# Patient Record
Sex: MALE | Race: WHITE | Employment: OTHER | URBAN - METROPOLITAN AREA
[De-identification: names, ages, dates, MRNs, and addresses within clinical notes are randomized per-mention and may not be internally consistent; named-entity substitution may affect disease eponyms.]

---

## 2017-01-02 PROBLEM — J30.2 SEASONAL ALLERGIC RHINITIS: Status: ACTIVE | Noted: 2017-01-02

## 2017-01-18 DIAGNOSIS — I10 ESSENTIAL HYPERTENSION: Primary | ICD-10-CM

## 2017-01-18 NOTE — TELEPHONE ENCOUNTER
CARVEDILOL 3.125MG    Last Written Prescription Date: 1/28/16  Last Fill Quantity: 180, # refills: 3  Last Office Visit with [facility], [facility] or [facility] prescribing provider: 10/25/16       POTASSIUM   Date Value Ref Range Status   12/29/2015 4.3 3.4 - 5.3 mmol/L Final     CREATININE   Date Value Ref Range Status   12/29/2015 0.80 0.66 - 1.25 mg/dL Final     BP Readings from Last 3 Encounters:   10/25/16 136/60   10/19/16 138/58   07/15/16 130/60

## 2017-01-19 RX ORDER — CARVEDILOL 3.12 MG/1
TABLET ORAL
Qty: 180 TABLET | Refills: 0 | Status: SHIPPED | OUTPATIENT
Start: 2017-01-19 | End: 2017-04-17

## 2017-01-22 DIAGNOSIS — I10 ESSENTIAL HYPERTENSION, BENIGN: Primary | ICD-10-CM

## 2017-01-23 NOTE — TELEPHONE ENCOUNTER
Cozaar 100mg      Last Written Prescription Date: 8/1/2016  Last Fill Quantity: 90, # refills: 1  Last Office Visit with [facility] or [facility] prescribing provider: 10/19/2016       POTASSIUM   Date Value Ref Range Status   12/29/2015 4.3 3.4 - 5.3 mmol/L Final     CREATININE   Date Value Ref Range Status   12/29/2015 0.80 0.66 - 1.25 mg/dL Final     BP Readings from Last 3 Encounters:   10/25/16 136/60   10/19/16 138/58   07/15/16 130/60

## 2017-01-24 RX ORDER — LOSARTAN POTASSIUM 100 MG/1
TABLET ORAL
Qty: 90 TABLET | Refills: 1 | Status: SHIPPED | OUTPATIENT
Start: 2017-01-24 | End: 2017-06-28

## 2017-01-24 NOTE — TELEPHONE ENCOUNTER
K and Cr overdue.  Pended CMP and routing to PCP.  Please add labs as needed.  Triage: call patient to set up lab only appt.

## 2017-02-16 DIAGNOSIS — N52.9 ED (ERECTILE DYSFUNCTION): ICD-10-CM

## 2017-02-16 RX ORDER — SILDENAFIL CITRATE 100 MG
TABLET ORAL
Qty: 12 TABLET | Refills: 2 | Status: SHIPPED | OUTPATIENT
Start: 2017-02-16 | End: 2018-01-04

## 2017-02-16 NOTE — TELEPHONE ENCOUNTER
sildenafil (VIAGRA) 100 MG tablet  Last Written Prescription Date: 01/13/2016  Last Fill Quantity: 12,  # refills: 3   Last Office Visit with [facility], [facility] or [facility] prescribing provider: 10/19/2016    Prescription approved per [facility] Refill Protocol.

## 2017-04-17 DIAGNOSIS — I10 ESSENTIAL HYPERTENSION: ICD-10-CM

## 2017-04-18 RX ORDER — CARVEDILOL 3.12 MG/1
TABLET ORAL
Qty: 180 TABLET | Refills: 0 | Status: SHIPPED | OUTPATIENT
Start: 2017-04-18 | End: 2017-11-07

## 2017-04-18 NOTE — TELEPHONE ENCOUNTER
carvedilol (COREG) 3.125 MG tablet     Last Written Prescription Date: 1/19/17  Last Fill Quantity: 180, # refills: 0    Last Office Visit with [facility], [facility] or [facility] prescribing provider:  10/19/16   Future Office Visit:        BP Readings from Last 3 Encounters:   10/25/16 136/60   10/19/16 138/58   07/15/16 130/60

## 2017-04-18 NOTE — TELEPHONE ENCOUNTER
Prescription approved per [facility], [facility] or [facility] refill protocol.  Tanya Teresa RN  [facility] [employer]

## 2017-06-27 ENCOUNTER — OFFICE VISIT (OUTPATIENT)
Dept: FAMILY MEDICINE | Facility: CLINIC | Age: 77
End: 2017-06-27
Payer: COMMERCIAL

## 2017-06-27 VITALS
SYSTOLIC BLOOD PRESSURE: 138 MMHG | BODY MASS INDEX: 35.36 KG/M2 | DIASTOLIC BLOOD PRESSURE: 60 MMHG | HEART RATE: 84 BPM | HEIGHT: 66 IN | WEIGHT: 220 LBS | RESPIRATION RATE: 16 BRPM

## 2017-06-27 DIAGNOSIS — K62.5 RECTAL BLEEDING: ICD-10-CM

## 2017-06-27 DIAGNOSIS — R19.7 DIARRHEA, UNSPECIFIED TYPE: Primary | ICD-10-CM

## 2017-06-27 LAB
BASOPHILS # BLD AUTO: 0 10E9/L (ref 0–0.2)
BASOPHILS NFR BLD AUTO: 0.1 %
DIFFERENTIAL METHOD BLD: ABNORMAL
EOSINOPHIL # BLD AUTO: 0.1 10E9/L (ref 0–0.7)
EOSINOPHIL NFR BLD AUTO: 1 %
ERYTHROCYTE [DISTWIDTH] IN BLOOD BY AUTOMATED COUNT: 13.3 % (ref 10–15)
HCT VFR BLD AUTO: 39.4 % (ref 40–53)
HGB BLD-MCNC: 13 G/DL (ref 13.3–17.7)
LYMPHOCYTES # BLD AUTO: 1.3 10E9/L (ref 0.8–5.3)
LYMPHOCYTES NFR BLD AUTO: 19.2 %
MCH RBC QN AUTO: 30.8 PG (ref 26.5–33)
MCHC RBC AUTO-ENTMCNC: 33 G/DL (ref 31.5–36.5)
MCV RBC AUTO: 93 FL (ref 78–100)
MONOCYTES # BLD AUTO: 0.7 10E9/L (ref 0–1.3)
MONOCYTES NFR BLD AUTO: 10.2 %
NEUTROPHILS # BLD AUTO: 4.7 10E9/L (ref 1.6–8.3)
NEUTROPHILS NFR BLD AUTO: 69.5 %
PLATELET # BLD AUTO: 194 10E9/L (ref 150–450)
RBC # BLD AUTO: 4.22 10E12/L (ref 4.4–5.9)
WBC # BLD AUTO: 6.8 10E9/L (ref 4–11)

## 2017-06-27 PROCEDURE — 36415 COLL VENOUS BLD VENIPUNCTURE: CPT | Performed by: FAMILY MEDICINE

## 2017-06-27 PROCEDURE — 85025 COMPLETE CBC W/AUTO DIFF WBC: CPT | Performed by: FAMILY MEDICINE

## 2017-06-27 PROCEDURE — 99213 OFFICE O/P EST LOW 20 MIN: CPT | Performed by: FAMILY MEDICINE

## 2017-06-27 NOTE — NURSING NOTE
"Chief Complaint   Patient presents with     Gastrointestinal Problem       Initial /60  Pulse 84  Resp 16  Ht 5' 6\" (1.676 m)  Wt 220 lb (99.8 kg)  BMI 35.51 kg/m2 Estimated body mass index is 35.51 kg/(m^2) as calculated from the following:    Height as of this encounter: 5' 6\" (1.676 m).    Weight as of this encounter: 220 lb (99.8 kg).  Medication Reconciliation: complete     Charlene Michele CMA      "

## 2017-06-27 NOTE — MR AVS SNAPSHOT
After Visit Summary   6/27/2017    Timmy Strange    MRN: [id number]           Patient Information     Date Of Birth          1940        Visit Information        Provider Department      6/27/2017 11:15 AM Samir Duque MD [facility]        Today's Diagnoses     Diarrhea, unspecified type    -  1    Rectal bleeding          Care Instructions    The patient has an appointment next Monday with colorectal surgeons.  I will have him go back on his Citrucel increasing the dose every couple of days until his stools become formed again.  We'll also increase his Imodium by taking 2 initially and then 1 after each loose stool up to 8 daily.  He is going to be getting himself scheduled for a physical later this month.  We did do a stool specimen for ova and parasites, his CBC and a test for Giardia.  Will treat further pending review of those tests.          Follow-ups after your visit        Future tests that were ordered for you today     Open Future Orders        Priority Expected Expires Ordered    Giardia antigen Routine  6/27/2018 6/27/2017    Ova and Parasite Exam Routine Routine  6/27/2018 6/27/2017            Who to contact     If you have questions or need follow up information about today's clinic visit or your schedule please contact [facility] directly at [phone].  Normal or non-critical lab and imaging results will be communicated to you by MyChart, letter or phone within 4 business days after the clinic has received the results. If you do not hear from us within 7 days, please contact the clinic through MyChart or phone. If you have a critical or abnormal lab result, we will notify you by phone as soon as possible.  Submit refill requests through [employer] or call your pharmacy and they will forward the refill request to us. Please allow 3 business days for your refill to be completed.          Additional Information  "About Your Visit        MyChart Information     [employer] gives you secure access to your electronic health record. If you see a primary care provider, you can also send messages to your care team and make appointments. If you have questions, please call your primary care clinic.  If you do not have a primary care provider, please call [phone] and they will assist you.        Care EveryWhere ID     This is your Care EveryWhere ID. This could be used by other organizations to access your [facility] medical records  [id number]        Your Vitals Were     Pulse Respirations Height BMI (Body Mass Index)          84 16 5' 6\" (1.676 m) 35.51 kg/m2         Blood Pressure from Last 3 Encounters:   06/27/17 138/60   10/25/16 136/60   10/19/16 138/58    Weight from Last 3 Encounters:   06/27/17 220 lb (99.8 kg)   10/25/16 222 lb 3.2 oz (100.8 kg)   10/19/16 223 lb (101.2 kg)              We Performed the Following     CBC with platelets differential          Today's Medication Changes          These changes are accurate as of: 6/27/17 12:34 PM.  If you have any questions, ask your nurse or doctor.               These medicines have changed or have updated prescriptions.        Dose/Directions    metFORMIN 500 MG tablet   Commonly known as:  GLUCOPHAGE   This may have changed:    - how much to take  - when to take this   Used for:  Type II diabetes mellitus with peripheral circulatory disorder (H)        Dose:  1000 mg   Take 2 tablets (1,000 mg) by mouth 2 times daily (with meals)   Quantity:  120 tablet   Refills:  2                Primary Care Provider Office Phone # Fax #    Samir Duque [phone]       [facility] [address]  [facility] [address]        Equal Access to Services     ABILIO CRESPO AH: Darron Marquis, wajustin cuenca, qaybta kaalramon campos, jessica jane. So [facility] [phone].    ATENCIÓN: Si jame kraus " a mckeon disposición servicios gratuitos de asistencia lingüística. Von drew [phone].    We comply with applicable federal civil rights laws and [place] laws. We do not discriminate on the basis of race, color, national origin, age, disability sex, sexual orientation or gender identity.            Thank you!     Thank you for choosing [facility]  for your care. Our goal is always to provide you with excellent care. Hearing back from our patients is one way we can continue to improve our services. Please take a few minutes to complete the written survey that you may receive in the mail after your visit with us. Thank you!             Your Updated Medication List - Protect others around you: Learn how to safely use, store and throw away your medicines at www.disposemymeds.org.          This list is accurate as of: 6/27/17 12:34 PM.  Always use your most recent med list.                   Brand Name Dispense Instructions for use Diagnosis    ALLEGRA PO           aspirin 81 MG tablet      Take 1 tablet by mouth daily.        carvedilol 3.125 MG tablet    COREG    180 tablet    TAKE 1 TABLET BY MOUTH TWICE DAILY    Essential hypertension       CENTRUM SILVER per tablet      Take 1 tablet by mouth daily        COLACE PO      Take 100 mg by mouth 2 times daily        finasteride 5 MG tablet    PROSCAR    90 tablet    TAKE 1 TABLET BY MOUTH EVERY DAY    Benign non-nodular prostatic hyperplasia without lower urinary tract symptoms       [facility] MOUTHWASH Susp     237 mL    Swish and swallow 5-10 mLs in mouth every 6 hours as needed    Tongue fissure, Other iron deficiency anemia, Need for prophylactic vaccination and inoculation against influenza       fluticasone 50 MCG/ACT spray    FLONASE    48 mL    INHALE 1 TO 2 SPRAYS IN EACH NOSTRIL EVERY DAY    Seasonal allergic rhinitis       insulin lispro protamine-insulin lispro injection    HumaLOG MIX 75/25 VIAL    1 vial    20-22 units  twice daily    Type II diabetes mellitus with peripheral circulatory disorder (H)       LEVOTHROID 88 MCG tablet   Generic drug:  levothyroxine      Take 88 mcg by mouth daily.        losartan 100 MG tablet    COZAAR    90 tablet    TAKE 1 TABLET BY MOUTH EVERY DAY    Essential hypertension, benign       lovastatin 40 MG tablet    MEVACOR    90 tablet    Take 1 tablet by mouth At Bedtime.    Hyperlipidemia LDL goal <70       metFORMIN 500 MG tablet    GLUCOPHAGE    120 tablet    Take 2 tablets (1,000 mg) by mouth 2 times daily (with meals)    Type II diabetes mellitus with peripheral circulatory disorder (H)       PROBIOTIC DAILY PO      Take 1 capsule by mouth daily        tamsulosin 0.4 MG capsule    FLOMAX    90 capsule    TAKE 1 CAPSULE BY MOUTH EVERY DAY    Benign non-nodular prostatic hyperplasia without lower urinary tract symptoms       VIAGRA 100 MG cap/tab   Generic drug:  sildenafil     12 tablet    TAKE 1 TABLET BY MOUTH 30 MINUTES TO 4 HOURS PRIOR TO ACTIVITY. MAX  MG PER 24 HOURS    ED (erectile dysfunction)

## 2017-06-27 NOTE — PROGRESS NOTES
SUBJECTIVE:                                                    Timmy Strange is a 76 year old male who presents to clinic today for the following health issues:      Gastrointestinal symptoms      Duration: ONGOING    Description:           BLEEDING - bright red blood in the stool    Intensity:  moderate    Accompanying signs and symptoms:  Diarrhea X 5 DAYS    History  Previous {similar problem: no   Previous evaluation:  GI consultation    Aggravating factors: none    Alleviating factors: Imodium     Other Therapies tried: None          Problem list and histories reviewed & adjusted, as indicated.  Additional history: patient did travel to a friend's cabin outside of [place] where they have been having some issues with well water.  He also had been to colorectal surgeons where they banded hemorrhoid and because he was having problems with constipation put him on Citrucel and MiraLAX.  He has stopped both once his diarrhea started.    Patient Active Problem List   Diagnosis     Essential hypertension     Hypothyroidism     Type II diabetes mellitus with peripheral circulatory disorder (H)     BPH (benign prostatic hyperplasia)     ED (erectile dysfunction)     Non morbid obesity due to excess calories: comorbid HTN< DM, hyperlipidemia     Mixed hyperlipidemia     Special screening for malignant neoplasm of prostate     Localized edema     Seasonal allergic rhinitis     Past Surgical History:   Procedure Laterality Date     COLONOSCOPY N/A 11/21/2014    Procedure: COMBINED COLONOSCOPY, SINGLE OR MULTIPLE BIOPSY/POLYPECTOMY BY BIOPSY;  Surgeon: Rolanda Bey MD;  Location:  GI     TONSILLECTOMY, ADENOIDECTOMY, COMBINED         Social History   Substance Use Topics     Smoking status: Former Smoker     Types: Pipe     Quit date: 11/15/2011     Smokeless tobacco: Never Used     Alcohol use No     Family History   Problem Relation Age of Onset     DIABETES Maternal Grandmother      DIABETES Maternal  "Grandfather      Arthritis Maternal Grandfather      Arthritis Father      Thyroid Disease Father      Glaucoma Mother      Alcohol/Drug Mother 49     cirrhosis     Glaucoma Maternal Aunt      DIABETES Daughter 44     type 2     Obesity Daughter      Coronary Artery Disease No family hx of      Hypertension No family hx of      Hyperlipidemia No family hx of      CEREBROVASCULAR DISEASE No family hx of      Breast Cancer No family hx of      Colon Cancer No family hx of      Prostate Cancer No family hx of      Other Cancer No family hx of      Depression No family hx of      Anxiety Disorder No family hx of      MENTAL ILLNESS No family hx of      Substance Abuse No family hx of      Anesthesia Reaction No family hx of      Asthma No family hx of      OSTEOPOROSIS No family hx of      Genetic Disorder No family hx of      Unknown/Adopted No family hx of            Reviewed and updated as needed this visit by clinical staff  Tobacco  Allergies  Meds  Med Hx  Surg Hx  Fam Hx  Soc Hx      Reviewed and updated as needed this visit by Provider         ROS:  CONSTITUTIONAL:NEGATIVE for fever, chills, change in weight  GI: POSITIVE for diarrhea and bright red blood around the stool but none in the stool and NEGATIVE for abdominal pain  and vomiting    OBJECTIVE:                                                    /60  Pulse 84  Resp 16  Ht 5' 6\" (1.676 m)  Wt 220 lb (99.8 kg)  BMI 35.51 kg/m2  Body mass index is 35.51 kg/(m^2).  GENERAL APPEARANCE: healthy, alert and no distress  ABDOMEN: Examination of the rectum shows what might be the remnants of his previous anal fissure.  I did not do a digital rectal exam today due to his previous bleeding issues.         ASSESSMENT/PLAN:                                                        ICD-10-CM    1. Diarrhea, unspecified type R19.7 Giardia antigen     Ova and Parasite Exam Routine     CBC with platelets differential   2. Rectal bleeding K62.5        Patient " Instructions   The patient has an appointment next Monday with colorectal surgeons.  I will have him go back on his Citrucel increasing the dose every couple of days until his stools become formed again.  We'll also increase his Imodium by taking 2 initially and then 1 after each loose stool up to 8 daily.  He is going to be getting himself scheduled for a physical later this month.  We did do a stool specimen for ova and parasites, his CBC and a test for Giardia.  Will treat further pending review of those tests.      Samir Duque MD  [facility]

## 2017-06-27 NOTE — PATIENT INSTRUCTIONS
The patient has an appointment next Monday with colorectal surgeons.  I will have him go back on his Citrucel increasing the dose every couple of days until his stools become formed again.  We'll also increase his Imodium by taking 2 initially and then 1 after each loose stool up to 8 daily.  He is going to be getting himself scheduled for a physical later this month.  We did do a stool specimen for ova and parasites, his CBC and a test for Giardia.  Will treat further pending review of those tests.

## 2017-06-28 DIAGNOSIS — R19.7 DIARRHEA, UNSPECIFIED TYPE: ICD-10-CM

## 2017-06-28 PROCEDURE — 87329 GIARDIA AG IA: CPT | Performed by: FAMILY MEDICINE

## 2017-06-28 PROCEDURE — 87209 SMEAR COMPLEX STAIN: CPT | Performed by: FAMILY MEDICINE

## 2017-06-28 PROCEDURE — 87177 OVA AND PARASITES SMEARS: CPT | Performed by: FAMILY MEDICINE

## 2017-06-29 LAB
G LAMBLIA AG STL QL IA: NORMAL
MICRO REPORT STATUS: NORMAL
MICRO REPORT STATUS: NORMAL
O+P STL MICRO: NORMAL
SPECIMEN SOURCE: NORMAL
SPECIMEN SOURCE: NORMAL

## 2017-09-26 DIAGNOSIS — I10 ESSENTIAL HYPERTENSION, BENIGN: ICD-10-CM

## 2017-09-26 NOTE — TELEPHONE ENCOUNTER
LOSARTAN 100MG TABLETS      Last Written Prescription Date: 6/29/2017  Last Fill Quantity: 30, # refills: 0  Last Office Visit with [facility], [facility] or [facility] prescribing provider: 6/27/2017       Potassium   Date Value Ref Range Status   12/29/2015 4.3 3.4 - 5.3 mmol/L Final     Creatinine   Date Value Ref Range Status   12/29/2015 0.80 0.66 - 1.25 mg/dL Final     BP Readings from Last 3 Encounters:   06/27/17 138/60   10/25/16 136/60   10/19/16 138/58

## 2017-09-27 RX ORDER — LOSARTAN POTASSIUM 100 MG/1
TABLET ORAL
Qty: 30 TABLET | Refills: 0 | Status: SHIPPED | OUTPATIENT
Start: 2017-09-27 | End: 2017-10-23

## 2017-10-04 DIAGNOSIS — N40.0 BENIGN NON-NODULAR PROSTATIC HYPERPLASIA WITHOUT LOWER URINARY TRACT SYMPTOMS: ICD-10-CM

## 2017-10-04 RX ORDER — TAMSULOSIN HYDROCHLORIDE 0.4 MG/1
CAPSULE ORAL
Qty: 90 CAPSULE | Refills: 2 | Status: SHIPPED | OUTPATIENT
Start: 2017-10-04 | End: 2018-08-21

## 2017-10-04 RX ORDER — FINASTERIDE 5 MG/1
TABLET, FILM COATED ORAL
Qty: 90 TABLET | Refills: 2 | Status: SHIPPED | OUTPATIENT
Start: 2017-10-04 | End: 2019-12-16

## 2017-10-04 NOTE — TELEPHONE ENCOUNTER
TAMSULOSIN 0.4MG CAPSULES         Last Written Prescription Date: 10/26/2016  Last Fill Quantity: 90, # refills: 3    Last Office Visit with [facility], [facility] or [facility] prescribing provider:  6/27/2017   Future Office Visit:      BP Readings from Last 3 Encounters:   06/27/17 138/60   10/25/16 136/60   10/19/16 138/58     FINASTERIDE 5MG TABLETS         Last Written Prescription Date: 10/26/2016  Last Fill Quantity: 90, # refills: 3    Last Office Visit with [facility], [facility] or  [facility] prescribing provider:  6/27/2017   Future Office Visit:      BP Readings from Last 3 Encounters:   06/27/17 138/60   10/25/16 136/60   10/19/16 138/58

## 2017-10-04 NOTE — TELEPHONE ENCOUNTER
Tamsulosin     Routing refill request to provider for review/approval because:  Drug interaction warning    Finasteride     Prescription approved per [facility] Refill Protocol.

## 2017-11-07 ENCOUNTER — OFFICE VISIT (OUTPATIENT)
Dept: FAMILY MEDICINE | Facility: CLINIC | Age: 77
End: 2017-11-07
Payer: COMMERCIAL

## 2017-11-07 VITALS
HEIGHT: 66 IN | SYSTOLIC BLOOD PRESSURE: 124 MMHG | RESPIRATION RATE: 16 BRPM | WEIGHT: 220 LBS | HEART RATE: 66 BPM | BODY MASS INDEX: 35.36 KG/M2 | DIASTOLIC BLOOD PRESSURE: 70 MMHG

## 2017-11-07 DIAGNOSIS — J30.2 CHRONIC SEASONAL ALLERGIC RHINITIS, UNSPECIFIED TRIGGER: ICD-10-CM

## 2017-11-07 DIAGNOSIS — E66.09 NON MORBID OBESITY DUE TO EXCESS CALORIES: ICD-10-CM

## 2017-11-07 DIAGNOSIS — E78.5 HYPERLIPIDEMIA LDL GOAL <70: ICD-10-CM

## 2017-11-07 DIAGNOSIS — R60.0 LOCALIZED EDEMA: ICD-10-CM

## 2017-11-07 DIAGNOSIS — I10 ESSENTIAL HYPERTENSION: ICD-10-CM

## 2017-11-07 DIAGNOSIS — N40.0 BENIGN PROSTATIC HYPERPLASIA WITHOUT LOWER URINARY TRACT SYMPTOMS: ICD-10-CM

## 2017-11-07 DIAGNOSIS — E78.2 MIXED HYPERLIPIDEMIA: ICD-10-CM

## 2017-11-07 DIAGNOSIS — Z00.00 ROUTINE MEDICAL EXAM: Primary | ICD-10-CM

## 2017-11-07 DIAGNOSIS — Z12.5 SCREENING FOR PROSTATE CANCER: ICD-10-CM

## 2017-11-07 DIAGNOSIS — E11.51 TYPE II DIABETES MELLITUS WITH PERIPHERAL CIRCULATORY DISORDER (H): ICD-10-CM

## 2017-11-07 DIAGNOSIS — Z13.89 SCREENING FOR DIABETIC PERIPHERAL NEUROPATHY: ICD-10-CM

## 2017-11-07 DIAGNOSIS — E03.9 ACQUIRED HYPOTHYROIDISM: ICD-10-CM

## 2017-11-07 LAB
ALBUMIN SERPL-MCNC: 3.7 G/DL (ref 3.4–5)
ALBUMIN UR-MCNC: NEGATIVE MG/DL
ALP SERPL-CCNC: 50 U/L (ref 40–150)
ALT SERPL W P-5'-P-CCNC: 23 U/L (ref 0–70)
ANION GAP SERPL CALCULATED.3IONS-SCNC: 6 MMOL/L (ref 3–14)
APPEARANCE UR: CLEAR
AST SERPL W P-5'-P-CCNC: 17 U/L (ref 0–45)
BASOPHILS # BLD AUTO: 0 10E9/L (ref 0–0.2)
BASOPHILS NFR BLD AUTO: 0.4 %
BILIRUB SERPL-MCNC: 1.2 MG/DL (ref 0.2–1.3)
BILIRUB UR QL STRIP: NEGATIVE
BUN SERPL-MCNC: 23 MG/DL (ref 7–30)
CALCIUM SERPL-MCNC: 8.9 MG/DL (ref 8.5–10.1)
CHLORIDE SERPL-SCNC: 105 MMOL/L (ref 94–109)
CHOLEST SERPL-MCNC: 162 MG/DL
CO2 SERPL-SCNC: 29 MMOL/L (ref 20–32)
COLOR UR AUTO: YELLOW
CREAT SERPL-MCNC: 0.86 MG/DL (ref 0.66–1.25)
DIFFERENTIAL METHOD BLD: ABNORMAL
EOSINOPHIL # BLD AUTO: 0.1 10E9/L (ref 0–0.7)
EOSINOPHIL NFR BLD AUTO: 2.2 %
ERYTHROCYTE [DISTWIDTH] IN BLOOD BY AUTOMATED COUNT: 14.6 % (ref 10–15)
GFR SERPL CREATININE-BSD FRML MDRD: 87 ML/MIN/1.7M2
GLUCOSE SERPL-MCNC: 118 MG/DL (ref 70–99)
GLUCOSE UR STRIP-MCNC: NEGATIVE MG/DL
HCT VFR BLD AUTO: 39.9 % (ref 40–53)
HDLC SERPL-MCNC: 52 MG/DL
HGB BLD-MCNC: 13.3 G/DL (ref 13.3–17.7)
HGB UR QL STRIP: NEGATIVE
KETONES UR STRIP-MCNC: ABNORMAL MG/DL
LDLC SERPL CALC-MCNC: 87 MG/DL
LEUKOCYTE ESTERASE UR QL STRIP: NEGATIVE
LYMPHOCYTES # BLD AUTO: 1.5 10E9/L (ref 0.8–5.3)
LYMPHOCYTES NFR BLD AUTO: 29.5 %
MCH RBC QN AUTO: 29.9 PG (ref 26.5–33)
MCHC RBC AUTO-ENTMCNC: 33.3 G/DL (ref 31.5–36.5)
MCV RBC AUTO: 90 FL (ref 78–100)
MONOCYTES # BLD AUTO: 0.8 10E9/L (ref 0–1.3)
MONOCYTES NFR BLD AUTO: 14.9 %
NEUTROPHILS # BLD AUTO: 2.7 10E9/L (ref 1.6–8.3)
NEUTROPHILS NFR BLD AUTO: 53 %
NITRATE UR QL: NEGATIVE
NONHDLC SERPL-MCNC: 110 MG/DL
PH UR STRIP: 6.5 PH (ref 5–7)
PLATELET # BLD AUTO: 190 10E9/L (ref 150–450)
POTASSIUM SERPL-SCNC: 4.4 MMOL/L (ref 3.4–5.3)
PROT SERPL-MCNC: 6.7 G/DL (ref 6.8–8.8)
PSA SERPL-ACNC: 0.79 UG/L (ref 0–4)
RBC # BLD AUTO: 4.45 10E12/L (ref 4.4–5.9)
RBC #/AREA URNS AUTO: ABNORMAL /HPF
SODIUM SERPL-SCNC: 140 MMOL/L (ref 133–144)
SOURCE: ABNORMAL
SP GR UR STRIP: 1.02 (ref 1–1.03)
TRIGL SERPL-MCNC: 115 MG/DL
TSH SERPL DL<=0.005 MIU/L-ACNC: 0.98 MU/L (ref 0.4–4)
UROBILINOGEN UR STRIP-ACNC: 0.2 EU/DL (ref 0.2–1)
WBC # BLD AUTO: 5 10E9/L (ref 4–11)
WBC #/AREA URNS AUTO: ABNORMAL /HPF

## 2017-11-07 PROCEDURE — G0439 PPPS, SUBSEQ VISIT: HCPCS | Performed by: FAMILY MEDICINE

## 2017-11-07 PROCEDURE — 80050 GENERAL HEALTH PANEL: CPT | Performed by: FAMILY MEDICINE

## 2017-11-07 PROCEDURE — 81001 URINALYSIS AUTO W/SCOPE: CPT | Performed by: FAMILY MEDICINE

## 2017-11-07 PROCEDURE — G0103 PSA SCREENING: HCPCS | Performed by: FAMILY MEDICINE

## 2017-11-07 PROCEDURE — 36415 COLL VENOUS BLD VENIPUNCTURE: CPT | Performed by: FAMILY MEDICINE

## 2017-11-07 PROCEDURE — 80061 LIPID PANEL: CPT | Performed by: FAMILY MEDICINE

## 2017-11-07 RX ORDER — CARVEDILOL 3.12 MG/1
3.12 TABLET ORAL 2 TIMES DAILY
Qty: 180 TABLET | Refills: 0 | Status: SHIPPED | OUTPATIENT
Start: 2017-11-07 | End: 2018-02-07

## 2017-11-07 RX ORDER — LOVASTATIN 40 MG
40 TABLET ORAL AT BEDTIME
Qty: 90 TABLET | Refills: 3 | Status: SHIPPED | OUTPATIENT
Start: 2017-11-07 | End: 2019-01-10

## 2017-11-07 NOTE — PROGRESS NOTES
SUBJECTIVE:   Timmy Strange is a 77 year old male who presents for Preventive Visit.  click delete buetton to remove this line now  click delete button to remove this line now  Are you in the first 12 months of your Medicare coverage?  No    Physical   Annual:     Getting at least 3 servings of Calcium per day::  Yes    Bi-annual eye exam::  Yes    Dental care twice a year::  Yes    Sleep apnea or symptoms of sleep apnea::  None    Diet::  Regular (no restrictions)    Frequency of exercise::  2-3 days/week    Duration of exercise::  15-30 minutes    Taking medications regularly::  Yes    Medication side effects::  None    Additional concerns today::  No      COGNITIVE SCREEN  1) Repeat 3 items (Banana, Sunrise, Chair)    2) Clock draw: NORMAL  3) 3 item recall: Recalls 3 objects  Results: 3 items recalled: COGNITIVE IMPAIRMENT LESS LIKELY    Mini-CogTM Copyright S Love. Licensed by the author for use in [facility]; reprinted with permission (ash@[facility]). All rights reserved.          Reviewed and updated as needed this visit by clinical staffTobacco  Allergies  Meds  Med Hx  Surg Hx  Fam Hx  Soc Hx        Reviewed and updated as needed this visit by Provider        Social History   Substance Use Topics     Smoking status: Former Smoker     Types: Pipe     Quit date: 11/15/2011     Smokeless tobacco: Never Used     Alcohol use No       The patient does not drink >3 drinks per day nor >7 drinks per week.      Today's PHQ-2 Score:   PHQ-2 ( 1999 [employer]) 2/27/2018   Q1: Little interest or pleasure in doing things 0   Q2: Feeling down, depressed or hopeless 0   PHQ-2 Score 0   Q1: Little interest or pleasure in doing things -   Q2: Feeling down, depressed or hopeless -   PHQ-2 Score -       Do you feel safe in your environment - Yes    Do you have a Health Care Directive?: Yes: Advance Directive has been received and scanned.    Current providers sharing in care for this patient include:    Patient Care Team:  Samir Duque MD as PCP - General (Family Practice)      Hearing impairment: Yes,    Ability to successfully perform activities of daily living: Yes, no assistance needed     Fall risk:  Fallen 2 or more times in the past year?: No  Any fall with injury in the past year?: No      Home safety:  none identified  click delete button to remove this line now    The following health maintenance items are reviewed in Epic and correct as of today:  Health Maintenance   Topic Date Due     AORTIC ANEURYSM SCREENING (SYSTEM ASSIGNED)  10/21/2007     EYE EXAM Q1 YEAR  05/01/2014     ADVANCE DIRECTIVE PLANNING Q5 YRS  12/26/2016     MICROALBUMIN Q1 YEAR  12/29/2016     A1C Q6 MO  01/15/2017     FOOT EXAM Q1 YEAR  07/15/2017     INFLUENZA VACCINE (1) 09/01/2018     CREATININE Q1 YEAR  11/07/2018     FALL RISK ASSESSMENT  11/07/2018     LIPID MONITORING Q1 YEAR  11/07/2018     PHQ-2 Q1 YR  02/27/2019     TSH W/ FREE T4 REFLEX Q2 YEAR  11/07/2019     COLONOSCOPY Q5 YR  11/21/2019     TETANUS IMMUNIZATION (SYSTEM ASSIGNED)  08/24/2020     PNEUMOCOCCAL  Completed     Patient Active Problem List   Diagnosis     Essential hypertension     Hypothyroidism     Type II diabetes mellitus with peripheral circulatory disorder (H)     BPH (benign prostatic hyperplasia)     ED (erectile dysfunction)     Non morbid obesity due to excess calories: comorbid HTN< DM, hyperlipidemia     Mixed hyperlipidemia     Special screening for malignant neoplasm of prostate     Localized edema     Seasonal allergic rhinitis     Screening for prostate cancer     Past Surgical History:   Procedure Laterality Date     COLONOSCOPY N/A 11/21/2014    Procedure: COMBINED COLONOSCOPY, SINGLE OR MULTIPLE BIOPSY/POLYPECTOMY BY BIOPSY;  Surgeon: Rolanda Bey MD;  Location:  GI     TONSILLECTOMY, ADENOIDECTOMY, COMBINED         Social History   Substance Use Topics     Smoking status: Former Smoker     Types: Pipe     Quit date:  "11/15/2011     Smokeless tobacco: Never Used     Alcohol use No     Family History   Problem Relation Age of Onset     Diabetes Maternal Grandmother      Diabetes Maternal Grandfather      Arthritis Maternal Grandfather      Arthritis Father      Thyroid Disease Father      Glaucoma Mother      Alcohol/Drug Mother 49     cirrhosis     Diabetes Daughter 44     type 2     Obesity Daughter      Glaucoma Maternal Aunt      Coronary Artery Disease No family hx of      Hypertension No family hx of      Hyperlipidemia No family hx of      Cerebrovascular Disease No family hx of      Breast Cancer No family hx of      Colon Cancer No family hx of      Prostate Cancer No family hx of      Other Cancer No family hx of      Depression No family hx of      Anxiety Disorder No family hx of      Mental Illness No family hx of      Substance Abuse No family hx of      Anesthesia Reaction No family hx of      Asthma No family hx of      Osteoperosis No family hx of      Genetic Disorder No family hx of      Unknown/Adopted No family hx of                Review of Systems  C: NEGATIVE for fever, chills, change in weight  I: NEGATIVE for worrisome rashes, moles or lesions  E: NEGATIVE for vision changes or irritation  E/M: NEGATIVE for ear, mouth and throat problems  R: NEGATIVE for significant cough or SOB  B: NEGATIVE for masses, tenderness or discharge  CV: NEGATIVE for chest pain, palpitations or peripheral edema  GI: NEGATIVE for nausea, abdominal pain, heartburn, or change in bowel habits  : NEGATIVE for frequency, dysuria, or hematuria  M: NEGATIVE for significant arthralgias or myalgia  N: NEGATIVE for weakness, dizziness or paresthesias  E: NEGATIVE for temperature intolerance, skin/hair changes  H: NEGATIVE for bleeding problems  P: NEGATIVE for changes in mood or affect    OBJECTIVE:   /70  Pulse 66  Resp 16  Ht 5' 6\" (1.676 m)  Wt 220 lb (99.8 kg)  BMI 35.51 kg/m2 Estimated body mass index is 35.51 kg/(m^2) " "as calculated from the following:    Height as of this encounter: 5' 6\" (1.676 m).    Weight as of this encounter: 220 lb (99.8 kg).  Physical Exam  GENERAL: healthy, alert and no distress  EYES: Eyes grossly normal to inspection, PERRL and conjunctivae and sclerae normal  HENT: ear canals and TM's normal, nose and mouth without ulcers or lesions  NECK: no adenopathy, no asymmetry, masses, or scars and thyroid normal to palpation  RESP: lungs clear to auscultation - no rales, rhonchi or wheezes  CV: regular rate and rhythm, normal S1 S2, no S3 or S4, no murmur, click or rub, no peripheral edema and peripheral pulses strong  ABDOMEN: soft, nontender, no hepatosplenomegaly, no masses and bowel sounds normal   (male): normal male genitalia without lesions or urethral discharge, no hernia  RECTAL: normal sphincter tone, no rectal masses, prostate normal size, smooth, nontender without nodules or masses  MS: no gross musculoskeletal defects noted, no edema  SKIN: no suspicious lesions or rashes  NEURO: Normal strength and tone, mentation intact and speech normal  PSYCH: mentation appears normal, affect normal/bright    ASSESSMENT / PLAN:       ICD-10-CM    1. Routine medical exam Z00.00    2. Screening for diabetic peripheral neuropathy Z13.89    3. Chronic seasonal allergic rhinitis, unspecified trigger J30.2    4. Non morbid obesity due to excess calories: comorbid HTN< DM, hyperlipidemia E66.09    5. Mixed hyperlipidemia E78.2    6. Type II diabetes mellitus with peripheral circulatory disorder (H) E11.51    7. Acquired hypothyroidism E03.9 TSH with free T4 reflex   8. Essential hypertension I10 UA with Microscopic     CBC with platelets differential     Comprehensive metabolic panel     Lipid Profile     DISCONTINUED: carvedilol (COREG) 3.125 MG tablet   9. Benign prostatic hyperplasia without lower urinary tract symptoms N40.0    10. Localized edema R60.0    11. Screening for prostate cancer Z12.5 Prostate spec " "antigen screen   12. Hyperlipidemia LDL goal <70 E78.5 lovastatin (MEVACOR) 40 MG tablet       End of Life Planning:  Patient currently has an advanced directive: Yes.  Practitioner is supportive of decision.    COUNSELING:  Reviewed preventive health counseling, as reflected in patient instructions       Vision screening       Prostate cancer screening        Estimated body mass index is 35.51 kg/(m^2) as calculated from the following:    Height as of this encounter: 5' 6\" (1.676 m).    Weight as of this encounter: 220 lb (99.8 kg).  Weight management plan: Discussed healthy diet and exercise guidelines and patient will follow up in 6 months in clinic to re-evaluate.   reports that he quit smoking about 6 years ago. His smoking use included Pipe. He has never used smokeless tobacco.        Appropriate preventive services were discussed with this patient, including applicable screening as appropriate for cardiovascular disease, diabetes, osteopenia/osteoporosis, and glaucoma.  As appropriate for age/gender, discussed screening for colorectal cancer, prostate cancer, breast cancer, and cervical cancer. Checklist reviewing preventive services available has been given to the patient.    Reviewed patients plan of care and provided an AVS. The Basic Care Plan (routine screening as documented in Health Maintenance) for Timmy meets the Care Plan requirement. This Care Plan has been established and reviewed with the Patient.    Counseling Resources:  ATP IV Guidelines  Pooled Cohorts Equation Calculator  Breast Cancer Risk Calculator  FRAX Risk Assessment  ICSI Preventive Guidelines  Dietary Guidelines for Americans, 2010  [employer] MyPlate  ASA Prophylaxis  Lung CA Screening    Samir Duque MD  [facility] XERXESAnswers for HPI/ROS submitted by the patient on 11/4/2017   PHQ-2 Score: 0    "

## 2017-11-07 NOTE — NURSING NOTE
"Chief Complaint   Patient presents with     Physical       Initial /70  Pulse 66  Resp 16  Ht 5' 6\" (1.676 m)  Wt 220 lb (99.8 kg)  BMI 35.51 kg/m2 Estimated body mass index is 35.51 kg/(m^2) as calculated from the following:    Height as of this encounter: 5' 6\" (1.676 m).    Weight as of this encounter: 220 lb (99.8 kg).  Medication Reconciliation: complete     Charlene Michele CMA      "

## 2017-11-07 NOTE — MR AVS SNAPSHOT
After Visit Summary   11/7/2017    Timmy Strange    MRN: [id number]           Patient Information     Date Of Birth          1940        Visit Information        Provider Department      11/7/2017 9:45 AM Samir Duque MD [facility]        Today's Diagnoses     Routine medical exam    -  1    Screening for diabetic peripheral neuropathy        Chronic seasonal allergic rhinitis, unspecified trigger        Non morbid obesity due to excess calories: comorbid HTN< DM, hyperlipidemia        Mixed hyperlipidemia        Type II diabetes mellitus with peripheral circulatory disorder (H)        Acquired hypothyroidism        Essential hypertension        Benign prostatic hyperplasia without lower urinary tract symptoms        Localized edema        Screening for prostate cancer        Hyperlipidemia LDL goal <70          Care Instructions      Preventive Health Recommendations:   Male Ages 65 and over    Yearly exam:             See your health care provider every year in order to  o   Review health changes.   o   Discuss preventive care.    o   Review your medicines if your doctor has prescribed any.    Talk with your health care provider about whether you should have a test to screen for prostate cancer (PSA).    Every 3 years, have a diabetes test (fasting glucose). If you are at risk for diabetes, you should have this test more often.    Every 5 years, have a cholesterol test. Have this test more often if you are at risk for high cholesterol or heart disease.     Every 10 years, have a colonoscopy. Or, have a yearly FIT test (stool test). These exams will check for colon cancer.    Talk to with your health care provider about screening for Abdominal Aortic Aneurysm if you have a family history of AAA or have a history of smoking.    Shots:     Get a flu shot each year.     Get a tetanus shot every 10 years.     Talk to your doctor about your pneumonia  vaccines. There are now two you should receive - Pneumovax (PPSV 23) and Prevnar (PCV 13).     Talk to your doctor about a shingles vaccine.     Talk to your doctor about the hepatitis B vaccine.  Nutrition:     Eat at least 5 servings of fruits and vegetables each day.     Eat whole-grain bread, whole-wheat pasta and brown rice instead of white grains and rice.     Talk to your provider about Calcium and Vitamin D.   Lifestyle    Exercise for at least 150 minutes a week (30 minutes a day, 5 days a week). This will help you control your weight and prevent disease.     Limit alcohol to one drink per day.     No smoking.     Wear sunscreen to prevent skin cancer.     See your dentist every six months for an exam and cleaning.     See your eye doctor every 1 to 2 years to screen for conditions such as glaucoma, macular degeneration, cataracts, etc           Follow-ups after your visit        Who to contact     If you have questions or need follow up information about today's clinic visit or your schedule please contact [facility] directly at [phone].  Normal or non-critical lab and imaging results will be communicated to you by [email or web address], letter or phone within 4 business days after the clinic has received the results. If you do not hear from us within 7 days, please contact the clinic through [employer] or phone. If you have a critical or abnormal lab result, we will notify you by phone as soon as possible.  Submit refill requests through [employer] or call your pharmacy and they will forward the refill request to us. Please allow 3 business days for your refill to be completed.          Additional Information About Your Visit        [employer] Information     [employer] gives you secure access to your electronic health record. If you see a primary care provider, you can also send messages to your care team and make appointments. If you have questions, please call your primary care clinic.  If you  "do not have a primary care provider, please call [phone] and they will assist you.        Care EveryWhere ID     This is your Care EveryWhere ID. This could be used by other organizations to access your [place] medical records  [id number]        Your Vitals Were     Pulse Respirations Height BMI (Body Mass Index)          66 16 5' 6\" (1.676 m) 35.51 kg/m2         Blood Pressure from Last 3 Encounters:   11/07/17 124/70   06/27/17 138/60   10/25/16 136/60    Weight from Last 3 Encounters:   11/07/17 220 lb (99.8 kg)   06/27/17 220 lb (99.8 kg)   10/25/16 222 lb 3.2 oz (100.8 kg)              We Performed the Following     CBC with platelets differential     Comprehensive metabolic panel     Lipid Profile     Prostate spec antigen screen     TSH with free T4 reflex     UA with Microscopic          Today's Medication Changes          These changes are accurate as of: 11/7/17 10:15 AM.  If you have any questions, ask your nurse or doctor.               These medicines have changed or have updated prescriptions.        Dose/Directions    carvedilol 3.125 MG tablet   Commonly known as:  COREG   This may have changed:  See the new instructions.   Used for:  Essential hypertension   Changed by:  Samir Duque MD        Dose:  3.125 mg   Take 1 tablet (3.125 mg) by mouth 2 times daily   Quantity:  180 tablet   Refills:  0       metFORMIN 500 MG tablet   Commonly known as:  GLUCOPHAGE   This may have changed:    - how much to take  - when to take this   Used for:  Type II diabetes mellitus with peripheral circulatory disorder (H)        Dose:  1000 mg   Take 2 tablets (1,000 mg) by mouth 2 times daily (with meals)   Quantity:  120 tablet   Refills:  2            Where to get your medicines      These medications were sent to [id number] - [place], [place] - [address] [facility] [address]  [address], [place] [address]     Phone:  [phone]     carvedilol 3.125 MG tablet    " lovastatin 40 MG tablet                Primary Care Provider Office Phone # Fax #    Samir Duque [phone]       [address] [facility] [address]        Equal Access to Services     ABILIO CRESPO : Hadii aad ku hadlilyo Soomaali, waaxda luqadaha, qaybta kaalmada adeegyada, waxjustin idiin carlosn ademarino dominguez lasanam jane. So [facility] [phone].    ATENCIÓN: Si habla español, tiene a mckeon disposición servicios gratuitos de asistencia lingüística. Llame al [phone].    We comply with applicable federal civil rights laws and [place] laws. We do not discriminate on the basis of race, color, national origin, age, disability, sex, sexual orientation, or gender identity.            Thank you!     Thank you for choosing [facility]  for your care. Our goal is always to provide you with excellent care. Hearing back from our patients is one way we can continue to improve our services. Please take a few minutes to complete the written survey that you may receive in the mail after your visit with us. Thank you!             Your Updated Medication List - Protect others around you: Learn how to safely use, store and throw away your medicines at www.disposemymeds.org.          This list is accurate as of: 11/7/17 10:15 AM.  Always use your most recent med list.                   Brand Name Dispense Instructions for use Diagnosis    ALLEGRA PO           aspirin 81 MG tablet      Take 1 tablet by mouth daily.        carvedilol 3.125 MG tablet    COREG    180 tablet    Take 1 tablet (3.125 mg) by mouth 2 times daily    Essential hypertension       CENTRUM SILVER per tablet      Take 1 tablet by mouth daily        COLACE PO      Take 100 mg by mouth 2 times daily        finasteride 5 MG tablet    PROSCAR    90 tablet    TAKE 1 TABLET BY MOUTH EVERY DAY    Benign non-nodular prostatic hyperplasia without lower urinary tract symptoms       [facility] MOUTHWASH Susp     237 mL     Swish and swallow 5-10 mLs in mouth every 6 hours as needed    Tongue fissure, Other iron deficiency anemia, Need for prophylactic vaccination and inoculation against influenza       fluticasone 50 MCG/ACT spray    FLONASE    48 mL    INHALE 1 TO 2 SPRAYS IN EACH NOSTRIL EVERY DAY    Seasonal allergic rhinitis       insulin lispro protamine-insulin lispro injection    HumaLOG MIX 75/25 VIAL    1 vial    20-22 units twice daily    Type II diabetes mellitus with peripheral circulatory disorder (H)       LEVOTHROID 88 MCG tablet   Generic drug:  levothyroxine      Take 88 mcg by mouth daily.        losartan 100 MG tablet    COZAAR    30 tablet    TAKE 1 TABLET BY MOUTH EVERY DAY    Essential hypertension, benign       lovastatin 40 MG tablet    MEVACOR    90 tablet    Take 1 tablet (40 mg) by mouth At Bedtime    Hyperlipidemia LDL goal <70       metFORMIN 500 MG tablet    GLUCOPHAGE    120 tablet    Take 2 tablets (1,000 mg) by mouth 2 times daily (with meals)    Type II diabetes mellitus with peripheral circulatory disorder (H)       PROBIOTIC DAILY PO      Take 1 capsule by mouth daily        tamsulosin 0.4 MG capsule    FLOMAX    90 capsule    TAKE 1 CAPSULE BY MOUTH EVERY DAY    Benign non-nodular prostatic hyperplasia without lower urinary tract symptoms       VIAGRA 100 MG tablet   Generic drug:  sildenafil     12 tablet    TAKE 1 TABLET BY MOUTH 30 MINUTES TO 4 HOURS PRIOR TO ACTIVITY. MAX  MG PER 24 HOURS    ED (erectile dysfunction)

## 2017-11-08 NOTE — PROGRESS NOTES
Dear Timmy,    Your tests were all normal. A copy of your tests are available in My Chart.    Glad to see you at your appointment.  If you have any questions feel free to call.      Sincerely,      TOO Hardy.

## 2017-11-18 DIAGNOSIS — I10 ESSENTIAL HYPERTENSION, BENIGN: ICD-10-CM

## 2017-11-20 RX ORDER — LOSARTAN POTASSIUM 100 MG/1
TABLET ORAL
Qty: 90 TABLET | Refills: 3 | Status: SHIPPED | OUTPATIENT
Start: 2017-11-20 | End: 2018-11-14

## 2017-11-20 NOTE — TELEPHONE ENCOUNTER
Last OV 11/07/2017.   Prescription approved per [facility] Refill Protocol.    Requested Prescriptions   Pending Prescriptions Disp Refills     losartan (COZAAR) 100 MG tablet [Pharmacy Med Name: LOSARTAN 100MG TABLETS] 30 tablet 0     Sig: TAKE 1 TABLET BY MOUTH EVERY DAY    Angiotensin-II Receptors Passed    11/18/2017  9:52 AM       Passed - Blood pressure under 140/90 in past 12 months.    BP Readings from Last 3 Encounters:   11/07/17 124/70   06/27/17 138/60   10/25/16 136/60                Passed - Recent or future visit with authorizing provider's specialty    Patient had office visit in the last year or has a visit in the next 30 days with authorizing provider.  See chart review.              Passed - Patient is age 18 or older       Passed - Normal serum creatinine on file in past 12 months    Recent Labs   Lab Test  11/07/17   1014   CR  0.86            Passed - Normal serum potassium on file in past 12 months    Recent Labs   Lab Test  11/07/17   1014   POTASSIUM  4.4

## 2018-01-02 ENCOUNTER — MYC MEDICAL ADVICE (OUTPATIENT)
Dept: FAMILY MEDICINE | Facility: CLINIC | Age: 78
End: 2018-01-02

## 2018-01-02 DIAGNOSIS — N52.9 ERECTILE DYSFUNCTION, UNSPECIFIED ERECTILE DYSFUNCTION TYPE: ICD-10-CM

## 2018-01-04 RX ORDER — SILDENAFIL 100 MG/1
TABLET, FILM COATED ORAL
Qty: 12 TABLET | Refills: 2 | Status: SHIPPED | OUTPATIENT
Start: 2018-01-04 | End: 2019-01-15

## 2018-02-07 DIAGNOSIS — I10 ESSENTIAL HYPERTENSION: ICD-10-CM

## 2018-02-08 RX ORDER — CARVEDILOL 3.12 MG/1
TABLET ORAL
Qty: 180 TABLET | Refills: 2 | Status: SHIPPED | OUTPATIENT
Start: 2018-02-08 | End: 2018-12-17

## 2018-02-08 NOTE — TELEPHONE ENCOUNTER
"Requested Prescriptions   Pending Prescriptions Disp Refills     carvedilol (COREG) 3.125 MG tablet [Pharmacy Med Name: CARVEDILOL 3.125MG TABLETS]  Last Written Prescription Date:  11/7/17  Last Fill Quantity: 180,  # refills: 0   Last Office Visit  11/7/2017        with  [facility], P or [facility] prescribing provider:     Future Office Visit:        180 tablet 0     Sig: TAKE ONE TABLET BY MOUTH TWICE DAILY    Beta-Blockers Protocol Passed    2/7/2018  9:57 AM       Passed - Blood pressure under 140/90    BP Readings from Last 3 Encounters:   11/07/17 124/70   06/27/17 138/60   10/25/16 136/60                Passed - Patient is age 6 or older       Passed - Recent or future visit with authorizing provider's specialty    Patient had office visit in the last year or has a visit in the next 30 days with authorizing provider.  See \"Patient Info\" tab in inbasket, or \"Choose Columns\" in Meds & Orders section of the refill encounter.               "

## 2018-02-27 ENCOUNTER — OFFICE VISIT (OUTPATIENT)
Dept: FAMILY MEDICINE | Facility: CLINIC | Age: 78
End: 2018-02-27
Payer: COMMERCIAL

## 2018-02-27 VITALS
OXYGEN SATURATION: 98 % | BODY MASS INDEX: 36 KG/M2 | HEART RATE: 74 BPM | RESPIRATION RATE: 16 BRPM | HEIGHT: 66 IN | DIASTOLIC BLOOD PRESSURE: 60 MMHG | WEIGHT: 224 LBS | TEMPERATURE: 97.8 F | SYSTOLIC BLOOD PRESSURE: 134 MMHG

## 2018-02-27 DIAGNOSIS — J06.9 UPPER RESPIRATORY TRACT INFECTION, UNSPECIFIED TYPE: Primary | ICD-10-CM

## 2018-02-27 PROCEDURE — 99213 OFFICE O/P EST LOW 20 MIN: CPT | Performed by: FAMILY MEDICINE

## 2018-02-27 RX ORDER — CODEINE PHOSPHATE AND GUAIFENESIN 10; 100 MG/5ML; MG/5ML
1 SOLUTION ORAL EVERY 4 HOURS PRN
Qty: 120 ML | Refills: 1 | Status: SHIPPED | OUTPATIENT
Start: 2018-02-27 | End: 2019-12-16

## 2018-02-27 NOTE — NURSING NOTE
"Chief Complaint   Patient presents with     URI       Initial /60 (BP Location: Left arm)  Pulse 74  Temp 97.8  F (36.6  C) (Tympanic)  Resp 16  Ht 5' 6\" (1.676 m)  Wt 224 lb (101.6 kg)  SpO2 98%  BMI 36.15 kg/m2 Estimated body mass index is 36.15 kg/(m^2) as calculated from the following:    Height as of this encounter: 5' 6\" (1.676 m).    Weight as of this encounter: 224 lb (101.6 kg).  Medication Reconciliation: completecomplete    Charlene Michele CMA      "

## 2018-02-27 NOTE — PATIENT INSTRUCTIONS
We will treat symptomatically.  Patient will push fluids.  He can continue with Mucinex during the day.  I gave him Robitussin with codeine to take at night.  He did have some urinary retention issues when he was on larger doses of codeine in the past.  Therefore, he will only use this at bedtime.  Tylenol or Advil can be used as needed.  Follow-up will be if not improving.

## 2018-02-27 NOTE — PROGRESS NOTES
SUBJECTIVE:   Timmy Strange is a 77 year old male who presents to clinic today for the following health issues:      RESPIRATORY SYMPTOMS      Duration: 1 week    Description  Cough- productive, fever, chills, myalgias and chest congestion    Severity: moderate    Accompanying signs and symptoms: None    History (predisposing factors: none    Precipitating or alleviating factors: None    Therapies tried and outcome:  oral decongestant- mucinex dm helps some          Problem list and histories reviewed & adjusted, as indicated.  Additional history: as documented    Patient Active Problem List   Diagnosis     Essential hypertension     Hypothyroidism     Type II diabetes mellitus with peripheral circulatory disorder (H)     BPH (benign prostatic hyperplasia)     ED (erectile dysfunction)     Non morbid obesity due to excess calories: comorbid HTN< DM, hyperlipidemia     Mixed hyperlipidemia     Special screening for malignant neoplasm of prostate     Localized edema     Seasonal allergic rhinitis     Screening for prostate cancer     Past Surgical History:   Procedure Laterality Date     COLONOSCOPY N/A 11/21/2014    Procedure: COMBINED COLONOSCOPY, SINGLE OR MULTIPLE BIOPSY/POLYPECTOMY BY BIOPSY;  Surgeon: Rolanda Bey MD;  Location:  GI     TONSILLECTOMY, ADENOIDECTOMY, COMBINED         Social History   Substance Use Topics     Smoking status: Former Smoker     Types: Pipe     Quit date: 11/15/2011     Smokeless tobacco: Never Used     Alcohol use No     Family History   Problem Relation Age of Onset     DIABETES Maternal Grandmother      DIABETES Maternal Grandfather      Arthritis Maternal Grandfather      Arthritis Father      Thyroid Disease Father      Glaucoma Mother      Alcohol/Drug Mother 49     cirrhosis     DIABETES Daughter 44     type 2     Obesity Daughter      Glaucoma Maternal Aunt      Coronary Artery Disease No family hx of      Hypertension No family hx of      Hyperlipidemia No  "family hx of      CEREBROVASCULAR DISEASE No family hx of      Breast Cancer No family hx of      Colon Cancer No family hx of      Prostate Cancer No family hx of      Other Cancer No family hx of      Depression No family hx of      Anxiety Disorder No family hx of      MENTAL ILLNESS No family hx of      Substance Abuse No family hx of      Anesthesia Reaction No family hx of      Asthma No family hx of      OSTEOPOROSIS No family hx of      Genetic Disorder No family hx of      Unknown/Adopted No family hx of            Reviewed and updated as needed this visit by clinical staff  Tobacco  Allergies  Meds  Med Hx  Surg Hx  Fam Hx  Soc Hx      Reviewed and updated as needed this visit by Provider         ROS:  Constitutional, HEENT, cardiovascular, pulmonary, gi and gu systems are negative, except as otherwise noted.    OBJECTIVE:                                                    /60 (BP Location: Left arm)  Pulse 74  Temp 97.8  F (36.6  C) (Tympanic)  Resp 16  Ht 5' 6\" (1.676 m)  Wt 224 lb (101.6 kg)  SpO2 98%  BMI 36.15 kg/m2  Body mass index is 36.15 kg/(m^2).  GENERAL APPEARANCE: healthy, alert and no distress  EYES: Eyes grossly normal to inspection, PERRL and conjunctivae and sclerae normal  HENT: ear canals and TM's normal and nose and mouth without ulcers or lesions  NECK: no adenopathy and no asymmetry, masses, or scars  RESP: lungs clear to auscultation - no rales, rhonchi or wheezes  CV: regular rates and rhythm, normal S1 S2, no S3 or S4 and no murmur, click or rub  LYMPHATICS: no cervical adenopathy         ASSESSMENT/PLAN:                                                        ICD-10-CM    1. Upper respiratory tract infection, unspecified type J06.9 guaiFENesin-codeine (ROBITUSSIN AC) 100-10 MG/5ML SOLN solution       Patient Instructions   We will treat symptomatically.  Patient will push fluids.  He can continue with Mucinex during the day.  I gave him Robitussin with codeine to " take at night.  He did have some urinary retention issues when he was on larger doses of codeine in the past.  Therefore, he will only use this at bedtime.  Tylenol or Advil can be used as needed.  Follow-up will be if not improving.      Samir Duque MD  [facility]

## 2018-02-27 NOTE — MR AVS SNAPSHOT
After Visit Summary   2/27/2018    Timmy Strange    MRN: [id number]           Patient Information     Date Of Birth          1940        Visit Information        Provider Department      2/27/2018 1:30 PM Samir Duque MD [facility]        Today's Diagnoses     Upper respiratory tract infection, unspecified type    -  1      Care Instructions    We will treat symptomatically.  Patient will push fluids.  He can continue with Mucinex during the day.  I gave him Robitussin with codeine to take at night.  He did have some urinary retention issues when he was on larger doses of codeine in the past.  Therefore, he will only use this at bedtime.  Tylenol or Advil can be used as needed.  Follow-up will be if not improving.          Follow-ups after your visit        Who to contact     If you have questions or need follow up information about today's clinic visit or your schedule please contact [facility] directly at [phone].  Normal or non-critical lab and imaging results will be communicated to you by Gustohart, letter or phone within 4 business days after the clinic has received the results. If you do not hear from us within 7 days, please contact the clinic through eriQoot or phone. If you have a critical or abnormal lab result, we will notify you by phone as soon as possible.  Submit refill requests through AwoX or call your pharmacy and they will forward the refill request to us. Please allow 3 business days for your refill to be completed.          Additional Information About Your Visit        Gustohart Information     AwoX gives you secure access to your electronic health record. If you see a primary care provider, you can also send messages to your care team and make appointments. If you have questions, please call your primary care clinic.  If you do not have a primary care provider, please call [phone] and they  "will assist you.        Care EveryWhere ID     This is your Care EveryWhere ID. This could be used by other organizations to access your [facility] medical records  [id number]        Your Vitals Were     Pulse Temperature Respirations Height Pulse Oximetry BMI (Body Mass Index)    74 97.8  F (36.6  C) (Tympanic) 16 5' 6\" (1.676 m) 98% 36.15 kg/m2       Blood Pressure from Last 3 Encounters:   02/27/18 134/60   11/07/17 124/70   06/27/17 138/60    Weight from Last 3 Encounters:   02/27/18 224 lb (101.6 kg)   11/07/17 220 lb (99.8 kg)   06/27/17 220 lb (99.8 kg)              Today, you had the following     No orders found for display         Today's Medication Changes          These changes are accurate as of 2/27/18  1:51 PM.  If you have any questions, ask your nurse or doctor.               Start taking these medicines.        Dose/Directions    guaiFENesin-codeine 100-10 MG/5ML Soln solution   Commonly known as:  ROBITUSSIN AC   Used for:  Upper respiratory tract infection, unspecified type   Started by:  Samir Duque MD        Dose:  1 tsp.   Take 5 mLs by mouth every 4 hours as needed for cough   Quantity:  120 mL   Refills:  1         These medicines have changed or have updated prescriptions.        Dose/Directions    insulin lispro protamine-insulin lispro injection   Commonly known as:  HumaLOG MIX 75/25 VIAL   This may have changed:  additional instructions   Used for:  Type II diabetes mellitus with peripheral circulatory disorder (H)        20-22 units twice daily   Quantity:  1 vial   Refills:  2       metFORMIN 500 MG tablet   Commonly known as:  GLUCOPHAGE   This may have changed:    - how much to take  - when to take this   Used for:  Type II diabetes mellitus with peripheral circulatory disorder (H)        Dose:  1000 mg   Take 2 tablets (1,000 mg) by mouth 2 times daily (with meals)   Quantity:  120 tablet   Refills:  2            Where to get your medicines      Some of these will need " a paper prescription and others can be bought over the counter.  Ask your nurse if you have questions.     Bring a paper prescription for each of these medications     guaiFENesin-codeine 100-10 MG/5ML Soln solution                Primary Care Provider Office Phone # Fax #    Samir Duque [phone] [address] [facility] [address]        Equal Access to Services     ABILIO CRESPO : Hadii aad ku hadasho Soomaali, waaxda luqadaha, qaybta kaalmada adeegyada, waxay idiin hayaan adeeg kharash la'aan . So [facility] [phone].    ATENCIÓN: Si habla español, tiene a mckeon disposición servicios gratuitos de asistencia lingüística. Llame al [phone].    We comply with applicable federal civil rights laws and [place] laws. We do not discriminate on the basis of race, color, national origin, age, disability, sex, sexual orientation, or gender identity.            Thank you!     Thank you for choosing [facility]  for your care. Our goal is always to provide you with excellent care. Hearing back from our patients is one way we can continue to improve our services. Please take a few minutes to complete the written survey that you may receive in the mail after your visit with us. Thank you!             Your Updated Medication List - Protect others around you: Learn how to safely use, store and throw away your medicines at www.disposemymeds.org.          This list is accurate as of 2/27/18  1:51 PM.  Always use your most recent med list.                   Brand Name Dispense Instructions for use Diagnosis    ALLEGRA PO           aspirin 81 MG tablet      Take 1 tablet by mouth daily.        carvedilol 3.125 MG tablet    COREG    180 tablet    TAKE ONE TABLET BY MOUTH TWICE DAILY    Essential hypertension       CENTRUM SILVER per tablet      Take 1 tablet by mouth daily        COLACE PO      Take 100 mg by mouth 2 times daily        * finasteride 5 MG tablet     PROSCAR    90 tablet    TAKE 1 TABLET BY MOUTH EVERY DAY    Benign non-nodular prostatic hyperplasia without lower urinary tract symptoms       * finasteride 5 MG tablet    PROSCAR    90 tablet    TAKE 1 TABLET BY MOUTH EVERY DAY    Benign non-nodular prostatic hyperplasia without lower urinary tract symptoms       [facility] MOUTHWASH Susp     237 mL    Swish and swallow 5-10 mLs in mouth every 6 hours as needed    Tongue fissure, Other iron deficiency anemia, Need for prophylactic vaccination and inoculation against influenza       fluticasone 50 MCG/ACT spray    FLONASE    48 mL    INHALE 1 TO 2 SPRAYS IN EACH NOSTRIL EVERY DAY    Seasonal allergic rhinitis       guaiFENesin-codeine 100-10 MG/5ML Soln solution    ROBITUSSIN AC    120 mL    Take 5 mLs by mouth every 4 hours as needed for cough    Upper respiratory tract infection, unspecified type       insulin lispro protamine-insulin lispro injection    HumaLOG MIX 75/25 VIAL    1 vial    20-22 units twice daily    Type II diabetes mellitus with peripheral circulatory disorder (H)       LEVOTHROID 88 MCG tablet   Generic drug:  levothyroxine      Take 88 mcg by mouth daily.        losartan 100 MG tablet    COZAAR    90 tablet    TAKE 1 TABLET BY MOUTH EVERY DAY    Essential hypertension, benign       lovastatin 40 MG tablet    MEVACOR    90 tablet    Take 1 tablet (40 mg) by mouth At Bedtime    Hyperlipidemia LDL goal <70       metFORMIN 500 MG tablet    GLUCOPHAGE    120 tablet    Take 2 tablets (1,000 mg) by mouth 2 times daily (with meals)    Type II diabetes mellitus with peripheral circulatory disorder (H)       PROBIOTIC DAILY PO      Take 1 capsule by mouth daily        sildenafil 100 MG tablet    VIAGRA    12 tablet    TAKE 1 TABLET BY MOUTH 30 MINUTES TO 4 HOURS PRIOR TO ACTIVITY. MAX  MG PER 24 HOURS    Erectile dysfunction, unspecified erectile dysfunction type       tamsulosin 0.4 MG capsule    FLOMAX    90 capsule    TAKE 1 CAPSULE BY MOUTH  EVERY DAY    Benign non-nodular prostatic hyperplasia without lower urinary tract symptoms       * Notice:  This list has 2 medication(s) that are the same as other medications prescribed for you. Read the directions carefully, and ask your doctor or other care provider to review them with you.

## 2018-02-28 ENCOUNTER — MYC MEDICAL ADVICE (OUTPATIENT)
Dept: FAMILY MEDICINE | Facility: CLINIC | Age: 78
End: 2018-02-28

## 2018-02-28 DIAGNOSIS — J06.9 UPPER RESPIRATORY TRACT INFECTION, UNSPECIFIED TYPE: Primary | ICD-10-CM

## 2018-02-28 RX ORDER — AZITHROMYCIN 250 MG/1
TABLET, FILM COATED ORAL
Qty: 6 TABLET | Refills: 0 | Status: SHIPPED | OUTPATIENT
Start: 2018-02-28 | End: 2018-03-05

## 2018-03-27 DIAGNOSIS — N40.0 BENIGN NON-NODULAR PROSTATIC HYPERPLASIA WITHOUT LOWER URINARY TRACT SYMPTOMS: ICD-10-CM

## 2018-03-27 NOTE — TELEPHONE ENCOUNTER
"Requested Prescriptions   Pending Prescriptions Disp Refills     finasteride (PROSCAR) 5 MG tablet [Pharmacy Med Name: FINASTERIDE 5MG TABLETS]  Last Written Prescription Date:  12/30/17  Last Fill Quantity: 90,  # refills: 0   Last office visit: 2/27/2018 with prescribing provider:  Dunia   Future Office Visit:     90 tablet 0     Sig: TAKE 1 TABLET BY MOUTH EVERY DAY    Alpha Blockers Failed    3/27/2018 10:17 AM       Failed - Patient does not have Tadalafil, Vardenafil, or Sildenafil on their medication list       Passed - Blood pressure under 140/90 in past 12 months    BP Readings from Last 3 Encounters:   02/27/18 134/60   11/07/17 124/70   06/27/17 138/60                Passed - Recent (12 mo) or future (30 days) visit within the authorizing provider's specialty    Patient had office visit in the last 12 months or has a visit in the next 30 days with authorizing provider or within the authorizing provider's specialty.  See \"Patient Info\" tab in inbasket, or \"Choose Columns\" in Meds & Orders section of the refill encounter.           Passed - Patient is 18 years of age or older          "

## 2018-03-28 RX ORDER — FINASTERIDE 5 MG/1
TABLET, FILM COATED ORAL
Qty: 90 TABLET | Refills: 3 | Status: SHIPPED | OUTPATIENT
Start: 2018-03-28 | End: 2019-08-01

## 2018-03-28 NOTE — TELEPHONE ENCOUNTER
Routing refill request to provider for review/approval because:  Drug interaction warning- Viagra on med list

## 2018-05-15 DIAGNOSIS — J30.2 SEASONAL ALLERGIC RHINITIS: ICD-10-CM

## 2018-05-16 NOTE — TELEPHONE ENCOUNTER
"Requested Prescriptions   Pending Prescriptions Disp Refills     fluticasone (FLONASE) 50 MCG/ACT spray [Pharmacy Med Name: FLUTICASONE 50MCG NASAL SP (120) RX]  Last Written Prescription Date:  1/2/17  Last Fill Quantity: 48 mL,  # refills: 3   Last office visit: 2/27/2018 with prescribing provider:  Dunia   Future Office Visit:     48 mL 0     Sig: SHAKE LIQUID AND USE 1 TO 2 SPRAYS IN EACH NOSTRIL EVERY DAY    Inhaled Steroids Protocol Passed    5/15/2018  7:54 PM       Passed - Patient is age 12 or older       Passed - Recent (12 mo) or future (30 days) visit within the authorizing provider's specialty    Patient had office visit in the last 12 months or has a visit in the next 30 days with authorizing provider or within the authorizing provider's specialty.  See \"Patient Info\" tab in inbasket, or \"Choose Columns\" in Meds & Orders section of the refill encounter.              "

## 2018-05-17 RX ORDER — FLUTICASONE PROPIONATE 50 MCG
SPRAY, SUSPENSION (ML) NASAL
Qty: 48 ML | Refills: 3 | Status: SHIPPED | OUTPATIENT
Start: 2018-05-17 | End: 2019-06-30

## 2018-05-17 NOTE — TELEPHONE ENCOUNTER
Prescription approved per [facility] Refill Protocol.  Angela Chanel RN- Triage [employer]

## 2018-08-21 DIAGNOSIS — N40.0 BENIGN NON-NODULAR PROSTATIC HYPERPLASIA WITHOUT LOWER URINARY TRACT SYMPTOMS: ICD-10-CM

## 2018-08-21 NOTE — TELEPHONE ENCOUNTER
"Requested Prescriptions   Pending Prescriptions Disp Refills     tamsulosin (FLOMAX) 0.4 MG capsule [Pharmacy Med Name: TAMSULOSIN 0.4MG CAPSULES]  Last Written Prescription Date:  10/4/17  Last Fill Quantity: 90,  # refills: 2   Last office visit: 2/27/2018 with prescribing provider:  Dunia   Future Office Visit:     90 capsule 0     Sig: TAKE 1 CAPSULE BY MOUTH EVERY DAY    Alpha Blockers Failed    8/21/2018  9:52 AM       Failed - Patient does not have Tadalafil, Vardenafil, or Sildenafil on their medication list       Passed - Blood pressure under 140/90 in past 12 months    BP Readings from Last 3 Encounters:   02/27/18 134/60   11/07/17 124/70   06/27/17 138/60                Passed - Recent (12 mo) or future (30 days) visit within the authorizing provider's specialty    Patient had office visit in the last 12 months or has a visit in the next 30 days with authorizing provider or within the authorizing provider's specialty.  See \"Patient Info\" tab in inbasket, or \"Choose Columns\" in Meds & Orders section of the refill encounter.           Passed - Patient is 18 years of age or older          "

## 2018-08-22 RX ORDER — TAMSULOSIN HYDROCHLORIDE 0.4 MG/1
CAPSULE ORAL
Qty: 90 CAPSULE | Refills: 0 | Status: SHIPPED | OUTPATIENT
Start: 2018-08-22 | End: 2018-11-14

## 2018-08-22 NOTE — TELEPHONE ENCOUNTER
Routing refill request to provider for review/approval because:  Patient has sildenafil on medication list.    Denise Ayala RN  [employer] Triage

## 2018-10-03 ENCOUNTER — OFFICE VISIT (OUTPATIENT)
Dept: FAMILY MEDICINE | Facility: CLINIC | Age: 78
End: 2018-10-03
Payer: COMMERCIAL

## 2018-10-03 VITALS
TEMPERATURE: 98.6 F | SYSTOLIC BLOOD PRESSURE: 130 MMHG | HEART RATE: 76 BPM | HEIGHT: 66 IN | OXYGEN SATURATION: 96 % | WEIGHT: 217 LBS | BODY MASS INDEX: 34.87 KG/M2 | DIASTOLIC BLOOD PRESSURE: 60 MMHG | RESPIRATION RATE: 16 BRPM

## 2018-10-03 DIAGNOSIS — H40.9 GLAUCOMA OF BOTH EYES, UNSPECIFIED GLAUCOMA TYPE: ICD-10-CM

## 2018-10-03 DIAGNOSIS — E78.2 MIXED HYPERLIPIDEMIA: ICD-10-CM

## 2018-10-03 DIAGNOSIS — J01.90 ACUTE SINUSITIS WITH COEXISTING CONDITION REQUIRING PROPHYLACTIC TREATMENT: Primary | ICD-10-CM

## 2018-10-03 DIAGNOSIS — E11.51 TYPE II DIABETES MELLITUS WITH PERIPHERAL CIRCULATORY DISORDER (H): ICD-10-CM

## 2018-10-03 DIAGNOSIS — I10 ESSENTIAL HYPERTENSION: ICD-10-CM

## 2018-10-03 DIAGNOSIS — H02.30 EXCESS SKIN OF EYELID, UNSPECIFIED LATERALITY: ICD-10-CM

## 2018-10-03 DIAGNOSIS — E66.01 MORBID OBESITY (H): ICD-10-CM

## 2018-10-03 PROBLEM — K64.4 RESIDUAL HEMORRHOIDAL SKIN TAGS: Status: ACTIVE | Noted: 2017-07-03

## 2018-10-03 PROBLEM — R03.0 ELEVATED BLOOD PRESSURE READING WITHOUT DIAGNOSIS OF HYPERTENSION: Status: ACTIVE | Noted: 2017-07-03

## 2018-10-03 PROCEDURE — 99214 OFFICE O/P EST MOD 30 MIN: CPT | Performed by: FAMILY MEDICINE

## 2018-10-03 RX ORDER — LATANOPROST 50 UG/ML
1 SOLUTION/ DROPS OPHTHALMIC DAILY
COMMUNITY

## 2018-10-03 RX ORDER — AMOXICILLIN 500 MG/1
1000 CAPSULE ORAL 3 TIMES DAILY
Qty: 60 CAPSULE | Refills: 0 | Status: SHIPPED | OUTPATIENT
Start: 2018-10-03 | End: 2018-10-13

## 2018-10-03 NOTE — PROGRESS NOTES
SUBJECTIVE:   Timmy Strange is a 77 year old male who presents to clinic today for the following health issues:    ENT Symptoms             Symptoms: cc Present Absent Comment   Fever/Chills  x     Fatigue  x     Muscle Aches  x     Eye Irritation  x     Sneezing   x    Nasal Pablo/Drg  x     Sinus Pressure/Pain  x     Loss of smell   x    Dental pain   x    Sore Throat  x     Swollen Glands   x    Ear Pain/Fullness  x     Cough  x     Wheeze  x     Chest Pain   x    Shortness of breath   x    Rash   x    Other   x      Symptom duration:  09/29/18   Symptom severity:  Moderate and Severe   Treatments tried:  Mucinex and Ibuprofen   Contacts:  Home     Diabetes Follow-up    Patient is checking blood sugars: twice daily.    Blood sugar testing frequency justification: On insulin, frequency appropriate   Results are as follows:         am - 100-130         suppertime - same    Diabetic concerns: None     Symptoms of hypoglycemia (low blood sugar): none     Paresthesias (numbness or burning in feet) or sores: No     Date of last diabetic eye exam: 2018    Diabetes Management Resources    Hyperlipidemia Follow-Up      Rate your low fat/cholesterol diet?: good    Taking statin?  Yes, no muscle aches from statin    Other lipid medications/supplements?:  none    Hypertension Follow-up      Outpatient blood pressures are not being checked.    Low Salt Diet: no added salt    BP Readings from Last 2 Encounters:   10/03/18 130/60   02/27/18 134/60     Hemoglobin A1C (%)   Date Value   07/15/2016 6.6 (H)   12/29/2015 7.1 (H)     LDL Cholesterol Calculated (mg/dL)   Date Value   11/07/2017 87   07/15/2016 58       Problem list and histories reviewed & adjusted, as indicated.  Additional history: as documented    Patient Active Problem List   Diagnosis     Essential hypertension     Hypothyroidism     Type II diabetes mellitus with peripheral circulatory disorder (H)     BPH (benign prostatic hyperplasia)     ED (erectile  "dysfunction)     Non morbid obesity due to excess calories: comorbid HTN< DM, hyperlipidemia     Mixed hyperlipidemia     Special screening for malignant neoplasm of prostate     Localized edema     Seasonal allergic rhinitis     Screening for prostate cancer     Elevated blood pressure reading without diagnosis of hypertension     Residual hemorrhoidal skin tags     Obesity (BMI 35.0-39.9) with comorbidity (H)     Excess skin of eyelid, unspecified laterality     Glaucoma of both eyes, unspecified glaucoma type     Past Surgical History:   Procedure Laterality Date     COLONOSCOPY N/A 11/21/2014    Procedure: COMBINED COLONOSCOPY, SINGLE OR MULTIPLE BIOPSY/POLYPECTOMY BY BIOPSY;  Surgeon: Rolanda Bey MD;  Location:  GI     TONSILLECTOMY, ADENOIDECTOMY, COMBINED         Social History   Substance Use Topics     Smoking status: Former Smoker     Types: Pipe     Quit date: 11/15/2011     Smokeless tobacco: Never Used     Alcohol use No     Family History   Problem Relation Age of Onset     Diabetes Maternal Grandmother      Diabetes Maternal Grandfather      Arthritis Maternal Grandfather      Arthritis Father      Thyroid Disease Father      Glaucoma Mother      Alcohol/Drug Mother 49     cirrhosis     Diabetes Daughter 44     type 2     Obesity Daughter      Glaucoma Maternal Aunt            Reviewed and updated as needed this visit by clinical staff  Tobacco  Allergies  Meds  Med Hx  Surg Hx  Fam Hx  Soc Hx      Reviewed and updated as needed this visit by Provider         ROS:  Constitutional, HEENT, cardiovascular, pulmonary, gi and gu systems are negative, except as otherwise noted.    OBJECTIVE:                                                    /60  Pulse 76  Temp 98.6  F (37  C) (Tympanic)  Resp 16  Ht 5' 6\" (1.676 m)  Wt 217 lb (98.4 kg)  SpO2 96%  BMI 35.02 kg/m2  Body mass index is 35.02 kg/(m^2).  GENERAL APPEARANCE: healthy, alert and no distress  EYES: Eyes grossly normal to " inspection, PERRL, conjunctivae and sclerae normal and droopy eyelids  HENT: ear canals and TM's normal, nose and mouth without ulcers or lesions, frontal sinus tenderness bilateral and maxillary sinus tenderness bilateral  NECK: no adenopathy  RESP: lungs clear to auscultation - no rales, rhonchi or wheezes  LYMPHATICS: no cervical adenopathy         ASSESSMENT/PLAN:                                                        ICD-10-CM    1. Acute sinusitis with coexisting condition requiring prophylactic treatment J01.90 amoxicillin (AMOXIL) 500 MG capsule   2. Mixed hyperlipidemia E78.2 Lipid Profile   3. Type II diabetes mellitus with peripheral circulatory disorder (H) E11.51 FOOT EXAM  NO CHARGE [21769.114]     HEMOGLOBIN A1C     Albumin Random Urine Quantitative with Creat Ratio     TSH   4. Essential hypertension I10 UA with Microscopic     CBC with platelets differential     Comprehensive metabolic panel   5. Obesity (BMI 35.0-39.9) with comorbidity (H) E66.01    6. Excess skin of eyelid, unspecified laterality H02.30     Bilateral   7. Glaucoma of both eyes, unspecified glaucoma type H40.9      Return in about 6 weeks (around 11/14/2018) for Preop.  Patient Instructions   I placed the patient on amoxicillin 1000 mg 3 times daily for the next 10 days.  He will treat symptomatically.  Salt water nasal spray.  He could use a [place] pot.  His wife and his 2 grandchildren who he was around with this past weekend are all 3 are on antibiotics.  Tylenol  or Advil as needed for fever or pain.  Follow-up will be as needed if not improving.    Patient is overdue for his testing for his diabetes, his cholesterol and follow-up on his medications.  I put in future orders for a lipid profile, hemoglobin A1c, microalbumin, TSH, urinalysis, CBC and a CMP.    Patient was recently at the eye doctor and diagnosed with glaucoma and excessive eyelid tissue.  He is going to have surgery done in December for his eyelids.  Within 30  days of that he will make an appointment for his preoperative exam.  We can review his blood tests at that time.  I do not think he will need any blood tests within 30 days of his surgery due to the minor nature of eyelid surgery.      Samir Duque MD  [facility]

## 2018-10-03 NOTE — NURSING NOTE
"Chief Complaint   Patient presents with     URI     /60  Pulse 76  Temp 98.6  F (37  C) (Tympanic)  Resp 16  Ht 5' 6\" (1.676 m)  Wt 217 lb (98.4 kg)  SpO2 96%  BMI 35.02 kg/m2 Estimated body mass index is 35.02 kg/(m^2) as calculated from the following:    Height as of this encounter: 5' 6\" (1.676 m).    Weight as of this encounter: 217 lb (98.4 kg).  BP completed using cuff size: shilpa Heaton CMA    Health Maintenance Due   Topic Date Due     AORTIC ANEURYSM SCREENING (SYSTEM ASSIGNED)  10/21/2007     EYE EXAM Q1 YEAR  05/01/2014     ADVANCE DIRECTIVE PLANNING Q5 YRS  12/26/2016     MICROALBUMIN Q1 YEAR  12/29/2016     A1C Q6 MO  01/15/2017     FOOT EXAM Q1 YEAR  07/15/2017     INFLUENZA VACCINE (1) 09/01/2018     Health Maintenance reviewed at today's visit patient asked to schedule/complete:   Diabetes:  Patient agrees to schedule  Immunizations:  Patient agrees to schedule    "

## 2018-10-03 NOTE — PATIENT INSTRUCTIONS
I placed the patient on amoxicillin 1000 mg 3 times daily for the next 10 days.  He will treat symptomatically.  Salt water nasal spray.  He could use a [place] pot.  His wife and his 2 grandchildren who he was around with this past weekend are all 3 are on antibiotics.  Tylenol  or Advil as needed for fever or pain.  Follow-up will be as needed if not improving.    Patient is overdue for his testing for his diabetes, his cholesterol and follow-up on his medications.  I put in future orders for a lipid profile, hemoglobin A1c, microalbumin, TSH, urinalysis, CBC and a CMP.    Patient was recently at the eye doctor and diagnosed with glaucoma and excessive eyelid tissue.  He is going to have surgery done in December for his eyelids.  Within 30 days of that he will make an appointment for his preoperative exam.  We can review his blood tests at that time.  I do not think he will need any blood tests within 30 days of his surgery due to the minor nature of eyelid surgery.

## 2018-10-03 NOTE — MR AVS SNAPSHOT
After Visit Summary   10/3/2018    Timmy Strange    MRN: [id number]           Patient Information     Date Of Birth          1940        Visit Information        Provider Department      10/3/2018 1:45 PM Samir Duque MD [facility]        Today's Diagnoses     Acute sinusitis with coexisting condition requiring prophylactic treatment    -  1    Mixed hyperlipidemia        Type II diabetes mellitus with peripheral circulatory disorder (H)        Essential hypertension        Obesity (BMI 35.0-39.9) with comorbidity (H)        Excess skin of eyelid, unspecified laterality        Glaucoma of both eyes, unspecified glaucoma type          Care Instructions    I placed the patient on amoxicillin 1000 mg 3 times daily for the next 10 days.  He will treat symptomatically.  Salt water nasal spray.  He could use a [place] pot.  His wife and his 2 grandchildren who he was around with this past weekend are all 3 are on antibiotics.  Tylenol  or Advil as needed for fever or pain.  Follow-up will be as needed if not improving.    Patient is overdue for his testing for his diabetes, his cholesterol and follow-up on his medications.  I put in future orders for a lipid profile, hemoglobin A1c, microalbumin, TSH, urinalysis, CBC and a CMP.    Patient was recently at the eye doctor and diagnosed with glaucoma and excessive eyelid tissue.  He is going to have surgery done in December for his eyelids.  Within 30 days of that he will make an appointment for his preoperative exam.  We can review his blood tests at that time.  I do not think she will need any blood tests within 30 days of his surgery due to the minor nature of eyelid surgery.          Follow-ups after your visit        Your next 10 appointments already scheduled     Nov 14, 2018 11:15 AM CST   Pre-Op physical with Samir Duque MD   [facility] ([facility]  [facility]    [address] [address]   [phone]              Future tests that were ordered for you today     Open Future Orders        Priority Expected Expires Ordered    FOOT EXAM  NO CHARGE [23015.114] Routine  11/10/2018 10/3/2018    HEMOGLOBIN A1C Routine  11/10/2018 10/3/2018    Albumin Random Urine Quantitative with Creat Ratio Routine  11/10/2018 10/3/2018    UA with Microscopic Routine  11/10/2018 10/3/2018    CBC with platelets differential Routine  11/10/2018 10/3/2018    Comprehensive metabolic panel Routine  11/10/2018 10/3/2018    Lipid Profile Routine  11/10/2018 10/3/2018    TSH Routine  11/10/2018 10/3/2018            Who to contact     If you have questions or need follow up information about today's clinic visit or your schedule please contact [facility] directly at [phone].  Normal or non-critical lab and imaging results will be communicated to you by Pradamahart, letter or phone within 4 business days after the clinic has received the results. If you do not hear from us within 7 days, please contact the clinic through Ohanaet or phone. If you have a critical or abnormal lab result, we will notify you by phone as soon as possible.  Submit refill requests through DueProps or call your pharmacy and they will forward the refill request to us. Please allow 3 business days for your refill to be completed.          Additional Information About Your Visit        Pradamahart Information     DueProps gives you secure access to your electronic health record. If you see a primary care provider, you can also send messages to your care team and make appointments. If you have questions, please call your primary care clinic.  If you do not have a primary care provider, please call [phone] and they will assist you.        Care EveryWhere ID     This is your Care EveryWhere ID. This could be used by other organizations to access your  "[place] medical records  [id number]        Your Vitals Were     Pulse Temperature Respirations Height Pulse Oximetry BMI (Body Mass Index)    76 98.6  F (37  C) (Tympanic) 16 5' 6\" (1.676 m) 96% 35.02 kg/m2       Blood Pressure from Last 3 Encounters:   10/03/18 130/60   02/27/18 134/60   11/07/17 124/70    Weight from Last 3 Encounters:   10/03/18 217 lb (98.4 kg)   02/27/18 224 lb (101.6 kg)   11/07/17 220 lb (99.8 kg)                 Today's Medication Changes          These changes are accurate as of 10/3/18  2:44 PM.  If you have any questions, ask your nurse or doctor.               Start taking these medicines.        Dose/Directions    amoxicillin 500 MG capsule   Commonly known as:  AMOXIL   Used for:  Acute sinusitis with coexisting condition requiring prophylactic treatment   Started by:  Samir Duque MD        Dose:  1000 mg   Take 2 capsules (1,000 mg) by mouth 3 times daily for 10 days   Quantity:  60 capsule   Refills:  0         These medicines have changed or have updated prescriptions.        Dose/Directions    insulin lispro protamine-insulin lispro injection   Commonly known as:  HumaLOG MIX 75/25 VIAL   This may have changed:  additional instructions   Used for:  Type II diabetes mellitus with peripheral circulatory disorder (H)        20-22 units twice daily   Quantity:  1 vial   Refills:  2       metFORMIN 500 MG tablet   Commonly known as:  GLUCOPHAGE   This may have changed:    - how much to take  - when to take this   Used for:  Type II diabetes mellitus with peripheral circulatory disorder (H)        Dose:  1000 mg   Take 2 tablets (1,000 mg) by mouth 2 times daily (with meals)   Quantity:  120 tablet   Refills:  2            Where to get your medicines      These medications were sent to [id number] - [place], [place] - [address] [facility] [address]  [address], [place] [address]     Phone:  [phone]     amoxicillin 500 MG capsule          "       Primary Care Provider Office Phone # Fax #    Samir Duque [phone]       [address] [facility] [address]        Equal Access to Services     ABILIO CRESPO : Darron schmid efremo Soomaali, waaxda luqadaha, qaybta kaalmada adeegyada, jessica dominguez laSarkisaretha jane. So [facility] [phone].    ATENCIÓN: Si habla español, tiene a mckeon disposición servicios gratuitos de asistencia lingüística. Llame al [phone].    We comply with applicable federal civil rights laws and [place] laws. We do not discriminate on the basis of race, color, national origin, age, disability, sex, sexual orientation, or gender identity.            Thank you!     Thank you for choosing [facility]  for your care. Our goal is always to provide you with excellent care. Hearing back from our patients is one way we can continue to improve our services. Please take a few minutes to complete the written survey that you may receive in the mail after your visit with us. Thank you!             Your Updated Medication List - Protect others around you: Learn how to safely use, store and throw away your medicines at www.disposemymeds.org.          This list is accurate as of 10/3/18  2:44 PM.  Always use your most recent med list.                   Brand Name Dispense Instructions for use Diagnosis    ALLEGRA PO           amoxicillin 500 MG capsule    AMOXIL    60 capsule    Take 2 capsules (1,000 mg) by mouth 3 times daily for 10 days    Acute sinusitis with coexisting condition requiring prophylactic treatment       aspirin 81 MG tablet      Take 1 tablet by mouth daily.        carvedilol 3.125 MG tablet    COREG    180 tablet    TAKE ONE TABLET BY MOUTH TWICE DAILY    Essential hypertension       CENTRUM SILVER per tablet      Take 1 tablet by mouth daily        COLACE PO      Take 100 mg by mouth 2 times daily        * finasteride 5 MG tablet    PROSCAR    90 tablet     TAKE 1 TABLET BY MOUTH EVERY DAY    Benign non-nodular prostatic hyperplasia without lower urinary tract symptoms       * finasteride 5 MG tablet    PROSCAR    90 tablet    TAKE 1 TABLET BY MOUTH EVERY DAY    Benign non-nodular prostatic hyperplasia without lower urinary tract symptoms       [facility] MOUTHWASH Susp     237 mL    Swish and swallow 5-10 mLs in mouth every 6 hours as needed    Tongue fissure, Other iron deficiency anemia, Need for prophylactic vaccination and inoculation against influenza       fluticasone 50 MCG/ACT spray    FLONASE    48 mL    SHAKE LIQUID AND USE 1 TO 2 SPRAYS IN EACH NOSTRIL EVERY DAY    Seasonal allergic rhinitis       guaiFENesin-codeine 100-10 MG/5ML Soln solution    ROBITUSSIN AC    120 mL    Take 5 mLs by mouth every 4 hours as needed for cough    Upper respiratory tract infection, unspecified type       insulin lispro protamine-insulin lispro injection    HumaLOG MIX 75/25 VIAL    1 vial    20-22 units twice daily    Type II diabetes mellitus with peripheral circulatory disorder (H)       latanoprost 0.005 % ophthalmic solution    XALATAN     Place 1 drop into both eyes daily        LEVOTHROID 88 MCG tablet   Generic drug:  levothyroxine      Take 88 mcg by mouth daily.        losartan 100 MG tablet    COZAAR    90 tablet    TAKE 1 TABLET BY MOUTH EVERY DAY    Essential hypertension, benign       lovastatin 40 MG tablet    MEVACOR    90 tablet    Take 1 tablet (40 mg) by mouth At Bedtime    Hyperlipidemia LDL goal <70       metFORMIN 500 MG tablet    GLUCOPHAGE    120 tablet    Take 2 tablets (1,000 mg) by mouth 2 times daily (with meals)    Type II diabetes mellitus with peripheral circulatory disorder (H)       PROBIOTIC DAILY PO      Take 1 capsule by mouth daily        sildenafil 100 MG tablet    VIAGRA    12 tablet    TAKE 1 TABLET BY MOUTH 30 MINUTES TO 4 HOURS PRIOR TO ACTIVITY. MAX  MG PER 24 HOURS    Erectile dysfunction, unspecified erectile dysfunction  type       tamsulosin 0.4 MG capsule    FLOMAX    90 capsule    TAKE 1 CAPSULE BY MOUTH EVERY DAY    Benign non-nodular prostatic hyperplasia without lower urinary tract symptoms       * Notice:  This list has 2 medication(s) that are the same as other medications prescribed for you. Read the directions carefully, and ask your doctor or other care provider to review them with you.

## 2018-10-04 ENCOUNTER — MYC MEDICAL ADVICE (OUTPATIENT)
Dept: FAMILY MEDICINE | Facility: CLINIC | Age: 78
End: 2018-10-04

## 2018-10-19 ENCOUNTER — MYC MEDICAL ADVICE (OUTPATIENT)
Dept: FAMILY MEDICINE | Facility: CLINIC | Age: 78
End: 2018-10-19

## 2018-10-22 ENCOUNTER — OFFICE VISIT (OUTPATIENT)
Dept: FAMILY MEDICINE | Facility: CLINIC | Age: 78
End: 2018-10-22
Payer: COMMERCIAL

## 2018-10-22 VITALS
OXYGEN SATURATION: 97 % | WEIGHT: 220 LBS | RESPIRATION RATE: 16 BRPM | TEMPERATURE: 98.1 F | DIASTOLIC BLOOD PRESSURE: 70 MMHG | BODY MASS INDEX: 35.51 KG/M2 | HEART RATE: 64 BPM | SYSTOLIC BLOOD PRESSURE: 124 MMHG

## 2018-10-22 DIAGNOSIS — J32.0 CHRONIC MAXILLARY SINUSITIS: Primary | ICD-10-CM

## 2018-10-22 PROCEDURE — 99213 OFFICE O/P EST LOW 20 MIN: CPT | Performed by: FAMILY MEDICINE

## 2018-10-22 RX ORDER — DOXYCYCLINE 100 MG/1
100 TABLET ORAL 2 TIMES DAILY
Qty: 28 TABLET | Refills: 0 | Status: SHIPPED | OUTPATIENT
Start: 2018-10-22 | End: 2018-11-05

## 2018-10-22 NOTE — MR AVS SNAPSHOT
After Visit Summary   10/22/2018    Timmy Strange    MRN: [id number]           Patient Information     Date Of Birth          1940        Visit Information        Provider Department      10/22/2018 3:15 PM Samir Duque MD [facility]        Today's Diagnoses     Chronic maxillary sinusitis    -  1      Care Instructions    We had a discussion about treatment for his sinus condition.  We discussed potentially going on a 2-week course of antibiotics versus getting a CT scan of the sinuses done at the present time.  The patient has almost annually had a spring and fall sinus infection.  We talked about trying a daily antihistamine as [place].  With further discussion the patient opted to go on a 2-week course of doxycycline and a daily dose of fexofenadine.  If not improving over the next 2 weeks we will get a CT scan done of his sinuses.  He will continue with symptomatic treatment otherwise.  He may try a [place] pot.          Follow-ups after your visit        Follow-up notes from your care team     Return in about 2 weeks (around 11/5/2018) for Sinus congestion.      Your next 10 appointments already scheduled     Oct 30, 2018  8:45 AM CDT   LAB with [facility]   [facility] ([address] [address] [phone]           OUTSIDE LABS: Please include name of facility and Physician that is requesting outside labs be drawn.  Please indicate if labs are fasting or non-fasting on appt notes.  Be as specific as you can on which labs are being drawn.            Nov 14, 2018 11:15 AM CST   Pre-Op physical with Samir Duque MD   [facility] ([facility])    [address] [place] [address]    [phone]              Who to contact     If you have questions or need follow up information about today's clinic visit or your schedule please contact [facility] directly at [phone].  Normal or non-critical lab and imaging results will be communicated to you by [email or web address], letter or phone within 4 business days after the clinic has received the results. If you do not hear from us within 7 days, please contact the clinic through [email or web address] or phone. If you have a critical or abnormal lab result, we will notify you by phone as soon as possible.  Submit refill requests through Caliopa or call your pharmacy and they will forward the refill request to us. Please allow 3 business days for your refill to be completed.          Additional Information About Your Visit        [email or web address] Information     Caliopa gives you secure access to your electronic health record. If you see a primary care provider, you can also send messages to your care team and make appointments. If you have questions, please call your primary care clinic.  If you do not have a primary care provider, please call [phone] and they will assist you.        Care EveryWhere ID     This is your Care EveryWhere ID. This could be used by other organizations to access your [place] medical records  [id number]        Your Vitals Were     Pulse Temperature Respirations Pulse Oximetry BMI (Body Mass Index)       64 98.1  F (36.7  C) (Tympanic) 16 97% 35.51 kg/m2        Blood Pressure from Last 3 Encounters:   10/22/18 124/70   10/03/18 130/60   02/27/18 134/60    Weight from Last 3 Encounters:   10/22/18 220 lb (99.8 kg)   10/03/18 217 lb (98.4 kg)   02/27/18 224 lb (101.6 kg)              Today, you had the following     No orders found for display         Today's Medication Changes          These changes are accurate as of 10/22/18 11:59 PM.  If you have any questions, ask your nurse or doctor.               Start taking  these medicines.        Dose/Directions    doxycycline Monohydrate 100 MG Tabs   Used for:  Chronic maxillary sinusitis   Started by:  Samir Duque MD        Dose:  100 mg   Take 100 mg by mouth 2 times daily for 14 days   Quantity:  28 tablet   Refills:  0         These medicines have changed or have updated prescriptions.        Dose/Directions    insulin lispro protamine-insulin lispro injection   Commonly known as:  HumaLOG MIX 75/25 VIAL   This may have changed:  additional instructions   Used for:  Type II diabetes mellitus with peripheral circulatory disorder (H)        20-22 units twice daily   Quantity:  1 vial   Refills:  2       metFORMIN 500 MG tablet   Commonly known as:  GLUCOPHAGE   This may have changed:    - how much to take  - when to take this   Used for:  Type II diabetes mellitus with peripheral circulatory disorder (H)        Dose:  1000 mg   Take 2 tablets (1,000 mg) by mouth 2 times daily (with meals)   Quantity:  120 tablet   Refills:  2            Where to get your medicines      These medications were sent to [facility] [address] [place] [address] [place] [address] AT [facility] [address] & [facility] [address]  [address] [place] [address], [email or web address] [address]     Phone:  [phone]     doxycycline Monohydrate 100 MG Tabs                Primary Care Provider Office Phone # Fax #    Samir Duque [phone] [address] [facility] [address]        Equal Access to Services     ABILIO CRESPO AH: Hadii bob schmid hadasho Soluis danielali, waaxda luqadaha, qaybta kaalmada adeegyada, jessica jane. So [facility] [phone].    ATENCIÓN: Si habla español, tiene a mckeon disposición servicios gratuitos de asistencia lingüística. Von al [phone].    We comply with applicable federal civil rights laws and [place] laws. We do not discriminate on the basis of race, color, national origin, age, disability, sex, sexual orientation, or gender identity.             Thank you!     Thank you for choosing [facility]  for your care. Our goal is always to provide you with excellent care. Hearing back from our patients is one way we can continue to improve our services. Please take a few minutes to complete the written survey that you may receive in the mail after your visit with us. Thank you!             Your Updated Medication List - Protect others around you: Learn how to safely use, store and throw away your medicines at www.disposemymeds.org.          This list is accurate as of 10/22/18 11:59 PM.  Always use your most recent med list.                   Brand Name Dispense Instructions for use Diagnosis    ALLEGRA PO           aspirin 81 MG tablet      Take 1 tablet by mouth daily.        carvedilol 3.125 MG tablet    COREG    180 tablet    TAKE ONE TABLET BY MOUTH TWICE DAILY    Essential hypertension       CENTRUM SILVER per tablet      Take 1 tablet by mouth daily        COLACE PO      Take 100 mg by mouth 2 times daily        doxycycline Monohydrate 100 MG Tabs     28 tablet    Take 100 mg by mouth 2 times daily for 14 days    Chronic maxillary sinusitis       * finasteride 5 MG tablet    PROSCAR    90 tablet    TAKE 1 TABLET BY MOUTH EVERY DAY    Benign non-nodular prostatic hyperplasia without lower urinary tract symptoms       * finasteride 5 MG tablet    PROSCAR    90 tablet    TAKE 1 TABLET BY MOUTH EVERY DAY    Benign non-nodular prostatic hyperplasia without lower urinary tract symptoms       [facility] MOUTHWASH Susp     237 mL    Swish and swallow 5-10 mLs in mouth every 6 hours as needed    Tongue fissure, Other iron deficiency anemia, Need for prophylactic vaccination and inoculation against influenza       fluticasone 50 MCG/ACT spray    FLONASE    48 mL    SHAKE LIQUID AND USE 1 TO 2 SPRAYS IN EACH NOSTRIL EVERY DAY    Seasonal allergic rhinitis       guaiFENesin-codeine 100-10 MG/5ML Soln solution    ROBITUSSIN AC    120 mL     Take 5 mLs by mouth every 4 hours as needed for cough    Upper respiratory tract infection, unspecified type       insulin lispro protamine-insulin lispro injection    HumaLOG MIX 75/25 VIAL    1 vial    20-22 units twice daily    Type II diabetes mellitus with peripheral circulatory disorder (H)       latanoprost 0.005 % ophthalmic solution    XALATAN     Place 1 drop into both eyes daily        LEVOTHROID 88 MCG tablet   Generic drug:  levothyroxine      Take 88 mcg by mouth daily.        losartan 100 MG tablet    COZAAR    90 tablet    TAKE 1 TABLET BY MOUTH EVERY DAY    Essential hypertension, benign       lovastatin 40 MG tablet    MEVACOR    90 tablet    Take 1 tablet (40 mg) by mouth At Bedtime    Hyperlipidemia LDL goal <70       metFORMIN 500 MG tablet    GLUCOPHAGE    120 tablet    Take 2 tablets (1,000 mg) by mouth 2 times daily (with meals)    Type II diabetes mellitus with peripheral circulatory disorder (H)       PROBIOTIC DAILY PO      Take 1 capsule by mouth daily        sildenafil 100 MG tablet    VIAGRA    12 tablet    TAKE 1 TABLET BY MOUTH 30 MINUTES TO 4 HOURS PRIOR TO ACTIVITY. MAX  MG PER 24 HOURS    Erectile dysfunction, unspecified erectile dysfunction type       tamsulosin 0.4 MG capsule    FLOMAX    90 capsule    TAKE 1 CAPSULE BY MOUTH EVERY DAY    Benign non-nodular prostatic hyperplasia without lower urinary tract symptoms       * Notice:  This list has 2 medication(s) that are the same as other medications prescribed for you. Read the directions carefully, and ask your doctor or other care provider to review them with you.

## 2018-10-22 NOTE — PROGRESS NOTES
SUBJECTIVE:   Timmy Strange is a 78 year old male who presents to clinic today for the following health issues:      Acute Illness   Acute illness concerns:sinus  Onset: 1 month    Fever: no    Chills/Sweats: YES-     Headache (location?): YES    Sinus Pressure:YES    Conjunctivitis:  YES-     Ear Pain: YES- wears hearing aids    Rhinorrhea: YES    Congestion: YES    Sore Throat: no     Cough: YES-productive of yellow sputum    Wheeze: no    Decreased Appetite: no    Nausea: no    Vomiting: no    Diarrhea:  no    Dysuria/Freq.: no    Fatigue/Achiness: YES- fatigue    Sick/Strep Exposure: YES     Therapies Tried and outcome: has had 1 round of amoxicillan, sx came back after finishing the rx          Problem list and histories reviewed & adjusted, as indicated.  Additional history: as documented    Patient Active Problem List   Diagnosis     Essential hypertension     Hypothyroidism     Type II diabetes mellitus with peripheral circulatory disorder (H)     BPH (benign prostatic hyperplasia)     ED (erectile dysfunction)     Non morbid obesity due to excess calories: comorbid HTN< DM, hyperlipidemia     Mixed hyperlipidemia     Special screening for malignant neoplasm of prostate     Localized edema     Seasonal allergic rhinitis     Screening for prostate cancer     Elevated blood pressure reading without diagnosis of hypertension     Residual hemorrhoidal skin tags     Obesity (BMI 35.0-39.9) with comorbidity (H)     Excess skin of eyelid, unspecified laterality     Glaucoma of both eyes, unspecified glaucoma type     Past Surgical History:   Procedure Laterality Date     COLONOSCOPY N/A 11/21/2014    Procedure: COMBINED COLONOSCOPY, SINGLE OR MULTIPLE BIOPSY/POLYPECTOMY BY BIOPSY;  Surgeon: Rolanda Bey MD;  Location:  GI     TONSILLECTOMY, ADENOIDECTOMY, COMBINED         Social History   Substance Use Topics     Smoking status: Former Smoker     Types: Pipe     Quit date: 11/15/2011     Smokeless  tobacco: Never Used     Alcohol use No     Family History   Problem Relation Age of Onset     Diabetes Maternal Grandmother      Diabetes Maternal Grandfather      Arthritis Maternal Grandfather      Arthritis Father      Thyroid Disease Father      Glaucoma Mother      Alcohol/Drug Mother 49     cirrhosis     Diabetes Daughter 44     type 2     Obesity Daughter      Glaucoma Maternal Aunt            Reviewed and updated as needed this visit by clinical staff  Tobacco  Allergies  Meds  Med Hx  Surg Hx  Fam Hx  Soc Hx      Reviewed and updated as needed this visit by Provider         ROS:  Constitutional, HEENT, cardiovascular, pulmonary, gi and gu systems are negative, except as otherwise noted.    OBJECTIVE:                                                    /70  Pulse 64  Temp 98.1  F (36.7  C) (Tympanic)  Resp 16  Wt 220 lb (99.8 kg)  SpO2 97%  BMI 35.51 kg/m2  Body mass index is 35.51 kg/(m^2).  GENERAL APPEARANCE: healthy, alert and no distress         ASSESSMENT/PLAN:                                                        ICD-10-CM    1. Chronic maxillary sinusitis J32.0 doxycycline Monohydrate 100 MG TABS     Return in about 2 weeks (around 11/5/2018) for Sinus congestion.  Patient Instructions   We had a discussion about treatment for his sinus condition.  We discussed potentially going on a 2-week course of antibiotics versus getting a CT scan of the sinuses done at the present time.  The patient has almost annually had a spring and fall sinus infection.  We talked about trying a daily antihistamine as [place] allergens.  With further discussion the patient opted to go on a 2-week course of doxycycline and a daily dose of fexofenadine.  If not improving over the next 2 weeks we will get a CT scan done of his sinuses.  He will continue with symptomatic treatment otherwise.  He may try a Elodia pot.      Samir  Baldo Duque MD  [facility]

## 2018-10-24 NOTE — PATIENT INSTRUCTIONS
We had a discussion about treatment for his sinus condition.  We discussed potentially going on a 2-week course of antibiotics versus getting a CT scan of the sinuses done at the present time.  The patient has almost annually had a spring and fall sinus infection.  We talked about trying a daily antihistamine as [place] airborne allerg[place].  With further discussion the patient opted to go on a 2-week course of doxycycline and a daily dose of fexofenadine.  If not improving over the next 2 weeks we will get a CT scan done of his sinuses.  He will continue with symptomatic treatment otherwise.  He may try a [place] pot.

## 2018-10-30 DIAGNOSIS — I10 ESSENTIAL HYPERTENSION: ICD-10-CM

## 2018-10-30 DIAGNOSIS — E78.2 MIXED HYPERLIPIDEMIA: ICD-10-CM

## 2018-10-30 DIAGNOSIS — E11.51 TYPE II DIABETES MELLITUS WITH PERIPHERAL CIRCULATORY DISORDER (H): ICD-10-CM

## 2018-10-30 LAB
ALBUMIN SERPL-MCNC: 3.6 G/DL (ref 3.4–5)
ALBUMIN UR-MCNC: NEGATIVE MG/DL
ALP SERPL-CCNC: 46 U/L (ref 40–150)
ALT SERPL W P-5'-P-CCNC: 23 U/L (ref 0–70)
ANION GAP SERPL CALCULATED.3IONS-SCNC: 7 MMOL/L (ref 3–14)
APPEARANCE UR: CLEAR
AST SERPL W P-5'-P-CCNC: 15 U/L (ref 0–45)
BASOPHILS # BLD AUTO: 0 10E9/L (ref 0–0.2)
BASOPHILS NFR BLD AUTO: 0.2 %
BILIRUB SERPL-MCNC: 0.7 MG/DL (ref 0.2–1.3)
BILIRUB UR QL STRIP: NEGATIVE
BUN SERPL-MCNC: 18 MG/DL (ref 7–30)
CALCIUM SERPL-MCNC: 8.7 MG/DL (ref 8.5–10.1)
CHLORIDE SERPL-SCNC: 108 MMOL/L (ref 94–109)
CHOLEST SERPL-MCNC: 134 MG/DL
CO2 SERPL-SCNC: 26 MMOL/L (ref 20–32)
COLOR UR AUTO: YELLOW
CREAT SERPL-MCNC: 0.85 MG/DL (ref 0.66–1.25)
CREAT UR-MCNC: 107 MG/DL
DIFFERENTIAL METHOD BLD: ABNORMAL
EOSINOPHIL # BLD AUTO: 0.1 10E9/L (ref 0–0.7)
EOSINOPHIL NFR BLD AUTO: 2.4 %
ERYTHROCYTE [DISTWIDTH] IN BLOOD BY AUTOMATED COUNT: 13.9 % (ref 10–15)
GFR SERPL CREATININE-BSD FRML MDRD: 88 ML/MIN/1.7M2
GLUCOSE SERPL-MCNC: 121 MG/DL (ref 70–99)
GLUCOSE UR STRIP-MCNC: NEGATIVE MG/DL
HBA1C MFR BLD: 6.6 % (ref 0–5.6)
HCT VFR BLD AUTO: 38.6 % (ref 40–53)
HDLC SERPL-MCNC: 45 MG/DL
HGB BLD-MCNC: 12.5 G/DL (ref 13.3–17.7)
HGB UR QL STRIP: NEGATIVE
KETONES UR STRIP-MCNC: NEGATIVE MG/DL
LDLC SERPL CALC-MCNC: 65 MG/DL
LEUKOCYTE ESTERASE UR QL STRIP: NEGATIVE
LYMPHOCYTES # BLD AUTO: 1.6 10E9/L (ref 0.8–5.3)
LYMPHOCYTES NFR BLD AUTO: 38.6 %
MCH RBC QN AUTO: 29.7 PG (ref 26.5–33)
MCHC RBC AUTO-ENTMCNC: 32.4 G/DL (ref 31.5–36.5)
MCV RBC AUTO: 92 FL (ref 78–100)
MICROALBUMIN UR-MCNC: 9 MG/L
MICROALBUMIN/CREAT UR: 8.4 MG/G CR (ref 0–17)
MONOCYTES # BLD AUTO: 0.6 10E9/L (ref 0–1.3)
MONOCYTES NFR BLD AUTO: 14.1 %
NEUTROPHILS # BLD AUTO: 1.9 10E9/L (ref 1.6–8.3)
NEUTROPHILS NFR BLD AUTO: 44.7 %
NITRATE UR QL: NEGATIVE
NONHDLC SERPL-MCNC: 89 MG/DL
PH UR STRIP: 6 PH (ref 5–7)
PLATELET # BLD AUTO: 214 10E9/L (ref 150–450)
POTASSIUM SERPL-SCNC: 4.7 MMOL/L (ref 3.4–5.3)
PROT SERPL-MCNC: 6.5 G/DL (ref 6.8–8.8)
RBC # BLD AUTO: 4.21 10E12/L (ref 4.4–5.9)
RBC #/AREA URNS AUTO: NORMAL /HPF
SODIUM SERPL-SCNC: 141 MMOL/L (ref 133–144)
SOURCE: NORMAL
SP GR UR STRIP: 1.02 (ref 1–1.03)
TRIGL SERPL-MCNC: 120 MG/DL
TSH SERPL DL<=0.005 MIU/L-ACNC: 1.15 MU/L (ref 0.4–4)
UROBILINOGEN UR STRIP-ACNC: 0.2 EU/DL (ref 0.2–1)
WBC # BLD AUTO: 4.2 10E9/L (ref 4–11)
WBC #/AREA URNS AUTO: NORMAL /HPF

## 2018-10-30 PROCEDURE — 84443 ASSAY THYROID STIM HORMONE: CPT | Performed by: FAMILY MEDICINE

## 2018-10-30 PROCEDURE — 85025 COMPLETE CBC W/AUTO DIFF WBC: CPT | Performed by: FAMILY MEDICINE

## 2018-10-30 PROCEDURE — 82043 UR ALBUMIN QUANTITATIVE: CPT | Performed by: FAMILY MEDICINE

## 2018-10-30 PROCEDURE — 80061 LIPID PANEL: CPT | Performed by: FAMILY MEDICINE

## 2018-10-30 PROCEDURE — 81001 URINALYSIS AUTO W/SCOPE: CPT | Performed by: FAMILY MEDICINE

## 2018-10-30 PROCEDURE — 80053 COMPREHEN METABOLIC PANEL: CPT | Performed by: FAMILY MEDICINE

## 2018-10-30 PROCEDURE — 83036 HEMOGLOBIN GLYCOSYLATED A1C: CPT | Performed by: FAMILY MEDICINE

## 2018-10-30 PROCEDURE — 36415 COLL VENOUS BLD VENIPUNCTURE: CPT | Performed by: FAMILY MEDICINE

## 2018-11-12 ENCOUNTER — MYC MEDICAL ADVICE (OUTPATIENT)
Dept: FAMILY MEDICINE | Facility: CLINIC | Age: 78
End: 2018-11-12

## 2018-11-14 ENCOUNTER — OFFICE VISIT (OUTPATIENT)
Dept: FAMILY MEDICINE | Facility: CLINIC | Age: 78
End: 2018-11-14
Payer: COMMERCIAL

## 2018-11-14 VITALS
RESPIRATION RATE: 18 BRPM | BODY MASS INDEX: 35.36 KG/M2 | DIASTOLIC BLOOD PRESSURE: 60 MMHG | SYSTOLIC BLOOD PRESSURE: 130 MMHG | HEIGHT: 66 IN | TEMPERATURE: 96.3 F | HEART RATE: 76 BPM | WEIGHT: 220 LBS | OXYGEN SATURATION: 96 %

## 2018-11-14 DIAGNOSIS — E11.51 TYPE II DIABETES MELLITUS WITH PERIPHERAL CIRCULATORY DISORDER (H): ICD-10-CM

## 2018-11-14 DIAGNOSIS — N40.0 BENIGN NON-NODULAR PROSTATIC HYPERPLASIA WITHOUT LOWER URINARY TRACT SYMPTOMS: ICD-10-CM

## 2018-11-14 DIAGNOSIS — R60.0 LOCALIZED EDEMA: ICD-10-CM

## 2018-11-14 DIAGNOSIS — E66.09 NON MORBID OBESITY DUE TO EXCESS CALORIES: ICD-10-CM

## 2018-11-14 DIAGNOSIS — E78.2 MIXED HYPERLIPIDEMIA: ICD-10-CM

## 2018-11-14 DIAGNOSIS — N52.1 ERECTILE DYSFUNCTION DUE TO DISEASES CLASSIFIED ELSEWHERE: ICD-10-CM

## 2018-11-14 DIAGNOSIS — Z00.00 MEDICARE ANNUAL WELLNESS VISIT, SUBSEQUENT: Primary | ICD-10-CM

## 2018-11-14 DIAGNOSIS — E66.01 MORBID OBESITY (H): ICD-10-CM

## 2018-11-14 DIAGNOSIS — I10 ESSENTIAL HYPERTENSION, BENIGN: ICD-10-CM

## 2018-11-14 DIAGNOSIS — H40.9 GLAUCOMA OF BOTH EYES, UNSPECIFIED GLAUCOMA TYPE: ICD-10-CM

## 2018-11-14 DIAGNOSIS — Z23 NEED FOR PROPHYLACTIC VACCINATION AND INOCULATION AGAINST INFLUENZA: ICD-10-CM

## 2018-11-14 DIAGNOSIS — Z12.5 SCREENING FOR PROSTATE CANCER: ICD-10-CM

## 2018-11-14 PROCEDURE — G0439 PPPS, SUBSEQ VISIT: HCPCS | Performed by: FAMILY MEDICINE

## 2018-11-14 PROCEDURE — 99207 ZZC FOOT EXAM  NO CHARGE: CPT | Performed by: FAMILY MEDICINE

## 2018-11-14 PROCEDURE — G0008 ADMIN INFLUENZA VIRUS VAC: HCPCS | Performed by: FAMILY MEDICINE

## 2018-11-14 PROCEDURE — 90662 IIV NO PRSV INCREASED AG IM: CPT | Performed by: FAMILY MEDICINE

## 2018-11-14 RX ORDER — LOSARTAN POTASSIUM 100 MG/1
TABLET ORAL
Qty: 90 TABLET | Refills: 0 | Status: CANCELLED | OUTPATIENT
Start: 2018-11-14

## 2018-11-14 RX ORDER — TAMSULOSIN HYDROCHLORIDE 0.4 MG/1
CAPSULE ORAL
Qty: 90 CAPSULE | Refills: 3 | Status: SHIPPED | OUTPATIENT
Start: 2018-11-14 | End: 2019-12-03

## 2018-11-14 RX ORDER — LOSARTAN POTASSIUM 100 MG/1
100 TABLET ORAL DAILY
Qty: 90 TABLET | Refills: 3 | Status: SHIPPED | OUTPATIENT
Start: 2018-11-14 | End: 2019-11-06

## 2018-11-14 RX ORDER — TAMSULOSIN HYDROCHLORIDE 0.4 MG/1
CAPSULE ORAL
Qty: 90 CAPSULE | Refills: 0 | Status: CANCELLED | OUTPATIENT
Start: 2018-11-14

## 2018-11-14 ASSESSMENT — ACTIVITIES OF DAILY LIVING (ADL): CURRENT_FUNCTION: NO ASSISTANCE NEEDED

## 2018-11-14 NOTE — PROGRESS NOTES
"SUBJECTIVE:   Timmy Strange is a 78 year old male who presents for Preventive Visit.    Are you in the first 12 months of your Medicare coverage?  No    Annual Wellness Visit     In general, how would you rate your overall health?  Good    Frequency of exercise:  2-3 days/week    Do you usually eat at least 4 servings of fruit and vegetables a day, include whole grains    & fiber and avoid regularly eating high fat or \"junk\" foods?  No    Taking medications regularly:  Yes    Medication side effects:  Not applicable    Ability to successfully perform activities of daily living:  No assistance needed    Home Safety:  No safety concerns identified    Hearing Impairment:  No hearing concerns    In the past 6 months, have you been bothered by leaking of urine?  No    In general, how would you rate your overall mental or emotional health?  Excellent    PHQ-2 Total Score: 0    Additional concerns today:  No      Ability to successfully perform activities of daily living: Yes, no assistance needed  Home safety:  none identified   Hearing impairment: Yes, wears aids    Fall risk:       COGNITIVE SCREEN  1) Repeat 3 items (Leader, Season, Table)    2) Clock draw: NORMAL  3) 3 item recall: Recalls 3 objects  Results: 3 items recalled: COGNITIVE IMPAIRMENT LESS LIKELY    Mini-CogTM Copyright MEY Aleman. Licensed by the author for use in [facility]; reprinted with permission ([email or web address]). All rights reserved.        Reviewed and updated as needed this visit by clinical staff  Tobacco  Allergies  Meds  Problems         Reviewed and updated as needed this visit by Provider        Social History   Substance Use Topics     Smoking status: Former Smoker     Types: Pipe     Quit date: 11/15/2011     Smokeless tobacco: Never Used     Alcohol use No       Alcohol Use 11/14/2018   If you drink alcohol do you typically have greater than 3 drinks per day OR greater than 7 drinks per week? Not Applicable   No flowsheet " data found.            Do you feel safe in your environment - Yes    Do you have a Health Care Directive?: Yes: Advance Directive has been received and scanned.    Current providers sharing in care for this patient include:   Patient Care Team:  Samir Duque MD as PCP - General (Family Practice)    The following health maintenance items are reviewed in Epic and correct as of today:  Health Maintenance   Topic Date Due     EYE EXAM Q1 YEAR  05/01/2014     ADVANCE DIRECTIVE PLANNING Q5 YRS  12/26/2016     FOOT EXAM Q1 YEAR  07/15/2017     INFLUENZA VACCINE (1) 09/01/2018     A1C Q6 MO  04/30/2019     FALL RISK ASSESSMENT  10/03/2019     PHQ-2 Q1 YR  10/03/2019     CREATININE Q1 YEAR  10/30/2019     LIPID MONITORING Q1 YEAR  10/30/2019     MICROALBUMIN Q1 YEAR  10/30/2019     COLONOSCOPY Q5 YR  11/21/2019     TETANUS IMMUNIZATION (SYSTEM ASSIGNED)  08/24/2020     TSH W/ FREE T4 REFLEX Q2 YEAR  10/30/2020     PNEUMOCOCCAL  Completed     Patient Active Problem List   Diagnosis     Essential hypertension     Hypothyroidism     Type II diabetes mellitus with peripheral circulatory disorder (H)     BPH (benign prostatic hyperplasia)     ED (erectile dysfunction)     Non morbid obesity due to excess calories: comorbid HTN< DM, hyperlipidemia     Mixed hyperlipidemia     Special screening for malignant neoplasm of prostate     Localized edema     Seasonal allergic rhinitis     Screening for prostate cancer     Elevated blood pressure reading without diagnosis of hypertension     Residual hemorrhoidal skin tags     Obesity (BMI 35.0-39.9) with comorbidity (H)     Excess skin of eyelid, unspecified laterality     Glaucoma of both eyes, unspecified glaucoma type     Past Surgical History:   Procedure Laterality Date     COLONOSCOPY N/A 11/21/2014    Procedure: COMBINED COLONOSCOPY, SINGLE OR MULTIPLE BIOPSY/POLYPECTOMY BY BIOPSY;  Surgeon: Rolanda Bey MD;  Location:  GI     TONSILLECTOMY, ADENOIDECTOMY,  "COMBINED         Social History   Substance Use Topics     Smoking status: Former Smoker     Types: Pipe     Quit date: 11/15/2011     Smokeless tobacco: Never Used     Alcohol use No     Family History   Problem Relation Age of Onset     Diabetes Maternal Grandmother      Diabetes Maternal Grandfather      Arthritis Maternal Grandfather      Arthritis Father      Thyroid Disease Father      Glaucoma Mother      Alcohol/Drug Mother 49     cirrhosis     Diabetes Daughter 44     type 2     Obesity Daughter      Glaucoma Maternal Aunt                Review of Systems  Constitutional, HEENT, cardiovascular, pulmonary, gi and gu systems are negative, except as otherwise noted.    OBJECTIVE:   /60  Pulse 76  Temp 96.3  F (35.7  C) (Tympanic)  Resp 18  Ht 5' 6\" (1.676 m)  Wt 220 lb (99.8 kg)  SpO2 96%  BMI 35.51 kg/m2 Estimated body mass index is 35.51 kg/(m^2) as calculated from the following:    Height as of this encounter: 5' 6\" (1.676 m).    Weight as of this encounter: 220 lb (99.8 kg).  Physical Exam  GENERAL: healthy, alert and no distress  EYES: Eyes grossly normal to inspection, PERRL and conjunctivae and sclerae normal  HENT: ear canals and TM's normal, nose and mouth without ulcers or lesions  NECK: no adenopathy, no asymmetry, masses, or scars and thyroid normal to palpation  RESP: lungs clear to auscultation - no rales, rhonchi or wheezes  CV: regular rate and rhythm, normal S1 S2, no S3 or S4, no murmur, click or rub, no peripheral edema and peripheral pulses strong  ABDOMEN: soft, nontender, no hepatosplenomegaly, no masses and bowel sounds normal  MS: no gross musculoskeletal defects noted, no edema  SKIN: no suspicious lesions or rashes  NEURO: Normal strength and tone, mentation intact and speech normal  PSYCH: mentation appears normal, affect normal/bright  LYMPH: no cervical, supraclavicular, axillary, or inguinal adenopathy  Diabetic foot exam: normal DP and PT pulses, no trophic changes " or ulcerative lesions and normal sensory exam    Results for orders placed or performed in visit on 10/30/18   HEMOGLOBIN A1C   Result Value Ref Range    Hemoglobin A1C 6.6 (H) 0 - 5.6 %   Albumin Random Urine Quantitative with Creat Ratio   Result Value Ref Range    Creatinine Urine 107 mg/dL    Albumin Urine mg/L 9 mg/L    Albumin Urine mg/g Cr 8.40 0 - 17 mg/g Cr   UA with Microscopic   Result Value Ref Range    Color Urine Yellow     Appearance Urine Clear     Glucose Urine Negative NEG^Negative mg/dL    Bilirubin Urine Negative NEG^Negative    Ketones Urine Negative NEG^Negative mg/dL    Specific Gravity Urine 1.020 1.003 - 1.035    pH Urine 6.0 5.0 - 7.0 pH    Protein Albumin Urine Negative NEG^Negative mg/dL    Urobilinogen Urine 0.2 0.2 - 1.0 EU/dL    Nitrite Urine Negative NEG^Negative    Blood Urine Negative NEG^Negative    Leukocyte Esterase Urine Negative NEG^Negative    Source Midstream Urine     WBC Urine 0 - 5 OTO5^0 - 5 /HPF    RBC Urine O - 2 OTO2^O - 2 /HPF   CBC with platelets differential   Result Value Ref Range    WBC 4.2 4.0 - 11.0 10e9/L    RBC Count 4.21 (L) 4.4 - 5.9 10e12/L    Hemoglobin 12.5 (L) 13.3 - 17.7 g/dL    Hematocrit 38.6 (L) 40.0 - 53.0 %    MCV 92 78 - 100 fl    MCH 29.7 26.5 - 33.0 pg    MCHC 32.4 31.5 - 36.5 g/dL    RDW 13.9 10.0 - 15.0 %    Platelet Count 214 150 - 450 10e9/L    % Neutrophils 44.7 %    % Lymphocytes 38.6 %    % Monocytes 14.1 %    % Eosinophils 2.4 %    % Basophils 0.2 %    Absolute Neutrophil 1.9 1.6 - 8.3 10e9/L    Absolute Lymphocytes 1.6 0.8 - 5.3 10e9/L    Absolute Monocytes 0.6 0.0 - 1.3 10e9/L    Absolute Eosinophils 0.1 0.0 - 0.7 10e9/L    Absolute Basophils 0.0 0.0 - 0.2 10e9/L    Diff Method Automated Method    Comprehensive metabolic panel   Result Value Ref Range    Sodium 141 133 - 144 mmol/L    Potassium 4.7 3.4 - 5.3 mmol/L    Chloride 108 94 - 109 mmol/L    Carbon Dioxide 26 20 - 32 mmol/L    Anion Gap 7 3 - 14 mmol/L    Glucose 121 (H) 70 -  "99 mg/dL    Urea Nitrogen 18 7 - 30 mg/dL    Creatinine 0.85 0.66 - 1.25 mg/dL    GFR Estimate 88 >60 mL/min/1.7m2    GFR Estimate If Black >90 >60 mL/min/1.7m2    Calcium 8.7 8.5 - 10.1 mg/dL    Bilirubin Total 0.7 0.2 - 1.3 mg/dL    Albumin 3.6 3.4 - 5.0 g/dL    Protein Total 6.5 (L) 6.8 - 8.8 g/dL    Alkaline Phosphatase 46 40 - 150 U/L    ALT 23 0 - 70 U/L    AST 15 0 - 45 U/L   Lipid Profile   Result Value Ref Range    Cholesterol 134 <200 mg/dL    Triglycerides 120 <150 mg/dL    HDL Cholesterol 45 >39 mg/dL    LDL Cholesterol Calculated 65 <100 mg/dL    Non HDL Cholesterol 89 <130 mg/dL   TSH   Result Value Ref Range    TSH 1.15 0.40 - 4.00 mU/L       ASSESSMENT / PLAN:       ICD-10-CM    1. Medicare annual wellness visit, subsequent Z00.00    2. Essential hypertension, benign I10 losartan (COZAAR) 100 MG tablet   3. Benign non-nodular prostatic hyperplasia without lower urinary tract symptoms N40.0 tamsulosin (FLOMAX) 0.4 MG capsule   4. Need for prophylactic vaccination and inoculation against influenza Z23 FLU VACCINE, INCREASED ANTIGEN, PRESV FREE, AGE 65+ [86311]     Vaccine Administration, Initial [06032]     ADMIN INFLUENZA (For MEDICARE Patients ONLY) []       End of Life Planning:  Patient currently has an advanced directive: yes and will get us a copy    COUNSELING:  Reviewed preventive health counseling, as reflected in patient instructions       Regular exercise       Immunizations    Vaccinated for: Influenza          BP Readings from Last 1 Encounters:   11/14/18 130/60     Estimated body mass index is 35.51 kg/(m^2) as calculated from the following:    Height as of this encounter: 5' 6\" (1.676 m).    Weight as of this encounter: 220 lb (99.8 kg).      Weight management plan: Discussed healthy diet and exercise guidelines and patient will follow up in 6 months in clinic to re-evaluate.     reports that he quit smoking about 7 years ago. His smoking use included Pipe. He has never used " smokeless tobacco.      Appropriate preventive services were discussed with this patient, including applicable screening as appropriate for cardiovascular disease, diabetes, osteopenia/osteoporosis, and glaucoma.  As appropriate for age/gender, discussed screening for colorectal cancer, prostate cancer, breast cancer, and cervical cancer. Checklist reviewing preventive services available has been given to the patient.    Reviewed patients plan of care and provided an AVS. The Basic Care Plan (routine screening as documented in Health Maintenance) for Timmy meets the Care Plan requirement. This Care Plan has been established and reviewed with the Patient.    Counseling Resources:  ATP IV Guidelines  Pooled Cohorts Equation Calculator  Breast Cancer Risk Calculator  FRAX Risk Assessment  ICSI Preventive Guidelines  Dietary Guidelines for Americans, 2010  [employer]'s MyPlate  ASA Prophylaxis  Lung CA Screening    Samir Duque MD  [facility]    Injectable Influenza Immunization Documentation    1.  Is the person to be vaccinated sick today?   No    2. Does the person to be vaccinated have an allergy to a component   of the vaccine?   No  Egg Allergy Algorithm Link    3. Has the person to be vaccinated ever had a serious reaction   to influenza vaccine in the past?   No    4. Has the person to be vaccinated ever had Guillain-Barré syndrome?   No    Form completed by Starr Heaton CMA

## 2018-11-14 NOTE — NURSING NOTE
"Chief Complaint   Patient presents with     Physical     /60  Pulse 76  Temp 96.3  F (35.7  C) (Tympanic)  Resp 18  Ht 5' 6\" (1.676 m)  Wt 220 lb (99.8 kg)  SpO2 96%  BMI 35.51 kg/m2 Estimated body mass index is 35.51 kg/(m^2) as calculated from the following:    Height as of this encounter: 5' 6\" (1.676 m).    Weight as of this encounter: 220 lb (99.8 kg).  BP completed using cuff size: tae Heaton CMA    Health Maintenance Due   Topic Date Due     EYE EXAM Q1 YEAR  05/01/2014     ADVANCE DIRECTIVE PLANNING Q5 YRS  12/26/2016     FOOT EXAM Q1 YEAR  07/15/2017     INFLUENZA VACCINE (1) 09/01/2018     Health Maintenance reviewed at today's visit patient asked to schedule/complete:   Diabetes:  Patient agrees to schedule  Immunizations:  Patient agrees to schedule    "

## 2018-11-14 NOTE — PATIENT INSTRUCTIONS
Services Typically covered by Medicare Recommended Completed   Vaccines    Pneumonoccol    Influenza    Hepatitis B (if medium/high risk)     Once for patients after age 65    Yearly  Medium/high risk factors:    End Stage Kidney Disease    Hemophiliacs who received Factor XIII or IX concentrates    Clients of institutions for developmentally disabled    Persons who live in same house as a Hepatitis B carrier    Homosexual men    Illicit injectable drug users    Health care workers     Mammogram Covered: One-time screen between age 35-39, annually for age 40+     Pap and Pelvic Exam Covered: Annually if  high risk,  or childbearing age with abnormal Pap in last 3 years.  Q24 months for all other women     Prostate Cancer Screening    Digital rectal exam    PSA Covered: Annually for all men > age 50     Corolrectal Cancer Screening Screening colonoscopy every 10 years, more often for high risk patients     Diabetes Self-Management Training Requires referral by treating physician for patient with diabetes     Diabetes Screening    Fasting blood sugar or glucose tolerance test   Once yearly, twice yearly if prediabetic     Cardiovascular Screening Blood Tests    Total Cholesterol    HDL    Triglycerides Every 5 years     Medical Nutrition Therapy for Diabetes or Renal Disease Requires referral by treating physician for patient with diabetes or kidney disease     Glaucoma Screening Annually for patients with one of the following risk factors:    Diabetes Mellitus    Family history of Glaucoma    -American age 50 and over    -American age 65 and over     Bone Mass Measurement Every 24 months if one of the following risk factors:    Estrogen deficiency    Vertebral abnormalities on x-ray indicative of Osteoporosis, Osteopenia, or Vertebral fracture    Receiving/expected to receive the equivalent of at least 5 mg of Prednisone per day for > 3 months    Hyperparathyroidism    Patient being monitored for  response to Osteoporosis Therapy     One-time AAA screen  Must be ordered as part of Medicare IPPE   Any patient with a family history of AAA    Males Age 65-75, with history of smoking at least 100 cigarettes in lifetime     Smoking Cessation Counseling Beneficiaries who use tobacco are eligible to receive 2 cessation attempts per year; each attempt includes maximum of 4 sessions     HIV Screening Annually for beneficiaries at increased risk:       Increased risk for HIV infection is defined in the  National Coverage Determinations (NCD) Manual,  Publication 100-03 Sections 190.14 (diagnostic) and 210.7 (screening). See http://www.cms.gov/manuals/downloads/mvu788k0_Xeww4.pdf and http://www.cms.gov/manuals/downloads/aol110d4_Yulb9.pdf on the Internet.  Three times per pregnancy for beneficiaries who are pregnant.     Future Annual Wellness Visit Annually, for all beneficiaries.       Preventive Health Recommendations:     See your health care provider every year to    Review health changes.     Discuss preventive care.      Review your medicines if your doctor has prescribed any.    Talk with your health care provider about whether you should have a test to screen for prostate cancer (PSA).    Every 3 years, have a diabetes test (fasting glucose). If you are at risk for diabetes, you should have this test more often.    Every 5 years, have a cholesterol test. Have this test more often if you are at risk for high cholesterol or heart disease.     Every 10 years, have a colonoscopy. Or, have a yearly FIT test (stool test). These exams will check for colon cancer.    Talk to with your health care provider about screening for Abdominal Aortic Aneurysm if you have a family history of AAA or have a history of smoking.  Shots:     Get a flu shot each year.     Get a tetanus shot every 10 years.     Talk to your doctor about your pneumonia vaccines. There are now two you should receive - Pneumovax (PPSV 23) and Prevnar (PCV  13).    Talk to your pharmacist about a shingles vaccine.     Talk to your doctor about the hepatitis B vaccine.  Nutrition:     Eat at least 5 servings of fruits and vegetables each day.     Eat whole-grain bread, whole-wheat pasta and brown rice instead of white grains and rice.     Get adequate Calcium and Vitamin D.   Lifestyle    Exercise for at least 150 minutes a week (30 minutes a day, 5 days a week). This will help you control your weight and prevent disease.     Limit alcohol to one drink per day.     No smoking.     Wear sunscreen to prevent skin cancer.     See your dentist every six months for an exam and cleaning.     See your eye doctor every 1 to 2 years to screen for conditions such as glaucoma, macular degeneration and cataracts.    Personalized Prevention Plan  You are due for the preventive services outlined below.  Your care team is available to assist you in scheduling these services.  If you have already completed any of these items, please share that information with your care team to update in your medical record.    Health Maintenance Due   Topic Date Due     Eye Exam - yearly  05/01/2014     Discuss Advance Directive Planning  12/26/2016     Diabetic Foot Exam - yearly  07/15/2017     Flu Vaccine (1) 09/01/2018

## 2018-11-14 NOTE — MR AVS SNAPSHOT
After Visit Summary   11/14/2018    Timmy Strange    MRN: [id number]           Patient Information     Date Of Birth          1940        Visit Information        Provider Department      11/14/2018 11:15 AM Samir Duque MD [facility]        Today's Diagnoses     Medicare annual wellness visit, subsequent    -  1    Essential hypertension, benign        Benign non-nodular prostatic hyperplasia without lower urinary tract symptoms        Need for prophylactic vaccination and inoculation against influenza        Obesity (BMI 35.0-39.9) with comorbidity (H)        Glaucoma of both eyes, unspecified glaucoma type        Screening for prostate cancer        Non morbid obesity due to excess calories: comorbid HTN< DM, hyperlipidemia        Mixed hyperlipidemia        Localized edema        Erectile dysfunction due to diseases classified elsewhere        Type II diabetes mellitus with peripheral circulatory disorder (H)          Care Instructions          Services Typically covered by Medicare Recommended Completed   Vaccines    Pneumonoccol    Influenza    Hepatitis B (if medium/high risk)     Once for patients after age 65    Yearly  Medium/high risk factors:    End Stage Kidney Disease    Hemophiliacs who received Factor XIII or IX concentrates    Clients of institutions for developmentally disabled    Persons who live in same house as a Hepatitis B carrier    Homosexual men    Illicit injectable drug users    Health care workers     Mammogram Covered: One-time screen between age 35-39, annually for age 40+     Pap and Pelvic Exam Covered: Annually if  high risk,  or childbearing age with abnormal Pap in last 3 years.  Q24 months for all other women     Prostate Cancer Screening    Digital rectal exam    PSA Covered: Annually for all men > age 50     Corolrectal Cancer Screening Screening colonoscopy every 10 years, more often for high risk patients      Diabetes Self-Management Training Requires referral by treating physician for patient with diabetes     Diabetes Screening    Fasting blood sugar or glucose tolerance test   Once yearly, twice yearly if prediabetic     Cardiovascular Screening Blood Tests    Total Cholesterol    HDL    Triglycerides Every 5 years     Medical Nutrition Therapy for Diabetes or Renal Disease Requires referral by treating physician for patient with diabetes or kidney disease     Glaucoma Screening Annually for patients with one of the following risk factors:    Diabetes Mellitus    Family history of Glaucoma    -American age 50 and over    -American age 65 and over     Bone Mass Measurement Every 24 months if one of the following risk factors:    Estrogen deficiency    Vertebral abnormalities on x-ray indicative of Osteoporosis, Osteopenia, or Vertebral fracture    Receiving/expected to receive the equivalent of at least 5 mg of Prednisone per day for > 3 months    Hyperparathyroidism    Patient being monitored for response to Osteoporosis Therapy     One-time AAA screen  Must be ordered as part of Medicare IPPE   Any patient with a family history of AAA    Males Age 65-75, with history of smoking at least 100 cigarettes in lifetime     Smoking Cessation Counseling Beneficiaries who use tobacco are eligible to receive 2 cessation attempts per year; each attempt includes maximum of 4 sessions     HIV Screening Annually for beneficiaries at increased risk:       Increased risk for HIV infection is defined in the  National Coverage Determinations (NCD) Manual,  Publication 100-03 Sections 190.14 (diagnostic) and 210.7 (screening). See http://www.cms.gov/manuals/downloads/sya428y4_Ygfp8.pdf and http://www.cms.gov/manuals/downloads/tif757u0_Lhvr6.pdf on the Internet.  Three times per pregnancy for beneficiaries who are pregnant.     Future Annual Wellness Visit Annually, for all beneficiaries.       Preventive Health  Recommendations:     See your health care provider every year to    Review health changes.     Discuss preventive care.      Review your medicines if your doctor has prescribed any.    Talk with your health care provider about whether you should have a test to screen for prostate cancer (PSA).    Every 3 years, have a diabetes test (fasting glucose). If you are at risk for diabetes, you should have this test more often.    Every 5 years, have a cholesterol test. Have this test more often if you are at risk for high cholesterol or heart disease.     Every 10 years, have a colonoscopy. Or, have a yearly FIT test (stool test). These exams will check for colon cancer.    Talk to with your health care provider about screening for Abdominal Aortic Aneurysm if you have a family history of AAA or have a history of smoking.  Shots:     Get a flu shot each year.     Get a tetanus shot every 10 years.     Talk to your doctor about your pneumonia vaccines. There are now two you should receive - Pneumovax (PPSV 23) and Prevnar (PCV 13).    Talk to your pharmacist about a shingles vaccine.     Talk to your doctor about the hepatitis B vaccine.  Nutrition:     Eat at least 5 servings of fruits and vegetables each day.     Eat whole-grain bread, whole-wheat pasta and brown rice instead of white grains and rice.     Get adequate Calcium and Vitamin D.   Lifestyle    Exercise for at least 150 minutes a week (30 minutes a day, 5 days a week). This will help you control your weight and prevent disease.     Limit alcohol to one drink per day.     No smoking.     Wear sunscreen to prevent skin cancer.     See your dentist every six months for an exam and cleaning.     See your eye doctor every 1 to 2 years to screen for conditions such as glaucoma, macular degeneration and cataracts.    Personalized Prevention Plan  You are due for the preventive services outlined below.  Your care team is available to assist you in scheduling these  services.  If you have already completed any of these items, please share that information with your care team to update in your medical record.    Health Maintenance Due   Topic Date Due     Eye Exam - yearly  05/01/2014     Discuss Advance Directive Planning  12/26/2016     Diabetic Foot Exam - yearly  07/15/2017     Flu Vaccine (1) 09/01/2018             Follow-ups after your visit        Follow-up notes from your care team     Return in about 6 months (around 5/14/2019) for hypertension, diabetes.      Who to contact     If you have questions or need follow up information about today's clinic visit or your schedule please contact [facility] directly at [phone].  Normal or non-critical lab and imaging results will be communicated to you by MyChart, letter or phone within 4 business days after the clinic has received the results. If you do not hear from us within 7 days, please contact the clinic through [employer] or phone. If you have a critical or abnormal lab result, we will notify you by phone as soon as possible.  Submit refill requests through [employer] or call your pharmacy and they will forward the refill request to us. Please allow 3 business days for your refill to be completed.          Additional Information About Your Visit        MyChart Information     [employer] gives you secure access to your electronic health record. If you see a primary care provider, you can also send messages to your care team and make appointments. If you have questions, please call your primary care clinic.  If you do not have a primary care provider, please call [phone] and they will assist you.        Care EveryWhere ID     This is your Care EveryWhere ID. This could be used by other organizations to access your [facility] medical records  [id number]        Your Vitals Were     Pulse Temperature Respirations Height Pulse Oximetry BMI (Body Mass Index)    76 96.3  F (35.7  C) (Tympanic) 18  "5' 6\" (1.676 m) 96% 35.51 kg/m2       Blood Pressure from Last 3 Encounters:   11/14/18 130/60   10/22/18 124/70   10/03/18 130/60    Weight from Last 3 Encounters:   11/14/18 220 lb (99.8 kg)   10/22/18 220 lb (99.8 kg)   10/03/18 217 lb (98.4 kg)              We Performed the Following     ADMIN INFLUENZA (For MEDICARE Patients ONLY) []     FLU VACCINE, INCREASED ANTIGEN, PRESV FREE, AGE 65+ [19627]     Vaccine Administration, Initial [19414]          Today's Medication Changes          These changes are accurate as of 11/14/18 12:01 PM.  If you have any questions, ask your nurse or doctor.               These medicines have changed or have updated prescriptions.        Dose/Directions    insulin lispro protamine-insulin lispro injection   Commonly known as:  HumaLOG MIX 75/25 VIAL   This may have changed:  additional instructions   Used for:  Type II diabetes mellitus with peripheral circulatory disorder (H)        20-22 units twice daily   Quantity:  1 vial   Refills:  2       losartan 100 MG tablet   Commonly known as:  COZAAR   This may have changed:  See the new instructions.   Used for:  Essential hypertension, benign   Changed by:  Samir Duque MD        Dose:  100 mg   Take 1 tablet (100 mg) by mouth daily   Quantity:  90 tablet   Refills:  3       metFORMIN 500 MG tablet   Commonly known as:  GLUCOPHAGE   This may have changed:    - how much to take  - when to take this   Used for:  Type II diabetes mellitus with peripheral circulatory disorder (H)        Dose:  1000 mg   Take 2 tablets (1,000 mg) by mouth 2 times daily (with meals)   Quantity:  120 tablet   Refills:  2            Where to get your medicines      These medications were sent to [id number] - [place], [place] - [address] [facility] [address]  [address], [place] [address]     Phone:  [phone]     losartan 100 MG tablet    tamsulosin 0.4 MG capsule                Primary Care Provider Office " Phone # Fax #    Samir Duque [phone]       [address] [facility] [address]        Equal Access to Services     ABILIO CRESPO : Darron schmid efremo Soluis danielali, waaxda luqadaha, qaybta kaalmada adelaurada, jessica dominguez laSarkisaretha jane. So [facility] [phone].    ATENCIÓN: Si habla español, tiene a mckeon disposición servicios gratuitos de asistencia lingüística. Llame al [phone].    We comply with applicable federal civil rights laws and [place] laws. We do not discriminate on the basis of race, color, national origin, age, disability, sex, sexual orientation, or gender identity.            Thank you!     Thank you for choosing [facility]  for your care. Our goal is always to provide you with excellent care. Hearing back from our patients is one way we can continue to improve our services. Please take a few minutes to complete the written survey that you may receive in the mail after your visit with us. Thank you!             Your Updated Medication List - Protect others around you: Learn how to safely use, store and throw away your medicines at www.disposemymeds.org.          This list is accurate as of 11/14/18 12:01 PM.  Always use your most recent med list.                   Brand Name Dispense Instructions for use Diagnosis    ALLEGRA PO           aspirin 81 MG tablet      Take 1 tablet by mouth daily.        carvedilol 3.125 MG tablet    COREG    180 tablet    TAKE ONE TABLET BY MOUTH TWICE DAILY    Essential hypertension       CENTRUM SILVER per tablet      Take 1 tablet by mouth daily        COLACE PO      Take 100 mg by mouth 2 times daily        * finasteride 5 MG tablet    PROSCAR    90 tablet    TAKE 1 TABLET BY MOUTH EVERY DAY    Benign non-nodular prostatic hyperplasia without lower urinary tract symptoms       * finasteride 5 MG tablet    PROSCAR    90 tablet    TAKE 1 TABLET BY MOUTH EVERY DAY    Benign non-nodular  prostatic hyperplasia without lower urinary tract symptoms       [facility] MOUTHWASH Susp     237 mL    Swish and swallow 5-10 mLs in mouth every 6 hours as needed    Tongue fissure, Other iron deficiency anemia, Need for prophylactic vaccination and inoculation against influenza       fluticasone 50 MCG/ACT spray    FLONASE    48 mL    SHAKE LIQUID AND USE 1 TO 2 SPRAYS IN EACH NOSTRIL EVERY DAY    Seasonal allergic rhinitis       guaiFENesin-codeine 100-10 MG/5ML Soln solution    ROBITUSSIN AC    120 mL    Take 5 mLs by mouth every 4 hours as needed for cough    Upper respiratory tract infection, unspecified type       insulin lispro protamine-insulin lispro injection    HumaLOG MIX 75/25 VIAL    1 vial    20-22 units twice daily    Type II diabetes mellitus with peripheral circulatory disorder (H)       latanoprost 0.005 % ophthalmic solution    XALATAN     Place 1 drop into both eyes daily        LEVOTHROID 88 MCG tablet   Generic drug:  levothyroxine      Take 88 mcg by mouth daily.        losartan 100 MG tablet    COZAAR    90 tablet    Take 1 tablet (100 mg) by mouth daily    Essential hypertension, benign       lovastatin 40 MG tablet    MEVACOR    90 tablet    Take 1 tablet (40 mg) by mouth At Bedtime    Hyperlipidemia LDL goal <70       metFORMIN 500 MG tablet    GLUCOPHAGE    120 tablet    Take 2 tablets (1,000 mg) by mouth 2 times daily (with meals)    Type II diabetes mellitus with peripheral circulatory disorder (H)       PROBIOTIC DAILY PO      Take 1 capsule by mouth daily        sildenafil 100 MG tablet    VIAGRA    12 tablet    TAKE 1 TABLET BY MOUTH 30 MINUTES TO 4 HOURS PRIOR TO ACTIVITY. MAX  MG PER 24 HOURS    Erectile dysfunction, unspecified erectile dysfunction type       tamsulosin 0.4 MG capsule    FLOMAX    90 capsule    TAKE 1 CAPSULE BY MOUTH EVERY DAY    Benign non-nodular prostatic hyperplasia without lower urinary tract symptoms       * Notice:  This list has 2 medication(s)  that are the same as other medications prescribed for you. Read the directions carefully, and ask your doctor or other care provider to review them with you.

## 2018-12-17 DIAGNOSIS — I10 ESSENTIAL HYPERTENSION: ICD-10-CM

## 2018-12-18 RX ORDER — CARVEDILOL 3.12 MG/1
TABLET ORAL
Qty: 180 TABLET | Refills: 3 | Status: SHIPPED | OUTPATIENT
Start: 2018-12-18 | End: 2020-01-03

## 2018-12-18 NOTE — TELEPHONE ENCOUNTER
"Requested Prescriptions   Pending Prescriptions Disp Refills     carvedilol (COREG) 3.125 MG tablet [Pharmacy Med Name: CARVEDILOL 3.125MG TABLETS]  Last Written Prescription Date:  2/8/2018  Last Fill Quantity: 180 tablet,  # refills: 2   Last office visit: 11/14/2018 with prescribing provider:  Dunia   Future Office Visit:     180 tablet 0     Sig: TAKE ONE TABLET BY MOUTH TWICE DAILY    Beta-Blockers Protocol Passed - 12/17/2018  9:13 AM       Passed - Blood pressure under 140/90 in past 12 months    BP Readings from Last 3 Encounters:   11/14/18 130/60   10/22/18 124/70   10/03/18 130/60                Passed - Patient is age 6 or older       Passed - Recent (12 mo) or future (30 days) visit within the authorizing provider's specialty    Patient had office visit in the last 12 months or has a visit in the next 30 days with authorizing provider or within the authorizing provider's specialty.  See \"Patient Info\" tab in inbasket, or \"Choose Columns\" in Meds & Orders section of the refill encounter.                 "

## 2019-01-07 DIAGNOSIS — E78.5 HYPERLIPIDEMIA LDL GOAL <70: ICD-10-CM

## 2019-01-07 NOTE — TELEPHONE ENCOUNTER
"Requested Prescriptions   Pending Prescriptions Disp Refills     lovastatin (MEVACOR) 40 MG tablet  Last Written Prescription Date:  11/7/17  Last Fill Quantity: 90 TABLET,  # refills: 3   Last office visit: 11/14/2018 with prescribing provider:  KODY   Future Office Visit:     90 tablet 3     Sig: Take 1 tablet (40 mg) by mouth At Bedtime    Statins Protocol Passed - 1/7/2019  2:04 PM       Passed - LDL on file in past 12 months    Recent Labs   Lab Test 10/30/18  0832   LDL 65            Passed - No abnormal creatine kinase in past 12 months    No lab results found.            Passed - Recent (12 mo) or future (30 days) visit within the authorizing provider's specialty    Patient had office visit in the last 12 months or has a visit in the next 30 days with authorizing provider or within the authorizing provider's specialty.  See \"Patient Info\" tab in inbasket, or \"Choose Columns\" in Meds & Orders section of the refill encounter.             Passed - Medication is active on med list       Passed - Patient is age 18 or older          "

## 2019-01-10 RX ORDER — LOVASTATIN 40 MG
40 TABLET ORAL AT BEDTIME
Qty: 90 TABLET | Refills: 3 | Status: SHIPPED | OUTPATIENT
Start: 2019-01-10 | End: 2019-11-19

## 2019-01-10 NOTE — TELEPHONE ENCOUNTER
"Requested Prescriptions   Pending Prescriptions Disp Refills     lovastatin (MEVACOR) 40 MG tablet 90 tablet 3    Last Written Prescription Date:  11/7/2017  Last Fill Quantity: 90,  # refills: 3   Last office visit: 11/14/2018 with prescribing provider:  Dunia  Future Office Visit:     Sig: Take 1 tablet (40 mg) by mouth At Bedtime    Statins Protocol Passed - 1/7/2019  2:06 PM       Passed - LDL on file in past 12 months    Recent Labs   Lab Test 10/30/18  0832   LDL 65            Passed - No abnormal creatine kinase in past 12 months    No lab results found.            Passed - Recent (12 mo) or future (30 days) visit within the authorizing provider's specialty    Patient had office visit in the last 12 months or has a visit in the next 30 days with authorizing provider or within the authorizing provider's specialty.  See \"Patient Info\" tab in inbasket, or \"Choose Columns\" in Meds & Orders section of the refill encounter.             Passed - Medication is active on med list       Passed - Patient is age 18 or older      Filled per [facility] protocol.     FLORIDA Villa, RN  [employer] [facility]      "

## 2019-01-15 DIAGNOSIS — N52.9 ERECTILE DYSFUNCTION, UNSPECIFIED ERECTILE DYSFUNCTION TYPE: ICD-10-CM

## 2019-01-15 NOTE — TELEPHONE ENCOUNTER
"Requested Prescriptions   Pending Prescriptions Disp Refills     sildenafil (VIAGRA) 100 MG tablet [Pharmacy Med Name: Sildenafil Citrate Oral Tablet 100 MG]  Last Written Prescription Date:  1/4/2018  Last Fill Quantity: 12,  # refills: 2  Last office visit: 11/14/2018 with prescribing provider:  Dr Duque   Future Office Visit:     9 tablet 0     Sig: TAKE 1 TABLET BY MOUTH 30 MINUTES TO 4 HOURS PRIOR TO ACTIVITY, MAX OF 1 TAB PER 24 HOURS    Erectile Dysfuction Protocol Failed - 1/15/2019  9:26 AM       Failed - Absence of Alpha Blockers on Med list       Passed - Absence of nitrates on medication list       Passed - Recent (12 mo) or future (30 days) visit within the authorizing provider's specialty    Patient had office visit in the last 12 months or has a visit in the next 30 days with authorizing provider or within the authorizing provider's specialty.  See \"Patient Info\" tab in inbasket, or \"Choose Columns\" in Meds & Orders section of the refill encounter.             Passed - Medication is active on med list       Passed - Patient is age 18 or older          "

## 2019-01-17 RX ORDER — SILDENAFIL 100 MG/1
TABLET, FILM COATED ORAL
Qty: 9 TABLET | Refills: 0 | Status: SHIPPED | OUTPATIENT
Start: 2019-01-17 | End: 2019-02-18

## 2019-01-17 NOTE — TELEPHONE ENCOUNTER
Routing refill request to provider for review/approval because:  Fails RN prescription refill protocol

## 2019-02-17 ENCOUNTER — TELEPHONE (OUTPATIENT)
Dept: FAMILY MEDICINE | Facility: CLINIC | Age: 79
End: 2019-02-17

## 2019-02-17 DIAGNOSIS — N52.9 ERECTILE DYSFUNCTION, UNSPECIFIED ERECTILE DYSFUNCTION TYPE: ICD-10-CM

## 2019-02-17 NOTE — TELEPHONE ENCOUNTER
Patient calling about exposure to influenza through grandchildren. He was with them yesterday and today they had positive influenza test. Caller reports no symptoms but is wondering if he should have tamiflu on hand in case he does develop symptoms. He reports he did have the flu shot. Pharmacy [facility] in [place]. Patient can be reached at [phone] (home). Message routed to care team for f/u.

## 2019-02-18 ENCOUNTER — MYC MEDICAL ADVICE (OUTPATIENT)
Dept: FAMILY MEDICINE | Facility: CLINIC | Age: 79
End: 2019-02-18

## 2019-02-18 DIAGNOSIS — J10.1 INFLUENZA A: Primary | ICD-10-CM

## 2019-02-18 NOTE — TELEPHONE ENCOUNTER
I would probably wait to see if Mr. Strange starts to develop any symptoms before getting him a prescription for Tamiflu.  However, should he develop symptoms he should contact us very soon so we can get him started.

## 2019-02-19 RX ORDER — OSELTAMIVIR PHOSPHATE 75 MG/1
75 CAPSULE ORAL DAILY
Qty: 10 CAPSULE | Refills: 0 | Status: SHIPPED | OUTPATIENT
Start: 2019-02-19 | End: 2019-05-09

## 2019-02-19 NOTE — TELEPHONE ENCOUNTER
Patient Contact    Attempt # 1    Was call answered?  No.  Left message on voicemail with information to call the clinic and ask to talk with triage.

## 2019-02-20 RX ORDER — SILDENAFIL 100 MG/1
TABLET, FILM COATED ORAL
Qty: 12 TABLET | Refills: 2 | Status: SHIPPED | OUTPATIENT
Start: 2019-02-20 | End: 2019-05-21

## 2019-02-20 NOTE — TELEPHONE ENCOUNTER
Pt is requesting explanation why sildenafil (VIAGRA) 100 MG tablet was only filled for one month. He would like a refill for 1 year. Pt asked if any concerns why Rx was only filled for 1 month. He is expecting a call back today.

## 2019-05-09 ENCOUNTER — OFFICE VISIT (OUTPATIENT)
Dept: FAMILY MEDICINE | Facility: CLINIC | Age: 79
End: 2019-05-09
Payer: COMMERCIAL

## 2019-05-09 VITALS
RESPIRATION RATE: 16 BRPM | OXYGEN SATURATION: 98 % | SYSTOLIC BLOOD PRESSURE: 120 MMHG | HEART RATE: 71 BPM | DIASTOLIC BLOOD PRESSURE: 66 MMHG | TEMPERATURE: 98 F | BODY MASS INDEX: 33.89 KG/M2 | WEIGHT: 210 LBS

## 2019-05-09 DIAGNOSIS — J01.01 ACUTE RECURRENT MAXILLARY SINUSITIS: Primary | ICD-10-CM

## 2019-05-09 PROCEDURE — 99213 OFFICE O/P EST LOW 20 MIN: CPT | Performed by: FAMILY MEDICINE

## 2019-05-09 NOTE — PROGRESS NOTES
SUBJECTIVE:   Timmy Strange is a 78 year old male who presents to clinic today for the following   health issues:      ENT Symptoms             Symptoms: cc Present Absent Comment   Fever/Chills       Fatigue  x     Muscle Aches   x    Eye Irritation   x    Sneezing   x    Nasal Pablo/Drg  x     Sinus Pressure/Pain  x     Loss of smell   x    Dental pain   x    Sore Throat   x    Swollen Glands   x    Ear Pain/Fullness  x x    Cough  x     Wheeze   x    Chest Pain  x     Shortness of breath   x    Rash   x    Other   x      Symptom duration:  10 days   Symptom severity:  moderate   Treatments tried:  mucinex-somewhat helpful   Contacts:  wife and grandchildren     Started after caring for his grandkids.  3 1/2 yr old had cold, then had Strep        Additional history: as documented    Reviewed  and updated as needed this visit by clinical staff         Reviewed and updated as needed this visit by Provider         Labs reviewed in EPIC    ROS:  CONSTITUTIONAL:NEGATIVE for fever, chills, change in weight  ENT/MOUTH: NEGATIVE for ear, mouth and throat problems and POSITIVE for Hx sinus infections, nasal congestion, postnasal drainage, rhinorrhea-purulent and sinus pressure  RESP:POSITIVE for cough-productive    OBJECTIVE:                                                    /66   Pulse 71   Temp 98  F (36.7  C) (Tympanic)   Resp 16   Wt 95.3 kg (210 lb)   SpO2 98%   BMI 33.89 kg/m    Body mass index is 33.89 kg/m .  GENERAL APPEARANCE: healthy, alert and no distress  HENT: ear canals and TM's normal, nose and mouth without ulcers or lesions, oral mucous membranes moist, oropharynx clear, frontal sinus tenderness bilateral and maxillary sinus tenderness bilateral  NECK: no adenopathy, no asymmetry, masses, or scars and thyroid normal to palpation  RESP: lungs clear to auscultation - no rales, rhonchi or wheezes    Diagnostic test results:  none      ASSESSMENT/PLAN:                                                         ICD-10-CM    1. Acute recurrent maxillary sinusitis J01.01 amoxicillin-clavulanate (AUGMENTIN) 875-125 MG tablet       Follow up with Provider - 2 weeks if not resolved   Patient Instructions   Symptomatic treatment.  Will use saline gargles, tylenol and/or advil. Suck on  lozenges as needed. Push fluids. Salt water nasal spray as needed.  Use expectorant such as Mucinex or Robitussin for cough      Darshan Meier MD  [facility]

## 2019-05-21 DIAGNOSIS — N52.9 ERECTILE DYSFUNCTION, UNSPECIFIED ERECTILE DYSFUNCTION TYPE: ICD-10-CM

## 2019-05-23 NOTE — TELEPHONE ENCOUNTER
"Requested Prescriptions   Pending Prescriptions Disp Refills     sildenafil (VIAGRA) 100 MG tablet [Pharmacy Med Name: Sildenafil Citrate Oral Tablet 100 MG]  Last Written Prescription Date:  2/20/19  Last Fill Quantity: 5,  # refills: 11   Last office visit: 5/9/2019 with prescribing provider:  anson   Future Office Visit:     12 tablet 1     Sig: TAKE 1 TABLET BY MOUTH 30 MINUTES TO 4 HOURS PRIOR TO ACTIVITY. MAX  MG PER 24 HOURS       Erectile Dysfuction Protocol Failed - 5/21/2019 12:01 PM        Failed - Absence of Alpha Blockers on Med list        Passed - Absence of nitrates on medication list        Passed - Recent (12 mo) or future (30 days) visit within the authorizing provider's specialty     Patient had office visit in the last 12 months or has a visit in the next 30 days with authorizing provider or within the authorizing provider's specialty.  See \"Patient Info\" tab in inbasket, or \"Choose Columns\" in Meds & Orders section of the refill encounter.              Passed - Medication is active on med list        Passed - Patient is age 18 or older          "

## 2019-05-23 NOTE — TELEPHONE ENCOUNTER
Routing refill request to provider for review/approval because: Patient is currently taking Flomax. Provider approval needed.

## 2019-05-24 RX ORDER — SILDENAFIL 100 MG/1
TABLET, FILM COATED ORAL
Qty: 12 TABLET | Refills: 1 | Status: SHIPPED | OUTPATIENT
Start: 2019-05-24 | End: 2019-09-15

## 2019-06-10 ENCOUNTER — OFFICE VISIT (OUTPATIENT)
Dept: FAMILY MEDICINE | Facility: CLINIC | Age: 79
End: 2019-06-10
Payer: COMMERCIAL

## 2019-06-10 VITALS
DIASTOLIC BLOOD PRESSURE: 56 MMHG | WEIGHT: 213 LBS | SYSTOLIC BLOOD PRESSURE: 128 MMHG | TEMPERATURE: 98 F | BODY MASS INDEX: 34.23 KG/M2 | HEART RATE: 67 BPM | HEIGHT: 66 IN | RESPIRATION RATE: 16 BRPM

## 2019-06-10 DIAGNOSIS — E11.51 TYPE II DIABETES MELLITUS WITH PERIPHERAL CIRCULATORY DISORDER (H): ICD-10-CM

## 2019-06-10 DIAGNOSIS — I10 ESSENTIAL HYPERTENSION: ICD-10-CM

## 2019-06-10 DIAGNOSIS — K64.9 HEMORRHOIDS, UNSPECIFIED HEMORRHOID TYPE: Primary | ICD-10-CM

## 2019-06-10 LAB
ALBUMIN UR-MCNC: NEGATIVE MG/DL
APPEARANCE UR: CLEAR
BASOPHILS # BLD AUTO: 0 10E9/L (ref 0–0.2)
BASOPHILS NFR BLD AUTO: 0.4 %
BILIRUB UR QL STRIP: NEGATIVE
COLOR UR AUTO: YELLOW
DIFFERENTIAL METHOD BLD: ABNORMAL
EOSINOPHIL # BLD AUTO: 0.2 10E9/L (ref 0–0.7)
EOSINOPHIL NFR BLD AUTO: 3 %
ERYTHROCYTE [DISTWIDTH] IN BLOOD BY AUTOMATED COUNT: 13.8 % (ref 10–15)
GLUCOSE UR STRIP-MCNC: NEGATIVE MG/DL
HBA1C MFR BLD: 6.5 % (ref 0–5.6)
HCT VFR BLD AUTO: 36.4 % (ref 40–53)
HGB BLD-MCNC: 12.2 G/DL (ref 13.3–17.7)
HGB UR QL STRIP: NEGATIVE
KETONES UR STRIP-MCNC: NEGATIVE MG/DL
LEUKOCYTE ESTERASE UR QL STRIP: NEGATIVE
LYMPHOCYTES # BLD AUTO: 1.4 10E9/L (ref 0.8–5.3)
LYMPHOCYTES NFR BLD AUTO: 28.6 %
MCH RBC QN AUTO: 29.7 PG (ref 26.5–33)
MCHC RBC AUTO-ENTMCNC: 33.5 G/DL (ref 31.5–36.5)
MCV RBC AUTO: 89 FL (ref 78–100)
MONOCYTES # BLD AUTO: 0.6 10E9/L (ref 0–1.3)
MONOCYTES NFR BLD AUTO: 11.5 %
NEUTROPHILS # BLD AUTO: 2.8 10E9/L (ref 1.6–8.3)
NEUTROPHILS NFR BLD AUTO: 56.5 %
NITRATE UR QL: NEGATIVE
PH UR STRIP: 6 PH (ref 5–7)
PLATELET # BLD AUTO: 212 10E9/L (ref 150–450)
RBC # BLD AUTO: 4.11 10E12/L (ref 4.4–5.9)
RBC #/AREA URNS AUTO: NORMAL /HPF
SOURCE: NORMAL
SP GR UR STRIP: 1.01 (ref 1–1.03)
UROBILINOGEN UR STRIP-ACNC: 0.2 EU/DL (ref 0.2–1)
WBC # BLD AUTO: 5 10E9/L (ref 4–11)
WBC #/AREA URNS AUTO: NORMAL /HPF

## 2019-06-10 PROCEDURE — 80051 ELECTROLYTE PANEL: CPT | Performed by: FAMILY MEDICINE

## 2019-06-10 PROCEDURE — 84520 ASSAY OF UREA NITROGEN: CPT | Performed by: FAMILY MEDICINE

## 2019-06-10 PROCEDURE — 99214 OFFICE O/P EST MOD 30 MIN: CPT | Performed by: FAMILY MEDICINE

## 2019-06-10 PROCEDURE — 82043 UR ALBUMIN QUANTITATIVE: CPT | Performed by: FAMILY MEDICINE

## 2019-06-10 PROCEDURE — 81001 URINALYSIS AUTO W/SCOPE: CPT | Performed by: FAMILY MEDICINE

## 2019-06-10 PROCEDURE — 82565 ASSAY OF CREATININE: CPT | Performed by: FAMILY MEDICINE

## 2019-06-10 PROCEDURE — 83036 HEMOGLOBIN GLYCOSYLATED A1C: CPT | Performed by: FAMILY MEDICINE

## 2019-06-10 PROCEDURE — 36415 COLL VENOUS BLD VENIPUNCTURE: CPT | Performed by: FAMILY MEDICINE

## 2019-06-10 PROCEDURE — 85025 COMPLETE CBC W/AUTO DIFF WBC: CPT | Performed by: FAMILY MEDICINE

## 2019-06-10 ASSESSMENT — MIFFLIN-ST. JEOR: SCORE: 1638.91

## 2019-06-10 NOTE — PATIENT INSTRUCTIONS
I sent the patient for consultation with GI about his internal hemorrhoids.  We will check lab tests as noted on his diabetes and his high blood pressure.  Follow-up at a minimum would be in 3 months to follow-up on his chronic medical issues.  His hemoglobin has been borderline and we checked that again also today.

## 2019-06-10 NOTE — PROGRESS NOTES
Subjective     Timmy Strange is a 78 year old male who presents to clinic today for the following health issues:    HPI   Hemorrhoids       Duration: chronic internal hemorrhoids with intermittent bleeding    Description:   Pain: no   Itching: no     Accompanying signs and symptoms:   Blood in stool: YES- 20 days out of past month  Changes in stool pattern: YES- diarrhea then took Imodium which irritated the internal hemorroids    History (similar episodes/previous evaluation): Needs Hgb checked today    Precipitating or alleviating factors: None    Therapies tried and outcome: preparation H    Diabetes Follow-up      How often are you checking your blood sugar? One time daily    What time of day are you checking your blood sugars (select all that apply)?  Before meals    Have you had any blood sugars above 200?  No    Have you had any blood sugars below 70?  No    What symptoms do you notice when your blood sugar is low?  None    What concerns do you have today about your diabetes? None     Do you have any of these symptoms? (Select all that apply)  No numbness or tingling in feet.  No redness, sores or blisters on feet.  No complaints of excessive thirst.  No reports of blurry vision.  No significant changes to weight.     Have you had a diabetic eye exam in the last 12 months? Yes- Date of last eye exam: 2018    BP Readings from Last 2 Encounters:   06/10/19 128/56   05/09/19 120/66     Hemoglobin A1C (%)   Date Value   06/10/2019 6.5 (H)   10/30/2018 6.6 (H)     LDL Cholesterol Calculated (mg/dL)   Date Value   10/30/2018 65   11/07/2017 87       Diabetes Management Resources  Hypertension Follow-up      Do you check your blood pressure regularly outside of the clinic? No     Are you following a low salt diet? Yes    Are your blood pressures ever more than 140 on the top number (systolic) OR more   than 90 on the bottom number (diastolic), for example 140/90? No    Patient Active Problem List   Diagnosis      "Essential hypertension     Hypothyroidism     Type II diabetes mellitus with peripheral circulatory disorder (H)     BPH (benign prostatic hyperplasia)     ED (erectile dysfunction)     Non morbid obesity due to excess calories: comorbid HTN< DM, hyperlipidemia     Mixed hyperlipidemia     Special screening for malignant neoplasm of prostate     Localized edema     Seasonal allergic rhinitis     Screening for prostate cancer     Elevated blood pressure reading without diagnosis of hypertension     Residual hemorrhoidal skin tags     Obesity (BMI 35.0-39.9) with comorbidity (H)     Excess skin of eyelid, unspecified laterality     Glaucoma of both eyes, unspecified glaucoma type     Past Surgical History:   Procedure Laterality Date     COLONOSCOPY N/A 2014    Procedure: COMBINED COLONOSCOPY, SINGLE OR MULTIPLE BIOPSY/POLYPECTOMY BY BIOPSY;  Surgeon: Rolanda Bey MD;  Location:  GI     TONSILLECTOMY, ADENOIDECTOMY, COMBINED         Social History     Tobacco Use     Smoking status: Former Smoker     Types: Pipe     Last attempt to quit: 11/15/2011     Years since quittin.5     Smokeless tobacco: Never Used   Substance Use Topics     Alcohol use: No     Alcohol/week: 0.0 oz     Family History   Problem Relation Age of Onset     Diabetes Maternal Grandmother      Diabetes Maternal Grandfather      Arthritis Maternal Grandfather      Arthritis Father      Thyroid Disease Father      Glaucoma Mother      Alcohol/Drug Mother 49        cirrhosis     Diabetes Daughter 44        type 2     Obesity Daughter      Glaucoma Maternal Aunt            Reviewed and updated as needed this visit by Provider         Review of Systems   ROS COMP: Constitutional, HEENT, cardiovascular, pulmonary, gi and gu systems are negative, except as otherwise noted.      Objective    /56 (Cuff Size: Adult Large)   Pulse 67   Temp 98  F (36.7  C) (Tympanic)   Resp 16   Ht 1.676 m (5' 6\")   Wt 96.6 kg (213 lb)   BMI " "34.38 kg/m    Body mass index is 34.38 kg/m .  Physical Exam   GENERAL APPEARANCE: healthy, alert and no distress            Assessment & Plan       ICD-10-CM    1. Hemorrhoids, unspecified hemorrhoid type K64.9 GASTROENTEROLOGY ADULT REF CONSULT ONLY   2. Type II diabetes mellitus with peripheral circulatory disorder (H) E11.51 Albumin Random Urine Quantitative with Creat Ratio     Hemoglobin A1c   3. Essential hypertension I10 UA with Microscopic     CBC with platelets differential     Electrolyte panel (Na, K, Cl, CO2, Anion gap)     Creatinine     Urea nitrogen        BMI:   Estimated body mass index is 34.38 kg/m  as calculated from the following:    Height as of this encounter: 1.676 m (5' 6\").    Weight as of this encounter: 96.6 kg (213 lb).   Weight management plan: Discussed healthy diet and exercise guidelines        Patient Instructions   I sent the patient for consultation with GI about his internal hemorrhoids.  We will check lab tests as noted on his diabetes and his high blood pressure.  Follow-up at a minimum would be in 3 months to follow-up on his chronic medical issues.  His hemoglobin has been borderline and we checked that again also today.      Return in about 3 months (around 9/10/2019) for diabetes, dyslipidemia, hypertension.    Samir Duque MD  [facility]    "

## 2019-06-11 LAB
ANION GAP SERPL CALCULATED.3IONS-SCNC: 6 MMOL/L (ref 3–14)
BUN SERPL-MCNC: 19 MG/DL (ref 7–30)
CHLORIDE SERPL-SCNC: 108 MMOL/L (ref 94–109)
CO2 SERPL-SCNC: 27 MMOL/L (ref 20–32)
CREAT SERPL-MCNC: 0.84 MG/DL (ref 0.66–1.25)
CREAT UR-MCNC: 86 MG/DL
GFR SERPL CREATININE-BSD FRML MDRD: 85 ML/MIN/{1.73_M2}
MICROALBUMIN UR-MCNC: 6 MG/L
MICROALBUMIN/CREAT UR: 7.08 MG/G CR (ref 0–17)
POTASSIUM SERPL-SCNC: 4.4 MMOL/L (ref 3.4–5.3)
SODIUM SERPL-SCNC: 141 MMOL/L (ref 133–144)

## 2019-06-30 DIAGNOSIS — J30.2 SEASONAL ALLERGIC RHINITIS: ICD-10-CM

## 2019-07-01 RX ORDER — FLUTICASONE PROPIONATE 50 MCG
SPRAY, SUSPENSION (ML) NASAL
Qty: 48 G | Refills: 2 | Status: ON HOLD | OUTPATIENT
Start: 2019-07-01 | End: 2021-05-08

## 2019-07-01 NOTE — TELEPHONE ENCOUNTER
"Requested Prescriptions   Pending Prescriptions Disp Refills     fluticasone (FLONASE) 50 MCG/ACT nasal spray [Pharmacy Med Name: Fluticasone Propionate Nasal Suspension 50 MCG/ACT] 48 g 0     Sig: SHAKE LIQUID AND USE 1-2 SPRAYS IN EACH NOSTIL EVERY DAY   Last Written Prescription Date:  05/17/2018  Last Fill Quantity: 48,  # refills: 3   Last office visit: 6/10/2019 with prescribing provider:  Dr. Duque   Future Office Visit:      Inhaled Steroids Protocol Passed - 6/30/2019  4:39 PM        Passed - Patient is age 12 or older        Passed - Recent (12 mo) or future (30 days) visit within the authorizing provider's specialty     Patient had office visit in the last 12 months or has a visit in the next 30 days with authorizing provider or within the authorizing provider's specialty.  See \"Patient Info\" tab in inbasket, or \"Choose Columns\" in Meds & Orders section of the refill encounter.              Passed - Medication is active on med list        "

## 2019-07-08 ENCOUNTER — TRANSFERRED RECORDS (OUTPATIENT)
Dept: HEALTH INFORMATION MANAGEMENT | Facility: CLINIC | Age: 79
End: 2019-07-08

## 2019-08-01 DIAGNOSIS — N40.0 BENIGN NON-NODULAR PROSTATIC HYPERPLASIA WITHOUT LOWER URINARY TRACT SYMPTOMS: ICD-10-CM

## 2019-08-01 RX ORDER — FINASTERIDE 5 MG/1
TABLET, FILM COATED ORAL
Qty: 90 TABLET | Refills: 2 | Status: SHIPPED | OUTPATIENT
Start: 2019-08-01 | End: 2020-01-03

## 2019-08-01 NOTE — TELEPHONE ENCOUNTER
"Last OV 06/10/2019.    Requested Prescriptions   Pending Prescriptions Disp Refills     finasteride (PROSCAR) 5 MG tablet [Pharmacy Med Name: Finasteride Oral Tablet 5 MG] 90 tablet 0     Sig: TAKE ONE TABLET BY MOUTH ONE TIME DAILY       Alpha Blockers Failed - 8/1/2019  9:04 AM        Failed - Patient does not have Tadalafil, Vardenafil, or Sildenafil on their medication list        Passed - Blood pressure under 140/90 in past 12 months     BP Readings from Last 3 Encounters:   06/10/19 128/56   05/09/19 120/66   11/14/18 130/60                 Passed - Recent (12 mo) or future (30 days) visit within the authorizing provider's specialty     Patient had office visit in the last 12 months or has a visit in the next 30 days with authorizing provider or within the authorizing provider's specialty.  See \"Patient Info\" tab in inbasket, or \"Choose Columns\" in Meds & Orders section of the refill encounter.              Passed - Medication is active on med list        Passed - Patient is 18 years of age or older          "

## 2019-09-15 DIAGNOSIS — N52.9 ERECTILE DYSFUNCTION, UNSPECIFIED ERECTILE DYSFUNCTION TYPE: ICD-10-CM

## 2019-09-16 NOTE — TELEPHONE ENCOUNTER
"Requested Prescriptions   Pending Prescriptions Disp Refills     sildenafil (VIAGRA) 100 MG tablet [Pharmacy Med Name: Sildenafil Citrate Oral Tablet 100 MG]  Last Written Prescription Date:  5/24/2019  Last Fill Quantity: 12 tablet,  # refills: 1   Last office visit: 6/10/2019 with prescribing provider:  Dunia   Future Office Visit:     12 tablet 0     Sig: TAKE 1 TABLET BY MOUTH 30 MINUTES TO 4 HOURS PRIOR TO ACTIVITY. MAX  MG PER 24 HOURS       Erectile Dysfuction Protocol Failed - 9/15/2019 10:34 AM        Failed - Absence of Alpha Blockers on Med list        Passed - Absence of nitrates on medication list        Passed - Recent (12 mo) or future (30 days) visit within the authorizing provider's specialty     Patient had office visit in the last 12 months or has a visit in the next 30 days with authorizing provider or within the authorizing provider's specialty.  See \"Patient Info\" tab in inbasket, or \"Choose Columns\" in Meds & Orders section of the refill encounter.              Passed - Medication is active on med list        Passed - Patient is age 18 or older           "

## 2019-09-17 ENCOUNTER — TRANSFERRED RECORDS (OUTPATIENT)
Dept: HEALTH INFORMATION MANAGEMENT | Facility: CLINIC | Age: 79
End: 2019-09-17

## 2019-09-17 RX ORDER — SILDENAFIL 100 MG/1
TABLET, FILM COATED ORAL
Qty: 12 TABLET | Refills: 0 | Status: SHIPPED | OUTPATIENT
Start: 2019-09-17 | End: 2019-11-09

## 2019-09-17 NOTE — TELEPHONE ENCOUNTER
Routing refill request to provider for review/approval because:  Failed - Absence of Alpha Blockers on Med list

## 2019-10-02 ENCOUNTER — HEALTH MAINTENANCE LETTER (OUTPATIENT)
Age: 79
End: 2019-10-02

## 2019-10-16 ENCOUNTER — OFFICE VISIT (OUTPATIENT)
Dept: FAMILY MEDICINE | Facility: CLINIC | Age: 79
End: 2019-10-16
Payer: COMMERCIAL

## 2019-10-16 VITALS
HEIGHT: 66 IN | SYSTOLIC BLOOD PRESSURE: 130 MMHG | OXYGEN SATURATION: 96 % | WEIGHT: 207.5 LBS | BODY MASS INDEX: 33.35 KG/M2 | TEMPERATURE: 98 F | RESPIRATION RATE: 14 BRPM | HEART RATE: 59 BPM | DIASTOLIC BLOOD PRESSURE: 62 MMHG

## 2019-10-16 DIAGNOSIS — E11.51 TYPE II DIABETES MELLITUS WITH PERIPHERAL CIRCULATORY DISORDER (H): ICD-10-CM

## 2019-10-16 DIAGNOSIS — R14.1 FLATULENCE, ERUCTATION, AND GAS PAIN: ICD-10-CM

## 2019-10-16 DIAGNOSIS — I10 ESSENTIAL HYPERTENSION: ICD-10-CM

## 2019-10-16 DIAGNOSIS — R19.7 DIARRHEA, UNSPECIFIED TYPE: Primary | ICD-10-CM

## 2019-10-16 DIAGNOSIS — R14.2 FLATULENCE, ERUCTATION, AND GAS PAIN: ICD-10-CM

## 2019-10-16 DIAGNOSIS — R14.3 FLATULENCE, ERUCTATION, AND GAS PAIN: ICD-10-CM

## 2019-10-16 PROCEDURE — 99214 OFFICE O/P EST MOD 30 MIN: CPT | Performed by: FAMILY MEDICINE

## 2019-10-16 ASSESSMENT — MIFFLIN-ST. JEOR: SCORE: 1613.96

## 2019-10-16 NOTE — PROGRESS NOTES
Subjective   Discuss test results from [facility]!  Timmy Strange is a 78 year old male who presents to clinic today for the following health issues:    HPI   Diabetes Follow-up      How often are you checking your blood sugar? Two times daily    What time of day are you checking your blood sugars (select all that apply)?  Before meals    Have you had any blood sugars above 200?  No    Have you had any blood sugars below 70?  Yes     What symptoms do you notice when your blood sugar is low?  None    What concerns do you have today about your diabetes? None     Do you have any of these symptoms? (Select all that apply)  Weight loss     Have you had a diabetic eye exam in the last 12 months? Yes- Date of last eye exam: Had done 09/2019 @ Dr. Escalona @ [facility]    Diabetes Management Resources    Hyperlipidemia Follow-Up      Are you having any of the following symptoms? (Select all that apply)  No complaints of shortness of breath, chest pain or pressure.  No increased sweating or nausea with activity.  No left-sided neck or arm pain.  No complaints of pain in calves when walking 1-2 blocks.    Are you regularly taking any medication or supplement to lower your cholesterol?   Yes- Lovastatin    Are you having muscle aches or other side effects that you think could be caused by your cholesterol lowering medication?  No    Hypertension Follow-up      Do you check your blood pressure regularly outside of the clinic? No     Are you following a low salt diet? No    Are your blood pressures ever more than 140 on the top number (systolic) OR more   than 90 on the bottom number (diastolic), for example 140/90? No    BP Readings from Last 2 Encounters:   10/16/19 130/62   06/10/19 128/56     Hemoglobin A1C (%)   Date Value   06/10/2019 6.5 (H)   10/30/2018 6.6 (H)     LDL Cholesterol Calculated (mg/dL)   Date Value   10/30/2018 65   11/07/2017 87         How many servings of fruits and vegetables do you eat daily?  0-1    On  average, how many sweetened beverages do you drink each day (soda, juice, sweet tea, etc)?   0    How many days per week do you miss taking your medication? 0        Diarrhea      Duration: months    Description:       Consistency of stool: loose       Blood in stool: no        Number of loose stools past 24 hours: 2    Intensity:  moderate    Accompanying signs and symptoms:       Fever: no        Nausea/vomitting: no        Abdominal pain: YES       Weight loss: YES    History (recent antibiotics or travel/ill contacts/med changes/testing done): Antibiotics    Precipitating or alleviating factors: Question antibiotics    Therapies tried and outcome: Imodium AD has helped but patient still has a lot of gas      Patient Active Problem List   Diagnosis     Essential hypertension     Hypothyroidism     Type II diabetes mellitus with peripheral circulatory disorder (H)     BPH (benign prostatic hyperplasia)     ED (erectile dysfunction)     Non morbid obesity due to excess calories: comorbid HTN< DM, hyperlipidemia     Mixed hyperlipidemia     Special screening for malignant neoplasm of prostate     Localized edema     Seasonal allergic rhinitis     Screening for prostate cancer     Elevated blood pressure reading without diagnosis of hypertension     Residual hemorrhoidal skin tags     Obesity (BMI 35.0-39.9) with comorbidity (H)     Excess skin of eyelid, unspecified laterality     Glaucoma of both eyes, unspecified glaucoma type     Flatulence, eructation, and gas pain     Diarrhea, unspecified type     Past Surgical History:   Procedure Laterality Date     COLONOSCOPY N/A 2014    Procedure: COMBINED COLONOSCOPY, SINGLE OR MULTIPLE BIOPSY/POLYPECTOMY BY BIOPSY;  Surgeon: Rolanda Bey MD;  Location:  GI     TONSILLECTOMY, ADENOIDECTOMY, COMBINED         Social History     Tobacco Use     Smoking status: Former Smoker     Packs/day: 0.00     Last attempt to quit: 11/15/2011     Years since quittin.9  "    Smokeless tobacco: Never Used   Substance Use Topics     Alcohol use: No     Alcohol/week: 0.0 standard drinks     Family History   Problem Relation Age of Onset     Diabetes Maternal Grandmother      Diabetes Maternal Grandfather      Arthritis Maternal Grandfather      Arthritis Father      Thyroid Disease Father      Glaucoma Mother      Alcohol/Drug Mother 49        cirrhosis     Diabetes Daughter 44        type 2     Obesity Daughter      Glaucoma Maternal Aunt              Reviewed and updated as needed this visit by Provider         Review of Systems   ROS COMP: Constitutional, HEENT, cardiovascular, pulmonary, gi and gu systems are negative, except as otherwise noted.      Objective    /62 (Patient Position: Sitting, Cuff Size: Adult Regular)   Pulse 59   Temp 98  F (36.7  C) (Tympanic)   Resp 14   Ht 1.676 m (5' 6\")   Wt 94.1 kg (207 lb 8 oz)   SpO2 96%   BMI 33.49 kg/m    Body mass index is 33.49 kg/m .  Physical Exam   GENERAL APPEARANCE: healthy, alert and no distress            Assessment & Plan       ICD-10-CM    1. Diarrhea, unspecified type R19.7 Clostridium difficile Toxin B PCR   2. Type II diabetes mellitus with peripheral circulatory disorder (H) E11.51    3. Essential hypertension I10    4. Flatulence, eructation, and gas pain R14.3     R14.1     R14.2         BMI:   Estimated body mass index is 33.49 kg/m  as calculated from the following:    Height as of this encounter: 1.676 m (5' 6\").    Weight as of this encounter: 94.1 kg (207 lb 8 oz).           Patient Instructions   With respect to the increased gas that the patient has, for the next 2 weeks he will cut out all dairy products.  If that does not have any of his level of gas we will go on a gluten-free diet for 2 weeks.  He will increase his use of Imodium up to 6/day to take care of the loose stools at present.  He was recently seen by gastroenterology and they suggested may be of PPI.  He did try that for 2 weeks and it " made no difference in his symptoms.  We are checking  test today for C. Difficile.  Follow-up will be in approximately 1 month sooner as needed.  He does have a colonoscopy set up for the beginning of December.      Return in about 4 weeks (around 11/13/2019) for Diarrhea, diabetes, hypertension, dyslipidemia.    Samir Duque MD  [facility]

## 2019-10-17 PROBLEM — R14.1 FLATULENCE, ERUCTATION, AND GAS PAIN: Status: ACTIVE | Noted: 2019-10-17

## 2019-10-17 PROBLEM — R14.2 FLATULENCE, ERUCTATION, AND GAS PAIN: Status: ACTIVE | Noted: 2019-10-17

## 2019-10-17 PROBLEM — R14.3 FLATULENCE, ERUCTATION, AND GAS PAIN: Status: ACTIVE | Noted: 2019-10-17

## 2019-10-17 PROBLEM — R19.7 DIARRHEA, UNSPECIFIED TYPE: Status: ACTIVE | Noted: 2019-10-17

## 2019-10-17 NOTE — PATIENT INSTRUCTIONS
With respect to the increased gas that the patient has, for the next 2 weeks he will cut out all dairy products.  If that does not have any of his level of gas we will go on a gluten-free diet for 2 weeks.  He will increase his use of Imodium up to 6/day to take care of the loose stools at present.  He was recently seen by gastroenterology and they suggested may be of PPI.  He did try that for 2 weeks and it made no difference in his symptoms.  We are checking  test today for C. Difficile.  Follow-up will be in approximately 1 month sooner as needed.  He does have a colonoscopy set up for the beginning of December.

## 2019-10-23 DIAGNOSIS — R19.7 DIARRHEA, UNSPECIFIED TYPE: ICD-10-CM

## 2019-10-23 LAB
C DIFF TOX B STL QL: NEGATIVE
SPECIMEN SOURCE: NORMAL

## 2019-10-23 PROCEDURE — 87493 C DIFF AMPLIFIED PROBE: CPT | Performed by: FAMILY MEDICINE

## 2019-11-09 DIAGNOSIS — N52.9 ERECTILE DYSFUNCTION, UNSPECIFIED ERECTILE DYSFUNCTION TYPE: ICD-10-CM

## 2019-11-11 NOTE — TELEPHONE ENCOUNTER
"Requested Prescriptions   Pending Prescriptions Disp Refills     sildenafil (VIAGRA) 100 MG tablet [Pharmacy Med Name: Sildenafil Citrate Oral Tablet 100 MG]  Last Written Prescription Date:  9/17/2019  Last Fill Quantity: 12 tablet,  # refills: 0   Last office visit: 10/16/2019 with prescribing provider:  Dunia   Future Office Visit:     12 tablet 0     Sig: TAKE 1 TABLET BY MOUTH 30 MINUTES TO 4 HOURS PRIOR TO ACTIVITY. MAX  MG PER 24 HOURS       Erectile Dysfuction Protocol Failed - 11/9/2019  1:05 PM        Failed - Absence of Alpha Blockers on Med list        Passed - Absence of nitrates on medication list        Passed - Recent (12 mo) or future (30 days) visit within the authorizing provider's specialty     Patient has had an office visit with the authorizing provider or a provider within the authorizing providers department within the previous 12 mos or has a future within next 30 days. See \"Patient Info\" tab in inbasket, or \"Choose Columns\" in Meds & Orders section of the refill encounter.              Passed - Medication is active on med list        Passed - Patient is age 18 or older           "

## 2019-11-12 RX ORDER — SILDENAFIL 100 MG/1
TABLET, FILM COATED ORAL
Qty: 12 TABLET | Refills: 0 | Status: SHIPPED | OUTPATIENT
Start: 2019-11-12 | End: 2019-12-18

## 2019-11-15 DIAGNOSIS — E78.5 HYPERLIPIDEMIA LDL GOAL <70: ICD-10-CM

## 2019-11-15 NOTE — TELEPHONE ENCOUNTER
"Lovastatin Oral Tablet 40 MG  Last Written Prescription Date:  1/10/2019  Last Fill Quantity: 90,  # refills: 3   Last office visit: 10/16/2019 with prescribing provider:     Future Office Visit:      Requested Prescriptions   Pending Prescriptions Disp Refills     lovastatin (MEVACOR) 40 MG tablet [Pharmacy Med Name: Lovastatin Oral Tablet 40 MG] 90 tablet 2     Sig: TAKE ONE TABLET BY MOUTH AT BEDTIME       Statins Protocol Failed - 11/15/2019  7:02 AM        Failed - LDL on file in past 12 months     Recent Labs   Lab Test 10/30/18  0832   LDL 65             Passed - No abnormal creatine kinase in past 12 months     No lab results found.             Passed - Recent (12 mo) or future (30 days) visit within the authorizing provider's specialty     Patient has had an office visit with the authorizing provider or a provider within the authorizing providers department within the previous 12 mos or has a future within next 30 days. See \"Patient Info\" tab in inbasket, or \"Choose Columns\" in Meds & Orders section of the refill encounter.              Passed - Medication is active on med list        Passed - Patient is age 18 or older          "

## 2019-11-18 RX ORDER — LOVASTATIN 40 MG
TABLET ORAL
Qty: 90 TABLET | Refills: 2 | OUTPATIENT
Start: 2019-11-18

## 2019-11-19 RX ORDER — LOVASTATIN 40 MG
40 TABLET ORAL AT BEDTIME
Qty: 30 TABLET | Refills: 0 | Status: SHIPPED | OUTPATIENT
Start: 2019-11-19 | End: 2019-12-03

## 2019-11-19 NOTE — TELEPHONE ENCOUNTER
Cindy was called back. Pt is due for are refill, I am not sure if the previous nurse mixed it up with another medication.     Medication is being filled for 1 time refill only due to:  Patient needs labs fasting lipids. Future labs ordered yes, patient to schedule a lab only appointment.

## 2019-11-19 NOTE — TELEPHONE ENCOUNTER
Adrianna with [facility] pharmacy called with questions about why it is too soon for the patient to fill the medication. She would like a call back to discuss this.

## 2019-11-26 DIAGNOSIS — E78.5 HYPERLIPIDEMIA LDL GOAL <70: ICD-10-CM

## 2019-11-26 LAB
CHOLEST SERPL-MCNC: 142 MG/DL
HDLC SERPL-MCNC: 49 MG/DL
LDLC SERPL CALC-MCNC: 76 MG/DL
NONHDLC SERPL-MCNC: 93 MG/DL
TRIGL SERPL-MCNC: 85 MG/DL

## 2019-11-26 PROCEDURE — 80061 LIPID PANEL: CPT | Performed by: FAMILY MEDICINE

## 2019-11-26 PROCEDURE — 36415 COLL VENOUS BLD VENIPUNCTURE: CPT | Performed by: FAMILY MEDICINE

## 2019-12-16 ENCOUNTER — HEALTH MAINTENANCE LETTER (OUTPATIENT)
Age: 79
End: 2019-12-16

## 2019-12-16 ENCOUNTER — OFFICE VISIT (OUTPATIENT)
Dept: FAMILY MEDICINE | Facility: CLINIC | Age: 79
End: 2019-12-16
Payer: COMMERCIAL

## 2019-12-16 VITALS
OXYGEN SATURATION: 97 % | BODY MASS INDEX: 33.51 KG/M2 | HEART RATE: 72 BPM | WEIGHT: 208.5 LBS | DIASTOLIC BLOOD PRESSURE: 68 MMHG | SYSTOLIC BLOOD PRESSURE: 160 MMHG | TEMPERATURE: 98 F | RESPIRATION RATE: 15 BRPM | HEIGHT: 66 IN

## 2019-12-16 DIAGNOSIS — Z00.00 ROUTINE GENERAL MEDICAL EXAMINATION AT A HEALTH CARE FACILITY: Primary | ICD-10-CM

## 2019-12-16 DIAGNOSIS — E11.51 TYPE II DIABETES MELLITUS WITH PERIPHERAL CIRCULATORY DISORDER (H): ICD-10-CM

## 2019-12-16 DIAGNOSIS — E78.2 MIXED HYPERLIPIDEMIA: ICD-10-CM

## 2019-12-16 DIAGNOSIS — I10 ESSENTIAL HYPERTENSION: ICD-10-CM

## 2019-12-16 PROCEDURE — G0439 PPPS, SUBSEQ VISIT: HCPCS | Performed by: FAMILY MEDICINE

## 2019-12-16 PROCEDURE — 2894A VOIDCORRECT: CPT | Mod: 25 | Performed by: FAMILY MEDICINE

## 2019-12-16 ASSESSMENT — ACTIVITIES OF DAILY LIVING (ADL): CURRENT_FUNCTION: NO ASSISTANCE NEEDED

## 2019-12-16 ASSESSMENT — MIFFLIN-ST. JEOR: SCORE: 1613.5

## 2019-12-16 NOTE — PATIENT INSTRUCTIONS
Preventive Health Recommendations:     See your health care provider every year to    Review health changes.     Discuss preventive care.      Review your medicines if your doctor has prescribed any.      Talk with your health care provider about whether you should have a test to screen for prostate cancer (PSA).    Every 3 years, have a diabetes test (fasting glucose). If you are at risk for diabetes, you should have this test more often.    Every 5 years, have a cholesterol test. Have this test more often if you are at risk for high cholesterol or heart disease.     Every 10 years, have a colonoscopy. Or, have a yearly FIT test (stool test). These exams will check for colon cancer.    Talk to with your health care provider about screening for Abdominal Aortic Aneurysm if you have a family history of AAA or have a history of smoking.    Shots:     Get a flu shot each year.     Get a tetanus shot every 10 years.     Talk to your doctor about your pneumonia vaccines. There are now two you should receive - Pneumovax (PPSV 23) and Prevnar (PCV 13).     Talk to your pharmacist about a shingles vaccine.     Talk to your doctor about the hepatitis B vaccine.  Nutrition:     Eat at least 5 servings of fruits and vegetables each day.     Eat whole-grain bread, whole-wheat pasta and brown rice instead of white grains and rice.     Get adequate Calcium and Vitamin D.   Lifestyle    Exercise for at least 150 minutes a week (30 minutes a day, 5 days a week). This will help you control your weight and prevent disease.     Limit alcohol to one drink per day.     No smoking.     Wear sunscreen to prevent skin cancer.    See your dentist every six months for an exam and cleaning.    See your eye doctor every 1 to 2 years to screen for conditions such as glaucoma, macular degeneration, cataracts, etc.    Personalized Prevention Plan  You are due for the preventive services outlined below.  Your care team is available to assist you  in scheduling these services.  If you have already completed any of these items, please share that information with your care team to update in your medical record.  Health Maintenance Due   Topic Date Due     Zoster (Shingles) Vaccine (1 of 2) 10/21/1992     Discuss Advance Care Planning  12/26/2016     FALL RISK ASSESSMENT  10/03/2019     Basic Metabolic Panel  10/30/2019     Annual Wellness Visit  11/14/2019     Diabetic Foot Exam  11/14/2019     A1C Lab  12/10/2019     Colonoscopy  11/21/2019

## 2019-12-16 NOTE — PROGRESS NOTES
"SUBJECTIVE:   Timmy Strange is a 79 year old male who presents for Preventive Visit.      Are you in the first 12 months of your Medicare coverage?  No    Healthy Habits:     In general, how would you rate your overall health?  Good    Frequency of exercise:  2-3 days/week    Duration of exercise:  15-30 minutes    Do you usually eat at least 4 servings of fruit and vegetables a day, include whole grains    & fiber and avoid regularly eating high fat or \"junk\" foods?  Yes    Taking medications regularly:  Yes    Medication side effects:  None    Ability to successfully perform activities of daily living:  No assistance needed    Home Safety:  No safety concerns identified    Hearing Impairment:  Need to ask people to speak up or repeat themselves    In the past 6 months, have you been bothered by leaking of urine?  No    In general, how would you rate your overall mental or emotional health?  Good      PHQ-2 Total Score: 0    Additional concerns today:  No    Do you feel safe in your environment? Yes    Have you ever done Advance Care Planning? (For example, a Health Directive, POLST, or a discussion with a medical provider or your loved ones about your wishes): Yes, patient states has an Advance Care Planning document and will bring a copy to the clinic.      Fall risk  Fallen 2 or more times in the past year?: No  Any fall with injury in the past year?: No  click delete button to remove this line now  Cognitive Screening   1) Repeat 3 items (Leader, Season, Table)    2) Clock draw: Normal  3) 3 item recall: Recalls 3 objects  Results: 3 items recalled: COGNITIVE IMPAIRMENT LESS LIKELY    Mini-CogTM Tarsha Aleman. Licensed by the author for use in [facility]; reprinted with permission (ash@.[email or web address]). All rights reserved.      Do you have sleep apnea, excessive snoring or daytime drowsiness?: no    Reviewed and updated as needed this visit by clinical staff  Tobacco  Allergies  Meds  Med Hx  " Surg Hx  Fam Hx  Soc Hx        Reviewed and updated as needed this visit by Provider        Social History     Tobacco Use     Smoking status: Former Smoker     Packs/day: 0.00     Last attempt to quit: 11/15/2011     Years since quittin.0     Smokeless tobacco: Never Used   Substance Use Topics     Alcohol use: No     Alcohol/week: 0.0 standard drinks     If you drink alcohol do you typically have >3 drinks per day or >7 drinks per week? No    Alcohol Use 2019   Prescreen: >3 drinks/day or >7 drinks/week? No   Prescreen: >3 drinks/day or >7 drinks/week? -               Current providers sharing in care for this patient include:   Patient Care Team:  Samir Duque MD as PCP - General (Family Practice)  Samir Duque MD as Assigned PCP    The following health maintenance items are reviewed in Epic and correct as of today:  Health Maintenance   Topic Date Due     ZOSTER IMMUNIZATION (1 of 2) 10/21/1992     FALL RISK ASSESSMENT  10/03/2019     BMP  10/30/2019     MEDICARE ANNUAL WELLNESS VISIT  2019     DIABETIC FOOT EXAM  2019     A1C  12/10/2019     COLONOSCOPY  2019     MICROALBUMIN  06/10/2020     DTAP/TDAP/TD IMMUNIZATION (2 - Td) 2020     EYE EXAM  2020     TSH W/FREE T4 REFLEX  10/30/2020     LIPID  2020     ADVANCE CARE PLANNING  2024     PHQ-2  Completed     INFLUENZA VACCINE  Completed     PNEUMOCOCCAL IMMUNIZATION 65+ LOW/MEDIUM RISK  Completed     IPV IMMUNIZATION  Aged Out     MENINGITIS IMMUNIZATION  Aged Out     Patient Active Problem List   Diagnosis     Essential hypertension     Hypothyroidism     Type II diabetes mellitus with peripheral circulatory disorder (H)     BPH (benign prostatic hyperplasia)     ED (erectile dysfunction)     Non morbid obesity due to excess calories: comorbid HTN< DM, hyperlipidemia     Mixed hyperlipidemia     Special screening for malignant neoplasm of prostate     Localized edema     Seasonal  "allergic rhinitis     Screening for prostate cancer     Elevated blood pressure reading without diagnosis of hypertension     Residual hemorrhoidal skin tags     Obesity (BMI 35.0-39.9) with comorbidity (H)     Excess skin of eyelid, unspecified laterality     Glaucoma of both eyes, unspecified glaucoma type     Flatulence, eructation, and gas pain     Diarrhea, unspecified type     Past Surgical History:   Procedure Laterality Date     COLONOSCOPY N/A 2014    Procedure: COMBINED COLONOSCOPY, SINGLE OR MULTIPLE BIOPSY/POLYPECTOMY BY BIOPSY;  Surgeon: Rolanda Bey MD;  Location:  GI     TONSILLECTOMY, ADENOIDECTOMY, COMBINED         Social History     Tobacco Use     Smoking status: Former Smoker     Packs/day: 0.00     Last attempt to quit: 11/15/2011     Years since quittin.0     Smokeless tobacco: Never Used   Substance Use Topics     Alcohol use: No     Alcohol/week: 0.0 standard drinks     Family History   Problem Relation Age of Onset     Diabetes Maternal Grandmother      Diabetes Maternal Grandfather      Arthritis Maternal Grandfather      Arthritis Father      Thyroid Disease Father      Glaucoma Mother      Alcohol/Drug Mother 49        cirrhosis     Diabetes Daughter 44        type 2     Obesity Daughter      Glaucoma Maternal Aunt              Review of Systems  Constitutional, HEENT, cardiovascular, pulmonary, gi and gu systems are negative, except as otherwise noted.    OBJECTIVE:   BP (!) 160/68   Pulse 72   Temp 98  F (36.7  C) (Tympanic)   Resp 15   Ht 1.676 m (5' 6\")   Wt 94.6 kg (208 lb 8 oz)   SpO2 97%   BMI 33.65 kg/m   Estimated body mass index is 33.65 kg/m  as calculated from the following:    Height as of this encounter: 1.676 m (5' 6\").    Weight as of this encounter: 94.6 kg (208 lb 8 oz).  Physical Exam  GENERAL: healthy, alert and no distress  EYES: Eyes grossly normal to inspection, PERRL and conjunctivae and sclerae normal  HENT: ear canals and TM's normal, " "nose and mouth without ulcers or lesions  NECK: no adenopathy, no asymmetry, masses, or scars and thyroid normal to palpation  RESP: lungs clear to auscultation - no rales, rhonchi or wheezes  CV: regular rate and rhythm, normal S1 S2, no S3 or S4, no murmur, click or rub, no peripheral edema and peripheral pulses strong  ABDOMEN: soft, nontender, no hepatosplenomegaly, no masses and bowel sounds normal  MS: no gross musculoskeletal defects noted, no edema  SKIN: no suspicious lesions or rashes  NEURO: Normal strength and tone, mentation intact and speech normal  PSYCH: mentation appears normal, affect normal/bright  LYMPH: no cervical, supraclavicular, axillary, or inguinal adenopathy        ASSESSMENT / PLAN:       ICD-10-CM    1. Routine general medical examination at a health care facility Z00.00    2. Essential hypertension I10    3. Type II diabetes mellitus with peripheral circulatory disorder (H) E11.51    4. Mixed hyperlipidemia E78.2        COUNSELING:  Reviewed preventive health counseling, as reflected in patient instructions       Regular exercise       Healthy diet/nutrition       Vision screening    Estimated body mass index is 33.65 kg/m  as calculated from the following:    Height as of this encounter: 1.676 m (5' 6\").    Weight as of this encounter: 94.6 kg (208 lb 8 oz).    Weight management plan: Discussed healthy diet and exercise guidelines     reports that he quit smoking about 8 years ago. He smoked 0.00 packs per day. He has never used smokeless tobacco.    I will see the patient back in a month to get his blood pressure checked again.  If it still elevated as it is today we would then anticipate increasing his carvedilol.  He has follow-up appointments coming up with specialists so we will wait till after those are accomplished before we start messing with his blood pressure medications.  Appropriate preventive services were discussed with this patient, including applicable screening as " appropriate for cardiovascular disease, diabetes, osteopenia/osteoporosis, and glaucoma.  As appropriate for age/gender, discussed screening for colorectal cancer, prostate cancer, breast cancer, and cervical cancer. Checklist reviewing preventive services available has been given to the patient.    Reviewed patients plan of care and provided an AVS. The Basic Care Plan (routine screening as documented in Health Maintenance) for Timmy meets the Care Plan requirement. This Care Plan has been established and reviewed with the Patient.    Counseling Resources:  ATP IV Guidelines  Pooled Cohorts Equation Calculator  Breast Cancer Risk Calculator  FRAX Risk Assessment  ICSI Preventive Guidelines  Dietary Guidelines for Americans, 2010  USDA's MyPlate  ASA Prophylaxis  Lung CA Screening    Samir Duque MD  [facility]    Identified Health Risks:

## 2019-12-22 ENCOUNTER — MYC MEDICAL ADVICE (OUTPATIENT)
Dept: FAMILY MEDICINE | Facility: CLINIC | Age: 79
End: 2019-12-22

## 2019-12-23 NOTE — TELEPHONE ENCOUNTER
The patient has been to colon and rectal surgeons before for colonoscopy so I do not think he needs a referral.

## 2020-01-03 ENCOUNTER — MYC MEDICAL ADVICE (OUTPATIENT)
Dept: FAMILY MEDICINE | Facility: CLINIC | Age: 80
End: 2020-01-03

## 2020-01-03 DIAGNOSIS — N40.0 BENIGN NON-NODULAR PROSTATIC HYPERPLASIA WITHOUT LOWER URINARY TRACT SYMPTOMS: ICD-10-CM

## 2020-01-03 DIAGNOSIS — E78.5 HYPERLIPIDEMIA LDL GOAL <70: ICD-10-CM

## 2020-01-03 DIAGNOSIS — I10 ESSENTIAL HYPERTENSION: ICD-10-CM

## 2020-01-03 DIAGNOSIS — I10 ESSENTIAL HYPERTENSION, BENIGN: ICD-10-CM

## 2020-01-03 RX ORDER — LOVASTATIN 40 MG
TABLET ORAL
Qty: 90 TABLET | Refills: 3 | Status: SHIPPED | OUTPATIENT
Start: 2020-01-03 | End: 2020-12-01

## 2020-01-03 RX ORDER — CARVEDILOL 3.12 MG/1
3.12 TABLET ORAL 2 TIMES DAILY
Qty: 180 TABLET | Refills: 3 | Status: SHIPPED | OUTPATIENT
Start: 2020-01-03 | End: 2021-02-02

## 2020-01-03 RX ORDER — LOSARTAN POTASSIUM 100 MG/1
100 TABLET ORAL DAILY
Qty: 90 TABLET | Refills: 3 | Status: SHIPPED | OUTPATIENT
Start: 2020-01-03 | End: 2020-10-08

## 2020-01-03 RX ORDER — LIDOCAINE 40 MG/G
CREAM TOPICAL
Status: CANCELLED | OUTPATIENT
Start: 2020-01-03

## 2020-01-03 RX ORDER — ONDANSETRON 2 MG/ML
4 INJECTION INTRAMUSCULAR; INTRAVENOUS
Status: CANCELLED | OUTPATIENT
Start: 2020-01-03

## 2020-01-03 RX ORDER — TAMSULOSIN HYDROCHLORIDE 0.4 MG/1
CAPSULE ORAL
Qty: 90 CAPSULE | Refills: 3 | Status: SHIPPED | OUTPATIENT
Start: 2020-01-03 | End: 2020-11-09

## 2020-01-03 RX ORDER — FINASTERIDE 5 MG/1
1 TABLET, FILM COATED ORAL DAILY
Qty: 90 TABLET | Refills: 3 | Status: SHIPPED | OUTPATIENT
Start: 2020-01-03 | End: 2020-03-09

## 2020-01-03 RX ORDER — CARVEDILOL 3.12 MG/1
3.12 TABLET ORAL 2 TIMES DAILY
Qty: 180 TABLET | Refills: 3 | Status: CANCELLED | OUTPATIENT
Start: 2020-01-03

## 2020-01-03 NOTE — TELEPHONE ENCOUNTER
Routing refill request to provider for review/approval because:  Pt needs medications filled to [facility]. When I called him he stated he didn't need any immediate tranfers, but needed new scripts    I went over medications with pt that need to be sent to [facility].  He says he will make an appt with you for BP recheck after his wife's surgery in approx 2 weeks.  OK to sign for all new [facility] meds for year? Pended for you to sign.     Otherwise, these are the meds that have not been WDL      tamsulosin (FLOMAX) 0.4 MG capsule 90 capsule 3       Sig: TAKE ONE CAPSULE BY MOUTH ONE TIME DAILY   Last Written Prescription Date:  12/06/19  Last Fill Quantity: 90,  # refills: 3   Last office visit: 12/16/2019 with prescribing provider:  Dunia   Future Office Visit:       Alpha Blockers Failed - 1/3/2020 11:31 AM           losartan (COZAAR) 100 MG tablet 90 tablet 2       Sig: Take 1 tablet (100 mg) by mouth daily   Last Written Prescription Date:  11/08/19  Last Fill Quantity: 90,  # refills: 2   Last office visit: 12/16/2019 with prescribing provider:  Dunia   Future Office Visit:          Angiotensin-II Receptors Failed - 1/3/2020 11:31 AM           finasteride (PROSCAR) 5 MG tablet 90 tablet 2       Sig: Take 1 tablet (5 mg) by mouth daily   Last Written Prescription Date:  08/01/19  Last Fill Quantity: 90,  # refills: 2   Last office visit: 12/16/2019 with prescribing provider:  Dunia   Future Office Visit:          There is no refill protocol information for this order       carvedilol (COREG) 3.125 MG tablet 180 tablet 3       Sig: Take 1 tablet (3.125 mg) by mouth 2 times daily   Last Written Prescription Date:  12/18/19  Last Fill Quantity: 180,  # refills: 3   Last office visit: 12/16/2019 with prescribing provider:  Dunia   Future Office Visit:          Beta-Blockers Protocol Failed - 1/3/2020 11:31 AM

## 2020-01-03 NOTE — TELEPHONE ENCOUNTER
"Requested Prescriptions   Pending Prescriptions Disp Refills     carvedilol (COREG) 3.125 MG tablet 180 tablet 3     Sig: Take 1 tablet (3.125 mg) by mouth 2 times daily     Last Written Prescription Date:  12/18/2019  Last Fill Quantity: 180,  # refills: 3   Last office visit: 12/16/2019 with prescribing provider:  12/16/2019   Future Office Visit:          Beta-Blockers Protocol Failed - 1/3/2020 10:48 AM        Failed - Blood pressure under 140/90 in past 12 months     BP Readings from Last 3 Encounters:   12/16/19 (!) 160/68   10/16/19 130/62   06/10/19 128/56                 Passed - Patient is age 6 or older        Passed - Recent (12 mo) or future (30 days) visit within the authorizing provider's specialty     Patient has had an office visit with the authorizing provider or a provider within the authorizing providers department within the previous 12 mos or has a future within next 30 days. See \"Patient Info\" tab in inbasket, or \"Choose Columns\" in Meds & Orders section of the refill encounter.              Passed - Medication is active on med list          "

## 2020-01-03 NOTE — TELEPHONE ENCOUNTER
FYI- Please see prior message from pt.    Changed his #1 pharm to [facility]- no further action needed here.  Also told pt to reach out to [facility] and [facility] to transfer medications between each other.  Updates about health.     Please let Nurse Triage know if we should do anything for follow up. Thank you!

## 2020-01-03 NOTE — TELEPHONE ENCOUNTER
"Requested Prescriptions   Pending Prescriptions Disp Refills     tamsulosin (FLOMAX) 0.4 MG capsule 90 capsule 3     Sig: TAKE ONE CAPSULE BY MOUTH ONE TIME DAILY   Last Written Prescription Date:  12/06/19  Last Fill Quantity: 90,  # refills: 3   Last office visit: 12/16/2019 with prescribing provider:  Dunia   Future Office Visit:      Alpha Blockers Failed - 1/3/2020 11:31 AM        Failed - Blood pressure under 140/90 in past 12 months     BP Readings from Last 3 Encounters:   12/16/19 (!) 160/68   10/16/19 130/62   06/10/19 128/56                 Failed - Patient does not have Tadalafil, Vardenafil, or Sildenafil on their medication list        Passed - Recent (12 mo) or future (30 days) visit within the authorizing provider's specialty     Patient has had an office visit with the authorizing provider or a provider within the authorizing providers department within the previous 12 mos or has a future within next 30 days. See \"Patient Info\" tab in inbasket, or \"Choose Columns\" in Meds & Orders section of the refill encounter.              Passed - Medication is active on med list        Passed - Patient is 18 years of age or older        lovastatin (MEVACOR) 40 MG tablet 90 tablet 2     Sig: Take 1 tablet by mouth At Bedtime. Must have labs done before refills   Last Written Prescription Date:  12/06/19  Last Fill Quantity: 90,  # refills: 2   Last office visit: 12/16/2019 with prescribing provider:  Dunia   Future Office Visit:        Statins Protocol Passed - 1/3/2020 11:31 AM        Passed - LDL on file in past 12 months     Recent Labs   Lab Test 11/26/19  0853   LDL 76             Passed - No abnormal creatine kinase in past 12 months     No lab results found.             Passed - Recent (12 mo) or future (30 days) visit within the authorizing provider's specialty     Patient has had an office visit with the authorizing provider or a provider within the authorizing providers department within the " "previous 12 mos or has a future within next 30 days. See \"Patient Info\" tab in inbasket, or \"Choose Columns\" in Meds & Orders section of the refill encounter.              Passed - Medication is active on med list        Passed - Patient is age 18 or older        losartan (COZAAR) 100 MG tablet 90 tablet 2     Sig: Take 1 tablet (100 mg) by mouth daily   Last Written Prescription Date:  11/08/19  Last Fill Quantity: 90,  # refills: 2   Last office visit: 12/16/2019 with prescribing provider:  Dunia   Future Office Visit:        Angiotensin-II Receptors Failed - 1/3/2020 11:31 AM        Failed - Last blood pressure under 140/90 in past 12 months     BP Readings from Last 3 Encounters:   12/16/19 (!) 160/68   10/16/19 130/62   06/10/19 128/56                 Passed - Recent (12 mo) or future (30 days) visit within the authorizing provider's specialty     Patient has had an office visit with the authorizing provider or a provider within the authorizing providers department within the previous 12 mos or has a future within next 30 days. See \"Patient Info\" tab in [employer], or \"Choose Columns\" in Meds & Orders section of the refill encounter.              Passed - Medication is active on med list        Passed - Patient is age 18 or older        Passed - Normal serum creatinine on file in past 12 months     Recent Labs   Lab Test 06/10/19  1154   CR 0.84             Passed - Normal serum potassium on file in past 12 months     Recent Labs   Lab Test 06/10/19  1154   POTASSIUM 4.4                    finasteride (PROSCAR) 5 MG tablet 90 tablet 2     Sig: Take 1 tablet (5 mg) by mouth daily   Last Written Prescription Date:  08/01/19  Last Fill Quantity: 90,  # refills: 2   Last office visit: 12/16/2019 with prescribing provider:  Dunia   Future Office Visit:        There is no refill protocol information for this order        carvedilol (COREG) 3.125 MG tablet 180 tablet 3     Sig: Take 1 tablet (3.125 mg) by mouth " "2 times daily   Last Written Prescription Date:  12/18/19  Last Fill Quantity: 180,  # refills: 3   Last office visit: 12/16/2019 with prescribing provider:  Dunia   Future Office Visit:        Beta-Blockers Protocol Failed - 1/3/2020 11:31 AM        Failed - Blood pressure under 140/90 in past 12 months     BP Readings from Last 3 Encounters:   12/16/19 (!) 160/68   10/16/19 130/62   06/10/19 128/56                 Passed - Patient is age 6 or older        Passed - Recent (12 mo) or future (30 days) visit within the authorizing provider's specialty     Patient has had an office visit with the authorizing provider or a provider within the authorizing providers department within the previous 12 mos or has a future within next 30 days. See \"Patient Info\" tab in inbasket, or \"Choose Columns\" in Meds & Orders section of the refill encounter.              Passed - Medication is active on med list          "

## 2020-01-03 NOTE — TELEPHONE ENCOUNTER
Called pt to see which medications need to be transferred.  Went over meds with pt- he states he needs new scripts for the 5 pended medications.

## 2020-01-06 DIAGNOSIS — N40.0 BENIGN NON-NODULAR PROSTATIC HYPERPLASIA WITHOUT LOWER URINARY TRACT SYMPTOMS: ICD-10-CM

## 2020-01-06 RX ORDER — TAMSULOSIN HYDROCHLORIDE 0.4 MG/1
CAPSULE ORAL
Qty: 90 CAPSULE | Refills: 3 | Status: CANCELLED | OUTPATIENT
Start: 2020-01-06

## 2020-01-06 NOTE — TELEPHONE ENCOUNTER
"Requested Prescriptions   Pending Prescriptions Disp Refills     tamsulosin (FLOMAX) 0.4 MG capsule  Last Written Prescription Date:  1/3/2020  Last Fill Quantity: 90 capsule,  # refills: 3   Last office visit: 12/16/2019 with prescribing provider:  Dunia   Future Office Visit:     90 capsule 3     Sig: TAKE ONE CAPSULE BY MOUTH ONE TIME DAILY       Alpha Blockers Failed - 1/6/2020  3:36 PM        Failed - Blood pressure under 140/90 in past 12 months     BP Readings from Last 3 Encounters:   12/16/19 (!) 160/68   10/16/19 130/62   06/10/19 128/56                 Failed - Patient does not have Tadalafil, Vardenafil, or Sildenafil on their medication list        Passed - Recent (12 mo) or future (30 days) visit within the authorizing provider's specialty     Patient has had an office visit with the authorizing provider or a provider within the authorizing providers department within the previous 12 mos or has a future within next 30 days. See \"Patient Info\" tab in inbasket, or \"Choose Columns\" in Meds & Orders section of the refill encounter.              Passed - Medication is active on med list        Passed - Patient is 18 years of age or older           "

## 2020-01-20 ENCOUNTER — HOSPITAL ENCOUNTER (OUTPATIENT)
Facility: CLINIC | Age: 80
Discharge: HOME | End: 2020-01-20
Attending: COLON & RECTAL SURGERY | Admitting: COLON & RECTAL SURGERY
Payer: COMMERCIAL

## 2020-01-20 VITALS
WEIGHT: 207 LBS | OXYGEN SATURATION: 99 % | HEART RATE: 71 BPM | SYSTOLIC BLOOD PRESSURE: 152 MMHG | RESPIRATION RATE: 14 BRPM | BODY MASS INDEX: 33.27 KG/M2 | HEIGHT: 66 IN | DIASTOLIC BLOOD PRESSURE: 62 MMHG

## 2020-01-20 LAB — COLONOSCOPY: NORMAL

## 2020-01-20 PROCEDURE — 25000128 H RX IP 250 OP 636: Performed by: COLON & RECTAL SURGERY

## 2020-01-20 PROCEDURE — 99153 MOD SED SAME PHYS/QHP EA: CPT | Performed by: COLON & RECTAL SURGERY

## 2020-01-20 PROCEDURE — 88305 TISSUE EXAM BY PATHOLOGIST: CPT | Mod: 26,59 | Performed by: COLON & RECTAL SURGERY

## 2020-01-20 PROCEDURE — G0500 MOD SEDAT ENDO SERVICE >5YRS: HCPCS | Performed by: COLON & RECTAL SURGERY

## 2020-01-20 PROCEDURE — 45380 COLONOSCOPY AND BIOPSY: CPT | Mod: PT,XU | Performed by: COLON & RECTAL SURGERY

## 2020-01-20 PROCEDURE — 27210582 ZZH DEVICE CLIP RESOLUTION, EACH: Performed by: COLON & RECTAL SURGERY

## 2020-01-20 PROCEDURE — 45385 COLONOSCOPY W/LESION REMOVAL: CPT | Performed by: COLON & RECTAL SURGERY

## 2020-01-20 PROCEDURE — 88305 TISSUE EXAM BY PATHOLOGIST: CPT | Performed by: COLON & RECTAL SURGERY

## 2020-01-20 RX ORDER — PROCHLORPERAZINE MALEATE 5 MG
5 TABLET ORAL EVERY 6 HOURS PRN
Status: DISCONTINUED | OUTPATIENT
Start: 2020-01-20 | End: 2020-01-20 | Stop reason: HOSPADM

## 2020-01-20 RX ORDER — ONDANSETRON 4 MG/1
4 TABLET, ORALLY DISINTEGRATING ORAL EVERY 6 HOURS PRN
Status: DISCONTINUED | OUTPATIENT
Start: 2020-01-20 | End: 2020-01-20 | Stop reason: HOSPADM

## 2020-01-20 RX ORDER — NALOXONE HYDROCHLORIDE 0.4 MG/ML
.1-.4 INJECTION, SOLUTION INTRAMUSCULAR; INTRAVENOUS; SUBCUTANEOUS
Status: DISCONTINUED | OUTPATIENT
Start: 2020-01-20 | End: 2020-01-20 | Stop reason: HOSPADM

## 2020-01-20 RX ORDER — FLUMAZENIL 0.1 MG/ML
0.2 INJECTION, SOLUTION INTRAVENOUS
Status: DISCONTINUED | OUTPATIENT
Start: 2020-01-20 | End: 2020-01-20 | Stop reason: HOSPADM

## 2020-01-20 RX ORDER — FENTANYL CITRATE 50 UG/ML
INJECTION, SOLUTION INTRAMUSCULAR; INTRAVENOUS PRN
Status: DISCONTINUED | OUTPATIENT
Start: 2020-01-20 | End: 2020-01-20 | Stop reason: HOSPADM

## 2020-01-20 RX ORDER — DIPHENHYDRAMINE HCL 25 MG
25 CAPSULE ORAL EVERY 4 HOURS PRN
Status: DISCONTINUED | OUTPATIENT
Start: 2020-01-20 | End: 2020-01-20 | Stop reason: HOSPADM

## 2020-01-20 RX ORDER — ONDANSETRON 2 MG/ML
4 INJECTION INTRAMUSCULAR; INTRAVENOUS
Status: DISCONTINUED | OUTPATIENT
Start: 2020-01-20 | End: 2020-01-20 | Stop reason: HOSPADM

## 2020-01-20 RX ORDER — LIDOCAINE 40 MG/G
CREAM TOPICAL
Status: DISCONTINUED | OUTPATIENT
Start: 2020-01-20 | End: 2020-01-20 | Stop reason: HOSPADM

## 2020-01-20 RX ORDER — ONDANSETRON 2 MG/ML
4 INJECTION INTRAMUSCULAR; INTRAVENOUS EVERY 6 HOURS PRN
Status: DISCONTINUED | OUTPATIENT
Start: 2020-01-20 | End: 2020-01-20 | Stop reason: HOSPADM

## 2020-01-20 RX ORDER — DIPHENHYDRAMINE HYDROCHLORIDE 50 MG/ML
25 INJECTION INTRAMUSCULAR; INTRAVENOUS EVERY 4 HOURS PRN
Status: DISCONTINUED | OUTPATIENT
Start: 2020-01-20 | End: 2020-01-20 | Stop reason: HOSPADM

## 2020-01-20 ASSESSMENT — MIFFLIN-ST. JEOR: SCORE: 1606.7

## 2020-01-20 NOTE — OP NOTE
See Provation Note In Chart    Mima Vieira MD  [facility]  Office Phone # [phone]

## 2020-01-20 NOTE — H&P
Pre-Endoscopy History and Physical     Timmy Strange MRN# [id number]   YOB: 1940 Age: 79 year old     Date of Procedure: 1/20/2020  Primary care provider: Samir Duque  Type of Endoscopy: Colonoscopy  Reason for Procedure: H/O polyps  Type of Anesthesia Anticipated: Moderate Sedation    HPI:    Timmy is a 79 year old male who will be undergoing the above procedure.      A history and physical has been performed. The patient's medications and allergies have been reviewed. The risks and benefits of the procedure and the sedation options and risks were discussed with the patient.  All questions were answered and informed consent was obtained.      He denies a personal or family history of anesthesia complications or bleeding disorders.     No Known Allergies     No current facility-administered medications on file prior to encounter.   Fexofenadine HCl (ALLEGRA PO),   fluticasone (FLONASE) 50 MCG/ACT nasal spray, SHAKE LIQUID AND USE 1-2 SPRAYS IN EACH NOSTIL EVERY DAY  latanoprost (XALATAN) 0.005 % ophthalmic solution, Place 1 drop into both eyes daily  levothyroxine (LEVOTHROID) 88 MCG tablet, Take 88 mcg by mouth daily.  metFORMIN (GLUCOPHAGE) 500 MG tablet, Take 2 tablets (1,000 mg) by mouth 2 times daily (with meals) (Patient taking differently: Take 1,500 mg by mouth daily (with breakfast) )  Multiple Vitamins-Minerals (CENTRUM SILVER) per tablet, Take 1 tablet by mouth daily  Diphenhyd-HC-Nystatin-Tetracyc (FIRST-AMARIS MOUTHWASH) SUSP, Swish and swallow 5-10 mLs in mouth every 6 hours as needed        Patient Active Problem List   Diagnosis     Essential hypertension     Hypothyroidism     Type II diabetes mellitus with peripheral circulatory disorder (H)     BPH (benign prostatic hyperplasia)     ED (erectile dysfunction)     Non morbid obesity due to excess calories: comorbid HTN< DM, hyperlipidemia     Mixed hyperlipidemia     Special screening for malignant neoplasm of prostate  "    Localized edema     Seasonal allergic rhinitis     Screening for prostate cancer     Elevated blood pressure reading without diagnosis of hypertension     Residual hemorrhoidal skin tags     Obesity (BMI 35.0-39.9) with comorbidity (H)     Excess skin of eyelid, unspecified laterality     Glaucoma of both eyes, unspecified glaucoma type     Flatulence, eructation, and gas pain     Diarrhea, unspecified type        Past Medical History:   Diagnosis Date     Arthritis 1970    Dx'd with RA when in the      BPH (benign prostatic hyperplasia) 2013     Diabetes mellitus      ED (erectile dysfunction) 2015     HTN (hypertension)      Hyperlipidemia LDL goal <100      Hypothyroidism      Obesity         Past Surgical History:   Procedure Laterality Date     COLONOSCOPY N/A 2014    Procedure: COMBINED COLONOSCOPY, SINGLE OR MULTIPLE BIOPSY/POLYPECTOMY BY BIOPSY;  Surgeon: Rolanda Bey MD;  Location:  GI     TONSILLECTOMY, ADENOIDECTOMY, COMBINED         Social History     Tobacco Use     Smoking status: Former Smoker     Packs/day: 0.00     Last attempt to quit: 11/15/2011     Years since quittin.1     Smokeless tobacco: Never Used   Substance Use Topics     Alcohol use: No     Alcohol/week: 0.0 standard drinks       Family History   Problem Relation Age of Onset     Diabetes Maternal Grandmother      Diabetes Maternal Grandfather      Arthritis Maternal Grandfather      Arthritis Father      Thyroid Disease Father      Glaucoma Mother      Alcohol/Drug Mother 49        cirrhosis     Diabetes Daughter 44        type 2     Obesity Daughter      Glaucoma Maternal Aunt        REVIEW OF SYSTEMS:     5 point ROS negative except as noted above in HPI, including Gen., Resp., CV, GI &  system review.      PHYSICAL EXAM:   BP (!) 168/75   Pulse 72   Resp 16   Ht 1.676 m (5' 6\")   Wt 93.9 kg (207 lb)   SpO2 99%   BMI 33.41 kg/m   Estimated body mass index is 33.41 kg/m  as calculated " "from the following:    Height as of this encounter: 1.676 m (5' 6\").    Weight as of this encounter: 93.9 kg (207 lb).   GENERAL APPEARANCE: healthy and alert  MENTAL STATUS: alert  AIRWAY EXAM: Mallampatti Class I (visualization of the soft palate, fauces, uvula, anterior and posterior pillars)  RESP: lungs clear to auscultation - no rales, rhonchi or wheezes  CV: regular rates and rhythm      IMPRESSION   ASA Class 2 - Mild systemic disease        PLAN:     Plan for colonoscopy. We discussed the risks, benefits and alternatives and the patient wished to proceed.    The above has been forwarded to the consulting provider.      Mima Vieira MD  [facility]  Phone: [phone]  Fax: [phone]  January 20, 2020    "

## 2020-01-21 LAB — COPATH REPORT: NORMAL

## 2020-01-28 ENCOUNTER — OFFICE VISIT (OUTPATIENT)
Dept: FAMILY MEDICINE | Facility: CLINIC | Age: 80
End: 2020-01-28
Payer: COMMERCIAL

## 2020-01-28 VITALS
WEIGHT: 208 LBS | BODY MASS INDEX: 33.57 KG/M2 | SYSTOLIC BLOOD PRESSURE: 130 MMHG | RESPIRATION RATE: 16 BRPM | TEMPERATURE: 97.6 F | OXYGEN SATURATION: 97 % | HEART RATE: 62 BPM | DIASTOLIC BLOOD PRESSURE: 64 MMHG

## 2020-01-28 DIAGNOSIS — I10 ESSENTIAL HYPERTENSION: Primary | ICD-10-CM

## 2020-01-28 DIAGNOSIS — E78.2 MIXED HYPERLIPIDEMIA: ICD-10-CM

## 2020-01-28 DIAGNOSIS — E11.51 TYPE II DIABETES MELLITUS WITH PERIPHERAL CIRCULATORY DISORDER (H): ICD-10-CM

## 2020-01-28 PROCEDURE — 99214 OFFICE O/P EST MOD 30 MIN: CPT | Performed by: FAMILY MEDICINE

## 2020-01-28 RX ORDER — LANCETS 30 GAUGE
EACH MISCELLANEOUS
COMMUNITY
Start: 2020-01-08 | End: 2021-08-11

## 2020-01-28 RX ORDER — METHYLPREDNISOLONE SODIUM SUCCINATE 125 MG/2ML
INJECTION, POWDER, FOR SOLUTION INTRAMUSCULAR; INTRAVENOUS
Refills: 1 | COMMUNITY
Start: 2019-12-04 | End: 2021-08-11

## 2020-01-28 RX ORDER — INSULIN LISPRO 100 [IU]/ML
18 INJECTION, SUSPENSION SUBCUTANEOUS EVERY EVENING
COMMUNITY
Start: 2020-01-02 | End: 2021-08-11

## 2020-01-28 RX ORDER — METFORMIN HCL 500 MG
1500 TABLET, EXTENDED RELEASE 24 HR ORAL
COMMUNITY
Start: 2020-01-02 | End: 2021-08-11

## 2020-01-28 RX ORDER — 1.1% SODIUM FLUORIDE PRESCRIPTION DENTAL CREAM 5 MG/G
CREAM DENTAL
Status: ON HOLD | COMMUNITY
Start: 2020-01-06 | End: 2021-05-08

## 2020-01-28 RX ORDER — BLOOD-GLUCOSE METER
EACH MISCELLANEOUS
COMMUNITY
Start: 2020-01-02 | End: 2023-06-07

## 2020-01-28 RX ORDER — SODIUM FLUORIDE 6 MG/ML
PASTE, DENTIFRICE DENTAL
Refills: 3 | Status: ON HOLD | COMMUNITY
Start: 2019-11-27 | End: 2021-05-08

## 2020-01-28 NOTE — PROGRESS NOTES
Subjective     Timmy Strange is a 79 year old male who presents to clinic today for the following health issues:    HPI   Diabetes Follow-up    How often are you checking your blood sugar? One time daily  What time of day are you checking your blood sugars (select all that apply)?  Before meals  Have you had any blood sugars above 200?  No  Have you had any blood sugars below 70?  No    What symptoms do you notice when your blood sugar is low?  None and Not applicable    What concerns do you have today about your diabetes? None     Do you have any of these symptoms? (Select all that apply)  No numbness or tingling in feet.  No redness, sores or blisters on feet.  No complaints of excessive thirst.  No reports of blurry vision.  No significant changes to weight.        Hyperlipidemia Follow-Up      Are you regularly taking any medication or supplement to lower your cholesterol?   Yes- lovastatin    Are you having muscle aches or other side effects that you think could be caused by your cholesterol lowering medication?  No    Hypertension Follow-up      Do you check your blood pressure regularly outside of the clinic? No     Are you following a low salt diet? No    Are your blood pressures ever more than 140 on the top number (systolic) OR more   than 90 on the bottom number (diastolic), for example 140/90? No    BP Readings from Last 2 Encounters:   01/28/20 130/64   01/20/20 (!) 152/62     Hemoglobin A1C (%)   Date Value   06/10/2019 6.5 (H)   10/30/2018 6.6 (H)     LDL Cholesterol Calculated (mg/dL)   Date Value   11/26/2019 76   10/30/2018 65         How many servings of fruits and vegetables do you eat daily?  2-3    On average, how many sweetened beverages do you drink each day (Examples: soda, juice, sweet tea, etc.  Do NOT count diet or artificially sweetened beverages)?   0    How many days per week do you exercise enough to make your heart beat faster? 3 or less    How many minutes a day do you exercise  enough to make your heart beat faster? 20 - 29    How many days per week do you miss taking your medication? 0        Patient Active Problem List   Diagnosis     Essential hypertension     Hypothyroidism     Type II diabetes mellitus with peripheral circulatory disorder (H)     BPH (benign prostatic hyperplasia)     ED (erectile dysfunction)     Non morbid obesity due to excess calories: comorbid HTN< DM, hyperlipidemia     Mixed hyperlipidemia     Special screening for malignant neoplasm of prostate     Localized edema     Seasonal allergic rhinitis     Screening for prostate cancer     Elevated blood pressure reading without diagnosis of hypertension     Residual hemorrhoidal skin tags     Obesity (BMI 35.0-39.9) with comorbidity (H)     Excess skin of eyelid, unspecified laterality     Glaucoma of both eyes, unspecified glaucoma type     Flatulence, eructation, and gas pain     Diarrhea, unspecified type     Past Surgical History:   Procedure Laterality Date     COLONOSCOPY N/A 2014    Procedure: COMBINED COLONOSCOPY, SINGLE OR MULTIPLE BIOPSY/POLYPECTOMY BY BIOPSY;  Surgeon: Rolanda Bey MD;  Location:  GI     COLONOSCOPY N/A 2020    Procedure: COLONOSCOPY, WITH POLYPECTOMY AND BIOPSY;  Surgeon: Christina Vieira MD;  Location:  GI     TONSILLECTOMY, ADENOIDECTOMY, COMBINED         Social History     Tobacco Use     Smoking status: Former Smoker     Packs/day: 0.00     Last attempt to quit: 11/15/2011     Years since quittin.2     Smokeless tobacco: Never Used   Substance Use Topics     Alcohol use: No     Alcohol/week: 0.0 standard drinks     Family History   Problem Relation Age of Onset     Diabetes Maternal Grandmother      Diabetes Maternal Grandfather      Arthritis Maternal Grandfather      Arthritis Father      Thyroid Disease Father      Glaucoma Mother      Alcohol/Drug Mother 49        cirrhosis     Diabetes Daughter 44        type 2     Obesity Daughter      Glaucoma  Maternal Aunt              Reviewed and updated as needed this visit by Provider         Review of Systems   ROS COMP: Constitutional, HEENT, cardiovascular, pulmonary, gi and gu systems are negative, except as otherwise noted.      Objective    /64   Pulse 62   Temp 97.6  F (36.4  C) (Tympanic)   Resp 16   Wt 94.3 kg (208 lb)   SpO2 97%   BMI 33.57 kg/m    Body mass index is 33.57 kg/m .  Physical Exam   GENERAL APPEARANCE: healthy, alert and no distress            Assessment & Plan     No diagnosis found.       There are no Patient Instructions on file for this visit.    No follow-ups on file.    Samir Duque MD  [facility]

## 2020-03-07 DIAGNOSIS — N40.0 BENIGN NON-NODULAR PROSTATIC HYPERPLASIA WITHOUT LOWER URINARY TRACT SYMPTOMS: ICD-10-CM

## 2020-03-07 NOTE — TELEPHONE ENCOUNTER
"Requested Prescriptions   Pending Prescriptions Disp Refills     finasteride (PROSCAR) 5 MG tablet [Pharmacy Med Name: Finasteride Oral Tablet 5 MG]    Last Written Prescription Date:  01/03/2020  Last Fill Quantity: 90 tablet,  # refills: 3   Last office visit: 1/28/2020 with prescribing provider:  Dunia   Future Office Visit:     90 tablet 1     Sig: TAKE ONE TABLET BY MOUTH ONE TIME DAILY       BPH Agents Passed - 3/7/2020  7:02 AM        Passed - Recent (12 mo) or future (30 days) visit within the authorizing provider's department     Patient has had an office visit with the authorizing provider or a provider within the authorizing providers department within the previous 12 mos or has a future within next 30 days. See \"Patient Info\" tab in inbasket, or \"Choose Columns\" in Meds & Orders section of the refill encounter.              Passed - Medication is active on med list        Passed - Patient is 18 years of age or older           "

## 2020-03-09 RX ORDER — FINASTERIDE 5 MG/1
TABLET, FILM COATED ORAL
Qty: 90 TABLET | Refills: 2 | Status: SHIPPED | OUTPATIENT
Start: 2020-03-09 | End: 2021-01-11

## 2020-03-09 NOTE — TELEPHONE ENCOUNTER
Prescription approved per [facility] Refill Protocol.    Request is from new pharmacy.

## 2020-06-09 ENCOUNTER — VIRTUAL VISIT (OUTPATIENT)
Dept: FAMILY MEDICINE | Facility: CLINIC | Age: 80
End: 2020-06-09
Payer: COMMERCIAL

## 2020-06-09 DIAGNOSIS — E78.2 MIXED HYPERLIPIDEMIA: ICD-10-CM

## 2020-06-09 DIAGNOSIS — I10 ESSENTIAL HYPERTENSION: ICD-10-CM

## 2020-06-09 DIAGNOSIS — Z12.5 SPECIAL SCREENING FOR MALIGNANT NEOPLASM OF PROSTATE: ICD-10-CM

## 2020-06-09 DIAGNOSIS — E11.51 TYPE II DIABETES MELLITUS WITH PERIPHERAL CIRCULATORY DISORDER (H): Primary | ICD-10-CM

## 2020-06-09 DIAGNOSIS — E03.9 ACQUIRED HYPOTHYROIDISM: ICD-10-CM

## 2020-06-09 DIAGNOSIS — R49.8 WEAKNESS OF VOICE: ICD-10-CM

## 2020-06-09 PROCEDURE — 99214 OFFICE O/P EST MOD 30 MIN: CPT | Mod: 95 | Performed by: FAMILY MEDICINE

## 2020-06-09 RX ORDER — PANTOPRAZOLE SODIUM 40 MG/1
40 TABLET, DELAYED RELEASE ORAL DAILY
Qty: 90 TABLET | Refills: 3 | Status: SHIPPED | OUTPATIENT
Start: 2020-06-09 | End: 2021-02-22

## 2020-06-09 NOTE — PROGRESS NOTES
"Timmy Strange is a 79 year old male who is being evaluated via a billable video visit.      The patient has been notified of following:     \"This video visit will be conducted via a call between you and your physician/provider. We have found that certain health care needs can be provided without the need for an in-person physical exam.  This service lets us provide the care you need with a video conversation.  If a prescription is necessary we can send it directly to your pharmacy.  If lab work is needed we can place an order for that and you can then stop by our lab to have the test done at a later time.    Video visits are billed at different rates depending on your insurance coverage.  Please reach out to your insurance provider with any questions.    If during the course of the call the physician/provider feels a video visit is not appropriate, you will not be charged for this service.\"    Patient has given verbal consent for Video visit? Yes    How would you like to obtain your AVS? Dagoharmykel    Patient would like the video invitation sent by: Send to e-mail at: [email or web address]    Will anyone else be joining your video visit? No      Subjective     Timmy Strange is a 79 year old male who presents today via video visit for the following health issues:    HPI  Diabetes Follow-up    How often are you checking your blood sugar? One time daily  What time of day are you checking your blood sugars (select all that apply)?  Before meals  Have you had any blood sugars above 200?  No  Have you had any blood sugars below 70?  No    What symptoms do you notice when your blood sugar is low?  Dizzy    What concerns do you have today about your diabetes? None     Do you have any of these symptoms? (Select all that apply)  No numbness or tingling in feet.  No redness, sores or blisters on feet.  No complaints of excessive thirst.  No reports of blurry vision.  No significant changes to weight.              Hyperlipidemia " Follow-Up      Are you regularly taking any medication or supplement to lower your cholesterol?   Yes- Lovastatin    Are you having muscle aches or other side effects that you think could be caused by your cholesterol lowering medication?  No    Hypertension Follow-up      Do you check your blood pressure regularly outside of the clinic? No     Are you following a low salt diet? Yes    Are your blood pressures ever more than 140 on the top number (systolic) OR more   than 90 on the bottom number (diastolic), for example 140/90?unknown    BP Readings from Last 2 Encounters:   01/28/20 130/64   01/20/20 (!) 152/62     Hemoglobin A1C (%)   Date Value   06/10/2019 6.5 (H)   10/30/2018 6.6 (H)     LDL Cholesterol Calculated (mg/dL)   Date Value   11/26/2019 76   10/30/2018 65         How many servings of fruits and vegetables do you eat daily?  4 or more    On average, how many sweetened beverages do you drink each day (Examples: soda, juice, sweet tea, etc.  Do NOT count diet or artificially sweetened beverages)?   0    How many days per week do you exercise enough to make your heart beat faster? 3 or less    How many minutes a day do you exercise enough to make your heart beat faster? 20 - 29    How many days per week do you miss taking your medication? 0        Video Start Time: 1:27    Weak voice      Duration: 2 years    Description (location/character/radiation): Patient's wife has noted a weak voice    Intensity:  mild    Accompanying signs and symptoms: Patient does sleep with his mouth open 3 or 4 nights a week.  He did see GI and they suggested getting on medication possibly for reflux.    History (similar episodes/previous evaluation): None    Precipitating or alleviating factors: See above    Therapies tried and outcome: Patient trialed 2 days of Zantac and then stopped.       Patient Active Problem List   Diagnosis     Essential hypertension     Hypothyroidism     Type II diabetes mellitus with peripheral  circulatory disorder (H)     BPH (benign prostatic hyperplasia)     ED (erectile dysfunction)     Non morbid obesity due to excess calories: comorbid HTN< DM, hyperlipidemia     Mixed hyperlipidemia     Special screening for malignant neoplasm of prostate     Localized edema     Seasonal allergic rhinitis     Screening for prostate cancer     Elevated blood pressure reading without diagnosis of hypertension     Residual hemorrhoidal skin tags     Obesity (BMI 35.0-39.9) with comorbidity (H)     Excess skin of eyelid, unspecified laterality     Glaucoma of both eyes, unspecified glaucoma type     Flatulence, eructation, and gas pain     Diarrhea, unspecified type     Weakness of voice     Past Surgical History:   Procedure Laterality Date     COLONOSCOPY N/A 2014    Procedure: COMBINED COLONOSCOPY, SINGLE OR MULTIPLE BIOPSY/POLYPECTOMY BY BIOPSY;  Surgeon: Rolanda Bey MD;  Location:  GI     COLONOSCOPY N/A 2020    Procedure: COLONOSCOPY, WITH POLYPECTOMY AND BIOPSY;  Surgeon: Christina Vieira MD;  Location:  GI     TONSILLECTOMY, ADENOIDECTOMY, COMBINED         Social History     Tobacco Use     Smoking status: Former Smoker     Packs/day: 0.00     Last attempt to quit: 11/15/2011     Years since quittin.5     Smokeless tobacco: Never Used   Substance Use Topics     Alcohol use: No     Alcohol/week: 0.0 standard drinks     Family History   Problem Relation Age of Onset     Diabetes Maternal Grandmother      Diabetes Maternal Grandfather      Arthritis Maternal Grandfather      Arthritis Father      Thyroid Disease Father      Glaucoma Mother      Alcohol/Drug Mother 49        cirrhosis     Diabetes Daughter 44        type 2     Obesity Daughter      Glaucoma Maternal Aunt            Reviewed and updated as needed this visit by Provider         Review of Systems   Constitutional, HEENT, cardiovascular, pulmonary, gi and gu systems are negative, except as otherwise noted.      Objective   "  There were no vitals taken for this visit.  Estimated body mass index is 33.57 kg/m  as calculated from the following:    Height as of 1/20/20: 1.676 m (5' 6\").    Weight as of 1/28/20: 94.3 kg (208 lb).  Physical Exam     GENERAL: Healthy, alert and no distress  EYES: Eyes grossly normal to inspection.  No discharge or erythema, or obvious scleral/conjunctival abnormalities.  RESP: No audible wheeze, cough, or visible cyanosis.  No visible retractions or increased work of breathing.    SKIN: Visible skin clear. No significant rash, abnormal pigmentation or lesions.  NEURO: Cranial nerves grossly intact.  Mentation and speech appropriate for age.  PSYCH: Mentation appears normal, affect normal/bright, judgement and insight intact, normal speech and appearance well-groomed.              Assessment & Plan       ICD-10-CM    1. Type II diabetes mellitus with peripheral circulatory disorder (H)  E11.51 Albumin Random Urine Quantitative with Creat Ratio     Hemoglobin A1c   2. Mixed hyperlipidemia  E78.2 Lipid Profile   3. Acquired hypothyroidism  E03.9 TSH     T4 FREE   4. Essential hypertension  I10 UA with Microscopic     CBC with platelets differential     Comprehensive metabolic panel   5. Weakness of voice  R49.8 pantoprazole (PROTONIX) 40 MG EC tablet   6. Special screening for malignant neoplasm of prostate  Z12.5 Prostate spec antigen screen        BMI:   Estimated body mass index is 33.57 kg/m  as calculated from the following:    Height as of 1/20/20: 1.676 m (5' 6\").    Weight as of 1/28/20: 94.3 kg (208 lb).           Patient Instructions   I placed the patient on Protonix 40 mg daily to see if that affects his weakness of voice.  He will also try some salt water nasal spray at bedtime.  Follow-up will be if not improving.  Lab tests were ordered.  When he goes into [facility] to get his blood drawn he will also have a nurse only visit to get his blood pressure checked.  He has an upcoming appointment " with his endocrinologist.  We did do his thyroid function tests today.      No follow-ups on file.    Samir Duque MD  [facility]      Video-Visit Details    Type of service:  Video Visit    Video End Time:1:43    Originating Location (pt. Location): Home    Distant Location (provider location):  [facility]     Platform used for Video Visit: Darrin    No follow-ups on file.       Samir Duque MD

## 2020-06-09 NOTE — PATIENT INSTRUCTIONS
I placed the patient on Protonix 40 mg daily to see if that affects his weakness of voice.  He will also try some salt water nasal spray at bedtime.  Follow-up will be if not improving.  Lab tests were ordered.  When he goes into [facility] to get his blood drawn he will also have a nurse only visit to get his blood pressure checked.  He has an upcoming appointment with his endocrinologist.  We did do his thyroid function tests today.

## 2020-06-16 ENCOUNTER — TELEPHONE (OUTPATIENT)
Dept: LAB | Facility: CLINIC | Age: 80
End: 2020-06-16

## 2020-06-16 ENCOUNTER — ALLIED HEALTH/NURSE VISIT (OUTPATIENT)
Dept: NURSING | Facility: CLINIC | Age: 80
End: 2020-06-16
Payer: COMMERCIAL

## 2020-06-16 VITALS — SYSTOLIC BLOOD PRESSURE: 120 MMHG | DIASTOLIC BLOOD PRESSURE: 50 MMHG

## 2020-06-16 DIAGNOSIS — E78.2 MIXED HYPERLIPIDEMIA: ICD-10-CM

## 2020-06-16 DIAGNOSIS — E03.9 ACQUIRED HYPOTHYROIDISM: ICD-10-CM

## 2020-06-16 DIAGNOSIS — R49.8 WEAKNESS OF VOICE: ICD-10-CM

## 2020-06-16 DIAGNOSIS — I10 ESSENTIAL HYPERTENSION: Primary | ICD-10-CM

## 2020-06-16 DIAGNOSIS — E11.51 TYPE II DIABETES MELLITUS WITH PERIPHERAL CIRCULATORY DISORDER (H): ICD-10-CM

## 2020-06-16 DIAGNOSIS — Z12.5 SPECIAL SCREENING FOR MALIGNANT NEOPLASM OF PROSTATE: ICD-10-CM

## 2020-06-16 LAB
BASOPHILS # BLD AUTO: 0 10E9/L (ref 0–0.2)
BASOPHILS NFR BLD AUTO: 0.5 %
CHOLEST SERPL-MCNC: 109 MG/DL
DIFFERENTIAL METHOD BLD: ABNORMAL
EOSINOPHIL # BLD AUTO: 0.1 10E9/L (ref 0–0.7)
EOSINOPHIL NFR BLD AUTO: 2.3 %
ERYTHROCYTE [DISTWIDTH] IN BLOOD BY AUTOMATED COUNT: 14.4 % (ref 10–15)
HBA1C MFR BLD: 5.9 % (ref 0–5.6)
HCT VFR BLD AUTO: 37.3 % (ref 40–53)
HDLC SERPL-MCNC: 39 MG/DL
HGB BLD-MCNC: 12.3 G/DL (ref 13.3–17.7)
LDLC SERPL CALC-MCNC: 55 MG/DL
LYMPHOCYTES # BLD AUTO: 1.4 10E9/L (ref 0.8–5.3)
LYMPHOCYTES NFR BLD AUTO: 34.1 %
MCH RBC QN AUTO: 29.8 PG (ref 26.5–33)
MCHC RBC AUTO-ENTMCNC: 33 G/DL (ref 31.5–36.5)
MCV RBC AUTO: 90 FL (ref 78–100)
MONOCYTES # BLD AUTO: 0.5 10E9/L (ref 0–1.3)
MONOCYTES NFR BLD AUTO: 13.1 %
NEUTROPHILS # BLD AUTO: 2 10E9/L (ref 1.6–8.3)
NEUTROPHILS NFR BLD AUTO: 50 %
NONHDLC SERPL-MCNC: 70 MG/DL
PLATELET # BLD AUTO: 212 10E9/L (ref 150–450)
PSA SERPL-ACNC: 0.5 UG/L (ref 0–4)
RBC # BLD AUTO: 4.13 10E12/L (ref 4.4–5.9)
T4 FREE SERPL-MCNC: 0.97 NG/DL (ref 0.76–1.46)
TRIGL SERPL-MCNC: 77 MG/DL
TSH SERPL DL<=0.005 MIU/L-ACNC: 0.3 MU/L (ref 0.4–4)
WBC # BLD AUTO: 4 10E9/L (ref 4–11)

## 2020-06-16 PROCEDURE — G0103 PSA SCREENING: HCPCS | Performed by: INTERNAL MEDICINE

## 2020-06-16 PROCEDURE — 84443 ASSAY THYROID STIM HORMONE: CPT | Performed by: INTERNAL MEDICINE

## 2020-06-16 PROCEDURE — 36415 COLL VENOUS BLD VENIPUNCTURE: CPT | Performed by: INTERNAL MEDICINE

## 2020-06-16 PROCEDURE — 99207 ZZC NO CHARGE NURSE ONLY: CPT

## 2020-06-16 PROCEDURE — 84439 ASSAY OF FREE THYROXINE: CPT | Performed by: INTERNAL MEDICINE

## 2020-06-16 PROCEDURE — 85025 COMPLETE CBC W/AUTO DIFF WBC: CPT | Performed by: INTERNAL MEDICINE

## 2020-06-16 PROCEDURE — 80061 LIPID PANEL: CPT | Performed by: INTERNAL MEDICINE

## 2020-06-16 PROCEDURE — 83036 HEMOGLOBIN GLYCOSYLATED A1C: CPT | Performed by: INTERNAL MEDICINE

## 2020-06-16 NOTE — TELEPHONE ENCOUNTER
THIS IS DOCUMENTATION ONLY. 6/16/2020     PT CAME FOR LAB WORK AND REFUSED TO LEAVE A URINE SAMPLE. HE SAID HIS WIFE TOLD HIM NOT LEAVE A URINE AND TO JUST DO THE ORDERED BLOOD WORK.    HE INDICATED THAT HE WOULD SPEAK TO THE  ABOUT IT AT HIS NEXT VISIT.    THANK YOU  [facility]

## 2020-06-16 NOTE — PROGRESS NOTES
Timmy Strange is a 79 year old year old patient who comes in today for a Blood Pressure check because of ongoing blood pressure monitoring.  Vital Signs as repeated Cate Ward RN    Patient is taking medication as prescribed  Patient is tolerating medications well.  Patient is not monitoring Blood Pressure at home.    Current complaints: none  Disposition:  patient to continue with the same medication  Cate Ward RN

## 2020-08-18 ENCOUNTER — MYC MEDICAL ADVICE (OUTPATIENT)
Dept: FAMILY MEDICINE | Facility: CLINIC | Age: 80
End: 2020-08-18

## 2020-09-20 ENCOUNTER — MYC MEDICAL ADVICE (OUTPATIENT)
Dept: FAMILY MEDICINE | Facility: CLINIC | Age: 80
End: 2020-09-20

## 2020-09-20 DIAGNOSIS — M21.619 BUNION: ICD-10-CM

## 2020-09-20 DIAGNOSIS — E11.51 TYPE II DIABETES MELLITUS WITH PERIPHERAL CIRCULATORY DISORDER (H): Primary | ICD-10-CM

## 2020-09-24 ENCOUNTER — TRANSFERRED RECORDS (OUTPATIENT)
Dept: HEALTH INFORMATION MANAGEMENT | Facility: CLINIC | Age: 80
End: 2020-09-24

## 2020-10-08 DIAGNOSIS — I10 ESSENTIAL HYPERTENSION, BENIGN: ICD-10-CM

## 2020-10-08 RX ORDER — LOSARTAN POTASSIUM 100 MG/1
TABLET ORAL
Qty: 90 TABLET | Refills: 3 | Status: SHIPPED | OUTPATIENT
Start: 2020-10-08 | End: 2021-05-20

## 2020-10-11 DIAGNOSIS — N52.9 ERECTILE DYSFUNCTION, UNSPECIFIED ERECTILE DYSFUNCTION TYPE: ICD-10-CM

## 2020-10-12 RX ORDER — SILDENAFIL 100 MG/1
TABLET, FILM COATED ORAL
Qty: 12 TABLET | Refills: 0 | Status: SHIPPED | OUTPATIENT
Start: 2020-10-12 | End: 2020-12-14

## 2020-10-12 NOTE — TELEPHONE ENCOUNTER
Routing refill request to provider for review/approval because:  Provider approval needed. Beta Blocker on med list

## 2020-10-26 DIAGNOSIS — Z11.59 ENCOUNTER FOR SCREENING FOR OTHER VIRAL DISEASES: Primary | ICD-10-CM

## 2020-10-28 ENCOUNTER — TRANSFERRED RECORDS (OUTPATIENT)
Dept: HEALTH INFORMATION MANAGEMENT | Facility: CLINIC | Age: 80
End: 2020-10-28

## 2020-11-05 ENCOUNTER — MYC MEDICAL ADVICE (OUTPATIENT)
Dept: FAMILY MEDICINE | Facility: CLINIC | Age: 80
End: 2020-11-05

## 2020-11-09 ENCOUNTER — OFFICE VISIT (OUTPATIENT)
Dept: FAMILY MEDICINE | Facility: CLINIC | Age: 80
End: 2020-11-09
Payer: COMMERCIAL

## 2020-11-09 VITALS
HEART RATE: 66 BPM | BODY MASS INDEX: 31.47 KG/M2 | SYSTOLIC BLOOD PRESSURE: 140 MMHG | RESPIRATION RATE: 14 BRPM | DIASTOLIC BLOOD PRESSURE: 58 MMHG | WEIGHT: 195 LBS | TEMPERATURE: 98.5 F | OXYGEN SATURATION: 98 %

## 2020-11-09 DIAGNOSIS — Z12.5 SCREENING FOR PROSTATE CANCER: ICD-10-CM

## 2020-11-09 DIAGNOSIS — N40.0 BENIGN NON-NODULAR PROSTATIC HYPERPLASIA WITHOUT LOWER URINARY TRACT SYMPTOMS: ICD-10-CM

## 2020-11-09 DIAGNOSIS — E11.51 TYPE II DIABETES MELLITUS WITH PERIPHERAL CIRCULATORY DISORDER (H): ICD-10-CM

## 2020-11-09 DIAGNOSIS — E66.09 NON MORBID OBESITY DUE TO EXCESS CALORIES: ICD-10-CM

## 2020-11-09 DIAGNOSIS — I10 ESSENTIAL HYPERTENSION, BENIGN: ICD-10-CM

## 2020-11-09 DIAGNOSIS — E03.9 ACQUIRED HYPOTHYROIDISM: ICD-10-CM

## 2020-11-09 DIAGNOSIS — R35.0 URINARY FREQUENCY: Primary | ICD-10-CM

## 2020-11-09 DIAGNOSIS — E78.5 HYPERLIPIDEMIA LDL GOAL <70: ICD-10-CM

## 2020-11-09 LAB
ALBUMIN UR-MCNC: NEGATIVE MG/DL
APPEARANCE UR: CLEAR
BASOPHILS # BLD AUTO: 0 10E9/L (ref 0–0.2)
BASOPHILS NFR BLD AUTO: 0.2 %
BILIRUB UR QL STRIP: NEGATIVE
COLOR UR AUTO: YELLOW
DIFFERENTIAL METHOD BLD: ABNORMAL
EOSINOPHIL # BLD AUTO: 0.1 10E9/L (ref 0–0.7)
EOSINOPHIL NFR BLD AUTO: 1.3 %
ERYTHROCYTE [DISTWIDTH] IN BLOOD BY AUTOMATED COUNT: 13.1 % (ref 10–15)
GLUCOSE UR STRIP-MCNC: NEGATIVE MG/DL
HBA1C MFR BLD: 5.5 % (ref 0–5.6)
HCT VFR BLD AUTO: 34.6 % (ref 40–53)
HGB BLD-MCNC: 11 G/DL (ref 13.3–17.7)
HGB UR QL STRIP: NEGATIVE
KETONES UR STRIP-MCNC: NEGATIVE MG/DL
LEUKOCYTE ESTERASE UR QL STRIP: NEGATIVE
LYMPHOCYTES # BLD AUTO: 1.6 10E9/L (ref 0.8–5.3)
LYMPHOCYTES NFR BLD AUTO: 32.6 %
MCH RBC QN AUTO: 29.3 PG (ref 26.5–33)
MCHC RBC AUTO-ENTMCNC: 31.8 G/DL (ref 31.5–36.5)
MCV RBC AUTO: 92 FL (ref 78–100)
MONOCYTES # BLD AUTO: 0.6 10E9/L (ref 0–1.3)
MONOCYTES NFR BLD AUTO: 11.8 %
NEUTROPHILS # BLD AUTO: 2.6 10E9/L (ref 1.6–8.3)
NEUTROPHILS NFR BLD AUTO: 54.1 %
NITRATE UR QL: NEGATIVE
PH UR STRIP: 7 PH (ref 5–7)
PLATELET # BLD AUTO: 251 10E9/L (ref 150–450)
RBC # BLD AUTO: 3.75 10E12/L (ref 4.4–5.9)
RBC #/AREA URNS AUTO: NORMAL /HPF
SOURCE: NORMAL
SP GR UR STRIP: 1.02 (ref 1–1.03)
UROBILINOGEN UR STRIP-ACNC: 0.2 EU/DL (ref 0.2–1)
WBC # BLD AUTO: 4.8 10E9/L (ref 4–11)
WBC #/AREA URNS AUTO: NORMAL /HPF

## 2020-11-09 PROCEDURE — G0103 PSA SCREENING: HCPCS | Performed by: FAMILY MEDICINE

## 2020-11-09 PROCEDURE — 83036 HEMOGLOBIN GLYCOSYLATED A1C: CPT | Performed by: FAMILY MEDICINE

## 2020-11-09 PROCEDURE — 84439 ASSAY OF FREE THYROXINE: CPT | Performed by: FAMILY MEDICINE

## 2020-11-09 PROCEDURE — 82043 UR ALBUMIN QUANTITATIVE: CPT | Performed by: FAMILY MEDICINE

## 2020-11-09 PROCEDURE — 84443 ASSAY THYROID STIM HORMONE: CPT | Performed by: FAMILY MEDICINE

## 2020-11-09 PROCEDURE — 80061 LIPID PANEL: CPT | Performed by: FAMILY MEDICINE

## 2020-11-09 PROCEDURE — 99214 OFFICE O/P EST MOD 30 MIN: CPT | Performed by: FAMILY MEDICINE

## 2020-11-09 PROCEDURE — 36415 COLL VENOUS BLD VENIPUNCTURE: CPT | Performed by: FAMILY MEDICINE

## 2020-11-09 PROCEDURE — 80053 COMPREHEN METABOLIC PANEL: CPT | Performed by: FAMILY MEDICINE

## 2020-11-09 PROCEDURE — 81001 URINALYSIS AUTO W/SCOPE: CPT | Performed by: FAMILY MEDICINE

## 2020-11-09 PROCEDURE — 85025 COMPLETE CBC W/AUTO DIFF WBC: CPT | Performed by: FAMILY MEDICINE

## 2020-11-09 RX ORDER — TAMSULOSIN HYDROCHLORIDE 0.4 MG/1
0.8 CAPSULE ORAL DAILY
Qty: 180 CAPSULE | Refills: 3 | Status: SHIPPED | OUTPATIENT
Start: 2020-11-09 | End: 2020-12-22

## 2020-11-09 NOTE — PATIENT INSTRUCTIONS
Krunal has an upcoming appointment with urology in January.  I suggested that in the meantime we increase his tamsulosin to 0.8 mg daily.  Laboratory testing was done.  He has an upcoming appointment with endocrinology.

## 2020-11-09 NOTE — PROGRESS NOTES
Subjective     Timmy Strange is a 80 year old male who presents to clinic today for the following health issues:    HPI         Genitourinary - Male  Onset/Duration: Ongoing  Description:   Dysuria (painful urination): no}  Hematuria (blood in urine): no  Frequency: YES  Waking at night to urinate: YES  Hesitancy (delay in urine): YES  Retention (unable to empty): YES  Decrease in urinary flow: no  Incontinence: YES  Progression of Symptoms:  same  Accompanying Signs & Symptoms:  Fever: no  Back/Flank pain: no  Urethral discharge: no  Testicle lumps/masses/pain: no  Nausea and/or vomiting: no  Abdominal pain: no  History:   History of frequent UTI s: no  History of kidney stones: no  History of hernias: no  Personal or Family history of Prostate problems: YES  Sexually active: YES  Precipitating or alleviating factors: None  Therapies tried and outcome: saw palmetto and Medications    Diabetes Follow-up    How often are you checking your blood sugar? One time daily  What time of day are you checking your blood sugars (select all that apply)?  Before and after meals and At bedtime  Have you had any blood sugars above 200?  No  Have you had any blood sugars below 70?  No    What symptoms do you notice when your blood sugar is low?  Shaky, Dizzy and Weak    What concerns do you have today about your diabetes? None     Do you have any of these symptoms? (Select all that apply)  No numbness or tingling in feet.  No redness, sores or blisters on feet.  No complaints of excessive thirst.  No reports of blurry vision.  No significant changes to weight.    Have you had a diabetic eye exam in the last 12 months? Didn't ask        BP Readings from Last 2 Encounters:   11/09/20 (!) 140/58   06/16/20 120/50     Hemoglobin A1C (%)   Date Value   06/16/2020 5.9 (H)   06/10/2019 6.5 (H)     LDL Cholesterol Calculated (mg/dL)   Date Value   06/16/2020 55   11/26/2019 76         How many servings of fruits and vegetables do you eat  "daily?  2-3    On average, how many sweetened beverages do you drink each day (Examples: soda, juice, sweet tea, etc.  Do NOT count diet or artificially sweetened beverages)?   0    How many days per week do you exercise enough to make your heart beat faster? 3 or less    How many minutes a day do you exercise enough to make your heart beat faster? 9 or less    How many days per week do you miss taking your medication? 0      Review of Systems   Constitutional, HEENT, cardiovascular, pulmonary, gi and gu systems are negative, except as otherwise noted.      Objective    BP (!) 140/58   Pulse 66   Temp 98.5  F (36.9  C) (Tympanic)   Resp 14   Wt 88.5 kg (195 lb)   SpO2 98%   BMI 31.47 kg/m    Body mass index is 31.47 kg/m .  Physical Exam   GENERAL APPEARANCE: healthy, alert, no distress and over weight  PSYCH: mentation appears normal and affect normal/bright            Assessment & Plan     Urinary frequency      Essential hypertension, benign    - UA with Microscopic  - CBC with platelets differential  - Comprehensive metabolic panel    Type II diabetes mellitus with peripheral circulatory disorder (H)    - Albumin Random Urine Quantitative with Creat Ratio  - Hemoglobin A1c    Hyperlipidemia LDL goal <70    - Lipid Profile    Acquired hypothyroidism    - TSH  - T4, free    Screening for prostate cancer    - Prostate spec antigen screen    Benign non-nodular prostatic hyperplasia without lower urinary tract symptoms    - tamsulosin (FLOMAX) 0.4 MG capsule  Dispense: 180 capsule; Refill: 3       BMI:   Estimated body mass index is 31.47 kg/m  as calculated from the following:    Height as of 1/20/20: 1.676 m (5' 6\").    Weight as of this encounter: 88.5 kg (195 lb).   Weight management plan: Discussed healthy diet and exercise guidelines         Patient Instructions   Krunal has an upcoming appointment with urology in January.  I suggested that in the meantime we increase his tamsulosin to 0.8 mg daily.  " Laboratory testing was done.  He has an upcoming appointment with endocrinology.      Return in about 3 months (around 2/9/2021) for diabetes.    Samir Duque MD  [facility]

## 2020-11-10 LAB
ALBUMIN SERPL-MCNC: 3.4 G/DL (ref 3.4–5)
ALP SERPL-CCNC: 52 U/L (ref 40–150)
ALT SERPL W P-5'-P-CCNC: 26 U/L (ref 0–70)
ANION GAP SERPL CALCULATED.3IONS-SCNC: 4 MMOL/L (ref 3–14)
AST SERPL W P-5'-P-CCNC: 16 U/L (ref 0–45)
BILIRUB SERPL-MCNC: 0.8 MG/DL (ref 0.2–1.3)
BUN SERPL-MCNC: 22 MG/DL (ref 7–30)
CALCIUM SERPL-MCNC: 8.9 MG/DL (ref 8.5–10.1)
CHLORIDE SERPL-SCNC: 108 MMOL/L (ref 94–109)
CHOLEST SERPL-MCNC: 126 MG/DL
CO2 SERPL-SCNC: 29 MMOL/L (ref 20–32)
CREAT SERPL-MCNC: 0.82 MG/DL (ref 0.66–1.25)
CREAT UR-MCNC: 62 MG/DL
GFR SERPL CREATININE-BSD FRML MDRD: 85 ML/MIN/{1.73_M2}
GLUCOSE SERPL-MCNC: 115 MG/DL (ref 70–99)
HDLC SERPL-MCNC: 38 MG/DL
LDLC SERPL CALC-MCNC: 67 MG/DL
MICROALBUMIN UR-MCNC: <5 MG/L
MICROALBUMIN/CREAT UR: NORMAL MG/G CR (ref 0–17)
NONHDLC SERPL-MCNC: 88 MG/DL
POTASSIUM SERPL-SCNC: 4.5 MMOL/L (ref 3.4–5.3)
PROT SERPL-MCNC: 6.4 G/DL (ref 6.8–8.8)
PSA SERPL-ACNC: 0.78 UG/L (ref 0–4)
SODIUM SERPL-SCNC: 141 MMOL/L (ref 133–144)
T4 FREE SERPL-MCNC: 0.97 NG/DL (ref 0.76–1.46)
TRIGL SERPL-MCNC: 106 MG/DL
TSH SERPL DL<=0.005 MIU/L-ACNC: 0.54 MU/L (ref 0.4–4)

## 2020-11-30 ENCOUNTER — TRANSFERRED RECORDS (OUTPATIENT)
Dept: HEALTH INFORMATION MANAGEMENT | Facility: CLINIC | Age: 80
End: 2020-11-30

## 2020-12-01 DIAGNOSIS — E78.5 HYPERLIPIDEMIA LDL GOAL <70: ICD-10-CM

## 2020-12-01 RX ORDER — LOVASTATIN 40 MG
TABLET ORAL
Qty: 90 TABLET | Refills: 2 | Status: SHIPPED | OUTPATIENT
Start: 2020-12-01 | End: 2021-09-03

## 2020-12-09 ENCOUNTER — TRANSFERRED RECORDS (OUTPATIENT)
Dept: HEALTH INFORMATION MANAGEMENT | Facility: CLINIC | Age: 80
End: 2020-12-09

## 2020-12-11 ENCOUNTER — TRANSFERRED RECORDS (OUTPATIENT)
Dept: HEALTH INFORMATION MANAGEMENT | Facility: CLINIC | Age: 80
End: 2020-12-11

## 2020-12-11 DIAGNOSIS — N52.9 ERECTILE DYSFUNCTION, UNSPECIFIED ERECTILE DYSFUNCTION TYPE: ICD-10-CM

## 2020-12-14 RX ORDER — SILDENAFIL 100 MG/1
TABLET, FILM COATED ORAL
Qty: 12 TABLET | Refills: 0 | Status: SHIPPED | OUTPATIENT
Start: 2020-12-14 | End: 2021-03-18

## 2020-12-14 NOTE — TELEPHONE ENCOUNTER
Routing refill request to provider for review/approval because:  Erectile Dysfuction Protocol [id number]/11/2020 03:26 PM   Absence of Alpha Blockers on Med list

## 2020-12-19 ASSESSMENT — ACTIVITIES OF DAILY LIVING (ADL): CURRENT_FUNCTION: NO ASSISTANCE NEEDED

## 2020-12-22 ENCOUNTER — OFFICE VISIT (OUTPATIENT)
Dept: INTERNAL MEDICINE | Facility: CLINIC | Age: 80
End: 2020-12-22
Payer: COMMERCIAL

## 2020-12-22 VITALS
OXYGEN SATURATION: 98 % | HEART RATE: 60 BPM | SYSTOLIC BLOOD PRESSURE: 136 MMHG | RESPIRATION RATE: 16 BRPM | BODY MASS INDEX: 31.5 KG/M2 | DIASTOLIC BLOOD PRESSURE: 56 MMHG | WEIGHT: 196 LBS | HEIGHT: 66 IN

## 2020-12-22 DIAGNOSIS — D64.9 ANEMIA, UNSPECIFIED TYPE: ICD-10-CM

## 2020-12-22 DIAGNOSIS — N40.0 BENIGN NON-NODULAR PROSTATIC HYPERPLASIA WITHOUT LOWER URINARY TRACT SYMPTOMS: ICD-10-CM

## 2020-12-22 DIAGNOSIS — Z00.00 ROUTINE MEDICAL EXAM: Primary | ICD-10-CM

## 2020-12-22 DIAGNOSIS — I10 ESSENTIAL HYPERTENSION: ICD-10-CM

## 2020-12-22 DIAGNOSIS — E11.51 TYPE II DIABETES MELLITUS WITH PERIPHERAL CIRCULATORY DISORDER (H): ICD-10-CM

## 2020-12-22 DIAGNOSIS — E03.9 ACQUIRED HYPOTHYROIDISM: ICD-10-CM

## 2020-12-22 PROCEDURE — 99207 PR FOOT EXAM NO CHARGE: CPT | Mod: 25 | Performed by: FAMILY MEDICINE

## 2020-12-22 PROCEDURE — G0439 PPPS, SUBSEQ VISIT: HCPCS | Performed by: FAMILY MEDICINE

## 2020-12-22 RX ORDER — TAMSULOSIN HYDROCHLORIDE 0.4 MG/1
CAPSULE ORAL
Qty: 90 CAPSULE | Refills: 3 | Status: SHIPPED | OUTPATIENT
Start: 2020-12-22 | End: 2021-08-11

## 2020-12-22 ASSESSMENT — MIFFLIN-ST. JEOR: SCORE: 1551.8

## 2020-12-22 ASSESSMENT — ACTIVITIES OF DAILY LIVING (ADL): CURRENT_FUNCTION: NO ASSISTANCE NEEDED

## 2020-12-22 NOTE — PROGRESS NOTES
"SUBJECTIVE:   Timmy Strange is a 80 year old male who presents for Preventive Visit.    {Patient has been advised of split billing requirements and indicates understanding: Yes     Are you in the first 12 months of your Medicare coverage?  No    Healthy Habits:     In general, how would you rate your overall health?  Good    Frequency of exercise:  1 day/week    Duration of exercise:  15-30 minutes    Do you usually eat at least 4 servings of fruit and vegetables a day, include whole grains    & fiber and avoid regularly eating high fat or \"junk\" foods?  Yes    Taking medications regularly:  Yes    Medication side effects:  Not applicable    Ability to successfully perform activities of daily living:  No assistance needed    Home Safety:  No safety concerns identified    Hearing Impairment:  Difficulty following a conversation in a noisy restaurant or crowded room and need to ask people to speak up or repeat themselves    In the past 6 months, have you been bothered by leaking of urine?  No    In general, how would you rate your overall mental or emotional health?  Excellent      PHQ-2 Total Score: 0    Additional concerns today:  No       Do you feel safe in your environment? Yes    Have you ever done Advance Care Planning? (For example, a Health Directive, POLST, or a discussion with a medical provider or your loved ones about your wishes): Yes, patient states has an Advance Care Planning document and will bring a copy to the clinic.      Fall risk  Fallen 2 or more times in the past year?: No  Any fall with injury in the past year?: No    Cognitive Screening     1) Repeat 3 items (Leader, Season, Table)    2) Clock draw: NORMAL  3) 3 item recall: Recalls 3 objects  Results: 3 items recalled: COGNITIVE IMPAIRMENT LESS LIKELY    Mini-CogTM Copyright MEY Aleman. Licensed by the author for use in [facility]; reprinted with permission (ash@.[facility]). All rights reserved.      Do you have sleep apnea, " excessive snoring or daytime drowsiness?: no    Reviewed and updated as needed this visit by clinical staff                 Reviewed and updated as needed this visit by Provider                Social History     Tobacco Use     Smoking status: Former Smoker     Packs/day: 0.00     Quit date: 11/15/2011     Years since quittin.1     Smokeless tobacco: Never Used   Substance Use Topics     Alcohol use: No     Alcohol/week: 0.0 standard drinks     If you drink alcohol do you typically have >3 drinks per day or >7 drinks per week? No    Alcohol Use 2020   Prescreen: >3 drinks/day or >7 drinks/week? Not Applicable   Prescreen: >3 drinks/day or >7 drinks/week? -           Diabetes Follow-up    How often are you checking your blood sugar? One time daily  What time of day are you checking your blood sugars (select all that apply)?  Before and after meals  Have you had any blood sugars above 200?  No  Have you had any blood sugars below 70?  No    What symptoms do you notice when your blood sugar is low?  Shaky    What concerns do you have today about your diabetes? None     Do you have any of these symptoms? (Select all that apply)  No numbness or tingling in feet.  No redness, sores or blisters on feet.  No complaints of excessive thirst.  No reports of blurry vision.  No significant changes to weight.    Have you had a diabetic eye exam in the last 12 months? Yes- Date of last eye exam:           Hyperlipidemia Follow-Up      Are you regularly taking any medication or supplement to lower your cholesterol?   Yes- statin    Are you having muscle aches or other side effects that you think could be caused by your cholesterol lowering medication?  No    Hypertension Follow-up      Do you check your blood pressure regularly outside of the clinic? Yes     Are you following a low salt diet? Yes    Are your blood pressures ever more than 140 on the top number (systolic) OR more   than 90 on the bottom number  (diastolic), for example 140/90? No    BP Readings from Last 2 Encounters:   12/22/20 136/56   11/09/20 (!) 140/58     Hemoglobin A1C (%)   Date Value   11/09/2020 5.5   06/16/2020 5.9 (H)     LDL Cholesterol Calculated (mg/dL)   Date Value   11/09/2020 67   06/16/2020 55         Current providers sharing in care for this patient include:   Patient Care Team:  Samir Duque MD as PCP - General (Family Practice)  aSmir Duque MD as Assigned PCP    The following health maintenance items are reviewed in Epic and correct as of today:  Health Maintenance   Topic Date Due     HEPATITIS C SCREENING  10/21/1958     ZOSTER IMMUNIZATION (1 of 2) 10/21/1992     EYE EXAM  09/17/2020     FALL RISK ASSESSMENT  12/16/2020     MEDICARE ANNUAL WELLNESS VISIT  12/16/2020     A1C  05/09/2021     BMP  11/09/2021     LIPID  11/09/2021     MICROALBUMIN  11/09/2021     DIABETIC FOOT EXAM  11/30/2021     ADVANCE CARE PLANNING  12/17/2024     COLORECTAL CANCER SCREENING  01/20/2025     DTAP/TDAP/TD IMMUNIZATION (3 - Td) 10/24/2030     PHQ-2  Completed     INFLUENZA VACCINE  Completed     Pneumococcal Vaccine: 65+ Years  Completed     Pneumococcal Vaccine: Pediatrics (0 to 5 Years) and At-Risk Patients (6 to 64 Years)  Aged Out     IPV IMMUNIZATION  Aged Out     MENINGITIS IMMUNIZATION  Aged Out     Patient Active Problem List   Diagnosis     Essential hypertension     Hypothyroidism     Type II diabetes mellitus with peripheral circulatory disorder (H)     BPH (benign prostatic hyperplasia)     ED (erectile dysfunction)     Non morbid obesity due to excess calories: comorbid HTN< DM, hyperlipidemia     Mixed hyperlipidemia     Special screening for malignant neoplasm of prostate     Localized edema     Seasonal allergic rhinitis     Screening for prostate cancer     Elevated blood pressure reading without diagnosis of hypertension     Residual hemorrhoidal skin tags     Obesity (BMI 35.0-39.9) with comorbidity (H)      "Excess skin of eyelid, unspecified laterality     Glaucoma of both eyes, unspecified glaucoma type     Flatulence, eructation, and gas pain     Diarrhea, unspecified type     Weakness of voice     Past Surgical History:   Procedure Laterality Date     COLONOSCOPY N/A 2014    Procedure: COMBINED COLONOSCOPY, SINGLE OR MULTIPLE BIOPSY/POLYPECTOMY BY BIOPSY;  Surgeon: Rolanda Bey MD;  Location:  GI     COLONOSCOPY N/A 2020    Procedure: COLONOSCOPY, WITH POLYPECTOMY AND BIOPSY;  Surgeon: Christina Vieira MD;  Location:  GI     TONSILLECTOMY, ADENOIDECTOMY, COMBINED         Social History     Tobacco Use     Smoking status: Former Smoker     Packs/day: 0.00     Quit date: 11/15/2011     Years since quittin.1     Smokeless tobacco: Never Used   Substance Use Topics     Alcohol use: No     Alcohol/week: 0.0 standard drinks     Family History   Problem Relation Age of Onset     Diabetes Maternal Grandmother      Diabetes Maternal Grandfather      Arthritis Maternal Grandfather      Arthritis Father      Thyroid Disease Father      Glaucoma Mother      Alcohol/Drug Mother 49        cirrhosis     Diabetes Daughter 44        type 2     Obesity Daughter      Glaucoma Maternal Aunt              Review of Systems  Constitutional, HEENT, cardiovascular, pulmonary, gi and gu systems are negative, except as otherwise noted.    OBJECTIVE:   There were no vitals taken for this visit. Estimated body mass index is 31.47 kg/m  as calculated from the following:    Height as of 20: 1.676 m (5' 6\").    Weight as of 20: 88.5 kg (195 lb).  Physical Exam  GENERAL: healthy, alert and no distress  EYES: Eyes grossly normal to inspection, PERRL and conjunctivae and sclerae normal  HENT: ear canals and TM's normal, nose and mouth without ulcers or lesions  NECK: no adenopathy, no asymmetry, masses, or scars and thyroid normal to palpation  RESP: lungs clear to auscultation - no rales, rhonchi or " "wheezes  CV: regular rate and rhythm, normal S1 S2, no S3 or S4, no murmur, click or rub, no peripheral edema and peripheral pulses strong  ABDOMEN: soft, nontender, no hepatosplenomegaly, no masses and bowel sounds normal   (male): normal male genitalia without lesions or urethral discharge, no hernia  RECTAL: normal sphincter tone, no rectal masses, prostate normal size, smooth, nontender without nodules or masses  MS: no gross musculoskeletal defects noted, no edema  SKIN: no suspicious lesions or rashes  NEURO: Normal strength and tone, mentation intact and speech normal  PSYCH: mentation appears normal, affect normal/bright  LYMPH: no cervical, supraclavicular, axillary, or inguinal adenopathy  Diabetic foot exam: normal DP and PT pulses, no trophic changes or ulcerative lesions and normal sensory exam        ASSESSMENT / PLAN:   No diagnosis found.    Patient has been advised of split billing requirements and indicates understanding: Yes  COUNSELING:  Reviewed preventive health counseling, as reflected in patient instructions       Regular exercise       Healthy diet/nutrition    Estimated body mass index is 31.47 kg/m  as calculated from the following:    Height as of 1/20/20: 1.676 m (5' 6\").    Weight as of 11/9/20: 88.5 kg (195 lb).    Weight management plan: Discussed healthy diet and exercise guidelines    He reports that he quit smoking about 9 years ago. He smoked 0.00 packs per day. He has never used smokeless tobacco.      Appropriate preventive services were discussed with this patient, including applicable screening as appropriate for cardiovascular disease, diabetes, osteopenia/osteoporosis, and glaucoma.  As appropriate for age/gender, discussed screening for colorectal cancer, prostate cancer, breast cancer, and cervical cancer. Checklist reviewing preventive services available has been given to the patient.    Reviewed patients plan of care and provided an AVS. The Basic Care Plan (routine " screening as documented in Health Maintenance) for Timmy meets the Care Plan requirement. This Care Plan has been established and reviewed with the Patient.    Counseling Resources:  ATP IV Guidelines  Pooled Cohorts Equation Calculator  Breast Cancer Risk Calculator  Breast Cancer: Medication to Reduce Risk  FRAX Risk Assessment  ICSI Preventive Guidelines  Dietary Guidelines for Americans, 2010  [email or web address]'s MyPlate  ASA Prophylaxis  Lung CA Screening    Samir Duque MD  [facility]    Identified Health Risks:

## 2021-01-04 ENCOUNTER — TRANSFERRED RECORDS (OUTPATIENT)
Dept: HEALTH INFORMATION MANAGEMENT | Facility: CLINIC | Age: 81
End: 2021-01-04

## 2021-01-09 DIAGNOSIS — N40.0 BENIGN NON-NODULAR PROSTATIC HYPERPLASIA WITHOUT LOWER URINARY TRACT SYMPTOMS: ICD-10-CM

## 2021-01-10 DIAGNOSIS — Z11.59 ENCOUNTER FOR SCREENING FOR OTHER VIRAL DISEASES: Primary | ICD-10-CM

## 2021-01-11 ENCOUNTER — VIRTUAL VISIT (OUTPATIENT)
Dept: UROLOGY | Facility: CLINIC | Age: 81
End: 2021-01-11
Payer: COMMERCIAL

## 2021-01-11 ENCOUNTER — TELEPHONE (OUTPATIENT)
Dept: UROLOGY | Facility: CLINIC | Age: 81
End: 2021-01-11

## 2021-01-11 VITALS — BODY MASS INDEX: 30.76 KG/M2 | HEIGHT: 67 IN | WEIGHT: 196 LBS

## 2021-01-11 DIAGNOSIS — R35.0 URINARY FREQUENCY: Primary | ICD-10-CM

## 2021-01-11 PROCEDURE — 99203 OFFICE O/P NEW LOW 30 MIN: CPT | Mod: 95 | Performed by: UROLOGY

## 2021-01-11 RX ORDER — FINASTERIDE 5 MG/1
TABLET, FILM COATED ORAL
Qty: 90 TABLET | Refills: 3 | Status: SHIPPED | OUTPATIENT
Start: 2021-01-11 | End: 2021-07-07

## 2021-01-11 RX ORDER — TOLTERODINE 4 MG/1
4 CAPSULE, EXTENDED RELEASE ORAL DAILY
Qty: 30 CAPSULE | Refills: 4 | Status: SHIPPED | OUTPATIENT
Start: 2021-01-11 | End: 2021-01-12

## 2021-01-11 ASSESSMENT — PAIN SCALES - GENERAL: PAINLEVEL: NO PAIN (0)

## 2021-01-11 ASSESSMENT — MIFFLIN-ST. JEOR: SCORE: 1559.74

## 2021-01-11 NOTE — PROGRESS NOTES
Pt goes 7-8 times a day.  Pt does not drink bladder irritants.  Pt also wants to talk about not being achieve and orgasm.  Pt is diabetic.  Pt is taking 2 flomax tabs a day.  Pt is amemic  Pt lost 20lbs in the last year  Pt is on iron.  ==============================    Timmy is a 80 year old who is being evaluated via a billable video visit.      How would you like to obtain your AVS? MyChart  If the video visit is dropped, the invitation should be resent by: Send to e-mail at: [email or web address]  Will anyone else be joining your video visit? No      Video-Visit Details    Type of service:  Video Visit    History: It is a pleasure to see this very pleasant 80-year-old gentleman in initial consultation in video format today.  His major concern at present is mild frequency of micturition although without significant nocturia.  He has been going to the bathroom 12 or 13 times a day although since reducing caffeinated beverages it is now down to about 9-10 times a day.  He is not drinking significant sodas at this point and is only really drinking fluids and feeling thirsty.  Current medications include both tamsulosin and finasteride.  He is also taking medication for type 2 diabetes as well as insulin and medication for blood pressure problems.    Past Medical History:   Diagnosis Date     Arthritis 1970    Dx'd with RA when in the      BPH (benign prostatic hyperplasia) 12/16/2013     Diabetes mellitus      ED (erectile dysfunction) 1/6/2015     HTN (hypertension)      Hyperlipidemia LDL goal <100      Hypothyroidism      Mumps      Obesity        Past Surgical History:   Procedure Laterality Date     COLONOSCOPY N/A 11/21/2014    Procedure: COMBINED COLONOSCOPY, SINGLE OR MULTIPLE BIOPSY/POLYPECTOMY BY BIOPSY;  Surgeon: Rolanda Bey MD;  Location:  GI     COLONOSCOPY N/A 1/20/2020    Procedure: COLONOSCOPY, WITH POLYPECTOMY AND BIOPSY;  Surgeon: Christina Vieira MD;  Location:  GI      TESTICLE SURGERY       TONSILLECTOMY, ADENOIDECTOMY, COMBINED       VASECTOMY         Family History   Problem Relation Age of Onset     Diabetes Maternal Grandmother      Diabetes Maternal Grandfather      Arthritis Maternal Grandfather      Arthritis Father      Thyroid Disease Father      Glaucoma Mother      Alcohol/Drug Mother 49        cirrhosis     Diabetes Daughter 44        type 2     Obesity Daughter      Glaucoma Maternal Aunt        Social History     Socioeconomic History     Marital status:      Spouse name: Not on file     Number of children: Not on file     Years of education: Not on file     Highest education level: Not on file   Occupational History     Not on file   Social Needs     Financial resource strain: Not on file     Food insecurity     Worry: Not on file     Inability: Not on file     Transportation needs     Medical: Not on file     Non-medical: Not on file   Tobacco Use     Smoking status: Former Smoker     Packs/day: 0.00     Quit date: 11/15/2011     Years since quittin.1     Smokeless tobacco: Never Used   Substance and Sexual Activity     Alcohol use: No     Alcohol/week: 0.0 standard drinks     Drug use: No     Sexual activity: Yes     Partners: Female   Lifestyle     Physical activity     Days per week: Not on file     Minutes per session: Not on file     Stress: Not on file   Relationships     Social connections     Talks on phone: Not on file     Gets together: Not on file     Attends Zoroastrianism service: Not on file     Active member of club or organization: Not on file     Attends meetings of clubs or organizations: Not on file     Relationship status: Not on file     Intimate partner violence     Fear of current or ex partner: Not on file     Emotionally abused: Not on file     Physically abused: Not on file     Forced sexual activity: Not on file   Other Topics Concern     Parent/sibling w/ CABG, MI or angioplasty before 65F 55M? No      Service Not Asked      Blood Transfusions Not Asked     Caffeine Concern Not Asked     Occupational Exposure Not Asked     Hobby Hazards Not Asked     Sleep Concern Not Asked     Stress Concern Not Asked     Weight Concern Not Asked     Special Diet No     Back Care Not Asked     Exercise Yes     Comment: walking 3-4 days a week     Bike Helmet Not Asked     Seat Belt Not Asked     Self-Exams Not Asked   Social History Narrative     Not on file       Current Outpatient Medications   Medication Sig Dispense Refill     blood glucose monitoring (ONE TOUCH ULTRA 2) meter device kit        carvedilol (COREG) 3.125 MG tablet Take 1 tablet (3.125 mg) by mouth 2 times daily 180 tablet 3     Fexofenadine HCl (ALLEGRA PO)        finasteride (PROSCAR) 5 MG tablet TAKE ONE TABLET BY MOUTH ONE TIME DAILY 90 tablet 2     fluticasone (FLONASE) 50 MCG/ACT nasal spray SHAKE LIQUID AND USE 1-2 SPRAYS IN EACH NOSTIL EVERY DAY 48 g 2     HUMALOG MIX 75/25 KWIKPEN (75-25) 100 UNIT/ML susp        Lancets (ONETOUCH DELICA PLUS [id number]) MISC        latanoprost (XALATAN) 0.005 % ophthalmic solution Place 1 drop into both eyes daily       levothyroxine (LEVOTHROID) 88 MCG tablet Take 88 mcg by mouth daily.       losartan (COZAAR) 100 MG tablet TAKE 1 TABLET BY MOUTH  DAILY 90 tablet 3     lovastatin (MEVACOR) 40 MG tablet TAKE 1 TABLET BY MOUTH  DAILY AT BEDTIME 90 tablet 2     metFORMIN (GLUCOPHAGE) 500 MG tablet Take 2 tablets (1,000 mg) by mouth 2 times daily (with meals) 120 tablet 2     metFORMIN (GLUCOPHAGE-XR) 500 MG 24 hr tablet        Multiple Vitamins-Minerals (CENTRUM SILVER) per tablet Take 1 tablet by mouth daily       ONETOUCH ULTRA test strip        pantoprazole (PROTONIX) 40 MG EC tablet Take 1 tablet (40 mg) by mouth daily 90 tablet 3     PREVIDENT 5000 BOOSTER PLUS 1.1 % PSTE USE IN PLACE OF REGULAR TOOTHPASTE BEFORE BED, BRUSH NORMALLY, SWISH WITH REMAINING FOAM FOR AT LEAST 30 SECONDS, SPIT OUT EXCESS, DO NOT RI  3     SF 5000 PLUS 1.1 %  "CREA        sildenafil (VIAGRA) 100 MG tablet TAKE 1 TABLET BY MOUTH 30 MINUTES TO 4 HOURS PRIOR TO ACTIVITY. MAX  MG PER 24 HOURS 12 tablet 0     tamsulosin (FLOMAX) 0.4 MG capsule TAKE 1 CAPSULE BY MOUTH  ONCE A DAY 90 capsule 3     tolterodine ER (DETROL LA) 4 MG 24 hr capsule Take 1 capsule (4 mg) by mouth daily 30 capsule 4     ULTICARE SHORT 31G X 8 MM insulin pen needle USE TO INJECT TWICE DAILY  1       Review Of Systems:  Skin: negative  Eyes: negative  Ears/Nose/Throat: hearing loss  Respiratory: No shortness of breath, dyspnea on exertion, cough, or hemoptysis  Cardiovascular: Hypertension  Gastrointestinal: Looseness of stool  Genitourinary: frequency  Musculoskeletal: negative  Neurologic: negative  Psychiatric: negative  Hematologic/Lymphatic/Immunologic: negative  Endocrine: Diabetes mellitus.   -Diabetic diet  -Continue home diabetes regimen  -Start sliding-scale insulin    Exam: Based on video observation  Ht 1.689 m (5' 6.5\")   Wt 88.9 kg (196 lb)   BMI 31.16 kg/m      General Impression: Very pleasant gentleman in no acute distress, well oriented in time place and person    Mental status.  Normal    HEENT: There is no clinical evidence of jaundice on examination of conjunctiva.  Extraocular eye movements normal.  Mucous membranes appear unremarkable    Skin: Skin otherwise normal to examination    Lymph Nodes: Not examined    Respiratory System: Respiratory cycle normal    Cardiovascular: Not examined    Abdominal: Obese of the abdomen    Extremities: Not examined    Back and Flank: Not examined    Genital: Not examined    Rectal: Not examined    Neurologic: There are no visible focal clinical abnormal neurological signs on examination of both the central and peripheral nervous systems    Impression: The patient's main problem is frequency of micturition.  He has had a mild reduction in symptoms with elimination of caffeine but he still significantly troubled by the problem.  Interestingly " however he is has minimal problems with nocturia.  He is already on both tamsulosin and finasteride.  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  We had a careful discussion about this.  The most recent urinalysis is negative.  Serum creatinine is 0.82.  CBC is unremarkable.  We had a discussion today about a proceed.  It discussed the possibility of doing a cystoscopy to evaluate for evidence of infra vesicle obstruction which could be a contributing factor.  It is also possible that there may be some dysfunction of the detrusor muscle as well.  We talked about both of these methods i.e. cystoscopy and urodynamics.  However the patient would prefer to at least try a medication to inhibit the bladder to see if this is of some benefit first before we remove it the other investigations.  I suggested he try tall tolerating 4 mg daily.  I have informed her of potential side effects of postural hypotension, constipation, and dryness of the mouth that could occur with this.  He will keep me informed of his progress and if he continues to have major problems with frequency which are unaffected by this drug the next step would be an outpatient cystoscopy to evaluate for infra vesicle obstruction and then if necessary urodynamic studies if we feel there is evidence of potential dysfunction of the detrusor muscle.  I went over the entire situation with the patient and his wife in detail today.  I answered and addressed all his questions    Plan: We will start him on tolterodine 4 mg daily.  I have asked him to contact our office if this is not of benefit to him so he could proceed with cystoscopy    Video time totaled up to 30 minutes including time for review of records as this was our first visit, labs other pertinent studies discussion of all findings, potential options for investigation options of therapy as noted above    Originating Location (pt. Location):  Home    Distant Location (provider location):  [facility]     Platform used for Video Visit: Mao

## 2021-01-11 NOTE — LETTER
1/11/2021       RE: Timmy Strange  [address] [facility] [address]     Dear Colleague,    Thank you for referring your patient, Timmy Strange, to the [facility]OLOGY [facility] [place] [facility]. Please see a copy of my visit note below.    Pt goes 7-8 times a day.  Pt does not drink bladder irritants.  Pt also wants to talk about not being achieve and orgasm.  Pt is diabetic.  Pt is taking 2 flomax tabs a day.  Pt is amemic  Pt lost 20lbs in the last year  Pt is on iron.  ==============================    Timmy is a 80 year old who is being evaluated via a billable video visit.      How would you like to obtain your AVS? MyChart  If the video visit is dropped, the invitation should be resent by: Send to e-mail at: [email or web address]  Will anyone else be joining your video visit? No      Video-Visit Details    Type of service:  Video Visit    History: It is a pleasure to see this very pleasant 80-year-old gentleman in initial consultation in video format today.  His major concern at present is mild frequency of micturition although without significant nocturia.  He has been going to the bathroom 12 or 13 times a day although since reducing caffeinated beverages it is now down to about 9-10 times a day.  He is not drinking significant sodas at this point and is only really drinking fluids and feeling thirsty.  Current medications include both tamsulosin and finasteride.  He is also taking medication for type 2 diabetes as well as insulin and medication for blood pressure problems.    Past Medical History:   Diagnosis Date     Arthritis 1970    Dx'd with RA when in the      BPH (benign prostatic hyperplasia) 12/16/2013     Diabetes mellitus      ED (erectile dysfunction) 1/6/2015     HTN (hypertension)      Hyperlipidemia LDL goal <100      Hypothyroidism      Mumps      Obesity        Past Surgical History:   Procedure Laterality Date     COLONOSCOPY N/A  2014    Procedure: COMBINED COLONOSCOPY, SINGLE OR MULTIPLE BIOPSY/POLYPECTOMY BY BIOPSY;  Surgeon: Rolanda eBy MD;  Location:  GI     COLONOSCOPY N/A 2020    Procedure: COLONOSCOPY, WITH POLYPECTOMY AND BIOPSY;  Surgeon: Christina Vieira MD;  Location:  GI     TESTICLE SURGERY       TONSILLECTOMY, ADENOIDECTOMY, COMBINED       VASECTOMY         Family History   Problem Relation Age of Onset     Diabetes Maternal Grandmother      Diabetes Maternal Grandfather      Arthritis Maternal Grandfather      Arthritis Father      Thyroid Disease Father      Glaucoma Mother      Alcohol/Drug Mother 49        cirrhosis     Diabetes Daughter 44        type 2     Obesity Daughter      Glaucoma Maternal Aunt        Social History     Socioeconomic History     Marital status:      Spouse name: Not on file     Number of children: Not on file     Years of education: Not on file     Highest education level: Not on file   Occupational History     Not on file   Social Needs     Financial resource strain: Not on file     Food insecurity     Worry: Not on file     Inability: Not on file     Transportation needs     Medical: Not on file     Non-medical: Not on file   Tobacco Use     Smoking status: Former Smoker     Packs/day: 0.00     Quit date: 11/15/2011     Years since quittin.1     Smokeless tobacco: Never Used   Substance and Sexual Activity     Alcohol use: No     Alcohol/week: 0.0 standard drinks     Drug use: No     Sexual activity: Yes     Partners: Female   Lifestyle     Physical activity     Days per week: Not on file     Minutes per session: Not on file     Stress: Not on file   Relationships     Social connections     Talks on phone: Not on file     Gets together: Not on file     Attends Judaism service: Not on file     Active member of club or organization: Not on file     Attends meetings of clubs or organizations: Not on file     Relationship status: Not on file     Intimate  partner violence     Fear of current or ex partner: Not on file     Emotionally abused: Not on file     Physically abused: Not on file     Forced sexual activity: Not on file   Other Topics Concern     Parent/sibling w/ CABG, MI or angioplasty before 65F 55M? No      Service Not Asked     Blood Transfusions Not Asked     Caffeine Concern Not Asked     Occupational Exposure Not Asked     Hobby Hazards Not Asked     Sleep Concern Not Asked     Stress Concern Not Asked     Weight Concern Not Asked     Special Diet No     Back Care Not Asked     Exercise Yes     Comment: walking 3-4 days a week     Bike Helmet Not Asked     Seat Belt Not Asked     Self-Exams Not Asked   Social History Narrative     Not on file       Current Outpatient Medications   Medication Sig Dispense Refill     blood glucose monitoring (ONE TOUCH ULTRA 2) meter device kit        carvedilol (COREG) 3.125 MG tablet Take 1 tablet (3.125 mg) by mouth 2 times daily 180 tablet 3     Fexofenadine HCl (ALLEGRA PO)        finasteride (PROSCAR) 5 MG tablet TAKE ONE TABLET BY MOUTH ONE TIME DAILY 90 tablet 2     fluticasone (FLONASE) 50 MCG/ACT nasal spray SHAKE LIQUID AND USE 1-2 SPRAYS IN EACH NOSTIL EVERY DAY 48 g 2     HUMALOG MIX 75/25 KWIKPEN (75-25) 100 UNIT/ML susp        Lancets (ONETOUCH DELICA PLUS [id number]) MISC        latanoprost (XALATAN) 0.005 % ophthalmic solution Place 1 drop into both eyes daily       levothyroxine (LEVOTHROID) 88 MCG tablet Take 88 mcg by mouth daily.       losartan (COZAAR) 100 MG tablet TAKE 1 TABLET BY MOUTH  DAILY 90 tablet 3     lovastatin (MEVACOR) 40 MG tablet TAKE 1 TABLET BY MOUTH  DAILY AT BEDTIME 90 tablet 2     metFORMIN (GLUCOPHAGE) 500 MG tablet Take 2 tablets (1,000 mg) by mouth 2 times daily (with meals) 120 tablet 2     metFORMIN (GLUCOPHAGE-XR) 500 MG 24 hr tablet        Multiple Vitamins-Minerals (CENTRUM SILVER) per tablet Take 1 tablet by mouth daily       ONETOUCH ULTRA test strip         "pantoprazole (PROTONIX) 40 MG EC tablet Take 1 tablet (40 mg) by mouth daily 90 tablet 3     PREVIDENT 5000 BOOSTER PLUS 1.1 % PSTE USE IN PLACE OF REGULAR TOOTHPASTE BEFORE BED, BRUSH NORMALLY, SWISH WITH REMAINING FOAM FOR AT LEAST 30 SECONDS, SPIT OUT EXCESS, DO NOT RI  3     SF 5000 PLUS 1.1 % CREA        sildenafil (VIAGRA) 100 MG tablet TAKE 1 TABLET BY MOUTH 30 MINUTES TO 4 HOURS PRIOR TO ACTIVITY. MAX  MG PER 24 HOURS 12 tablet 0     tamsulosin (FLOMAX) 0.4 MG capsule TAKE 1 CAPSULE BY MOUTH  ONCE A DAY 90 capsule 3     tolterodine ER (DETROL LA) 4 MG 24 hr capsule Take 1 capsule (4 mg) by mouth daily 30 capsule 4     ULTICARE SHORT 31G X 8 MM insulin pen needle USE TO INJECT TWICE DAILY  1       Review Of Systems:  Skin: negative  Eyes: negative  Ears/Nose/Throat: hearing loss  Respiratory: No shortness of breath, dyspnea on exertion, cough, or hemoptysis  Cardiovascular: Hypertension  Gastrointestinal: Looseness of stool  Genitourinary: frequency  Musculoskeletal: negative  Neurologic: negative  Psychiatric: negative  Hematologic/Lymphatic/Immunologic: negative  Endocrine: Diabetes mellitus.   -Diabetic diet  -Continue home diabetes regimen  -Start sliding-scale insulin    Exam: Based on video observation  Ht 1.689 m (5' 6.5\")   Wt 88.9 kg (196 lb)   BMI 31.16 kg/m      General Impression: Very pleasant gentleman in no acute distress, well oriented in time place and person    Mental status.  Normal    HEENT: There is no clinical evidence of jaundice on examination of conjunctiva.  Extraocular eye movements normal.  Mucous membranes appear unremarkable    Skin: Skin otherwise normal to examination    Lymph Nodes: Not examined    Respiratory System: Respiratory cycle normal    Cardiovascular: Not examined    Abdominal: Obese of the abdomen    Extremities: Not examined    Back and Flank: Not examined    Genital: Not examined    Rectal: Not examined    Neurologic: There are no visible focal clinical " abnormal neurological signs on examination of both the central and peripheral nervous systems    Impression: The patient's main problem is frequency of micturition.  He has had a mild reduction in symptoms with elimination of caffeine but he still significantly troubled by the problem.  Interestingly however he is has minimal problems with nocturia.  He is already on both tamsulosin and finasteride.  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  We had a careful discussion about this.  The most recent urinalysis is negative.  Serum creatinine is 0.82.  CBC is unremarkable.  We had a discussion today about a proceed.  It discussed the possibility of doing a cystoscopy to evaluate for evidence of infra vesicle obstruction which could be a contributing factor.  It is also possible that there may be some dysfunction of the detrusor muscle as well.  We talked about both of these methods i.e. cystoscopy and urodynamics.  However the patient would prefer to at least try a medication to inhibit the bladder to see if this is of some benefit first before we remove it the other investigations.  I suggested he try tall tolerating 4 mg daily.  I have informed her of potential side effects of postural hypotension, constipation, and dryness of the mouth that could occur with this.  He will keep me informed of his progress and if he continues to have major problems with frequency which are unaffected by this drug the next step would be an outpatient cystoscopy to evaluate for infra vesicle obstruction and then if necessary urodynamic studies if we feel there is evidence of potential dysfunction of the detrusor muscle.  I went over the entire situation with the patient and his wife in detail today.  I answered and addressed all his questions    Plan: We will start him on tolterodine 4 mg daily.  I have asked him to contact our office if this is not of benefit to him so he could proceed  with cystoscopy    Video time totaled up to 30 minutes including time for review of records as this was our first visit, labs other pertinent studies discussion of all findings, potential options for investigation options of therapy as noted above    Originating Location (pt. Location): Home    Distant Location (provider location):  [facility]OLOG[facility] [place]     Platform used for Video Visit: Mao Vazquez MD

## 2021-01-11 NOTE — TELEPHONE ENCOUNTER
[facility] Call Center    Phone Message    May a detailed message be left on voicemail: yes     Reason for Call: Other: Timmy calling to report that he is waiting for Dr. Vazquez. He is wondering when he should expect Dr. Vazquez to join the video appointment. Please give him a call back to discuss.     Action Taken: Message routed to:  Other: [facility]    Travel Screening: Not Applicable

## 2021-01-11 NOTE — TELEPHONE ENCOUNTER
Returned phone call and LM that it will be about 15 more minutes as MD is running behind.     Dina Mejia LPN

## 2021-01-12 DIAGNOSIS — R35.0 URINARY FREQUENCY: ICD-10-CM

## 2021-01-12 RX ORDER — TOLTERODINE 4 MG/1
4 CAPSULE, EXTENDED RELEASE ORAL DAILY
Qty: 90 CAPSULE | Refills: 1 | Status: SHIPPED | OUTPATIENT
Start: 2021-01-12 | End: 2021-01-28

## 2021-01-19 DIAGNOSIS — D64.9 ANEMIA, UNSPECIFIED TYPE: Primary | ICD-10-CM

## 2021-01-20 DIAGNOSIS — D64.9 ANEMIA, UNSPECIFIED TYPE: ICD-10-CM

## 2021-01-20 LAB
BASOPHILS # BLD AUTO: 0 10E9/L (ref 0–0.2)
BASOPHILS NFR BLD AUTO: 0.2 %
DIFFERENTIAL METHOD BLD: ABNORMAL
EOSINOPHIL # BLD AUTO: 0.1 10E9/L (ref 0–0.7)
EOSINOPHIL NFR BLD AUTO: 1.2 %
ERYTHROCYTE [DISTWIDTH] IN BLOOD BY AUTOMATED COUNT: 15 % (ref 10–15)
HCT VFR BLD AUTO: 37.1 % (ref 40–53)
HGB BLD-MCNC: 12.2 G/DL (ref 13.3–17.7)
LYMPHOCYTES # BLD AUTO: 1.4 10E9/L (ref 0.8–5.3)
LYMPHOCYTES NFR BLD AUTO: 27.9 %
MCH RBC QN AUTO: 28.9 PG (ref 26.5–33)
MCHC RBC AUTO-ENTMCNC: 32.9 G/DL (ref 31.5–36.5)
MCV RBC AUTO: 88 FL (ref 78–100)
MONOCYTES # BLD AUTO: 0.7 10E9/L (ref 0–1.3)
MONOCYTES NFR BLD AUTO: 14.3 %
NEUTROPHILS # BLD AUTO: 2.8 10E9/L (ref 1.6–8.3)
NEUTROPHILS NFR BLD AUTO: 56.4 %
PLATELET # BLD AUTO: 213 10E9/L (ref 150–450)
RBC # BLD AUTO: 4.22 10E12/L (ref 4.4–5.9)
WBC # BLD AUTO: 5 10E9/L (ref 4–11)

## 2021-01-20 PROCEDURE — 36415 COLL VENOUS BLD VENIPUNCTURE: CPT | Performed by: FAMILY MEDICINE

## 2021-01-20 PROCEDURE — 85025 COMPLETE CBC W/AUTO DIFF WBC: CPT | Performed by: FAMILY MEDICINE

## 2021-01-28 DIAGNOSIS — Z11.59 ENCOUNTER FOR SCREENING FOR OTHER VIRAL DISEASES: ICD-10-CM

## 2021-01-28 DIAGNOSIS — D64.9 ANEMIA, UNSPECIFIED TYPE: Primary | ICD-10-CM

## 2021-01-28 LAB
LABORATORY COMMENT REPORT: NORMAL
SARS-COV-2 RNA RESP QL NAA+PROBE: NEGATIVE
SARS-COV-2 RNA RESP QL NAA+PROBE: NORMAL
SPECIMEN SOURCE: NORMAL
SPECIMEN SOURCE: NORMAL

## 2021-01-28 PROCEDURE — U0005 INFEC AGEN DETEC AMPLI PROBE: HCPCS | Performed by: COLON & RECTAL SURGERY

## 2021-01-28 PROCEDURE — U0003 INFECTIOUS AGENT DETECTION BY NUCLEIC ACID (DNA OR RNA); SEVERE ACUTE RESPIRATORY SYNDROME CORONAVIRUS 2 (SARS-COV-2) (CORONAVIRUS DISEASE [COVID-19]), AMPLIFIED PROBE TECHNIQUE, MAKING USE OF HIGH THROUGHPUT TECHNOLOGIES AS DESCRIBED BY CMS-2020-01-R: HCPCS | Performed by: COLON & RECTAL SURGERY

## 2021-02-01 ENCOUNTER — HOSPITAL ENCOUNTER (OUTPATIENT)
Facility: CLINIC | Age: 81
Discharge: HOME | End: 2021-02-01
Attending: COLON & RECTAL SURGERY | Admitting: COLON & RECTAL SURGERY
Payer: COMMERCIAL

## 2021-02-01 VITALS
SYSTOLIC BLOOD PRESSURE: 129 MMHG | TEMPERATURE: 97.3 F | HEART RATE: 58 BPM | OXYGEN SATURATION: 100 % | DIASTOLIC BLOOD PRESSURE: 52 MMHG | RESPIRATION RATE: 28 BRPM

## 2021-02-01 LAB — COLONOSCOPY: NORMAL

## 2021-02-01 PROCEDURE — 99153 MOD SED SAME PHYS/QHP EA: CPT | Performed by: COLON & RECTAL SURGERY

## 2021-02-01 PROCEDURE — 88305 TISSUE EXAM BY PATHOLOGIST: CPT | Mod: TC | Performed by: COLON & RECTAL SURGERY

## 2021-02-01 PROCEDURE — G0500 MOD SEDAT ENDO SERVICE >5YRS: HCPCS | Performed by: COLON & RECTAL SURGERY

## 2021-02-01 PROCEDURE — 88305 TISSUE EXAM BY PATHOLOGIST: CPT | Mod: 26 | Performed by: PATHOLOGY

## 2021-02-01 PROCEDURE — 45380 COLONOSCOPY AND BIOPSY: CPT | Mod: PT,XU | Performed by: COLON & RECTAL SURGERY

## 2021-02-01 PROCEDURE — 45385 COLONOSCOPY W/LESION REMOVAL: CPT | Mod: PT | Performed by: COLON & RECTAL SURGERY

## 2021-02-01 PROCEDURE — 250N000011 HC RX IP 250 OP 636: Performed by: COLON & RECTAL SURGERY

## 2021-02-01 RX ORDER — DIPHENHYDRAMINE HYDROCHLORIDE 50 MG/ML
25 INJECTION INTRAMUSCULAR; INTRAVENOUS EVERY 4 HOURS PRN
Status: DISCONTINUED | OUTPATIENT
Start: 2021-02-01 | End: 2021-02-01 | Stop reason: HOSPADM

## 2021-02-01 RX ORDER — ONDANSETRON 2 MG/ML
4 INJECTION INTRAMUSCULAR; INTRAVENOUS EVERY 6 HOURS PRN
Status: DISCONTINUED | OUTPATIENT
Start: 2021-02-01 | End: 2021-02-01 | Stop reason: HOSPADM

## 2021-02-01 RX ORDER — ONDANSETRON 2 MG/ML
4 INJECTION INTRAMUSCULAR; INTRAVENOUS
Status: DISCONTINUED | OUTPATIENT
Start: 2021-02-01 | End: 2021-02-01 | Stop reason: HOSPADM

## 2021-02-01 RX ORDER — ONDANSETRON 4 MG/1
4 TABLET, ORALLY DISINTEGRATING ORAL EVERY 6 HOURS PRN
Status: DISCONTINUED | OUTPATIENT
Start: 2021-02-01 | End: 2021-02-01 | Stop reason: HOSPADM

## 2021-02-01 RX ORDER — DIPHENHYDRAMINE HCL 25 MG
25 CAPSULE ORAL EVERY 4 HOURS PRN
Status: DISCONTINUED | OUTPATIENT
Start: 2021-02-01 | End: 2021-02-01 | Stop reason: HOSPADM

## 2021-02-01 RX ORDER — LIDOCAINE 40 MG/G
CREAM TOPICAL
Status: DISCONTINUED | OUTPATIENT
Start: 2021-02-01 | End: 2021-02-01 | Stop reason: HOSPADM

## 2021-02-01 RX ORDER — NALOXONE HYDROCHLORIDE 0.4 MG/ML
0.2 INJECTION, SOLUTION INTRAMUSCULAR; INTRAVENOUS; SUBCUTANEOUS
Status: DISCONTINUED | OUTPATIENT
Start: 2021-02-01 | End: 2021-02-01 | Stop reason: HOSPADM

## 2021-02-01 RX ORDER — NALOXONE HYDROCHLORIDE 0.4 MG/ML
0.4 INJECTION, SOLUTION INTRAMUSCULAR; INTRAVENOUS; SUBCUTANEOUS
Status: DISCONTINUED | OUTPATIENT
Start: 2021-02-01 | End: 2021-02-01 | Stop reason: HOSPADM

## 2021-02-01 RX ORDER — PROCHLORPERAZINE MALEATE 5 MG
5 TABLET ORAL EVERY 6 HOURS PRN
Status: DISCONTINUED | OUTPATIENT
Start: 2021-02-01 | End: 2021-02-01 | Stop reason: HOSPADM

## 2021-02-01 RX ORDER — FLUMAZENIL 0.1 MG/ML
0.2 INJECTION, SOLUTION INTRAVENOUS
Status: DISCONTINUED | OUTPATIENT
Start: 2021-02-01 | End: 2021-02-01 | Stop reason: HOSPADM

## 2021-02-01 RX ORDER — FENTANYL CITRATE 50 UG/ML
INJECTION, SOLUTION INTRAMUSCULAR; INTRAVENOUS PRN
Status: DISCONTINUED | OUTPATIENT
Start: 2021-02-01 | End: 2021-02-01 | Stop reason: HOSPADM

## 2021-02-01 RX ORDER — FERROUS SULFATE 324(65)MG
324 TABLET, DELAYED RELEASE (ENTERIC COATED) ORAL 2 TIMES DAILY
COMMUNITY
End: 2021-06-08

## 2021-02-01 NOTE — H&P
.  Pre-Endoscopy History and Physical     Timmy Strange MRN# [id number]   YOB: 1940 Age: 80 year old     Date of Procedure: 2/1/2021  Primary care provider: Samir Duque  Type of Endoscopy: Colonoscopy  Reason for Procedure: H/O polyps  Type of Anesthesia Anticipated: Moderate Sedation    HPI:    Timmy is a 80 year old male who will be undergoing the above procedure.      A history and physical has been performed. The patient's medications and allergies have been reviewed. The risks and benefits of the procedure and the sedation options and risks were discussed with the patient.  All questions were answered and informed consent was obtained.      He denies a personal or family history of anesthesia complications or bleeding disorders.     No Known Allergies     No current facility-administered medications on file prior to encounter.        blood glucose monitoring (ONE TOUCH ULTRA 2) meter device kit,        carvedilol (COREG) 3.125 MG tablet, Take 1 tablet (3.125 mg) by mouth 2 times daily       Ferrous Sulfate 324 (65 Fe) MG TBEC,        Fexofenadine HCl (ALLEGRA PO),        latanoprost (XALATAN) 0.005 % ophthalmic solution, Place 1 drop into both eyes daily       levothyroxine (LEVOTHROID) 88 MCG tablet, Take 88 mcg by mouth daily.       losartan (COZAAR) 100 MG tablet, TAKE 1 TABLET BY MOUTH  DAILY       metFORMIN (GLUCOPHAGE-XR) 500 MG 24 hr tablet,        pantoprazole (PROTONIX) 40 MG EC tablet, Take 1 tablet (40 mg) by mouth daily       PREVIDENT 5000 BOOSTER PLUS 1.1 % PSTE, USE IN PLACE OF REGULAR TOOTHPASTE BEFORE BED, BRUSH NORMALLY, SWISH WITH REMAINING FOAM FOR AT LEAST 30 SECONDS, SPIT OUT EXCESS, DO NOT RI       SF 5000 PLUS 1.1 % CREA,        ULTICARE SHORT 31G X 8 MM insulin pen needle, USE TO INJECT TWICE DAILY       fluticasone (FLONASE) 50 MCG/ACT nasal spray, SHAKE LIQUID AND USE 1-2 SPRAYS IN EACH NOSTIL EVERY DAY       HUMALOG MIX 75/25 KWIKPEN (75-25) 100 UNIT/ML  susp,        Lancets (ONETOUCH DELICA PLUS [id number]) MISC,        Multiple Vitamins-Minerals (CENTRUM SILVER) per tablet, Take 1 tablet by mouth daily       ONETOUCH ULTRA test strip,         Patient Active Problem List   Diagnosis     Essential hypertension     Hypothyroidism     Type II diabetes mellitus with peripheral circulatory disorder (H)     BPH (benign prostatic hyperplasia)     ED (erectile dysfunction)     Mixed hyperlipidemia     Special screening for malignant neoplasm of prostate     Localized edema     Seasonal allergic rhinitis     Screening for prostate cancer     Elevated blood pressure reading without diagnosis of hypertension     Residual hemorrhoidal skin tags     Obesity (BMI 35.0-39.9) with comorbidity (H)     Excess skin of eyelid, unspecified laterality     Glaucoma of both eyes, unspecified glaucoma type     Flatulence, eructation, and gas pain     Diarrhea, unspecified type     Weakness of voice     Routine medical exam     Anemia, unspecified type        Past Medical History:   Diagnosis Date     Arthritis 1970    Dx'd with RA when in the      BPH (benign prostatic hyperplasia) 2013     Diabetes mellitus      ED (erectile dysfunction) 2015     HTN (hypertension)      Hyperlipidemia LDL goal <100      Hypothyroidism      Mumps      Obesity         Past Surgical History:   Procedure Laterality Date     COLONOSCOPY N/A 2014    Procedure: COMBINED COLONOSCOPY, SINGLE OR MULTIPLE BIOPSY/POLYPECTOMY BY BIOPSY;  Surgeon: Rolanda Bey MD;  Location:  GI     COLONOSCOPY N/A 2020    Procedure: COLONOSCOPY, WITH POLYPECTOMY AND BIOPSY;  Surgeon: Christina Vieira MD;  Location:  GI     TESTICLE SURGERY       TONSILLECTOMY, ADENOIDECTOMY, COMBINED       VASECTOMY         Social History     Tobacco Use     Smoking status: Former Smoker     Packs/day: 0.00     Types: Pipe     Quit date: 11/15/2011     Years since quittin.2     Smokeless tobacco: Never  "Used   Substance Use Topics     Alcohol use: No     Alcohol/week: 0.0 standard drinks       Family History   Problem Relation Age of Onset     Diabetes Maternal Grandmother      Diabetes Maternal Grandfather      Arthritis Maternal Grandfather      Arthritis Father      Thyroid Disease Father      Glaucoma Mother      Alcohol/Drug Mother 49        cirrhosis     Diabetes Daughter 44        type 2     Obesity Daughter      Glaucoma Maternal Aunt        REVIEW OF SYSTEMS:     5 point ROS negative except as noted above in HPI, including Gen., Resp., CV, GI &  system review.      PHYSICAL EXAM:   There were no vitals taken for this visit. Estimated body mass index is 31.16 kg/m  as calculated from the following:    Height as of 1/11/21: 1.689 m (5' 6.5\").    Weight as of 1/11/21: 88.9 kg (196 lb).   GENERAL APPEARANCE: healthy and alert  MENTAL STATUS: alert  AIRWAY EXAM: Mallampatti Class I (visualization of the soft palate, fauces, uvula, anterior and posterior pillars)  RESP: lungs clear to auscultation - no rales, rhonchi or wheezes  CV: regular rates and rhythm      IMPRESSION   ASA Class 2 - Mild systemic disease        PLAN:     Plan for colonoscopy. We discussed the risks, benefits and alternatives and the patient wished to proceed.    The above has been forwarded to the consulting provider.      Mima Vieira MD  [facility]  Phone: [phone]  Fax: [phone]  February 1, 2021    "

## 2021-02-01 NOTE — OP NOTE
See Provation Note In Chart    Mima Vieira MD  [facility]  Office Phone # [phone]

## 2021-02-02 DIAGNOSIS — I10 ESSENTIAL HYPERTENSION: ICD-10-CM

## 2021-02-02 DIAGNOSIS — D64.9 ANEMIA, UNSPECIFIED TYPE: Primary | ICD-10-CM

## 2021-02-02 LAB — COPATH REPORT: NORMAL

## 2021-02-02 RX ORDER — CARVEDILOL 3.12 MG/1
TABLET ORAL
Qty: 180 TABLET | Refills: 3 | Status: SHIPPED | OUTPATIENT
Start: 2021-02-02 | End: 2021-05-20

## 2021-02-05 ENCOUNTER — MYC MEDICAL ADVICE (OUTPATIENT)
Dept: INTERNAL MEDICINE | Facility: CLINIC | Age: 81
End: 2021-02-05

## 2021-02-15 ENCOUNTER — TRANSFERRED RECORDS (OUTPATIENT)
Dept: HEALTH INFORMATION MANAGEMENT | Facility: CLINIC | Age: 81
End: 2021-02-15

## 2021-02-22 DIAGNOSIS — D64.9 ANEMIA, UNSPECIFIED TYPE: ICD-10-CM

## 2021-02-22 DIAGNOSIS — R49.8 WEAKNESS OF VOICE: ICD-10-CM

## 2021-02-22 LAB
ERYTHROCYTE [DISTWIDTH] IN BLOOD BY AUTOMATED COUNT: 15.7 % (ref 10–15)
HCT VFR BLD AUTO: 32.4 % (ref 40–53)
HGB BLD-MCNC: 10.4 G/DL (ref 13.3–17.7)
MCH RBC QN AUTO: 28.7 PG (ref 26.5–33)
MCHC RBC AUTO-ENTMCNC: 32.1 G/DL (ref 31.5–36.5)
MCV RBC AUTO: 90 FL (ref 78–100)
PLATELET # BLD AUTO: 226 10E9/L (ref 150–450)
RBC # BLD AUTO: 3.62 10E12/L (ref 4.4–5.9)
WBC # BLD AUTO: 4.3 10E9/L (ref 4–11)

## 2021-02-22 PROCEDURE — 85027 COMPLETE CBC AUTOMATED: CPT | Performed by: FAMILY MEDICINE

## 2021-02-22 PROCEDURE — 36415 COLL VENOUS BLD VENIPUNCTURE: CPT | Performed by: FAMILY MEDICINE

## 2021-02-22 RX ORDER — PANTOPRAZOLE SODIUM 40 MG/1
40 TABLET, DELAYED RELEASE ORAL DAILY
Qty: 90 TABLET | Refills: 2 | Status: SHIPPED | OUTPATIENT
Start: 2021-02-22 | End: 2021-08-11

## 2021-03-02 ENCOUNTER — VIRTUAL VISIT (OUTPATIENT)
Dept: INTERNAL MEDICINE | Facility: CLINIC | Age: 81
End: 2021-03-02
Payer: COMMERCIAL

## 2021-03-02 DIAGNOSIS — D64.9 ANEMIA, UNSPECIFIED TYPE: ICD-10-CM

## 2021-03-02 DIAGNOSIS — K21.9 GASTROESOPHAGEAL REFLUX DISEASE WITHOUT ESOPHAGITIS: Primary | ICD-10-CM

## 2021-03-02 PROCEDURE — 99213 OFFICE O/P EST LOW 20 MIN: CPT | Mod: 95 | Performed by: FAMILY MEDICINE

## 2021-03-02 NOTE — PATIENT INSTRUCTIONS
I referred the patient for an upper endoscopy.  He will continue with pantoprazole 40 mg daily.  We can always refer him back to colorectal surgery if his upper endoscopy is negative to get his hemorrhoids dealt with.

## 2021-03-02 NOTE — PROGRESS NOTES
Timmy is a 80 year old who is being evaluated via a billable video visit.      How would you like to obtain your AVS? Dagoharmykel  If the video visit is dropped, the invitation should be resent by: Other e-mail: María Elena  Will anyone else be joining your video visit? No      Video Start Time: 10:04    Assessment & Plan     Gastroesophageal reflux disease without esophagitis    - GASTROENTEROLOGY ADULT REF PROCEDURE ONLY; Future    Anemia, unspecified type          20 minutes spent on the date of the encounter doing chart review, history and exam, documentation and further activities as noted above       Patient Instructions   I referred the patient for an upper endoscopy.  He will continue with pantoprazole 40 mg daily.  We can always refer him back to colorectal surgery if his upper endoscopy is negative to get his hemorrhoids dealt with.      Return in about 2 months (around 5/2/2021) for diabetes.    Samir Duque MD  [facility]   Timmy is a 80 year old who presents for the following health issues     HPI       Pt to discuss update in regards to blood in his stool and discuss a treatment plan. Has had no bleeding for a week.     Discuss a endoscopy, pt is taking pantoprazole which seems to be effective.       Concern -anemia  Onset: 2-3 months  Description: Low hemoglobin  Intensity: mild  Progression of Symptoms:  constant  Accompanying Signs & Symptoms: Does have reflux  Previous history of similar problem: No, but does have a history of bleeding hemorrhoids.  Precipitating factors:        Worsened by: Nothing  Alleviating factors:        Improved by: Pantoprazole  Therapies tried and outcome:       Review of Systems   Constitutional, HEENT, cardiovascular, pulmonary, gi and gu systems are negative, except as otherwise noted.      Objective    Vitals - Patient Reported  Weight (Patient Reported): 90.7 kg (200 lb)      Vitals:  No vitals were obtained today due  to virtual visit.    Physical Exam   GENERAL: Healthy, alert and no distress  EYES: Eyes grossly normal to inspection.  No discharge or erythema, or obvious scleral/conjunctival abnormalities.  RESP: No audible wheeze, cough, or visible cyanosis.  No visible retractions or increased work of breathing.    SKIN: Visible skin clear. No significant rash, abnormal pigmentation or lesions.  NEURO: Cranial nerves grossly intact.  Mentation and speech appropriate for age.  PSYCH: Mentation appears normal, affect normal/bright, judgement and insight intact, normal speech and appearance well-groomed.                Video-Visit Details    Type of service:  Video Visit    Video End Time:10:17    Originating Location (pt. Location): Home    Distant Location (provider location):  [facility]     Platform used for Video Visit: [employer]

## 2021-03-04 DIAGNOSIS — Z11.59 ENCOUNTER FOR SCREENING FOR OTHER VIRAL DISEASES: ICD-10-CM

## 2021-03-12 DIAGNOSIS — Z11.59 ENCOUNTER FOR SCREENING FOR OTHER VIRAL DISEASES: ICD-10-CM

## 2021-03-12 PROCEDURE — U0003 INFECTIOUS AGENT DETECTION BY NUCLEIC ACID (DNA OR RNA); SEVERE ACUTE RESPIRATORY SYNDROME CORONAVIRUS 2 (SARS-COV-2) (CORONAVIRUS DISEASE [COVID-19]), AMPLIFIED PROBE TECHNIQUE, MAKING USE OF HIGH THROUGHPUT TECHNOLOGIES AS DESCRIBED BY CMS-2020-01-R: HCPCS | Performed by: SPECIALIST

## 2021-03-12 PROCEDURE — U0005 INFEC AGEN DETEC AMPLI PROBE: HCPCS | Performed by: SPECIALIST

## 2021-03-16 ENCOUNTER — HOSPITAL ENCOUNTER (OUTPATIENT)
Facility: CLINIC | Age: 81
Discharge: HOME | End: 2021-03-16
Attending: SPECIALIST | Admitting: SPECIALIST
Payer: COMMERCIAL

## 2021-03-16 VITALS
HEIGHT: 67 IN | SYSTOLIC BLOOD PRESSURE: 117 MMHG | WEIGHT: 197 LBS | BODY MASS INDEX: 30.92 KG/M2 | DIASTOLIC BLOOD PRESSURE: 57 MMHG | RESPIRATION RATE: 12 BRPM | OXYGEN SATURATION: 97 % | TEMPERATURE: 97.3 F | HEART RATE: 58 BPM

## 2021-03-16 LAB
GLUCOSE BLDC GLUCOMTR-MCNC: 103 MG/DL (ref 70–99)
UPPER GI ENDOSCOPY: NORMAL

## 2021-03-16 PROCEDURE — 88305 TISSUE EXAM BY PATHOLOGIST: CPT | Mod: TC | Performed by: SPECIALIST

## 2021-03-16 PROCEDURE — 82962 GLUCOSE BLOOD TEST: CPT

## 2021-03-16 PROCEDURE — 43239 EGD BIOPSY SINGLE/MULTIPLE: CPT | Performed by: SPECIALIST

## 2021-03-16 PROCEDURE — 88305 TISSUE EXAM BY PATHOLOGIST: CPT | Mod: 26 | Performed by: PATHOLOGY

## 2021-03-16 PROCEDURE — 250N000011 HC RX IP 250 OP 636: Performed by: SPECIALIST

## 2021-03-16 PROCEDURE — 250N000009 HC RX 250: Performed by: SPECIALIST

## 2021-03-16 PROCEDURE — G0500 MOD SEDAT ENDO SERVICE >5YRS: HCPCS | Performed by: SPECIALIST

## 2021-03-16 RX ORDER — DEXTROSE, SODIUM CHLORIDE, SODIUM LACTATE, POTASSIUM CHLORIDE, AND CALCIUM CHLORIDE 5; .6; .31; .03; .02 G/100ML; G/100ML; G/100ML; G/100ML; G/100ML
INJECTION, SOLUTION INTRAVENOUS CONTINUOUS PRN
Status: DISCONTINUED | OUTPATIENT
Start: 2021-03-16 | End: 2021-03-16 | Stop reason: HOSPADM

## 2021-03-16 RX ORDER — LIDOCAINE 40 MG/G
CREAM TOPICAL
Status: DISCONTINUED | OUTPATIENT
Start: 2021-03-16 | End: 2021-03-16 | Stop reason: HOSPADM

## 2021-03-16 RX ORDER — FENTANYL CITRATE 50 UG/ML
INJECTION, SOLUTION INTRAMUSCULAR; INTRAVENOUS PRN
Status: DISCONTINUED | OUTPATIENT
Start: 2021-03-16 | End: 2021-03-16 | Stop reason: HOSPADM

## 2021-03-16 RX ORDER — ONDANSETRON 2 MG/ML
4 INJECTION INTRAMUSCULAR; INTRAVENOUS
Status: DISCONTINUED | OUTPATIENT
Start: 2021-03-16 | End: 2021-03-16 | Stop reason: HOSPADM

## 2021-03-16 ASSESSMENT — MIFFLIN-ST. JEOR: SCORE: 1572.22

## 2021-03-16 NOTE — H&P
Pre-Endoscopy History and Physical     Timmy Strange MRN# [id number]   YOB: 1940 Age: 80 year old     Date of Procedure: 3/16/2021  Primary care provider: Samir Duque  Type of Endoscopy: Esophagogastroduodenoscopy with possible biopsy, possible dilation, possible foreign body removal  Reason for Procedure: anemia  Type of Anesthesia Anticipated: Conscious Sedation    HPI:    Timmy is a 80 year old male who will be undergoing the above procedure.      A history and physical has been performed. The patient's medications and allergies have been reviewed. The risks and benefits of the procedure and the sedation options and risks were discussed with the patient.  All questions were answered and informed consent was obtained.      He denies a personal or family history of anesthesia complications or bleeding disorders.     Patient Active Problem List   Diagnosis     Essential hypertension     Hypothyroidism     Type II diabetes mellitus with peripheral circulatory disorder (H)     BPH (benign prostatic hyperplasia)     ED (erectile dysfunction)     Mixed hyperlipidemia     Special screening for malignant neoplasm of prostate     Localized edema     Seasonal allergic rhinitis     Screening for prostate cancer     Elevated blood pressure reading without diagnosis of hypertension     Residual hemorrhoidal skin tags     Obesity (BMI 35.0-39.9) with comorbidity (H)     Excess skin of eyelid, unspecified laterality     Glaucoma of both eyes, unspecified glaucoma type     Flatulence, eructation, and gas pain     Diarrhea, unspecified type     Weakness of voice     Routine medical exam     Anemia, unspecified type     Gastroesophageal reflux disease without esophagitis        Past Medical History:   Diagnosis Date     Arthritis 1970    Dx'd with RA when in the      BPH (benign prostatic hyperplasia) 12/16/2013     Cancer (H)     basal cell cancer behind ear     Diabetes mellitus      ED  (erectile dysfunction) 2015     HTN (hypertension)      Hyperlipidemia LDL goal <100      Hypothyroidism      Mumps      Obesity         Past Surgical History:   Procedure Laterality Date     COLONOSCOPY N/A 2014    Procedure: COMBINED COLONOSCOPY, SINGLE OR MULTIPLE BIOPSY/POLYPECTOMY BY BIOPSY;  Surgeon: Rolanda Bey MD;  Location:  GI     COLONOSCOPY N/A 2020    Procedure: COLONOSCOPY, WITH POLYPECTOMY AND BIOPSY;  Surgeon: Christina Vieira MD;  Location:  GI     COLONOSCOPY N/A 2021    Procedure: Colonoscopy, With Polypectomy And Biopsy;  Surgeon: Christina Vieira MD;  Location:  GI     TESTICLE SURGERY       TONSILLECTOMY, ADENOIDECTOMY, COMBINED       VASECTOMY         Social History     Tobacco Use     Smoking status: Former Smoker     Packs/day: 0.00     Types: Pipe     Quit date: 11/15/2011     Years since quittin.3     Smokeless tobacco: Never Used   Substance Use Topics     Alcohol use: No     Alcohol/week: 0.0 standard drinks       Family History   Problem Relation Age of Onset     Diabetes Maternal Grandmother      Diabetes Maternal Grandfather      Arthritis Maternal Grandfather      Arthritis Father      Thyroid Disease Father      Glaucoma Mother      Alcohol/Drug Mother 49        cirrhosis     Diabetes Daughter 44        type 2     Obesity Daughter      Glaucoma Maternal Aunt        Prior to Admission medications    Medication Sig Start Date End Date Taking? Authorizing Provider   carvedilol (COREG) 3.125 MG tablet TAKE 1 TABLET BY MOUTH TWO  TIMES DAILY 21  Yes Samri Duque MD   Fexofenadine HCl (ALLEGRA PO)    Yes Reported, Patient   finasteride (PROSCAR) 5 MG tablet TAKE 1 TABLET BY MOUTH  DAILY 21  Yes Samir Duque MD   HUMALOG MIX 75/25 KWIKPEN (75-25) 100 UNIT/ML susp Took 16 units in the evening 20  Yes Reported, Patient   levothyroxine (LEVOTHROID) 88 MCG tablet Take 88 mcg by mouth daily.   Yes Reported,  Patient   losartan (COZAAR) 100 MG tablet TAKE 1 TABLET BY MOUTH  DAILY 10/8/20  Yes Samir Duque MD   lovastatin (MEVACOR) 40 MG tablet TAKE 1 TABLET BY MOUTH  DAILY AT BEDTIME 12/1/20  Yes Samir Duque MD   metFORMIN (GLUCOPHAGE-XR) 500 MG 24 hr tablet  1/2/20  Yes Reported, Patient   Multiple Vitamins-Minerals (CENTRUM SILVER) per tablet Take 1 tablet by mouth daily   Yes Reported, Patient   pantoprazole (PROTONIX) 40 MG EC tablet Take 1 tablet (40 mg) by mouth daily 2/22/21  Yes Samir Duque MD   sildenafil (VIAGRA) 100 MG tablet TAKE 1 TABLET BY MOUTH 30 MINUTES TO 4 HOURS PRIOR TO ACTIVITY. MAX  MG PER 24 HOURS 12/14/20  Yes Samir Duque MD   tamsulosin (FLOMAX) 0.4 MG capsule TAKE 1 CAPSULE BY MOUTH  ONCE A DAY 12/22/20  Yes Samir Duque MD   blood glucose monitoring (ONE TOUCH ULTRA 2) meter device kit  1/2/20   Reported, Patient   Ferrous Sulfate 324 (65 Fe) MG TBEC     Reported, Patient   fluticasone (FLONASE) 50 MCG/ACT nasal spray SHAKE LIQUID AND USE 1-2 SPRAYS IN EACH NOSTIL EVERY DAY 7/1/19   Samir Duque MD   Lancets (ONETOUCH DELICA PLUS [id number]) MISC  1/8/20   Reported, Patient   latanoprost (XALATAN) 0.005 % ophthalmic solution Place 1 drop into both eyes daily    Reported, Patient   ONETOUCH ULTRA test strip  1/8/20   Reported, Patient   PREVIDENT 5000 BOOSTER PLUS 1.1 % PSTE USE IN PLACE OF REGULAR TOOTHPASTE BEFORE BED, BRUSH NORMALLY, SWISH WITH REMAINING FOAM FOR AT LEAST 30 SECONDS, SPIT OUT EXCESS, DO NOT RI 11/27/19   Reported, Patient   SF 5000 PLUS 1.1 % CREA  1/6/20   Reported, Patient   ULTICARE SHORT 31G X 8 MM insulin pen needle USE TO INJECT TWICE DAILY 12/4/19   Reported, Patient       No Known Allergies     REVIEW OF SYSTEMS:   5 point ROS negative except as noted above in HPI, including Gen., Resp., CV, GI &  system review.    PHYSICAL EXAM:   BP (!) 155/56   Temp 97.3  F (36.3  C)   Resp 12   Ht 1.702  "m (5' 7\")   Wt 89.4 kg (197 lb)   SpO2 99%   BMI 30.85 kg/m   Estimated body mass index is 30.85 kg/m  as calculated from the following:    Height as of this encounter: 1.702 m (5' 7\").    Weight as of this encounter: 89.4 kg (197 lb).   GENERAL APPEARANCE: alert, and oriented  MENTAL STATUS: alert  AIRWAY EXAM: Mallampatti Class II (visualization of the soft palate, fauces, and uvula)  RESP: lungs clear to auscultation - no rales, rhonchi or wheezes  CV: regular rates and rhythm  DIAGNOSTICS:    Not indicated    IMPRESSION   ASA Class 2 - Mild systemic disease    PLAN:   Plan for Esophagogastroduodenoscopy with possible biopsy, possible dilation, possible foreign body removal. We discussed the risks, benefits and alternatives and the patient wished to proceed.    The above has been forwarded to the consulting provider.      Signed Electronically by: Jose Schrader MD  March 16, 2021          "

## 2021-03-17 LAB — COPATH REPORT: NORMAL

## 2021-03-18 DIAGNOSIS — N52.9 ERECTILE DYSFUNCTION, UNSPECIFIED ERECTILE DYSFUNCTION TYPE: ICD-10-CM

## 2021-03-18 DIAGNOSIS — D64.9 ANEMIA, UNSPECIFIED TYPE: Primary | ICD-10-CM

## 2021-03-18 RX ORDER — SILDENAFIL 100 MG/1
TABLET, FILM COATED ORAL
Qty: 12 TABLET | Refills: 0 | Status: SHIPPED | OUTPATIENT
Start: 2021-03-18 | End: 2021-08-11

## 2021-03-18 NOTE — RESULT ENCOUNTER NOTE
Assessment: Neither the endoscopy, nor the pathology, showed a clinically significant abnormality of the gastric mucosa. There is no explanation for anemia on endoscopy or pathology.     Recommendations: Follow up with primary caregiver.

## 2021-03-24 DIAGNOSIS — D64.9 ANEMIA, UNSPECIFIED TYPE: ICD-10-CM

## 2021-03-24 LAB
BASOPHILS # BLD AUTO: 0 10E9/L (ref 0–0.2)
BASOPHILS NFR BLD AUTO: 0.2 %
DIFFERENTIAL METHOD BLD: ABNORMAL
EOSINOPHIL # BLD AUTO: 0.1 10E9/L (ref 0–0.7)
EOSINOPHIL NFR BLD AUTO: 2 %
ERYTHROCYTE [DISTWIDTH] IN BLOOD BY AUTOMATED COUNT: 14.9 % (ref 10–15)
HCT VFR BLD AUTO: 33.1 % (ref 40–53)
HGB BLD-MCNC: 10.5 G/DL (ref 13.3–17.7)
LYMPHOCYTES # BLD AUTO: 1.3 10E9/L (ref 0.8–5.3)
LYMPHOCYTES NFR BLD AUTO: 29.2 %
MCH RBC QN AUTO: 28.7 PG (ref 26.5–33)
MCHC RBC AUTO-ENTMCNC: 31.7 G/DL (ref 31.5–36.5)
MCV RBC AUTO: 90 FL (ref 78–100)
MONOCYTES # BLD AUTO: 0.7 10E9/L (ref 0–1.3)
MONOCYTES NFR BLD AUTO: 16.3 %
NEUTROPHILS # BLD AUTO: 2.3 10E9/L (ref 1.6–8.3)
NEUTROPHILS NFR BLD AUTO: 52.3 %
PLATELET # BLD AUTO: 207 10E9/L (ref 150–450)
RBC # BLD AUTO: 3.66 10E12/L (ref 4.4–5.9)
WBC # BLD AUTO: 4.5 10E9/L (ref 4–11)

## 2021-03-24 PROCEDURE — 36415 COLL VENOUS BLD VENIPUNCTURE: CPT | Performed by: FAMILY MEDICINE

## 2021-03-24 PROCEDURE — 85025 COMPLETE CBC W/AUTO DIFF WBC: CPT | Performed by: FAMILY MEDICINE

## 2021-03-29 DIAGNOSIS — D64.9 ANEMIA, UNSPECIFIED TYPE: Primary | ICD-10-CM

## 2021-03-31 NOTE — TELEPHONE ENCOUNTER
RECORDS STATUS - ALL OTHER DIAGNOSIS      RECORDS RECEIVED FROM: [facility]/ [facility]   DATE RECEIVED: 5/13/2021   NOTES STATUS DETAILS   OFFICE NOTE from referring provider Samir Grewal MD   OFFICE NOTE from medical oncologist N/A [facility] Notes are in EPIC   DISCHARGE SUMMARY from hospital Complete 3/16/2021 Gastro - Esophagogastrodudenoscopy     2/1/2021 Gastro     1/20/2020 Gastro     11/21/2014 Gastro    DISCHARGE REPORT from the ER     OPERATIVE REPORT N/A 3/16/2021 Upper GI Endoscopy     2/1/2021 Colonoscopy     1/20/2021 Colonoscopy     More in EPIC   MEDICATION LIST Complete [facility]   CLINICAL TRIAL TREATMENTS TO DATE     LABS     PATHOLOGY REPORTS N/A    ANYTHING RELATED TO DIAGNOSIS Complete Labs last updated on 3/26/2021   GENONOMIC TESTING     TYPE:     IMAGING (NEED IMAGES & REPORT)     CT SCANS     MRI     MAMMO     ULTRASOUND     PET

## 2021-04-05 ENCOUNTER — TELEPHONE (OUTPATIENT)
Dept: INTERNAL MEDICINE | Facility: CLINIC | Age: 81
End: 2021-04-05

## 2021-04-05 DIAGNOSIS — N40.0 BENIGN NON-NODULAR PROSTATIC HYPERPLASIA WITHOUT LOWER URINARY TRACT SYMPTOMS: ICD-10-CM

## 2021-04-05 NOTE — TELEPHONE ENCOUNTER
[facility] - prescription not received on Tamsulosin from 12/2020    ICD-10-CM    1. Benign non-nodular prostatic hyperplasia without lower urinary tract symptoms  N40.0

## 2021-04-08 ENCOUNTER — MYC MEDICAL ADVICE (OUTPATIENT)
Dept: INTERNAL MEDICINE | Facility: CLINIC | Age: 81
End: 2021-04-08

## 2021-04-08 ENCOUNTER — TRANSFERRED RECORDS (OUTPATIENT)
Dept: HEALTH INFORMATION MANAGEMENT | Facility: CLINIC | Age: 81
End: 2021-04-08

## 2021-04-08 LAB — RETINOPATHY: NEGATIVE

## 2021-05-01 ENCOUNTER — TRANSFERRED RECORDS (OUTPATIENT)
Dept: HEALTH INFORMATION MANAGEMENT | Facility: CLINIC | Age: 81
End: 2021-05-01

## 2021-05-01 LAB — RETINOPATHY: NORMAL

## 2021-05-08 ENCOUNTER — TRANSFERRED RECORDS (OUTPATIENT)
Dept: HEALTH INFORMATION MANAGEMENT | Facility: CLINIC | Age: 81
End: 2021-05-08

## 2021-05-08 ENCOUNTER — HOSPITAL ENCOUNTER (INPATIENT)
Facility: CLINIC | Age: 81
LOS: 10 days | Discharge: HOME HEALTH | DRG: 603 | End: 2021-05-18
Attending: HOSPITALIST | Admitting: INTERNAL MEDICINE
Payer: COMMERCIAL

## 2021-05-08 DIAGNOSIS — L03.115 CELLULITIS OF RIGHT LOWER EXTREMITY: Primary | ICD-10-CM

## 2021-05-08 PROBLEM — L03.90 CELLULITIS: Status: ACTIVE | Noted: 2021-05-08

## 2021-05-08 LAB
ANION GAP SERPL CALCULATED.3IONS-SCNC: 2 MMOL/L (ref 3–14)
BUN SERPL-MCNC: 36 MG/DL (ref 7–30)
CALCIUM SERPL-MCNC: 8 MG/DL (ref 8.5–10.1)
CHLORIDE SERPL-SCNC: 106 MMOL/L (ref 94–109)
CO2 SERPL-SCNC: 29 MMOL/L (ref 20–32)
CREAT SERPL-MCNC: 1.21 MG/DL (ref 0.66–1.25)
CREAT SERPL-MCNC: 1.23 MG/DL (ref 0.67–1.17)
CRP SERPL-MCNC: 237 MG/L (ref 0–8)
ERYTHROCYTE [DISTWIDTH] IN BLOOD BY AUTOMATED COUNT: 16.3 % (ref 10–15)
GFR SERPL CREATININE-BSD FRML MDRD: 56 ML/MIN/1.73ME2 (ref 60–150)
GFR SERPL CREATININE-BSD FRML MDRD: 57 ML/MIN/{1.73_M2}
GLUCOSE BLDC GLUCOMTR-MCNC: 120 MG/DL (ref 70–99)
GLUCOSE BLDC GLUCOMTR-MCNC: 70 MG/DL (ref 70–99)
GLUCOSE SERPL-MCNC: 110 MG/DL (ref 70–99)
GLUCOSE SERPL-MCNC: 148 MG/DL (ref 74–100)
HBA1C MFR BLD: 5.6 % (ref 0–5.6)
HCT VFR BLD AUTO: 32.5 % (ref 40–53)
HGB BLD-MCNC: 10.2 G/DL (ref 13.3–17.7)
MCH RBC QN AUTO: 27.6 PG (ref 26.5–33)
MCHC RBC AUTO-ENTMCNC: 31.4 G/DL (ref 31.5–36.5)
MCV RBC AUTO: 88 FL (ref 78–100)
PLATELET # BLD AUTO: 174 10E9/L (ref 150–450)
POTASSIUM SERPL-SCNC: 4.2 MMOL/L (ref 3.4–5.3)
POTASSIUM SERPL-SCNC: 4.3 MMOL/L (ref 3.5–5.1)
PROCALCITONIN SERPL-MCNC: 3.76 NG/ML
RBC # BLD AUTO: 3.69 10E12/L (ref 4.4–5.9)
SODIUM SERPL-SCNC: 137 MMOL/L (ref 133–144)
WBC # BLD AUTO: 9.6 10E9/L (ref 4–11)

## 2021-05-08 PROCEDURE — 120N000001 HC R&B MED SURG/OB

## 2021-05-08 PROCEDURE — 250N000011 HC RX IP 250 OP 636: Performed by: HOSPITALIST

## 2021-05-08 PROCEDURE — 36415 COLL VENOUS BLD VENIPUNCTURE: CPT | Performed by: PHYSICIAN ASSISTANT

## 2021-05-08 PROCEDURE — 999N001017 HC STATISTIC GLUCOSE BY METER IP

## 2021-05-08 PROCEDURE — 99223 1ST HOSP IP/OBS HIGH 75: CPT | Mod: AI | Performed by: PHYSICIAN ASSISTANT

## 2021-05-08 PROCEDURE — 85027 COMPLETE CBC AUTOMATED: CPT | Performed by: PHYSICIAN ASSISTANT

## 2021-05-08 PROCEDURE — 84145 PROCALCITONIN (PCT): CPT | Performed by: PHYSICIAN ASSISTANT

## 2021-05-08 PROCEDURE — 250N000011 HC RX IP 250 OP 636: Performed by: PHYSICIAN ASSISTANT

## 2021-05-08 PROCEDURE — 258N000003 HC RX IP 258 OP 636: Performed by: PHYSICIAN ASSISTANT

## 2021-05-08 PROCEDURE — 250N000013 HC RX MED GY IP 250 OP 250 PS 637: Performed by: PHYSICIAN ASSISTANT

## 2021-05-08 PROCEDURE — 83036 HEMOGLOBIN GLYCOSYLATED A1C: CPT | Performed by: PHYSICIAN ASSISTANT

## 2021-05-08 PROCEDURE — 258N000003 HC RX IP 258 OP 636: Performed by: HOSPITALIST

## 2021-05-08 PROCEDURE — 80048 BASIC METABOLIC PNL TOTAL CA: CPT | Performed by: PHYSICIAN ASSISTANT

## 2021-05-08 PROCEDURE — 86140 C-REACTIVE PROTEIN: CPT | Performed by: PHYSICIAN ASSISTANT

## 2021-05-08 RX ORDER — ACETAMINOPHEN 325 MG/1
650 TABLET ORAL EVERY 4 HOURS PRN
Status: DISCONTINUED | OUTPATIENT
Start: 2021-05-08 | End: 2021-05-18 | Stop reason: HOSPADM

## 2021-05-08 RX ORDER — NALOXONE HYDROCHLORIDE 0.4 MG/ML
0.4 INJECTION, SOLUTION INTRAMUSCULAR; INTRAVENOUS; SUBCUTANEOUS
Status: DISCONTINUED | OUTPATIENT
Start: 2021-05-08 | End: 2021-05-18 | Stop reason: HOSPADM

## 2021-05-08 RX ORDER — LATANOPROST 50 UG/ML
1 SOLUTION/ DROPS OPHTHALMIC AT BEDTIME
Status: DISCONTINUED | OUTPATIENT
Start: 2021-05-08 | End: 2021-05-18 | Stop reason: HOSPADM

## 2021-05-08 RX ORDER — AMOXICILLIN 250 MG
1 CAPSULE ORAL 2 TIMES DAILY PRN
Status: DISCONTINUED | OUTPATIENT
Start: 2021-05-08 | End: 2021-05-18 | Stop reason: HOSPADM

## 2021-05-08 RX ORDER — PIPERACILLIN SODIUM, TAZOBACTAM SODIUM 3; .375 G/15ML; G/15ML
3.38 INJECTION, POWDER, LYOPHILIZED, FOR SOLUTION INTRAVENOUS EVERY 6 HOURS
Status: DISCONTINUED | OUTPATIENT
Start: 2021-05-08 | End: 2021-05-12

## 2021-05-08 RX ORDER — DEXTROSE MONOHYDRATE 25 G/50ML
25-50 INJECTION, SOLUTION INTRAVENOUS
Status: DISCONTINUED | OUTPATIENT
Start: 2021-05-08 | End: 2021-05-18 | Stop reason: HOSPADM

## 2021-05-08 RX ORDER — PROCHLORPERAZINE 25 MG
12.5 SUPPOSITORY, RECTAL RECTAL EVERY 12 HOURS PRN
Status: DISCONTINUED | OUTPATIENT
Start: 2021-05-08 | End: 2021-05-18 | Stop reason: HOSPADM

## 2021-05-08 RX ORDER — POLYETHYLENE GLYCOL 3350 17 G/17G
17 POWDER, FOR SOLUTION ORAL DAILY PRN
Status: DISCONTINUED | OUTPATIENT
Start: 2021-05-08 | End: 2021-05-18 | Stop reason: HOSPADM

## 2021-05-08 RX ORDER — NICOTINE POLACRILEX 4 MG
15-30 LOZENGE BUCCAL
Status: DISCONTINUED | OUTPATIENT
Start: 2021-05-08 | End: 2021-05-18 | Stop reason: HOSPADM

## 2021-05-08 RX ORDER — CEFAZOLIN SODIUM 1 G/50ML
1750 SOLUTION INTRAVENOUS EVERY 12 HOURS
Status: DISCONTINUED | OUTPATIENT
Start: 2021-05-09 | End: 2021-05-08

## 2021-05-08 RX ORDER — HYDRALAZINE HYDROCHLORIDE 20 MG/ML
10 INJECTION INTRAMUSCULAR; INTRAVENOUS EVERY 4 HOURS PRN
Status: DISCONTINUED | OUTPATIENT
Start: 2021-05-08 | End: 2021-05-18 | Stop reason: HOSPADM

## 2021-05-08 RX ORDER — NALOXONE HYDROCHLORIDE 0.4 MG/ML
0.2 INJECTION, SOLUTION INTRAMUSCULAR; INTRAVENOUS; SUBCUTANEOUS
Status: DISCONTINUED | OUTPATIENT
Start: 2021-05-08 | End: 2021-05-18 | Stop reason: HOSPADM

## 2021-05-08 RX ORDER — FINASTERIDE 5 MG/1
5 TABLET, FILM COATED ORAL DAILY
Status: DISCONTINUED | OUTPATIENT
Start: 2021-05-09 | End: 2021-05-18 | Stop reason: HOSPADM

## 2021-05-08 RX ORDER — LOSARTAN POTASSIUM 100 MG/1
100 TABLET ORAL DAILY
Status: DISCONTINUED | OUTPATIENT
Start: 2021-05-09 | End: 2021-05-18 | Stop reason: HOSPADM

## 2021-05-08 RX ORDER — LEVOTHYROXINE SODIUM 88 UG/1
88 TABLET ORAL DAILY
Status: DISCONTINUED | OUTPATIENT
Start: 2021-05-09 | End: 2021-05-18 | Stop reason: HOSPADM

## 2021-05-08 RX ORDER — LIDOCAINE 40 MG/G
CREAM TOPICAL
Status: DISCONTINUED | OUTPATIENT
Start: 2021-05-08 | End: 2021-05-18 | Stop reason: HOSPADM

## 2021-05-08 RX ORDER — AMOXICILLIN 250 MG
2 CAPSULE ORAL 2 TIMES DAILY PRN
Status: DISCONTINUED | OUTPATIENT
Start: 2021-05-08 | End: 2021-05-18 | Stop reason: HOSPADM

## 2021-05-08 RX ORDER — CALCIUM CARBONATE 500 MG/1
1000 TABLET, CHEWABLE ORAL 4 TIMES DAILY PRN
Status: DISCONTINUED | OUTPATIENT
Start: 2021-05-08 | End: 2021-05-18 | Stop reason: HOSPADM

## 2021-05-08 RX ORDER — FEXOFENADINE HCL 180 MG/1
180 TABLET ORAL DAILY
Status: DISCONTINUED | OUTPATIENT
Start: 2021-05-09 | End: 2021-05-18 | Stop reason: HOSPADM

## 2021-05-08 RX ORDER — TAMSULOSIN HYDROCHLORIDE 0.4 MG/1
0.8 CAPSULE ORAL DAILY
Status: DISCONTINUED | OUTPATIENT
Start: 2021-05-09 | End: 2021-05-18 | Stop reason: HOSPADM

## 2021-05-08 RX ORDER — ATORVASTATIN CALCIUM 10 MG/1
10 TABLET, FILM COATED ORAL DAILY
Refills: 2 | Status: DISCONTINUED | OUTPATIENT
Start: 2021-05-09 | End: 2021-05-18 | Stop reason: HOSPADM

## 2021-05-08 RX ORDER — ACETAMINOPHEN 650 MG/1
650 SUPPOSITORY RECTAL EVERY 4 HOURS PRN
Status: DISCONTINUED | OUTPATIENT
Start: 2021-05-08 | End: 2021-05-18 | Stop reason: HOSPADM

## 2021-05-08 RX ORDER — ONDANSETRON 4 MG/1
4 TABLET, ORALLY DISINTEGRATING ORAL EVERY 6 HOURS PRN
Status: DISCONTINUED | OUTPATIENT
Start: 2021-05-08 | End: 2021-05-18 | Stop reason: HOSPADM

## 2021-05-08 RX ORDER — PROCHLORPERAZINE MALEATE 5 MG
5 TABLET ORAL EVERY 6 HOURS PRN
Status: DISCONTINUED | OUTPATIENT
Start: 2021-05-08 | End: 2021-05-18 | Stop reason: HOSPADM

## 2021-05-08 RX ORDER — CARVEDILOL 3.12 MG/1
3.12 TABLET ORAL 2 TIMES DAILY WITH MEALS
Status: DISCONTINUED | OUTPATIENT
Start: 2021-05-08 | End: 2021-05-18 | Stop reason: HOSPADM

## 2021-05-08 RX ORDER — ONDANSETRON 2 MG/ML
4 INJECTION INTRAMUSCULAR; INTRAVENOUS EVERY 6 HOURS PRN
Status: DISCONTINUED | OUTPATIENT
Start: 2021-05-08 | End: 2021-05-18 | Stop reason: HOSPADM

## 2021-05-08 RX ORDER — OXYCODONE HYDROCHLORIDE 5 MG/1
5-10 TABLET ORAL EVERY 4 HOURS PRN
Status: DISCONTINUED | OUTPATIENT
Start: 2021-05-08 | End: 2021-05-18 | Stop reason: HOSPADM

## 2021-05-08 RX ORDER — PANTOPRAZOLE SODIUM 40 MG/1
40 TABLET, DELAYED RELEASE ORAL DAILY
Status: DISCONTINUED | OUTPATIENT
Start: 2021-05-09 | End: 2021-05-18 | Stop reason: HOSPADM

## 2021-05-08 RX ORDER — INSULIN LISPRO 100 [IU]/ML
14 INJECTION, SUSPENSION SUBCUTANEOUS
COMMUNITY
End: 2021-08-11

## 2021-05-08 RX ORDER — FERROUS SULFATE 325(65) MG
325 TABLET ORAL 2 TIMES DAILY
Status: DISCONTINUED | OUTPATIENT
Start: 2021-05-08 | End: 2021-05-18 | Stop reason: HOSPADM

## 2021-05-08 RX ORDER — SODIUM CHLORIDE 9 MG/ML
INJECTION, SOLUTION INTRAVENOUS CONTINUOUS
Status: DISCONTINUED | OUTPATIENT
Start: 2021-05-08 | End: 2021-05-12

## 2021-05-08 RX ORDER — HYDROMORPHONE HYDROCHLORIDE 1 MG/ML
.3-.5 INJECTION, SOLUTION INTRAMUSCULAR; INTRAVENOUS; SUBCUTANEOUS
Status: DISCONTINUED | OUTPATIENT
Start: 2021-05-08 | End: 2021-05-18 | Stop reason: HOSPADM

## 2021-05-08 RX ADMIN — FERROUS SULFATE TAB 325 MG (65 MG ELEMENTAL FE) 325 MG: 325 (65 FE) TAB at 22:11

## 2021-05-08 RX ADMIN — VANCOMYCIN HYDROCHLORIDE 750 MG: 10 INJECTION, POWDER, LYOPHILIZED, FOR SOLUTION INTRAVENOUS at 20:15

## 2021-05-08 RX ADMIN — CARVEDILOL 3.12 MG: 3.12 TABLET, FILM COATED ORAL at 19:06

## 2021-05-08 RX ADMIN — SODIUM CHLORIDE: 9 INJECTION, SOLUTION INTRAVENOUS at 17:01

## 2021-05-08 RX ADMIN — PIPERACILLIN SODIUM AND TAZOBACTAM SODIUM 3.38 G: 3; .375 INJECTION, POWDER, LYOPHILIZED, FOR SOLUTION INTRAVENOUS at 22:13

## 2021-05-08 RX ADMIN — ACETAMINOPHEN 650 MG: 325 TABLET, FILM COATED ORAL at 23:05

## 2021-05-08 ASSESSMENT — ACTIVITIES OF DAILY LIVING (ADL)
ADLS_ACUITY_SCORE: 16
INTERPRETER_SERVICES_OFFERED_TO_THE_PATIENT: NO
ADLS_ACUITY_SCORE: 18

## 2021-05-08 ASSESSMENT — MIFFLIN-ST. JEOR: SCORE: 1551.8

## 2021-05-08 NOTE — H&P
[facility]    History and Physical  Hospitalist       Date of Admission:  5/8/2021  Date of Service (when I saw the patient): 05/08/21    Assessment & Plan   Timmy Strange is a 80 year old male with a past medical history of type 2 diabetes, hypertension, hyperlipidemia, BPH, and recent falls who presented to [facility] emergency department with fever, chills, and generalized weakness with mild confusion.  He is found to have acute right lower extremity cellulitis, and is admitted to [facility] for further management.    Acute right lower extremity cellulitis  Patient developed fever and chills rapidly on 5/7.  Measured temperature at home to be 99.8.  Also had a productive cough, which he attributed to postnasal drip.  Was seen and outside hospital emergency department, and found to have temperature of 100, with WBC of 10.6.  On examination, he was found to have right lower extremity erythema from ankle up to his knee.  D-dimer elevated, but ultrasound negative for DVT.  He had no other signs for sepsis.  Findings are consistent for cellulitis.  He was started on IV vancomycin and Zosyn in the ER, and given 1 L fluid bolus.  He was then transferred to [facility] for further management  --IV vancomycin, IV Zosyn  --Brayan erythematous area   --CBC and BMP in a.m.  --Monitor for signs of sepsis or worsening infection  --- Addendum: Procalcitonin 3.76, .  Repeat WBC 9.6.     Generalized weakness   Recent mechanical falls x2  Possible infectious encephalopathy  Patient fell twice while in [place] on recent travel.  One time sounds like he lost balance due to his backpack slipping off, another time sounds like he was having trouble seeing in the dark which caused him to fall.  He reports some generalized weakness, but otherwise is independent and ambulatory at baseline.  --We will asked PT/OT to evaluate  --Monitor for worsening  confusion, currently oriented  --No focal deficits.  Patient might of hit his head during one of the falls, so could consider head CT but given his lack of focal or worsening symptoms will hold off for now.    Type 2 diabetes mellitus  PTA regimen is Humalog mix 75/2518 units every evening, 14 units every morning, with Metformin 1500 mg daily.  Hemoglobin A1c on recheck here is 5.6.  --Moderate dose sliding scale NovoLog  --Hold PTA Metformin  --Due to low p.o. intake, reduce long-acting insulin dose.  Will have Lantus 12 units in p.m., 10 units in a.m. and adjust as necessary.  --Given his low hemoglobin A1c, may need to consider keeping his insulin dosing lower at discharge.  --Carbohydrate controlled diet   --Monitor 4 times daily Accu-Cheks and as needed.    Hyperlipidemia  Hypertension  --Resume PTA statin  --Resume PTA carvedilol 3.125 mg twice daily, losartan 100 mg daily    Hypothyroidism  --Continue PTA Synthroid.    BPH  --Continue PTA Flomax and Proscar.    Anemia  Per patient and spouse, uncertain etiology.  He has a hematology appointment scheduled next week.  He has had negative EGD and colonoscopy recently.  --Patient wife requesting anemia work-up be performed here if he remains hospitalized.  They are fine with him following up as outpatient if he discharges quickly.  --We will monitor hemoglobin  Recent Labs   Lab 05/08/21  1739   HGB 10.2*       Diet: Consistent Carbohydrate Diet [id number] Calories: Moderate Consistent CHO (4-6 CHO units/meal)     DVT Prophylaxis: Pneumatic Compression Devices   Tim Catheter: not present  Code Status: Full Code     Disposition Plan    Expected discharge: Inpatient status, anticipate 2+ days of hospitalization for adequate treatment of acute cellulitis with IV antibiotics, close monitoring, and given his comorbidities including diabetes.  Will have PT/OT to evaluate.  CC/SW consulted.    Entered: SHELLEY Rodriguez 05/08/2021, 4:03 PM        The patient's  care was discussed with the Attending Physician, Dr. Newsome, Bedside Nurse, Patient and The patient's wife via phone.    The patient has been discussed with Dr. Newsome, who agrees with the assessment and plan at this time.       Patrick Ramsey    Primary Care Physician   Dr. Samir Duque    Chief Complaint   Fever, chills    History is obtained from the patient, as well as review of medical records sent from outside hospital emergency department.    History of Present Illness   Timmy Strange is a 80 year old male with a past medical history of diabetes, hypertension, hyperlipidemia, BPH, and hypothyroidism who presented to [facility] ER due to complaints of fever and chills.  Patient reports that his fever and chills developed rapidly on 5/7, along with a cough.  He denies any cough today, but continues to report fever.  Denies chest pain or shortness of breath.  Denies any urinary symptoms including dysuria, hematuria, and frequency.  Denies nausea, vomiting, diarrhea.  Per his wife he has been mentally slower, and takes a long time to answer questions which is not normal for him.  Patient has a low appetite and has not been eating like usual.  He has had 2 doses of Pfizer COVID-19 vaccination.  Last 1 was on 1/22/2021.  Travel to [place] and back on 4/12-4/20.  He had 2 falls while traveling in [place].  His wife states that with the first, the backpack he was carrying slipped and knocked him over, and he fell to his knees.  6 days later, he was using a flashlight on his phone and did not see the step down to the curb.  He did break his glasses without follow-up.  He has no new leg swelling or pain, no history of DVT or PE.  He does have a history of anemia of unknown etiology.  He has had a negative colonoscopy, endoscopy, and has a hematology appointment 5/13/2021.      In the ER patient was found to have right lower extremity erythema and warmth concerning for cellulitis.  Lab work  shows mild creatinine elevation of 1.23, along with elevated D-dimer, with negative lower extremity ultrasound for DVT.  Due to patient's continued confusion per wife, as well as history of DM, patient was placed on broad-spectrum antibiotics with vancomycin and IV Zosyn for cellulitis.  Dr. Benson accepted the patient for transfer.    CBC shows WBC 10.6, hemoglobin 10.6, platelet count 175.  Differential with left shift, 90% neutrophils.  BMP with sodium 137, testing 4.3, creatinine 1.23.  Urinalysis with largely unremarkable, no evidence for infection.  D-dimer was elevated 0.95  Right lower extremity ultrasound negative for DVT   Chest x-ray shows no evidence of pneumonia per radiology read      Patient received IV Zosyn, 3.75 mg along with IV vancomycin, and 1 L of normal saline IV fluid bolus.  Patient was transferred to [facility] for admission for further treatment of acute RLE cellulitis.      Past Medical History    I have reviewed this patient's medical history and updated it with pertinent information if needed.   Past Medical History:   Diagnosis Date     Arthritis 1970    Dx'd with RA when in the      BPH (benign prostatic hyperplasia) 12/16/2013     Cancer (H)     basal cell cancer behind ear     Diabetes mellitus      ED (erectile dysfunction) 1/6/2015     HTN (hypertension)      Hyperlipidemia LDL goal <100      Hypothyroidism      Mumps      Obesity        Past Surgical History   I have reviewed this patient's surgical history and updated it with pertinent information if needed.  Past Surgical History:   Procedure Laterality Date     COLONOSCOPY N/A 11/21/2014    Procedure: COMBINED COLONOSCOPY, SINGLE OR MULTIPLE BIOPSY/POLYPECTOMY BY BIOPSY;  Surgeon: Rolanda Bey MD;  Location:  GI     COLONOSCOPY N/A 1/20/2020    Procedure: COLONOSCOPY, WITH POLYPECTOMY AND BIOPSY;  Surgeon: Christina Vieira MD;  Location:  GI     COLONOSCOPY N/A 2/1/2021    Procedure:  Colonoscopy, With Polypectomy And Biopsy;  Surgeon: Christina Vieira MD;  Location:  GI     ESOPHAGOSCOPY, GASTROSCOPY, DUODENOSCOPY (EGD), COMBINED N/A 3/16/2021    Procedure: ESOPHAGOGASTRODUODENOSCOPY, WITH BIOPSY;  Surgeon: Jose Schrader MD;  Location:  GI     TESTICLE SURGERY       TONSILLECTOMY, ADENOIDECTOMY, COMBINED       VASECTOMY         Prior to Admission Medications   Prior to Admission Medications   Prescriptions Last Dose Informant Patient Reported? Taking?   Ferrous Sulfate 324 (65 Fe) MG TBEC   Yes No   Fexofenadine HCl (ALLEGRA PO)   Yes No   HUMALOG MIX 75/25 KWIKPEN (75-25) 100 UNIT/ML susp   Yes No   Sig: Took 16 units in the evening   Lancets (ONETOUCH DELICA PLUS [id number]) MISC   Yes No   Multiple Vitamins-Minerals (CENTRUM SILVER) per tablet   Yes No   Sig: Take 1 tablet by mouth daily   ONETOUCH ULTRA test strip   Yes No   PREVIDENT 5000 BOOSTER PLUS 1.1 % PSTE   Yes No   Sig: USE IN PLACE OF REGULAR TOOTHPASTE BEFORE BED, BRUSH NORMALLY, SWISH WITH REMAINING FOAM FOR AT LEAST 30 SECONDS, SPIT OUT EXCESS, DO NOT RI   SF 5000 PLUS 1.1 % CREA   Yes No   ULTICARE SHORT 31G X 8 MM insulin pen needle   Yes No   Sig: USE TO INJECT TWICE DAILY   blood glucose monitoring (ONE TOUCH ULTRA 2) meter device kit   Yes No   carvedilol (COREG) 3.125 MG tablet   No No   Sig: TAKE 1 TABLET BY MOUTH TWO  TIMES DAILY   finasteride (PROSCAR) 5 MG tablet   No No   Sig: TAKE 1 TABLET BY MOUTH  DAILY   fluticasone (FLONASE) 50 MCG/ACT nasal spray   No No   Sig: SHAKE LIQUID AND USE 1-2 SPRAYS IN EACH NOSTIL EVERY DAY   latanoprost (XALATAN) 0.005 % ophthalmic solution   Yes No   Sig: Place 1 drop into both eyes daily   levothyroxine (LEVOTHROID) 88 MCG tablet   Yes No   Sig: Take 88 mcg by mouth daily.   losartan (COZAAR) 100 MG tablet   No No   Sig: TAKE 1 TABLET BY MOUTH  DAILY   lovastatin (MEVACOR) 40 MG tablet   No No   Sig: TAKE 1 TABLET BY MOUTH  DAILY AT BEDTIME   metFORMIN (GLUCOPHAGE-XR) 500  MG 24 hr tablet   Yes No   pantoprazole (PROTONIX) 40 MG EC tablet   No No   Sig: Take 1 tablet (40 mg) by mouth daily   sildenafil (VIAGRA) 100 MG tablet   No No   Sig: TAKE 1 TABLET BY MOUTH 30 MINUTES TO 4 HOURS PRIOR TO ACTIVITY. MAX  MG PER 24 HOURS   tamsulosin (FLOMAX) 0.4 MG capsule   No No   Sig: TAKE 1 CAPSULE BY MOUTH  ONCE A DAY      Facility-Administered Medications: None     Allergies   No Known Allergies    Social History   I have reviewed this patient's social history and updated it with pertinent information if needed.  Patient is a former tobacco user, and quit 9 years ago.  He denies any current alcohol use.  Denies any illicit drug use.  He is  and lives independently with his wife.  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  He is currently retired.    Family History   I have reviewed this patient's family history and updated it with pertinent information if needed.   Family History   Problem Relation Age of Onset     Diabetes Maternal Grandmother      Diabetes Maternal Grandfather      Arthritis Maternal Grandfather      Arthritis Father      Thyroid Disease Father      Glaucoma Mother      Alcohol/Drug Mother 49        cirrhosis     Diabetes Daughter 44        type 2     Obesity Daughter      Glaucoma Maternal Aunt        Review of Systems   The 10 point Review of Systems is negative other than noted in the HPI or here.  Positive for fever, chills, generalized weakness, falls, right lower extremity redness, swelling, warmth, and productive cough.  Negative for chest pain, shortness of breath, abdominal pain, nausea, vomiting, diarrhea, dysuria, focal weakness/numbness.    Physical Exam   Temp: 98.9  F (37.2  C) Temp src: Oral BP: 128/50 Pulse: 64   Resp: 16 SpO2: 97 % O2 Device: None (Room air)    Vital Signs with Ranges  Temp:  [98.9  F (37.2  C)] 98.9  F (37.2  C)  Pulse:  [64] 64  Resp:  [16] 16  BP: (128)/(50) 128/50  SpO2:  [97 %] 97 %  192  lbs 8 oz    Constitutional: Well-appearing gentleman, lying in bed, awake, alert, cooperative, in no apparent distress.    ENT: Normocephalic, without obvious abnormality, atraumatic, oral pharynx with moist mucus membranes.  Eyes pupils are equal, round; extra occular movements intact.  Normal sclera.    Neck: Supple, symmetrical, trachea midline.  Pulmonary: No increased work of breathing, good air exchange, clear to auscultation bilaterally, no crackles or wheezing.  Cardiovascular: Regular rate and rhythm, normal S1 and S2, grade 1-2/6 systolic murmur heard at LSB.  GI: Normal bowel sounds, soft, non-distended, non-tender.    Skin/Integumen: Right lower extremity with erythema and warmth extending from the ankle to just below the knee, marked by nursing.  No open wounds, breaks in the skin or blisters.  Remainder extremities without rash.  Neuro: CN II-XII grossly intact.  Moves all 4 extremities equally.  Coordination grossly normal.  Speech normal.  No facial droop.  Psych:  Alert and oriented person, place, situation. Normal affect.  Extremities: Right lower extremity edematous and erythematous as above.  No tenderness to palpation.  Well perfused.  Remainder of limbs atraumatic and normal.    Data   Data reviewed today: The lab work and imaging reports sent over from the outside hospital were reviewed by myself.  I personally reviewed no images or EKG's today.  No lab results found in last 7 days.    Recent Labs   Lab 05/08/21  1739   WBC 9.6   HGB 10.2*   MCV 88         POTASSIUM 4.2   CHLORIDE 106   CO2 29   BUN 36*   CR 1.21   ANIONGAP 2*   ANIBAL 8.0*   *     No results found for this or any previous visit (from the past 24 hour(s)).

## 2021-05-08 NOTE — PROGRESS NOTES
Physician Attestation   I, Aj Newsome, saw and evaluated Timmy Strange as part of a shared visit.  I have reviewed and discussed with the advanced practice provider their history, physical and plan.    I personally reviewed the vital signs, medications, labs and imaging.    My key history or physical exam findings: 79 yo M w/hx of DM2 on insulin who presents with right leg erythema with associated chills. Reports he has had some recent falls bumping his right leg, but no open sores or wounds. Denies history of MRSA. Temperature of 100F at [place]. On exam right lower extremity erythema and warmth from foot up into thigh.     Key management decisions made by me:    Right leg cellulitis   - Continue piperacillin-tazobactam IV, non-purulent cellulitis and no known hx of MRSA so will stop vancomycin      Diabetes mellitus, type 2   - HgbA1c   - Transition to glargine 14 units daily + aspart 1 unit per 20 grams of carbohydrates + sliding scale insulin while hospitalized     Aj Newsome  Date of Service (when I saw the patient): 05/08/21

## 2021-05-08 NOTE — PHARMACY-VANCOMYCIN DOSING SERVICE
Pharmacy Vancomycin Initial Note  Date of Service May 8, 2021  Patient's  1940  80 year old, male    Indication: Skin and Soft Tissue Infection    Current estimated CrCl = Estimated Creatinine Clearance: 53.1 mL/min (based on SCr of 1.21 mg/dL).    Creatinine for last 3 days  2021:  5:39 PM Creatinine 1.21 mg/dL    Recent Vancomycin Level(s) for last 3 days  No results found for requested labs within last 72 hours.      Vancomycin IV Administrations (past 72 hours)      No vancomycin orders with administrations in past 72 hours.                Nephrotoxins and other renal medications (From now, onward)    Start     Dose/Rate Route Frequency Ordered Stop    21 0200  vancomycin (VANCOCIN) 1,750 mg in sodium chloride 0.9 % 500 mL intermittent infusion      1,750 mg  over 2 Hours Intravenous EVERY 12 HOURS 21 1823      21  piperacillin-tazobactam (ZOSYN) 3.375 g vial to attach to  mL bag      3.375 g  over 30 Minutes Intravenous EVERY 6 HOURS 21 1559      21 1830  vancomycin (VANCOCIN) 750 mg in sodium chloride 0.9 % 250 mL intermittent infusion      750 mg  over 90 Minutes Intravenous ONCE 21 1823            Contrast Orders - past 72 hours (72h ago, onward)    None                   Plan:  1. Give additional 750mg now then Start vancomycin  1750 mg IV q12h. Pt received approximately ~1gm in [address] [place] [address].   2. Vancomycin monitoring method: Trough (Method 1 = dosing nomogram)  3. Vancomycin therapeutic monitoring goal: 15-20 mg/L  4. Pharmacy will check vancomycin levels as appropriate in 1-3 Days.    5. Serum creatinine levels will be ordered daily for the first week of therapy and at least twice weekly for subsequent weeks.      Emi Price, [facility]

## 2021-05-08 NOTE — PLAN OF CARE
Pt is A & O x 4. Lungs sound clear, bowel sounds active, cms intact. Up with 1 and walker. Dressing is CDI. Up independently. IV fluid infusing. Redness in right leg. BG was covered per order. Will continue to monitor.

## 2021-05-08 NOTE — PHARMACY-ADMISSION MEDICATION HISTORY
Pharmacy Medication History  Admission medication history interview status for the 5/8/2021  admission is complete. See EPIC admission navigator for prior to admission medications     Location of Interview: Patient room  Medication history sources: Patient    Significant changes made to the medication list:  None    In the past week, patient estimated taking medication this percent of the time: greater than 90%    Additional medication history information:   None    Medication reconciliation completed by provider prior to medication history? No    Time spent in this activity: 20 min    Prior to Admission medications    Medication Sig Last Dose Taking? Auth Provider   carvedilol (COREG) 3.125 MG tablet TAKE 1 TABLET BY MOUTH TWO  TIMES DAILY 5/8/2021 at x1 Yes Samir Duque MD   Ferrous Sulfate 324 (65 Fe) MG TBEC Take 324 mg by mouth 2 times daily  5/8/2021 at x1 Yes Reported, Patient   Fexofenadine HCl (ALLEGRA PO) Take 180 mg by mouth daily  5/8/2021 at Unknown time Yes Reported, Patient   finasteride (PROSCAR) 5 MG tablet TAKE 1 TABLET BY MOUTH  DAILY 5/8/2021 at Unknown time Yes Samir Duque MD   HUMALOG MIX 75/25 KWIKPEN (75-25) 100 UNIT/ML susp Inject 18 Units Subcutaneous every evening Took 16 units in the evening 5/7/2021 at Unknown time Yes Reported, Patient   insulin lispro protamine-insulin lispro (HUMALOG MIX 75/25 KWIKPEN) (75-25) 100 UNIT/ML pen Inject 14 Units Subcutaneous every morning (before breakfast) 5/8/2021 at took 18units Yes Unknown, Entered By History   latanoprost (XALATAN) 0.005 % ophthalmic solution Place 1 drop into both eyes daily 5/7/2021 at Unknown time Yes Reported, Patient   levothyroxine (LEVOTHROID) 88 MCG tablet Take 88 mcg by mouth daily. 5/8/2021 at Unknown time Yes Reported, Patient   losartan (COZAAR) 100 MG tablet TAKE 1 TABLET BY MOUTH  DAILY 5/8/2021 at Unknown time Yes Samir Duque MD   lovastatin (MEVACOR) 40 MG tablet TAKE 1 TABLET BY  MOUTH  DAILY AT BEDTIME  Patient taking differently: Take 40 mg by mouth every morning  5/8/2021 at Unknown time Yes Samir Duque MD   metFORMIN (GLUCOPHAGE-XR) 500 MG 24 hr tablet Take 1,500 mg by mouth daily (with dinner)  5/6/2021 at pm Yes Reported, Patient   Multiple Vitamins-Minerals (CENTRUM SILVER) per tablet Take 1 tablet by mouth daily 5/8/2021 at Unknown time Yes Reported, Patient   pantoprazole (PROTONIX) 40 MG EC tablet Take 1 tablet (40 mg) by mouth daily 5/8/2021 at Unknown time Yes Samir Duque MD   tamsulosin (FLOMAX) 0.4 MG capsule TAKE 1 CAPSULE BY MOUTH  ONCE A DAY  Patient taking differently: 0.8 mg  5/8/2021 at Unknown time Yes Samir Duque MD   blood glucose monitoring (ONE TOUCH ULTRA 2) meter device kit    Reported, Patient   Lancets (ONETOUCH DELICA PLUS [id number]) MISC    Reported, Patient   ONETOUCH ULTRA test strip    Reported, Patient   sildenafil (VIAGRA) 100 MG tablet TAKE 1 TABLET BY MOUTH 30 MINUTES TO 4 HOURS PRIOR TO ACTIVITY. MAX  MG PER 24 HOURS   Samir Duque MD   ULTICARE SHORT 31G X 8 MM insulin pen needle USE TO INJECT TWICE DAILY   Reported, Patient       The information provided in this note is only as accurate as the sources available at the time of update(s)

## 2021-05-09 ENCOUNTER — NURSE TRIAGE (OUTPATIENT)
Dept: NURSING | Facility: CLINIC | Age: 81
End: 2021-05-09

## 2021-05-09 ENCOUNTER — MYC MEDICAL ADVICE (OUTPATIENT)
Dept: INTERNAL MEDICINE | Facility: CLINIC | Age: 81
End: 2021-05-09

## 2021-05-09 ENCOUNTER — APPOINTMENT (OUTPATIENT)
Dept: PHYSICAL THERAPY | Facility: CLINIC | Age: 81
DRG: 603 | End: 2021-05-09
Attending: PHYSICIAN ASSISTANT
Payer: COMMERCIAL

## 2021-05-09 LAB
ALBUMIN UR-MCNC: 20 MG/DL
ANION GAP SERPL CALCULATED.3IONS-SCNC: 3 MMOL/L (ref 3–14)
APPEARANCE UR: CLEAR
BILIRUB UR QL STRIP: NEGATIVE
BUN SERPL-MCNC: 38 MG/DL (ref 7–30)
CALCIUM SERPL-MCNC: 8.1 MG/DL (ref 8.5–10.1)
CHLORIDE SERPL-SCNC: 110 MMOL/L (ref 94–109)
CO2 SERPL-SCNC: 26 MMOL/L (ref 20–32)
COLOR UR AUTO: ABNORMAL
CREAT SERPL-MCNC: 1.45 MG/DL (ref 0.66–1.25)
CRP SERPL-MCNC: 203 MG/L (ref 0–8)
ERYTHROCYTE [DISTWIDTH] IN BLOOD BY AUTOMATED COUNT: 16.4 % (ref 10–15)
GFR SERPL CREATININE-BSD FRML MDRD: 46 ML/MIN/{1.73_M2}
GLUCOSE BLDC GLUCOMTR-MCNC: 118 MG/DL (ref 70–99)
GLUCOSE BLDC GLUCOMTR-MCNC: 158 MG/DL (ref 70–99)
GLUCOSE BLDC GLUCOMTR-MCNC: 160 MG/DL (ref 70–99)
GLUCOSE BLDC GLUCOMTR-MCNC: 182 MG/DL (ref 70–99)
GLUCOSE SERPL-MCNC: 124 MG/DL (ref 70–99)
GLUCOSE UR STRIP-MCNC: NEGATIVE MG/DL
HCT VFR BLD AUTO: 30.9 % (ref 40–53)
HGB BLD-MCNC: 9.6 G/DL (ref 13.3–17.7)
HGB UR QL STRIP: ABNORMAL
KETONES UR STRIP-MCNC: NEGATIVE MG/DL
LEUKOCYTE ESTERASE UR QL STRIP: NEGATIVE
MCH RBC QN AUTO: 27.7 PG (ref 26.5–33)
MCHC RBC AUTO-ENTMCNC: 31.1 G/DL (ref 31.5–36.5)
MCV RBC AUTO: 89 FL (ref 78–100)
MUCOUS THREADS #/AREA URNS LPF: PRESENT /LPF
NITRATE UR QL: NEGATIVE
PH UR STRIP: 5.5 PH (ref 5–7)
PLATELET # BLD AUTO: 158 10E9/L (ref 150–450)
POTASSIUM SERPL-SCNC: 4.6 MMOL/L (ref 3.4–5.3)
RBC # BLD AUTO: 3.47 10E12/L (ref 4.4–5.9)
RBC #/AREA URNS AUTO: 1 /HPF (ref 0–2)
SODIUM SERPL-SCNC: 139 MMOL/L (ref 133–144)
SOURCE: ABNORMAL
SP GR UR STRIP: 1.03 (ref 1–1.03)
SQUAMOUS #/AREA URNS AUTO: <1 /HPF (ref 0–1)
UROBILINOGEN UR STRIP-MCNC: 0 MG/DL (ref 0–2)
WBC # BLD AUTO: 8 10E9/L (ref 4–11)
WBC #/AREA URNS AUTO: 1 /HPF (ref 0–5)

## 2021-05-09 PROCEDURE — 250N000013 HC RX MED GY IP 250 OP 250 PS 637: Performed by: PHYSICIAN ASSISTANT

## 2021-05-09 PROCEDURE — 250N000012 HC RX MED GY IP 250 OP 636 PS 637: Performed by: PHYSICIAN ASSISTANT

## 2021-05-09 PROCEDURE — 250N000011 HC RX IP 250 OP 636: Performed by: PHYSICIAN ASSISTANT

## 2021-05-09 PROCEDURE — 85027 COMPLETE CBC AUTOMATED: CPT | Performed by: PHYSICIAN ASSISTANT

## 2021-05-09 PROCEDURE — 97161 PT EVAL LOW COMPLEX 20 MIN: CPT | Mod: GP | Performed by: PHYSICAL THERAPIST

## 2021-05-09 PROCEDURE — 250N000012 HC RX MED GY IP 250 OP 636 PS 637: Performed by: INTERNAL MEDICINE

## 2021-05-09 PROCEDURE — 97116 GAIT TRAINING THERAPY: CPT | Mod: GP | Performed by: PHYSICAL THERAPIST

## 2021-05-09 PROCEDURE — 999N001017 HC STATISTIC GLUCOSE BY METER IP

## 2021-05-09 PROCEDURE — 80048 BASIC METABOLIC PNL TOTAL CA: CPT | Performed by: PHYSICIAN ASSISTANT

## 2021-05-09 PROCEDURE — 120N000001 HC R&B MED SURG/OB

## 2021-05-09 PROCEDURE — 86140 C-REACTIVE PROTEIN: CPT | Performed by: PHYSICIAN ASSISTANT

## 2021-05-09 PROCEDURE — 99233 SBSQ HOSP IP/OBS HIGH 50: CPT | Performed by: INTERNAL MEDICINE

## 2021-05-09 PROCEDURE — 36415 COLL VENOUS BLD VENIPUNCTURE: CPT | Performed by: PHYSICIAN ASSISTANT

## 2021-05-09 PROCEDURE — 81001 URINALYSIS AUTO W/SCOPE: CPT | Performed by: INTERNAL MEDICINE

## 2021-05-09 PROCEDURE — 258N000003 HC RX IP 258 OP 636: Performed by: PHYSICIAN ASSISTANT

## 2021-05-09 RX ADMIN — PIPERACILLIN SODIUM AND TAZOBACTAM SODIUM 3.38 G: 3; .375 INJECTION, POWDER, LYOPHILIZED, FOR SOLUTION INTRAVENOUS at 14:03

## 2021-05-09 RX ADMIN — LOSARTAN POTASSIUM 100 MG: 100 TABLET, FILM COATED ORAL at 08:31

## 2021-05-09 RX ADMIN — PIPERACILLIN SODIUM AND TAZOBACTAM SODIUM 3.38 G: 3; .375 INJECTION, POWDER, LYOPHILIZED, FOR SOLUTION INTRAVENOUS at 08:40

## 2021-05-09 RX ADMIN — ATORVASTATIN CALCIUM 10 MG: 10 TABLET, FILM COATED ORAL at 08:31

## 2021-05-09 RX ADMIN — PIPERACILLIN SODIUM AND TAZOBACTAM SODIUM 3.38 G: 3; .375 INJECTION, POWDER, LYOPHILIZED, FOR SOLUTION INTRAVENOUS at 20:18

## 2021-05-09 RX ADMIN — INSULIN ASPART 1 UNITS: 100 INJECTION, SOLUTION INTRAVENOUS; SUBCUTANEOUS at 18:21

## 2021-05-09 RX ADMIN — INSULIN ASPART 1 UNITS: 100 INJECTION, SOLUTION INTRAVENOUS; SUBCUTANEOUS at 14:04

## 2021-05-09 RX ADMIN — PIPERACILLIN SODIUM AND TAZOBACTAM SODIUM 3.38 G: 3; .375 INJECTION, POWDER, LYOPHILIZED, FOR SOLUTION INTRAVENOUS at 02:20

## 2021-05-09 RX ADMIN — FEXOFENADINE HYDROCHLORIDE 180 MG: 180 TABLET, FILM COATED ORAL at 08:31

## 2021-05-09 RX ADMIN — CARVEDILOL 3.12 MG: 3.12 TABLET, FILM COATED ORAL at 08:32

## 2021-05-09 RX ADMIN — PANTOPRAZOLE SODIUM 40 MG: 40 TABLET, DELAYED RELEASE ORAL at 05:55

## 2021-05-09 RX ADMIN — LEVOTHYROXINE SODIUM 88 MCG: 88 TABLET ORAL at 05:54

## 2021-05-09 RX ADMIN — SODIUM CHLORIDE: 9 INJECTION, SOLUTION INTRAVENOUS at 14:03

## 2021-05-09 RX ADMIN — FERROUS SULFATE TAB 325 MG (65 MG ELEMENTAL FE) 325 MG: 325 (65 FE) TAB at 20:18

## 2021-05-09 RX ADMIN — INSULIN GLARGINE 14 UNITS: 100 INJECTION, SOLUTION SUBCUTANEOUS at 08:34

## 2021-05-09 RX ADMIN — FINASTERIDE 5 MG: 5 TABLET, FILM COATED ORAL at 08:32

## 2021-05-09 RX ADMIN — FERROUS SULFATE TAB 325 MG (65 MG ELEMENTAL FE) 325 MG: 325 (65 FE) TAB at 08:32

## 2021-05-09 RX ADMIN — TAMSULOSIN HYDROCHLORIDE 0.8 MG: 0.4 CAPSULE ORAL at 08:31

## 2021-05-09 ASSESSMENT — ACTIVITIES OF DAILY LIVING (ADL)
ADLS_ACUITY_SCORE: 18
ADLS_ACUITY_SCORE: 18
ADLS_ACUITY_SCORE: 17
ADLS_ACUITY_SCORE: 19

## 2021-05-09 NOTE — PROGRESS NOTES
05/09/21 1015   Quick Adds   Type of Visit Initial PT Evaluation   Living Environment   People in home spouse   Current Living Arrangements house   Home Accessibility stairs within home  (basement but does not need to use)   Number of Stairs, Within Home, Primary 8   Stair Railings, Within Home, Primary railing on right side (ascending)   Transportation Anticipated car, drives self   Living Environment Comments Pt lives in a 2 story home iwth his wife. Has a bed on main floor he sleeps on.    Self-Care   Usual Activity Tolerance moderate   Current Activity Tolerance moderate   Regular Exercise No   Equipment Currently Used at Home none   Activity/Exercise/Self-Care Comment Pt reports being relaitively sedentary but mobilie if needed.    Disability/Function   Hearing Difficulty or Deaf yes   Describe hearing loss bilateral hearing loss   Use of hearing assistive devices bilateral hearing aids   Fall history within last six months yes   Number of times patient has fallen within last six months 2  (1 fall due to backpack slipping off and other from darkness)   Change in Functional Status Since Onset of Current Illness/Injury no   General Information   Onset of Illness/Injury or Date of Surgery 05/08/21   Referring Physician Patrick Norwood PA   Pertinent History of Current Problem (include personal factors and/or comorbidities that impact the POC) Timmy Strange is a 80 year old male with a past medical history of type 2 diabetes, hypertension, hyperlipidemia, BPH, and recent falls who presented to [facility] emergency department with fever, chills, and generalized weakness with mild confusion.  He is found to have acute right lower extremity cellulitis, and is admitted to [facility] for further management.   General Observations Pt with R LE edema and redness. Alert and oriented.    Cognition   Orientation Status (Cognition) oriented x 4   Affect/Mental Status (Cognition)  WFL   Follows Commands (Cognition) WFL   Pain Assessment   Patient Currently in Pain Yes, see Vital Sign flowsheet   Integumentary/Edema   Integumentary/Edema Comments R LE edema and redness.    Posture    Posture Forward head position   Range of Motion (ROM)   ROM Comment WFL   Strength   Strength Comments WFL; indepenent functional stand   Bed Mobility   Comment (Bed Mobility) IND   Transfers   Transfer Safety Comments IND   Gait/Stairs (Locomotion)   Comment (Gait/Stairs) IND; slight antalgic gait but no overt LOB with 200ft ambulation without AD   Balance   Balance Comments Good.   Sensory Examination   Sensory Perception patient reports no sensory changes   Coordination   Coordination no deficits were identified   Muscle Tone   Muscle Tone no deficits were identified   Clinical Impression   Criteria for Skilled Therapeutic Intervention yes, treatment indicated   PT Diagnosis (PT) painful LE   Influenced by the following impairments pain   Functional limitations due to impairments pain   Clinical Presentation Stable/Uncomplicated   Clinical Presentation Rationale manageable pain levels   Clinical Decision Making (Complexity) moderate complexity   Therapy Frequency (PT) One time eval and treatment only   Predicted Duration of Therapy Intervention (days/wks) 1 day   Planned Therapy Interventions (PT) gait training   Risk & Benefits of therapy have been explained evaluation/treatment results reviewed;care plan/treatment goals reviewed;risks/benefits reviewed;current/potential barriers reviewed;participants voiced agreement with care plan;patient;spouse/significant other   PT Discharge Planning    PT Discharge Recommendation (DC Rec) home   PT Rationale for DC Rec Pt mobilizing IND; slight antalgic gait but demos safety with stairs and gait.    PT Brief overview of current status  IND; educated for walking program in hosptial IND   Total Evaluation Time   Total Evaluation Time (Minutes) 9

## 2021-05-09 NOTE — PROGRESS NOTES
MD Notification    Notified Person: MD    Notified Person Name: Perla    Notification Date/Time: 1415, 5/9/21    Notification Interaction: web page    Purpose of Notification: Pt requesting discontinue of cont. Fluids.      Orders Received: Decrease rate of cont. Nacl to 50 ml/hr    Comments:

## 2021-05-09 NOTE — PROGRESS NOTES
[facility]  Hospitalist Progress Note  Name: Timmy Strange    MRN: [id number]  Physician:  Lauri Duncan DO, FHM (Text Page)    Summary of Stay:  79 yo M w/hx of DM2 on insulin who presents with right leg erythema with associated chills. Reports he has had some recent falls bumping his right leg, but no open sores or wounds. Denies history of MRSA. Temperature of 100F at [place]. On exam right lower extremity erythema and warmth from foot up into thigh.  Since admission erythema has begun improving however his creatinine has worsened.    Assessment & Plan    RLE Cellulitis:  -  At outside facility D-dimer elevated, but ultrasound reportedly negative for DVT  -  Vancomycin started initially but stopped as no definitive MRSA history.  He remains on zosyn IV which I will continue.  RLE on exam today shows modest improvement of erythema from prior drawn lines but leg remains warm with significant erythema.  Encouraged patient to keep RLE elevated when possible.    CLAUDIA:  -  Patient with creatinine of 0.8 11/9/20.  On admission creat had increased to 1.2 and this AM is now 1.45.  This is possibly related to the infection or to the vancomycin.  He has been on IVF and doubt major volume depletion at this point.  IVF rate will be decreased this afternoon.  I will request a UA for CLAUDIA.  Will also request a bladder scan.  Consider further workup and nephrology assessment tomorrow if not improving.  -  Hold losartan with worsening renal function    Generalized weakness   Recent mechanical falls x2  Possible transient infectious encephalopathy:  Patient fell twice while in [place] on recent travel.  One time sounds like he lost balance due to his backpack slipping off, another time sounds like he was having trouble seeing in the dark which caused him to fall.  He reports some generalized weakness, but otherwise is independent and ambulatory at baseline.  --PT/OT evaluation today - no major needs  noted  --Monitor for worsening confusion, currently oriented  --No acute neuro complaints, recent focal deficits.    Type 2 diabetes mellitus on chronic insulin:  PTA regimen is Humalog mix 75/2518 units every evening, 14 units every morning, with Metformin 1500 mg daily.  Hemoglobin A1c on recheck here is 5.6.  --Transitioned initially to glargine 14 units daily + aspart 1 unit per 20 grams of carbohydrates + sliding scale insulin.  Monitor trend today and further adjust as needed.  --Holding PTA Metformin with acute infection and worsening renal function  --Given his low hemoglobin A1c, may need to consider keeping his insulin dosing lower at discharge.  --Carbohydrate controlled diet     Hyperlipidemia  Hypertension:  --Continue PTA statin  --Continue PTA carvedilol 3.125 mg twice daily  -  I have now placed losartan on HOLD due to CLAUDIA     Hypothyroidism:  --Continue PTA Synthroid.     BPH  --Continue PTA Flomax and Proscar.     Anemia:  Per patient and spouse, uncertain etiology.  He has a hematology appointment scheduled next week.  He has had negative EGD and colonoscopy recently.  --Monitor hgb for now, slightly lower but after initial significant IVF.  If worsens substantially further consider more acute workup.    Incidentally noted slight cardiac murmur:  -  No acute cardiac complaints.  Consider outpatient echo.         COVID Status:  COVID-19 PCR Results    COVID-19 PCR Results 1/28/21 1/28/21 3/12/21 3/12/21    1124 1124 1122 1122   COVID-19 Virus PCR to [facility] - Result Test received-See reflex to IDDL test SARS CoV2 (COVID-19) Virus RT-PCR  Test received-See reflex to IDDL test SARS CoV2 (COVID-19) Virus RT-PCR    COVID-19 Virus PCR to [facility] - Source Nasopharyngeal  Nasopharyngeal    SARS-CoV-2 Virus Specimen Source  Nasopharyngeal  Nasopharyngeal   SARS-CoV-2 PCR Result  NEGATIVE  NEGATIVE      Comments are available for some flowsheets but are not being displayed.         COVID-19 Antibody  Results, Testing for Immunity    COVID-19 Antibody Results, Testing for Immunity   No data to display.            Diet: Consistent Carbohydrate Diet 1042-7436 Calories: Moderate Consistent CHO (4-6 CHO units/meal)    DVT Prophylaxis:  Ambulation, heparin/lovenox not ordered with recent anemia issues.  If mobility declines could reconsider addition.  Tim Catheter: not present  Code Status: Full Code      Disposition Plan   Expected discharge Tuesday with further improvement though CLAUDIA makes timing difficult to predict.  Await pending cultures and need to make sure renal function recovers.     Entered: Lauri Duncan 05/09/2021, 3:04 PM       Interval History   Assumed care, history reviewed.  Mr. Strange overall feels better.  RLE erythema he feels is improving.  Denies chest pain, nausea, other new complaints.    -Data reviewed today: I reviewed all new labs and imaging reports over the last 24 hours. I personally reviewed no images or EKG's today.    Physical Exam   Temp: 98.2  F (36.8  C) Temp src: Oral BP: 114/52 Pulse: 54   Resp: 16 SpO2: 96 % O2 Device: None (Room air)    Vitals:    05/08/21 1526   Weight: 87.3 kg (192 lb 8 oz)     Vital Signs with Ranges  Temp:  [97.7  F (36.5  C)-99.7  F (37.6  C)] 98.2  F (36.8  C)  Pulse:  [50-70] 54  Resp:  [16] 16  BP: (101-128)/(43-52) 114/52  SpO2:  [93 %-97 %] 96 %  No intake/output data recorded.    GEN:  Alert, oriented x 3, appears comfortable, no overt distress.  He was up in the chair when I saw him earlier.  HEENT:  Normocephalic/atraumatic, no scleral icterus, no nasal discharge, mouth moist.  CV:  Regular rate and rhythm, very soft I/VI systolic murmur. No rub.  LUNGS:  Clear to auscultation bilaterally without rales/rhonchi/wheezing/retractions.  Symmetric chest rise on inhalation noted.  ABD:  Active bowel sounds, soft, non-tender/non-distended.  No rebound/guarding/rigidity.  EXT:  Trace LE edema.  No cyanosis.  No acute joint synovitis noted.  SKIN:  Dry  to touch, RLE erythema has receded about 10% from the lines.  Central erythema still quite notable RLE with warmth to touch.  No exanthems otherwise noted in the visualized areas.    Medications     sodium chloride 50 mL/hr at 05/09/21 1456       atorvastatin  10 mg Oral Daily     carvedilol  3.125 mg Oral BID w/meals     ferrous sulfate  325 mg Oral BID     fexofenadine  180 mg Oral Daily     finasteride  5 mg Oral Daily     insulin aspart  1-7 Units Subcutaneous TID AC     insulin aspart  1-5 Units Subcutaneous At Bedtime     insulin aspart   Subcutaneous TID AC     insulin glargine  14 Units Subcutaneous QAM AC     latanoprost  1 drop Both Eyes At Bedtime     levothyroxine  88 mcg Oral Daily     [Held by provider] losartan  100 mg Oral Daily     pantoprazole  40 mg Oral Daily     piperacillin-tazobactam  3.375 g Intravenous Q6H     sodium chloride (PF)  3 mL Intracatheter Q8H     tamsulosin  0.8 mg Oral Daily     Data     Recent Labs   Lab 05/09/21  0546 05/08/21  1739   WBC 8.0 9.6   HGB 9.6* 10.2*   HCT 30.9* 32.5*   MCV 89 88    174       Recent Labs   Lab 05/09/21  0546 05/08/21  1739    137   POTASSIUM 4.6 4.2   CHLORIDE 110* 106   CO2 26 29   ANIONGAP 3 2*   * 110*   BUN 38* 36*   CR 1.45* 1.21   GFRESTIMATED 46* 57*   GFRESTBLACK 53* 66   ANIBAL 8.1* 8.0*       No results found for this or any previous visit (from the past 24 hour(s)).

## 2021-05-09 NOTE — PLAN OF CARE
Physical Therapy Discharge Summary    Reason for therapy discharge:    All goals and outcomes met, no further needs identified.    Progress towards therapy goal(s). See goals on Care Plan in [facility] electronic health record for goal details.  Goals met    Therapy recommendation(s):    Continue home exercise program.Continue walking program during LOS.

## 2021-05-09 NOTE — UTILIZATION REVIEW
"[facility]   Admission Status; Secondary Review Determination     Admission Date: 5/8/2021  3:20 PM      Under the authority of the Utilization Management Committee, the utilization review process indicated a secondary review on the above patient.  The review outcome is based on review of the medical records, discussions with staff, and applying clinical experience noted on the date of the review.        (X)      Inpatient Status Appropriate - This patient's medical care is consistent with medical management for inpatient care and reasonable inpatient medical practice.      () Observation Status Appropriate - This patient does not meet hospital inpatient criteria and is placed in observation status. If this patient's primary payer is Medicare and was admitted as an inpatient, Condition Code 44 should be used and patient status changed to \"observation\".   () Admission Status NOT Appropriate - This patient's medical care is not consistent with medical management for Inpatient or Observation Status.          RATIONALE FOR DETERMINATION   79 yo male with HTN, DM II and recent falls who presented to an outside ER with fever, chills, generalized weakness and confusion and was found to have RLE cellulitis extending from his ankle to his knee.  He was transferred to this hospital for management of cellulitis and acute encephalopathy. He is receiving IV antibiotics and is at higher risk of complication due to DM II. Today, he has been afebrile and without leukocytosis; however, his Cr is going up from 1.21 to 1.45 today without history of CKD. Concern is for ATN from vancomycin received prior to transfer. His CRP is also quite high.  I spoke with Dr. Duncan, the physician caring for this patient today, and given worsening renal function along with ongoing cellulitis/infection and need for continued hospital level care including close lab monitoring he is appropriate for inpatient cares.     The severity of " illness, intensity of service provided, expected LOS and risk for adverse outcome make the care complex, high risk and appropriate for hospital admission.    The information on this document is developed by the utilization review team in order for the business office to ensure compliance.  This only denotes the appropriateness of proper admission status and does not reflect the quality of care rendered.         The definitions of Inpatient Status and Observation Status used in making the determination above are those provided in the CMS Coverage Manual, Chapter 1 and Chapter 6, section 70.4.      Sincerely,     Sherry Ramirez MD MPH  Utilization Review  [facility].

## 2021-05-09 NOTE — PLAN OF CARE
Patient vital signs are at baseline: Yes  Patient able to ambulate as they were prior to admission or with assist devices provided by therapies during their stay:  Yes  Patient MUST void prior to discharge:  No,  Reason:  DTV this shift  Patient able to tolerate oral intake:  Yes  Pain has adequate pain control using Oral analgesics:  Yes, denies pain    AOx4, VSS on RA, CMS intact, RLE erythema, warmth, 3+ edema RLE, PIV infusing, IND in rm, . Voids adequately in BR, plan is to continue IV abx, takes insulin, discharge pending resolution of cellulitis, PT,OT,SW following

## 2021-05-09 NOTE — PLAN OF CARE
1900-2330: Aox4, VSS on RA, IND, CMS intact. RLE cellulitis outlined is improving, +2 edema, redness/warm.No complaints of pain. Voiding w/o difficulty, + flatus, + BS, + BM. BG prior to bed was 120. PIV infusing. PT/OT/SW consulted. Plan pending ABX and pt progress.

## 2021-05-09 NOTE — PLAN OF CARE
Summary: Cellulitis RLE.  Orientation: A & O x 4  VS/ O2/ IV: VSS on ra. Afebrile. DIONTE NACL @ 100 ml/hr, ABX given x 2  Mobility: Independent within room  Pain Management: Minimal pain in RLE. Declines pain med, walking helps discomfort  Diet: Mod CHO  Bowel/ Bladder: Continent, adequate output.  Labs/ BG: , 160  Skin: RLE redness. Outlined, redness receeding. 2+ edema.   CMS: Intact  Consults/ Providers: Hospitalist  Discharge/ Plan: Continue to monitor, abx regimen and watching kidney function.

## 2021-05-09 NOTE — PLAN OF CARE
OT: OT orders received and reviewed. Per PT consult, no IP OT needs at this time. Pt ind with mobility and ADL's. Will complete orders at this time.

## 2021-05-10 LAB
ANION GAP SERPL CALCULATED.3IONS-SCNC: 3 MMOL/L (ref 3–14)
BUN SERPL-MCNC: 31 MG/DL (ref 7–30)
CALCIUM SERPL-MCNC: 7.9 MG/DL (ref 8.5–10.1)
CHLORIDE SERPL-SCNC: 112 MMOL/L (ref 94–109)
CO2 SERPL-SCNC: 25 MMOL/L (ref 20–32)
CREAT SERPL-MCNC: 1.07 MG/DL (ref 0.66–1.25)
CRP SERPL-MCNC: 134 MG/L (ref 0–8)
ERYTHROCYTE [DISTWIDTH] IN BLOOD BY AUTOMATED COUNT: 16.2 % (ref 10–15)
GFR SERPL CREATININE-BSD FRML MDRD: 66 ML/MIN/{1.73_M2}
GLUCOSE BLDC GLUCOMTR-MCNC: 142 MG/DL (ref 70–99)
GLUCOSE BLDC GLUCOMTR-MCNC: 165 MG/DL (ref 70–99)
GLUCOSE BLDC GLUCOMTR-MCNC: 171 MG/DL (ref 70–99)
GLUCOSE BLDC GLUCOMTR-MCNC: 231 MG/DL (ref 70–99)
GLUCOSE SERPL-MCNC: 131 MG/DL (ref 70–99)
HCT VFR BLD AUTO: 30.2 % (ref 40–53)
HGB BLD-MCNC: 9.3 G/DL (ref 13.3–17.7)
MCH RBC QN AUTO: 27.5 PG (ref 26.5–33)
MCHC RBC AUTO-ENTMCNC: 30.8 G/DL (ref 31.5–36.5)
MCV RBC AUTO: 89 FL (ref 78–100)
PLATELET # BLD AUTO: 161 10E9/L (ref 150–450)
POTASSIUM SERPL-SCNC: 3.8 MMOL/L (ref 3.4–5.3)
RBC # BLD AUTO: 3.38 10E12/L (ref 4.4–5.9)
SODIUM SERPL-SCNC: 140 MMOL/L (ref 133–144)
WBC # BLD AUTO: 5.5 10E9/L (ref 4–11)

## 2021-05-10 PROCEDURE — 36415 COLL VENOUS BLD VENIPUNCTURE: CPT | Performed by: INTERNAL MEDICINE

## 2021-05-10 PROCEDURE — 250N000011 HC RX IP 250 OP 636: Performed by: PHYSICIAN ASSISTANT

## 2021-05-10 PROCEDURE — 80048 BASIC METABOLIC PNL TOTAL CA: CPT | Performed by: INTERNAL MEDICINE

## 2021-05-10 PROCEDURE — 99233 SBSQ HOSP IP/OBS HIGH 50: CPT | Performed by: STUDENT IN AN ORGANIZED HEALTH CARE EDUCATION/TRAINING PROGRAM

## 2021-05-10 PROCEDURE — 250N000013 HC RX MED GY IP 250 OP 250 PS 637: Performed by: PHYSICIAN ASSISTANT

## 2021-05-10 PROCEDURE — 250N000012 HC RX MED GY IP 250 OP 636 PS 637: Performed by: INTERNAL MEDICINE

## 2021-05-10 PROCEDURE — 999N001017 HC STATISTIC GLUCOSE BY METER IP

## 2021-05-10 PROCEDURE — 85027 COMPLETE CBC AUTOMATED: CPT | Performed by: INTERNAL MEDICINE

## 2021-05-10 PROCEDURE — 120N000001 HC R&B MED SURG/OB

## 2021-05-10 PROCEDURE — 86140 C-REACTIVE PROTEIN: CPT | Performed by: INTERNAL MEDICINE

## 2021-05-10 PROCEDURE — 99222 1ST HOSP IP/OBS MODERATE 55: CPT | Performed by: INTERNAL MEDICINE

## 2021-05-10 RX ADMIN — PIPERACILLIN SODIUM AND TAZOBACTAM SODIUM 3.38 G: 3; .375 INJECTION, POWDER, LYOPHILIZED, FOR SOLUTION INTRAVENOUS at 13:32

## 2021-05-10 RX ADMIN — FERROUS SULFATE TAB 325 MG (65 MG ELEMENTAL FE) 325 MG: 325 (65 FE) TAB at 21:21

## 2021-05-10 RX ADMIN — INSULIN ASPART 1 UNITS: 100 INJECTION, SOLUTION INTRAVENOUS; SUBCUTANEOUS at 18:25

## 2021-05-10 RX ADMIN — ACETAMINOPHEN 650 MG: 325 TABLET, FILM COATED ORAL at 12:42

## 2021-05-10 RX ADMIN — FERROUS SULFATE TAB 325 MG (65 MG ELEMENTAL FE) 325 MG: 325 (65 FE) TAB at 08:47

## 2021-05-10 RX ADMIN — LEVOTHYROXINE SODIUM 88 MCG: 88 TABLET ORAL at 06:02

## 2021-05-10 RX ADMIN — PIPERACILLIN SODIUM AND TAZOBACTAM SODIUM 3.38 G: 3; .375 INJECTION, POWDER, LYOPHILIZED, FOR SOLUTION INTRAVENOUS at 01:20

## 2021-05-10 RX ADMIN — PIPERACILLIN SODIUM AND TAZOBACTAM SODIUM 3.38 G: 3; .375 INJECTION, POWDER, LYOPHILIZED, FOR SOLUTION INTRAVENOUS at 20:14

## 2021-05-10 RX ADMIN — LATANOPROST 1 DROP: 50 SOLUTION/ DROPS OPHTHALMIC at 21:21

## 2021-05-10 RX ADMIN — INSULIN ASPART 1 UNITS: 100 INJECTION, SOLUTION INTRAVENOUS; SUBCUTANEOUS at 12:44

## 2021-05-10 RX ADMIN — ACETAMINOPHEN 650 MG: 325 TABLET, FILM COATED ORAL at 17:10

## 2021-05-10 RX ADMIN — LATANOPROST 1 DROP: 50 SOLUTION/ DROPS OPHTHALMIC at 01:20

## 2021-05-10 RX ADMIN — TAMSULOSIN HYDROCHLORIDE 0.8 MG: 0.4 CAPSULE ORAL at 08:47

## 2021-05-10 RX ADMIN — PIPERACILLIN SODIUM AND TAZOBACTAM SODIUM 3.38 G: 3; .375 INJECTION, POWDER, LYOPHILIZED, FOR SOLUTION INTRAVENOUS at 08:46

## 2021-05-10 RX ADMIN — FEXOFENADINE HYDROCHLORIDE 180 MG: 180 TABLET, FILM COATED ORAL at 08:47

## 2021-05-10 RX ADMIN — CARVEDILOL 3.12 MG: 3.12 TABLET, FILM COATED ORAL at 08:47

## 2021-05-10 RX ADMIN — FINASTERIDE 5 MG: 5 TABLET, FILM COATED ORAL at 08:47

## 2021-05-10 RX ADMIN — ACETAMINOPHEN 325 MG: 325 TABLET, FILM COATED ORAL at 08:39

## 2021-05-10 RX ADMIN — PANTOPRAZOLE SODIUM 40 MG: 40 TABLET, DELAYED RELEASE ORAL at 06:01

## 2021-05-10 RX ADMIN — ACETAMINOPHEN 650 MG: 325 TABLET, FILM COATED ORAL at 21:21

## 2021-05-10 RX ADMIN — INSULIN GLARGINE 14 UNITS: 100 INJECTION, SOLUTION SUBCUTANEOUS at 08:49

## 2021-05-10 RX ADMIN — ATORVASTATIN CALCIUM 10 MG: 10 TABLET, FILM COATED ORAL at 08:46

## 2021-05-10 ASSESSMENT — ACTIVITIES OF DAILY LIVING (ADL)
ADLS_ACUITY_SCORE: 17
ADLS_ACUITY_SCORE: 19
ADLS_ACUITY_SCORE: 17
ADLS_ACUITY_SCORE: 17
ADLS_ACUITY_SCORE: 19
ADLS_ACUITY_SCORE: 19

## 2021-05-10 NOTE — PLAN OF CARE
Summary: Cellulitis RLE.  Orientation: A & O x 4  VS/ O2/ IV: VSS on ra. Afebrile. DIONTE NACL @ 50 ml/hr, ABX given x 2  Mobility: Independent within room  Pain Management: Tylenol prn for RLE pain.  Diet: Mod CHO  Bowel/ Bladder: Continent, adequate output.  Labs/ BG: , 171  Skin: RLE redness. Outlined, redness receeding. 2+ edema RLE. Small open sore R large toe, covered.  CMS: Intact  Consults/ Providers: Hospitalist, hematology  Discharge/ Plan: Continue to monitor, abx regimen and watching kidney function. Hematology following for trending low hgb.

## 2021-05-10 NOTE — TELEPHONE ENCOUNTER
Recommend inpatient consult by hematology . hospitalist to be seeing pt at noon . .Adri Packer RN

## 2021-05-10 NOTE — PROGRESS NOTES
[facility]  Hospitalist Progress Note    Name: Timmy Strange    MRN: [id number]  Physician:  MALLY MCDONOUGH MD   (Text Page)    Summary of Stay:  81 yo M w/hx of DM2 on insulin who presents with right leg erythema with associated chills. Reports he has had some recent falls bumping his right leg, but no open sores or wounds. Denies history of MRSA. Temperature of 100F at [place]. On exam right lower extremity erythema and warmth from foot up into thigh.  Since admission erythema has begun improving however his creatinine has worsened.    Assessment & Plan      RLE Cellulitis:  -  At outside facility D-dimer elevated, but ultrasound reportedly negative for DVT  -  Vancomycin started initially but stopped as no definitive MRSA history.  He remains on zosyn IV which I will continue.  RLE on exam today shows modest improvement of erythema from prior drawn lines but leg remains warm with significant erythema.  Encouraged patient to keep RLE elevated when possible.    CLAUDIA:  -  Patient with creatinine of 0.8 11/9/20.  On admission creat had increased to 1.2 and this AM is now 1.45.  This is possibly related to the infection or to the vancomycin.  He has been on IVF and doubt major volume depletion at this point.  IVF rate will be decreased this afternoon.  I will request a UA for CLAUDIA.  Will also request a bladder scan. Consider further workup and nephrology assessment tomorrow if not improving.  -  Hold losartan with worsening renal function    Generalized weakness   Recent mechanical falls x2  Possible transient infectious encephalopathy:  Patient fell twice while in [place] on recent travel.  One time sounds like he lost balance due to his backpack slipping off, another time sounds like he was having trouble seeing in the dark which caused him to fall.  He reports some generalized weakness, but otherwise is independent and ambulatory at baseline.  Plan:  --PT/OT evaluation - no major needs  noted  --Monitor for worsening confusion, currently oriented  --No acute neuro complaints, recent focal deficits.    Type 2 diabetes mellitus on chronic insulin:  PTA regimen is Humalog mix 75/2518 units every evening, 14 units every morning, with Metformin 1500 mg daily.  Hemoglobin A1c on recheck here is 5.6.  --Transitioned initially to glargine 14 units daily + aspart 1 unit per 20 grams of carbohydrates + sliding scale insulin.  Monitor trend today and further adjust as needed.  Plan:  --Holding PTA Metformin with acute infection and worsening renal function  --Given his low hemoglobin A1c, may need to consider keeping his insulin dosing lower at discharge.  --Carbohydrate controlled diet     Hyperlipidemia  Hypertension:  --Continue PTA statin  --Continue PTA carvedilol 3.125 mg twice daily  Plan:  - placed losartan on HOLD due to CLAUDIA, can likely resume at discharge     Hypothyroidism:  --Continue PTA Synthroid.     BPH  --Continue PTA Flomax and Proscar.     Anemia:  Per patient and spouse, uncertain etiology.  He has a hematology appointment scheduled next week.  He has had negative EGD and colonoscopy recently.  --Monitor hgb for now, slightly lower but after initial significant IVF.  Plan:  - hematology consulted per family request    Incidentally noted slight cardiac murmur:  -  No acute cardiac complaints.  Consider outpatient echo.         COVID Status:  COVID-19 PCR Results    COVID-19 PCR Results 1/28/21 1/28/21 3/12/21 3/12/21    1124 1124 1122 1122   COVID-19 Virus PCR to [facility] - Result Test received-See reflex to IDDL test SARS CoV2 (COVID-19) Virus RT-PCR  Test received-See reflex to IDDL test SARS CoV2 (COVID-19) Virus RT-PCR    COVID-19 Virus PCR to [facility] - Source Nasopharyngeal  Nasopharyngeal    SARS-CoV-2 Virus Specimen Source  Nasopharyngeal  Nasopharyngeal   SARS-CoV-2 PCR Result  NEGATIVE  NEGATIVE      Comments are available for some flowsheets but are not being displayed.          COVID-19 Antibody Results, Testing for Immunity    COVID-19 Antibody Results, Testing for Immunity   No data to display.            Diet: Consistent Carbohydrate Diet 4856-4187 Calories: Moderate Consistent CHO (4-6 CHO units/meal)    DVT Prophylaxis:  Ambulation, heparin/lovenox not ordered with recent anemia issues.  If mobility declines could reconsider addition.  Tim Catheter: not present  Code Status: Full Code      Disposition Plan      Expected discharge Tuesday/Wednesday with further improvement though CLAUDIA makes timing difficult to predict.  Await pending cultures and need to make sure renal function recovers.     Entered: Bipin Be 05/10/2021, 3:51 PM          Patient, family, interdisciplinary team involved in care and agrees with plan.  Total time - Greater than 35 min. More than 50% of time spent in direct patient care, care coordination, patient/caregiver counseling, and formalizing plan of care.       Interval History      Assumed care, history reviewed.  Mr. Strange overall feels better.  RLE erythema he feels is stable, not significantly better today  Denies chest pain, nausea, other new complaints. No fevers or chills. No other complaints    -Data reviewed today: I reviewed all new labs and imaging reports over the last 24 hours. I personally reviewed no images or EKG's today.    Physical Exam   Temp: 98.6  F (37  C) Temp src: Oral BP: 117/47 Pulse: 53   Resp: 16 SpO2: 97 % O2 Device: None (Room air)    Vitals:    05/08/21 1526   Weight: 87.3 kg (192 lb 8 oz)     Vital Signs with Ranges  Temp:  [97.5  F (36.4  C)-98.7  F (37.1  C)] 98.6  F (37  C)  Pulse:  [52-68] 53  Resp:  [16] 16  BP: (113-152)/(44-74) 117/47  SpO2:  [95 %-98 %] 97 %  I/O last 3 completed shifts:  In: 360 [P.O.:360]  Out: 250 [Urine:250]    GEN: no apparent distress  CV:  Regular rate and rhythm, very soft I/VI systolic murmur. No rub.  LUNGS:  Clear to auscultation bilaterally without rales/rhonchi/wheezing/retractions.   Symmetric chest rise on inhalation noted.  ABD:  Active bowel sounds, soft, non-tender/non-distended.  No rebound/guarding/rigidity.  EXT:  Trace LE edema.  No cyanosis.  No acute joint synovitis noted.  SKIN:  Dry to touch, RLE erythema has receded about 10% from the lines.  Central erythema still quite notable RLE with warmth to touch.  No exanthems otherwise noted in the visualized areas.    Medications     sodium chloride 50 mL/hr at 05/09/21 1456       atorvastatin  10 mg Oral Daily     carvedilol  3.125 mg Oral BID w/meals     ferrous sulfate  325 mg Oral BID     fexofenadine  180 mg Oral Daily     finasteride  5 mg Oral Daily     insulin aspart  1-7 Units Subcutaneous TID AC     insulin aspart  1-5 Units Subcutaneous At Bedtime     insulin aspart   Subcutaneous TID AC     insulin glargine  14 Units Subcutaneous QAM AC     latanoprost  1 drop Both Eyes At Bedtime     levothyroxine  88 mcg Oral Daily     [Held by provider] losartan  100 mg Oral Daily     pantoprazole  40 mg Oral Daily     piperacillin-tazobactam  3.375 g Intravenous Q6H     sodium chloride (PF)  3 mL Intracatheter Q8H     tamsulosin  0.8 mg Oral Daily     Data     Recent Labs   Lab 05/10/21  0617 05/09/21  0546 05/08/21  1739   WBC 5.5 8.0 9.6   HGB 9.3* 9.6* 10.2*   HCT 30.2* 30.9* 32.5*   MCV 89 89 88    158 174       Recent Labs   Lab 05/10/21  0617 05/09/21  0546 05/08/21  1739    139 137   POTASSIUM 3.8 4.6 4.2   CHLORIDE 112* 110* 106   CO2 25 26 29   ANIONGAP 3 3 2*   * 124* 110*   BUN 31* 38* 36*   CR 1.07 1.45* 1.21   GFRESTIMATED 66 46* 57*   GFRESTBLACK 76 53* 66   ANIBAL 7.9* 8.1* 8.0*       No results found for this or any previous visit (from the past 24 hour(s)).

## 2021-05-10 NOTE — PLAN OF CARE
A&OX4, VSS on RA except for bradycardiac, coreg held. Up independently. RLE elevated on pillow and iced. Cellulits appears to be improving but a small bump in the skin might be getting bigger per pt report. Pt has been advised to ice and elevate the leg. Mod carb diet. BG checks.UA collected and negative. Bladderscan with low PVR. IVF at 50mL/hr. Continue to monitor.

## 2021-05-10 NOTE — TELEPHONE ENCOUNTER
Pt is notified to keep video visit on Thursday . Unless he can get hospitals to gt a consult within the hospital MD Ese Packer RN

## 2021-05-10 NOTE — TELEPHONE ENCOUNTER
Caller is currently an inpatient at  [facility] ; Inquiring I he can get a podiatry consult to cut his nails while hospitalized and   has video  visit  Schedule for  4 days with his hematologist and wonder s what to do about that   Caller is advised to direct these questions to his primary nurse and attending provider while an inpatient   Caller understands lamin Dale RN  FNA       Reason for Disposition    General information question, no triage required and triager able to answer question    Protocols used: INFORMATION ONLY CALL - NO TRIAGE-A-

## 2021-05-10 NOTE — PLAN OF CARE
Patient vital signs are at baseline: Yes  Patient able to ambulate as they were prior to admission or with assist devices provided by therapies during their stay:  Yes  Patient MUST void prior to discharge:  Yes  Patient able to tolerate oral intake:  Yes  Pain has adequate pain control using Oral analgesics:  Yes    AOx4, VSS on RA  except spot checking noted slight bradycardia 56-58 bpm x 2, walks IND to BR, bladder scanned for 82 mL, voided 100 mL in urinal x1, voided in BR (unmeasured) several times this shift.

## 2021-05-11 LAB
ALBUMIN SERPL-MCNC: 2.5 G/DL (ref 3.4–5)
ALP SERPL-CCNC: 55 U/L (ref 40–150)
ALT SERPL W P-5'-P-CCNC: 73 U/L (ref 0–70)
ANION GAP SERPL CALCULATED.3IONS-SCNC: 6 MMOL/L (ref 3–14)
AST SERPL W P-5'-P-CCNC: 68 U/L (ref 0–45)
BASOPHILS # BLD AUTO: 0 10E9/L (ref 0–0.2)
BASOPHILS NFR BLD AUTO: 0.2 %
BILIRUB DIRECT SERPL-MCNC: 0.1 MG/DL (ref 0–0.2)
BILIRUB SERPL-MCNC: 0.6 MG/DL (ref 0.2–1.3)
BUN SERPL-MCNC: 27 MG/DL (ref 7–30)
CALCIUM SERPL-MCNC: 8 MG/DL (ref 8.5–10.1)
CHLORIDE SERPL-SCNC: 116 MMOL/L (ref 94–109)
CO2 SERPL-SCNC: 22 MMOL/L (ref 20–32)
CREAT SERPL-MCNC: 0.97 MG/DL (ref 0.66–1.25)
DIFFERENTIAL METHOD BLD: ABNORMAL
EOSINOPHIL # BLD AUTO: 0.2 10E9/L (ref 0–0.7)
EOSINOPHIL NFR BLD AUTO: 2.5 %
ERYTHROCYTE [DISTWIDTH] IN BLOOD BY AUTOMATED COUNT: 16.4 % (ref 10–15)
FERRITIN SERPL-MCNC: 69 NG/ML (ref 26–388)
FOLATE SERPL-MCNC: 28.1 NG/ML
GFR SERPL CREATININE-BSD FRML MDRD: 75 ML/MIN/{1.73_M2}
GLUCOSE BLDC GLUCOMTR-MCNC: 128 MG/DL (ref 70–99)
GLUCOSE BLDC GLUCOMTR-MCNC: 135 MG/DL (ref 70–99)
GLUCOSE BLDC GLUCOMTR-MCNC: 139 MG/DL (ref 70–99)
GLUCOSE BLDC GLUCOMTR-MCNC: 196 MG/DL (ref 70–99)
GLUCOSE BLDC GLUCOMTR-MCNC: 211 MG/DL (ref 70–99)
GLUCOSE SERPL-MCNC: 155 MG/DL (ref 70–99)
HCT VFR BLD AUTO: 30 % (ref 40–53)
HGB BLD-MCNC: 9.2 G/DL (ref 13.3–17.7)
IMM GRANULOCYTES # BLD: 0 10E9/L (ref 0–0.4)
IMM GRANULOCYTES NFR BLD: 0.2 %
IRON SATN MFR SERPL: 6 % (ref 15–46)
IRON SERPL-MCNC: 13 UG/DL (ref 35–180)
LYMPHOCYTES # BLD AUTO: 0.9 10E9/L (ref 0.8–5.3)
LYMPHOCYTES NFR BLD AUTO: 15.1 %
MCH RBC QN AUTO: 27.2 PG (ref 26.5–33)
MCHC RBC AUTO-ENTMCNC: 30.7 G/DL (ref 31.5–36.5)
MCV RBC AUTO: 89 FL (ref 78–100)
MONOCYTES # BLD AUTO: 0.7 10E9/L (ref 0–1.3)
MONOCYTES NFR BLD AUTO: 11.6 %
NEUTROPHILS # BLD AUTO: 4.3 10E9/L (ref 1.6–8.3)
NEUTROPHILS NFR BLD AUTO: 70.4 %
NRBC # BLD AUTO: 0 10*3/UL
NRBC BLD AUTO-RTO: 0 /100
PLATELET # BLD AUTO: 173 10E9/L (ref 150–450)
POTASSIUM SERPL-SCNC: 4.1 MMOL/L (ref 3.4–5.3)
PROT SERPL-MCNC: 5.7 G/DL (ref 6.8–8.8)
RBC # BLD AUTO: 3.38 10E12/L (ref 4.4–5.9)
RETICS # AUTO: 29.4 10E9/L (ref 25–95)
RETICS/RBC NFR AUTO: 0.9 % (ref 0.5–2)
SODIUM SERPL-SCNC: 144 MMOL/L (ref 133–144)
TIBC SERPL-MCNC: 226 UG/DL (ref 240–430)
TSH SERPL DL<=0.005 MIU/L-ACNC: 1.1 MU/L (ref 0.4–4)
VIT B12 SERPL-MCNC: 376 PG/ML (ref 193–986)
WBC # BLD AUTO: 6 10E9/L (ref 4–11)

## 2021-05-11 PROCEDURE — 250N000013 HC RX MED GY IP 250 OP 250 PS 637: Performed by: PHYSICIAN ASSISTANT

## 2021-05-11 PROCEDURE — 82746 ASSAY OF FOLIC ACID SERUM: CPT | Performed by: INTERNAL MEDICINE

## 2021-05-11 PROCEDURE — 82728 ASSAY OF FERRITIN: CPT | Performed by: INTERNAL MEDICINE

## 2021-05-11 PROCEDURE — 258N000003 HC RX IP 258 OP 636: Performed by: INTERNAL MEDICINE

## 2021-05-11 PROCEDURE — 85060 BLOOD SMEAR INTERPRETATION: CPT | Performed by: PATHOLOGY

## 2021-05-11 PROCEDURE — 83550 IRON BINDING TEST: CPT | Performed by: INTERNAL MEDICINE

## 2021-05-11 PROCEDURE — 84145 PROCALCITONIN (PCT): CPT | Performed by: INTERNAL MEDICINE

## 2021-05-11 PROCEDURE — 99232 SBSQ HOSP IP/OBS MODERATE 35: CPT | Performed by: INTERNAL MEDICINE

## 2021-05-11 PROCEDURE — 120N000001 HC R&B MED SURG/OB

## 2021-05-11 PROCEDURE — 80048 BASIC METABOLIC PNL TOTAL CA: CPT | Performed by: INTERNAL MEDICINE

## 2021-05-11 PROCEDURE — 84443 ASSAY THYROID STIM HORMONE: CPT | Performed by: INTERNAL MEDICINE

## 2021-05-11 PROCEDURE — 36415 COLL VENOUS BLD VENIPUNCTURE: CPT | Performed by: INTERNAL MEDICINE

## 2021-05-11 PROCEDURE — 80076 HEPATIC FUNCTION PANEL: CPT | Performed by: INTERNAL MEDICINE

## 2021-05-11 PROCEDURE — 99233 SBSQ HOSP IP/OBS HIGH 50: CPT | Performed by: STUDENT IN AN ORGANIZED HEALTH CARE EDUCATION/TRAINING PROGRAM

## 2021-05-11 PROCEDURE — 250N000011 HC RX IP 250 OP 636: Performed by: INTERNAL MEDICINE

## 2021-05-11 PROCEDURE — 84165 PROTEIN E-PHORESIS SERUM: CPT | Mod: 26 | Performed by: PATHOLOGY

## 2021-05-11 PROCEDURE — 999N001017 HC STATISTIC GLUCOSE BY METER IP

## 2021-05-11 PROCEDURE — 84165 PROTEIN E-PHORESIS SERUM: CPT | Mod: TC | Performed by: INTERNAL MEDICINE

## 2021-05-11 PROCEDURE — 999N001036 HC STATISTIC TOTAL PROTEIN: Performed by: INTERNAL MEDICINE

## 2021-05-11 PROCEDURE — 85025 COMPLETE CBC W/AUTO DIFF WBC: CPT | Performed by: INTERNAL MEDICINE

## 2021-05-11 PROCEDURE — 250N000011 HC RX IP 250 OP 636: Performed by: PHYSICIAN ASSISTANT

## 2021-05-11 PROCEDURE — 999N001109 HC STATISTIC MORPHOLOGY W/INTERP HISTOLOGY TC 85060: Performed by: INTERNAL MEDICINE

## 2021-05-11 PROCEDURE — 82607 VITAMIN B-12: CPT | Performed by: INTERNAL MEDICINE

## 2021-05-11 PROCEDURE — 250N000012 HC RX MED GY IP 250 OP 636 PS 637: Performed by: INTERNAL MEDICINE

## 2021-05-11 PROCEDURE — 85045 AUTOMATED RETICULOCYTE COUNT: CPT | Performed by: INTERNAL MEDICINE

## 2021-05-11 PROCEDURE — 83540 ASSAY OF IRON: CPT | Performed by: INTERNAL MEDICINE

## 2021-05-11 RX ADMIN — TAMSULOSIN HYDROCHLORIDE 0.8 MG: 0.4 CAPSULE ORAL at 08:26

## 2021-05-11 RX ADMIN — ATORVASTATIN CALCIUM 10 MG: 10 TABLET, FILM COATED ORAL at 08:26

## 2021-05-11 RX ADMIN — PIPERACILLIN SODIUM AND TAZOBACTAM SODIUM 3.38 G: 3; .375 INJECTION, POWDER, LYOPHILIZED, FOR SOLUTION INTRAVENOUS at 14:55

## 2021-05-11 RX ADMIN — CARVEDILOL 3.12 MG: 3.12 TABLET, FILM COATED ORAL at 08:26

## 2021-05-11 RX ADMIN — ACETAMINOPHEN 650 MG: 325 TABLET, FILM COATED ORAL at 17:29

## 2021-05-11 RX ADMIN — CARVEDILOL 3.12 MG: 3.12 TABLET, FILM COATED ORAL at 17:29

## 2021-05-11 RX ADMIN — FEXOFENADINE HYDROCHLORIDE 180 MG: 180 TABLET, FILM COATED ORAL at 08:25

## 2021-05-11 RX ADMIN — ACETAMINOPHEN 650 MG: 325 TABLET, FILM COATED ORAL at 06:15

## 2021-05-11 RX ADMIN — PIPERACILLIN SODIUM AND TAZOBACTAM SODIUM 3.38 G: 3; .375 INJECTION, POWDER, LYOPHILIZED, FOR SOLUTION INTRAVENOUS at 02:05

## 2021-05-11 RX ADMIN — ACETAMINOPHEN 650 MG: 325 TABLET, FILM COATED ORAL at 02:05

## 2021-05-11 RX ADMIN — FINASTERIDE 5 MG: 5 TABLET, FILM COATED ORAL at 08:26

## 2021-05-11 RX ADMIN — INSULIN ASPART 2 UNITS: 100 INJECTION, SOLUTION INTRAVENOUS; SUBCUTANEOUS at 17:33

## 2021-05-11 RX ADMIN — SODIUM CHLORIDE: 9 INJECTION, SOLUTION INTRAVENOUS at 11:33

## 2021-05-11 RX ADMIN — FERROUS SULFATE TAB 325 MG (65 MG ELEMENTAL FE) 325 MG: 325 (65 FE) TAB at 20:32

## 2021-05-11 RX ADMIN — PIPERACILLIN SODIUM AND TAZOBACTAM SODIUM 3.38 G: 3; .375 INJECTION, POWDER, LYOPHILIZED, FOR SOLUTION INTRAVENOUS at 20:32

## 2021-05-11 RX ADMIN — INSULIN ASPART 2 UNITS: 100 INJECTION, SOLUTION INTRAVENOUS; SUBCUTANEOUS at 08:28

## 2021-05-11 RX ADMIN — INSULIN GLARGINE 14 UNITS: 100 INJECTION, SOLUTION SUBCUTANEOUS at 08:31

## 2021-05-11 RX ADMIN — PANTOPRAZOLE SODIUM 40 MG: 40 TABLET, DELAYED RELEASE ORAL at 06:16

## 2021-05-11 RX ADMIN — LEVOTHYROXINE SODIUM 88 MCG: 88 TABLET ORAL at 06:15

## 2021-05-11 RX ADMIN — FERROUS SULFATE TAB 325 MG (65 MG ELEMENTAL FE) 325 MG: 325 (65 FE) TAB at 08:26

## 2021-05-11 RX ADMIN — PIPERACILLIN SODIUM AND TAZOBACTAM SODIUM 3.38 G: 3; .375 INJECTION, POWDER, LYOPHILIZED, FOR SOLUTION INTRAVENOUS at 08:25

## 2021-05-11 RX ADMIN — IRON SUCROSE 300 MG: 20 INJECTION, SOLUTION INTRAVENOUS at 12:54

## 2021-05-11 RX ADMIN — LATANOPROST 1 DROP: 50 SOLUTION/ DROPS OPHTHALMIC at 22:37

## 2021-05-11 ASSESSMENT — ACTIVITIES OF DAILY LIVING (ADL)
ADLS_ACUITY_SCORE: 18

## 2021-05-11 NOTE — PLAN OF CARE
Date: 5/11 7-3:30pm  Summary: Cellulitis RLE.  Orientation: A & O x 4  VS/ O2/ IV: VSS on ra. Afebrile. IV NS@ 50 ml/hr,   Mobility: Independent within room  Pain Management: Tylenol prn for RLE pain.  Diet: Mod CHO, carb counting  Bowel/ Bladder: Continent, adequate output.  Labs/ BG:   Skin: RLE redness. Outlined, redness receeding. 2+ edema RLE. Small open sore R large toe, covered.  CMS: Intact  Consults/ Providers: Hospitalist, hematology  Discharge/ Plan: Continue to monitor, abx regimen and watching kidney function. Hematology following for trending low hgb

## 2021-05-11 NOTE — CONSULTS
Consult Date: 05/10/2021    This consult has been requested by Dr. Be for anemia.    Mr. Strange is a 80-year-old gentleman with multiple medical problems including hypertension, hyperlipidemia, hypothyroidism and diabetes mellitus.  The patient is admitted with right lower extremity cellulitis.  The patient is on IV antibiotics.    The patient has worsening anemia for the last 1 year.  Because of that, Hematology/Oncology consult has been requested.  I reviewed his previous investigations, and they are summarized below:  1.  On 11/07/2017, hemoglobin of 13.3.  -On 10/30/2018, hemoglobin of 12.5.  -On 06/16/2020, hemoglobin of 12.3.  -On 02/22/2021, WBC of 4.3, hemoglobin of 10.4, platelet of 226 and MCV of 90.  2.  On 11/09/2020:    -CMP normal except mildly low protein.  -Hemoglobin A1c normal at 5.5.  -TSH normal.   3.  On 02/01/2021, colonoscopy revealed colon polyps and diverticulosis.  It was a tubular adenoma.  4.  On 03/16/2021, EGD was essentially normal.  Biopsy did not reveal any H. pylori.    The patient has now been admitted with cellulitis.  Labs done on 05/08/2021 reveal persistent anemia with hemoglobin of 10.2. Normal WBC platelet and MCV.    The patient is on IV antibiotics.  He is improving.  Pain and swelling in the leg is improving.    The patient denies bleeding from any site.  No bleeding from ear, nose or throat.  No blood in the urine or stool.  No black stool.    He has some fatigue.  No headache.  No dizziness.  No chest pain.  No shortness of breath.  No nausea or vomiting.    All other review of systems negative.    ALLERGIES:  REVIEWED.    MEDICATIONS:  Reviewed.    PAST MEDICAL HISTORY:    1.  Diabetes mellitus.  2.  Hypertension.  3.  Hypothyroidism.  4.  Hyperlipidemia.  5.  Osteoarthritis.  6.  ?Rheumatoid arthritis.   7.  BPH.  8.  Basal cell skin cancer.    9.  Vasectomy.  10.  Tonsillectomy and adenoidectomy.    SOCIAL HISTORY:    -He quit smoking 10 years ago.  -Occasional  alcohol use.    PHYSICAL EXAMINATION:    He is alert and oriented x3.  Not in any distress.  Rest of systems not examined.    LABS:  Reviewed.    IMPRESSION:    1.  A 80-year-old gentleman with normocytic anemia.  2.  Right lower extremity cellulitis.  3.  Multiple other medical problems including diabetes mellitus, hypertension, hypothyroidism.    RECOMMENDATION:    1.  I had a long discussion with the patient and his wife (wife was on the phone).  Labs were all reviewed.  I explained to the patient that he is slowly worsening anemia.  He has normocytic anemia.  This is typically seen with blood loss, anemia of chronic disease, anemia of renal disease or early myelodysplastic syndrome.    The patient does have multiple chronic medical problems, including diabetes mellitus that will cause anemia of chronic disease.  The patient is elderly.  He may be developing myelodysplastic syndrome.  We also discussed other causes including iron deficiency, vitamin B12 deficiency, folate deficiency, myeloma and other causes.    2.  Discussed regarding further workup.  We will get some labs, including blood smear review, vitamin B12, folate, SPEP and other labs.    I discussed regarding bone marrow biopsy.  That will help to look for myelodysplastic syndrome.  The patient has been admitted with cellulitis.  I told him that we will plan on getting a bone marrow biopsy as outpatient after cellulitis has resolved.  He is agreeable for it.    3.  The patient and wife had multiple questions, which were all answered.  We will plan on seeing him in clinic in 3-4 weeks after discharge. At that time, we will schedule the bone marrow biopsy.    Thanks for the consult.    TOTAL TIME SPENT:  60 minutes.    Chandrakant Panchal MD        D: 05/10/2021   T: 05/10/2021   MT: [facility]    Name:     YESI DENISE  MRN:      [id number]        Account:      [id number]   :      1940           Consult Date: 05/10/2021     Document:  [id number]

## 2021-05-11 NOTE — PLAN OF CARE
Summary: Cellulitis RLE.  Orientation: A & O x 4  VS/ O2/ IV: VSS on ra. Afebrile. DIONTE NACL @ 50 ml/hr,   Mobility: Independent within room  Pain Management: Tylenol prn for RLE pain.  Diet: Mod CHO  Bowel/ Bladder: Continent, adequate output.  Labs/ BG:   Skin: RLE redness. Outlined, redness receeding. 2+ edema RLE. Small open sore R large toe, covered.  CMS: Intact  Consults/ Providers: Hospitalist, hematology  Discharge/ Plan: Continue to monitor, abx regimen and watching kidney function. Hematology following for trending low hgb.

## 2021-05-11 NOTE — PROGRESS NOTES
[facility]  Hospitalist Progress Note    Name: Timmy Strange    MRN: [id number]  Physician:  MALLY MCDONOUGH MD   (Text Page)    Summary of Stay:  79 yo M w/hx of DM2 on insulin who presents with right leg erythema with associated chills. Reports he has had some recent falls bumping his right leg, but no open sores or wounds. Denies history of MRSA. Temperature of 100F at [place]. On exam right lower extremity erythema and warmth from foot up into thigh.  Since admission erythema has begun improving however his creatinine has worsened.    Assessment & Plan      RLE Cellulitis:  -  At outside facility D-dimer elevated, but ultrasound reportedly negative for DVT  -  Vancomycin started initially but stopped as no definitive MRSA history.    - still with significant erythema and swelling / tenderness / not improving as expected  Plan:  - Continue IV Zosyn  - ID consulted  - Follow vitals/temp    CLAUDIA:  -  Patient with creatinine of 0.8 11/9/20.  On admission creat had increased to 1.2 and this AM is now 1.45.  This is possibly related to the infection or to the vancomycin.  He has been on IVF and doubt major volume depletion at this point.  IVF rate will be decreased this afternoon.  I will request a UA for CLAUDIA.  Will also request a bladder scan. Consider further workup and nephrology assessment tomorrow if not improving.  -  Hold losartan for now    Generalized weakness   Recent mechanical falls x2  Possible transient infectious encephalopathy:  Patient fell twice while in [place] on recent travel.  One time sounds like he lost balance due to his backpack slipping off, another time sounds like he was having trouble seeing in the dark which caused him to fall.  He reports some generalized weakness, but otherwise is independent and ambulatory at baseline.  Plan:  --PT/OT evaluation - no major needs noted  --No acute neuro complaints, recent focal deficits.    Type 2 diabetes mellitus on chronic  insulin:  PTA regimen is Humalog mix 75/2518 units every evening, 14 units every morning, with Metformin 1500 mg daily.  Hemoglobin A1c on recheck here is 5.6.  --Transitioned initially to glargine 14 units daily + aspart 1 unit per 20 grams of carbohydrates + sliding scale insulin.  Monitor trend today and further adjust as needed.  Plan:  --Holding PTA Metformin with acute infection and worsening renal function  --Given his low hemoglobin A1c, may need to consider keeping his insulin dosing lower at discharge.  --Carbohydrate controlled diet     Hyperlipidemia  Hypertension:  --Continue PTA statin  --Continue PTA carvedilol 3.125 mg twice daily  Plan:  - placed losartan on HOLD due to CLAUDIA, can likely resume at discharge     Hypothyroidism:  --Continue PTA Synthroid.     BPH  --Continue PTA Flomax and Proscar.     Anemia:  Per patient and spouse, uncertain etiology.  He has a hematology appointment scheduled next week.  He has had negative EGD and colonoscopy recently.  --Monitor hgb for now, slightly lower but after initial significant IVF.  Plan:  - hematology consulted -> bone marrow biopsy as outpatient.  That will help to look for myelodysplastic syndrome.      Incidentally noted slight cardiac murmur:  -  No acute cardiac complaints.  Consider outpatient echo.         COVID Status:  COVID-19 PCR Results    COVID-19 PCR Results 1/28/21 1/28/21 3/12/21 3/12/21    1124 1124 1122 1122   COVID-19 Virus PCR to [facility] - Result Test received-See reflex to IDDL test SARS CoV2 (COVID-19) Virus RT-PCR  Test received-See reflex to IDDL test SARS CoV2 (COVID-19) Virus RT-PCR    COVID-19 Virus PCR to [facility] - Source Nasopharyngeal  Nasopharyngeal    SARS-CoV-2 Virus Specimen Source  Nasopharyngeal  Nasopharyngeal   SARS-CoV-2 PCR Result  NEGATIVE  NEGATIVE      Comments are available for some flowsheets but are not being displayed.         COVID-19 Antibody Results, Testing for Immunity    COVID-19 Antibody Results,  Testing for Immunity   No data to display.            Diet: Consistent Carbohydrate Diet 9730-1802 Calories: Moderate Consistent CHO (4-6 CHO units/meal)    DVT Prophylaxis:  Ambulation, heparin/lovenox not ordered with recent anemia issues.  If mobility declines could reconsider addition.  Tim Catheter: not present  Code Status: Full Code      Disposition Plan      Expected discharge Tuesday/Wednesday with further improvement though CLAUDIA makes timing difficult to predict.  Await pending cultures and need to make sure renal function recovers.     Entered: Bipin Be 05/11/2021, 6:27 PM          Patient, family, interdisciplinary team involved in care and agrees with plan.  Total time - Greater than 35 min. More than 50% of time spent in direct patient care, care coordination, patient/caregiver counseling, and formalizing plan of care.       Interval History      No acute events overnight, history reviewed.  RLE erythema not really better today and still with tenderness with ambulation.   Denies chest pain, nausea, other new complaints. No fevers or chills. No other complaints    -Data reviewed today: I reviewed all new labs and imaging reports over the last 24 hours. I personally reviewed no images or EKG's today.    Physical Exam   Temp: 98.2  F (36.8  C) Temp src: Oral BP: 134/48 Pulse: 58   Resp: 16 SpO2: 97 % O2 Device: None (Room air)    Vitals:    05/08/21 1526   Weight: 87.3 kg (192 lb 8 oz)     Vital Signs with Ranges  Temp:  [97.9  F (36.6  C)-98.3  F (36.8  C)] 98.2  F (36.8  C)  Pulse:  [53-60] 58  Resp:  [16-18] 16  BP: (124-148)/(48-60) 134/48  SpO2:  [97 %-100 %] 97 %  I/O last 3 completed shifts:  In: 550 [P.O.:200; I.V.:350]  Out: -     GEN: no apparent distress  CV:  Regular rate and rhythm, very soft I/VI systolic murmur. No rub.  LUNGS:  Clear to auscultation bilaterally without rales/rhonchi/wheezing/retractions.  Symmetric chest rise on inhalation noted.  ABD:  Active bowel sounds, soft,  non-tender/non-distended.  No rebound/guarding/rigidity.  EXT:  Pitting LE edema.  No cyanosis.  No acute joint synovitis noted.  SKIN:  Dry to touch, RLE erythema unchanged today. Central erythema still quite notable RLE with warmth to touch.  No exanthems otherwise noted in the visualized areas.    Medications     sodium chloride 50 mL/hr at 05/11/21 1133       atorvastatin  10 mg Oral Daily     carvedilol  3.125 mg Oral BID w/meals     ferrous sulfate  325 mg Oral BID     fexofenadine  180 mg Oral Daily     finasteride  5 mg Oral Daily     insulin aspart  1-7 Units Subcutaneous TID AC     insulin aspart  1-5 Units Subcutaneous At Bedtime     insulin aspart   Subcutaneous TID AC     insulin glargine  14 Units Subcutaneous QAM AC     latanoprost  1 drop Both Eyes At Bedtime     levothyroxine  88 mcg Oral Daily     [Held by provider] losartan  100 mg Oral Daily     pantoprazole  40 mg Oral Daily     piperacillin-tazobactam  3.375 g Intravenous Q6H     sodium chloride (PF)  3 mL Intracatheter Q8H     tamsulosin  0.8 mg Oral Daily     Data     Recent Labs   Lab 05/11/21  0630 05/10/21  0617 05/09/21  0546   WBC 6.0 5.5 8.0   HGB 9.2* 9.3* 9.6*   HCT 30.0* 30.2* 30.9*   MCV 89 89 89    161 158       Recent Labs   Lab 05/11/21  0630 05/10/21  0617 05/09/21  0546    140 139   POTASSIUM 4.1 3.8 4.6   CHLORIDE 116* 112* 110*   CO2 22 25 26   ANIONGAP 6 3 3   * 131* 124*   BUN 27 31* 38*   CR 0.97 1.07 1.45*   GFRESTIMATED 75 66 46*   GFRESTBLACK 86 76 53*   ANIBAL 8.0* 7.9* 8.1*   PROTTOTAL 5.7*  --   --    ALBUMIN 2.5*  --   --    BILITOTAL 0.6  --   --    ALKPHOS 55  --   --    AST 68*  --   --    ALT 73*  --   --        No results found for this or any previous visit (from the past 24 hour(s)).

## 2021-05-11 NOTE — PLAN OF CARE
A/Ox4. VSS on RA. Pain control with tylenol. RLE cellulitis marked. RLE edema- elevated on pillows. Up IND. Tolerating diet. Discharge pending.

## 2021-05-11 NOTE — PLAN OF CARE
Summary: Cellulitis RLE.  Orientation: A & O x 4  VS/ O2/ IV: VSS on ra. Afebrile. IV NS@ 50 ml/hr,   Mobility: Independent within room  Pain Management: Tylenol prn for RLE pain.  Diet: Mod CHO, carb counting  Bowel/ Bladder: Continent, adequate output.  Labs/ BG:   Skin: RLE redness. Outlined, redness receeding. 2+ edema RLE. Small open sore R large toe, covered.  CMS: Intact  Consults/ Providers: Hospitalist, hematology,infectious diseases consulted  Discharge/ Plan: Continue to monitor, abx regimen and watching kidney function. Hematology following for trending low hgb.

## 2021-05-12 ENCOUNTER — HOME INFUSION (PRE-WILLOW HOME INFUSION) (OUTPATIENT)
Dept: PHARMACY | Facility: CLINIC | Age: 81
End: 2021-05-12

## 2021-05-12 LAB
ALBUMIN SERPL ELPH-MCNC: 2.7 G/DL (ref 3.7–5.1)
ALPHA1 GLOB SERPL ELPH-MCNC: 0.4 G/DL (ref 0.2–0.4)
ALPHA2 GLOB SERPL ELPH-MCNC: 0.9 G/DL (ref 0.5–0.9)
B-GLOBULIN SERPL ELPH-MCNC: 0.6 G/DL (ref 0.6–1)
COPATH REPORT: NORMAL
GAMMA GLOB SERPL ELPH-MCNC: 0.5 G/DL (ref 0.7–1.6)
GLUCOSE BLDC GLUCOMTR-MCNC: 101 MG/DL (ref 70–99)
GLUCOSE BLDC GLUCOMTR-MCNC: 108 MG/DL (ref 70–99)
GLUCOSE BLDC GLUCOMTR-MCNC: 152 MG/DL (ref 70–99)
GLUCOSE BLDC GLUCOMTR-MCNC: 193 MG/DL (ref 70–99)
GLUCOSE BLDC GLUCOMTR-MCNC: 197 MG/DL (ref 70–99)
M PROTEIN SERPL ELPH-MCNC: 0 G/DL
PROCALCITONIN SERPL-MCNC: 0.75 NG/ML
PROT PATTERN SERPL ELPH-IMP: ABNORMAL

## 2021-05-12 PROCEDURE — 250N000013 HC RX MED GY IP 250 OP 250 PS 637: Performed by: INTERNAL MEDICINE

## 2021-05-12 PROCEDURE — 250N000011 HC RX IP 250 OP 636: Performed by: PHYSICIAN ASSISTANT

## 2021-05-12 PROCEDURE — 999N001017 HC STATISTIC GLUCOSE BY METER IP

## 2021-05-12 PROCEDURE — 250N000012 HC RX MED GY IP 250 OP 636 PS 637: Performed by: INTERNAL MEDICINE

## 2021-05-12 PROCEDURE — 250N000013 HC RX MED GY IP 250 OP 250 PS 637: Performed by: PHYSICIAN ASSISTANT

## 2021-05-12 PROCEDURE — 99233 SBSQ HOSP IP/OBS HIGH 50: CPT | Performed by: INTERNAL MEDICINE

## 2021-05-12 PROCEDURE — 84145 PROCALCITONIN (PCT): CPT | Performed by: INTERNAL MEDICINE

## 2021-05-12 PROCEDURE — 250N000009 HC RX 250: Performed by: INTERNAL MEDICINE

## 2021-05-12 PROCEDURE — 120N000001 HC R&B MED SURG/OB

## 2021-05-12 PROCEDURE — 250N000011 HC RX IP 250 OP 636: Performed by: INTERNAL MEDICINE

## 2021-05-12 RX ORDER — LACTOBACILLUS RHAMNOSUS GG 10B CELL
1 CAPSULE ORAL 2 TIMES DAILY
Status: DISCONTINUED | OUTPATIENT
Start: 2021-05-12 | End: 2021-05-18 | Stop reason: HOSPADM

## 2021-05-12 RX ORDER — LACTOBACILLUS ACIDOPH-L.BULGARICUS 1 MILLION CELL CHEWABLE TABLET 1MM CELL
1 TABLET,CHEWABLE ORAL 2 TIMES DAILY
Status: DISCONTINUED | OUTPATIENT
Start: 2021-05-12 | End: 2021-05-12 | Stop reason: CLARIF

## 2021-05-12 RX ORDER — CEFTRIAXONE 2 G/1
2 INJECTION, POWDER, FOR SOLUTION INTRAMUSCULAR; INTRAVENOUS EVERY 24 HOURS
Status: DISCONTINUED | OUTPATIENT
Start: 2021-05-12 | End: 2021-05-17

## 2021-05-12 RX ORDER — LACTOBACILLUS ACIDOPH-L.BULGARICUS 1 MILLION CELL CHEWABLE TABLET 1MM CELL
1 TABLET,CHEWABLE ORAL 2 TIMES DAILY
Status: DISCONTINUED | OUTPATIENT
Start: 2021-05-12 | End: 2021-05-12

## 2021-05-12 RX ORDER — CLINDAMYCIN PHOSPHATE 600 MG/50ML
600 INJECTION, SOLUTION INTRAVENOUS EVERY 8 HOURS
Status: DISCONTINUED | OUTPATIENT
Start: 2021-05-12 | End: 2021-05-17

## 2021-05-12 RX ORDER — ACETAMINOPHEN 500 MG
1000 TABLET ORAL EVERY 8 HOURS
Status: DISCONTINUED | OUTPATIENT
Start: 2021-05-12 | End: 2021-05-18 | Stop reason: HOSPADM

## 2021-05-12 RX ORDER — MICONAZOLE NITRATE 20 MG/G
CREAM TOPICAL 2 TIMES DAILY
Status: DISCONTINUED | OUTPATIENT
Start: 2021-05-12 | End: 2021-05-18 | Stop reason: HOSPADM

## 2021-05-12 RX ORDER — CEFTRIAXONE 2 G/1
2 INJECTION, POWDER, FOR SOLUTION INTRAMUSCULAR; INTRAVENOUS EVERY 24 HOURS
Status: DISCONTINUED | OUTPATIENT
Start: 2021-05-12 | End: 2021-05-12

## 2021-05-12 RX ADMIN — TAMSULOSIN HYDROCHLORIDE 0.8 MG: 0.4 CAPSULE ORAL at 08:48

## 2021-05-12 RX ADMIN — FERROUS SULFATE TAB 325 MG (65 MG ELEMENTAL FE) 325 MG: 325 (65 FE) TAB at 08:48

## 2021-05-12 RX ADMIN — ACETAMINOPHEN 1000 MG: 500 TABLET, FILM COATED ORAL at 21:59

## 2021-05-12 RX ADMIN — INSULIN GLARGINE 14 UNITS: 100 INJECTION, SOLUTION SUBCUTANEOUS at 08:48

## 2021-05-12 RX ADMIN — CARVEDILOL 3.12 MG: 3.12 TABLET, FILM COATED ORAL at 18:23

## 2021-05-12 RX ADMIN — ACETAMINOPHEN 650 MG: 325 TABLET, FILM COATED ORAL at 16:48

## 2021-05-12 RX ADMIN — ACETAMINOPHEN 650 MG: 325 TABLET, FILM COATED ORAL at 09:04

## 2021-05-12 RX ADMIN — LEVOTHYROXINE SODIUM 88 MCG: 88 TABLET ORAL at 06:24

## 2021-05-12 RX ADMIN — INSULIN ASPART 1 UNITS: 100 INJECTION, SOLUTION INTRAVENOUS; SUBCUTANEOUS at 18:24

## 2021-05-12 RX ADMIN — Medication 1 CAPSULE: at 21:51

## 2021-05-12 RX ADMIN — LATANOPROST 1 DROP: 50 SOLUTION/ DROPS OPHTHALMIC at 21:51

## 2021-05-12 RX ADMIN — FINASTERIDE 5 MG: 5 TABLET, FILM COATED ORAL at 08:48

## 2021-05-12 RX ADMIN — ATORVASTATIN CALCIUM 10 MG: 10 TABLET, FILM COATED ORAL at 08:48

## 2021-05-12 RX ADMIN — CLINDAMYCIN IN 5 PERCENT DEXTROSE 600 MG: 12 INJECTION, SOLUTION INTRAVENOUS at 18:50

## 2021-05-12 RX ADMIN — FERROUS SULFATE TAB 325 MG (65 MG ELEMENTAL FE) 325 MG: 325 (65 FE) TAB at 21:51

## 2021-05-12 RX ADMIN — PIPERACILLIN SODIUM AND TAZOBACTAM SODIUM 3.38 G: 3; .375 INJECTION, POWDER, LYOPHILIZED, FOR SOLUTION INTRAVENOUS at 02:33

## 2021-05-12 RX ADMIN — FEXOFENADINE HYDROCHLORIDE 180 MG: 180 TABLET, FILM COATED ORAL at 08:48

## 2021-05-12 RX ADMIN — CEFTRIAXONE SODIUM 2 G: 2 INJECTION, POWDER, FOR SOLUTION INTRAMUSCULAR; INTRAVENOUS at 12:57

## 2021-05-12 RX ADMIN — MICONAZOLE NITRATE: 20 CREAM TOPICAL at 21:59

## 2021-05-12 RX ADMIN — PANTOPRAZOLE SODIUM 40 MG: 40 TABLET, DELAYED RELEASE ORAL at 06:24

## 2021-05-12 RX ADMIN — PIPERACILLIN SODIUM AND TAZOBACTAM SODIUM 3.38 G: 3; .375 INJECTION, POWDER, LYOPHILIZED, FOR SOLUTION INTRAVENOUS at 08:48

## 2021-05-12 RX ADMIN — CARVEDILOL 3.12 MG: 3.12 TABLET, FILM COATED ORAL at 08:48

## 2021-05-12 ASSESSMENT — ACTIVITIES OF DAILY LIVING (ADL)
ADLS_ACUITY_SCORE: 18
ADLS_ACUITY_SCORE: 17
ADLS_ACUITY_SCORE: 18
ADLS_ACUITY_SCORE: 17

## 2021-05-12 NOTE — CONSULTS
[facility]    Infectious Disease Consultation     Date of Admission:  5/8/2021  Date of Consult (When I saw the patient): 05/12/21    Assessment & Plan   Timmy Strange is a 80 year old male who was admitted on 5/8/2021.     Impression:  1. 80 y.o male with diabetes, insulin dependent.   2. Arthritis   3. HTN, HLP  4. Admitted with fevers, chills, and right leg swelling and redness.   5. Started on vanco and zosyn, vanco stopped due to worsening creat.   6. Today switched to ceftriaxone.     Recommendations:   Agree with ceftriaxone.   Add clindamycin   Keep the LE raised at all times sitting and lying down.   Streptococcal appearing cellulitis will take several days to improve. Anticipate next 2 -3 days in hospital on IV antibiotics.  Start probiotics.   Trend procal.       Manfred Swift MD    Reason for Consult   Reason for consult: I was asked to evaluate this patient for cellulitis .    Primary Care Physician   Samir Duque    Chief Complaint   Cellulitis     History is obtained from the patient and medical records    History of Present Illness   Timmy Strange is a 80 year old male with  DM2 on insulin who presents with right leg erythema with associated chills. Reports he has had some recent falls bumping his right leg, but no open sores or wounds. Denies history of MRSA. Temperature of 100F at [place]. On exam right lower extremity erythema and warmth from foot up into thigh.    Past Medical History   I have reviewed this patient's medical history and updated it with pertinent information if needed.   Past Medical History:   Diagnosis Date     Arthritis 1970    Dx'd with RA when in the      BPH (benign prostatic hyperplasia) 12/16/2013     Cancer (H)     basal cell cancer behind ear     Diabetes mellitus      ED (erectile dysfunction) 1/6/2015     HTN (hypertension)      Hyperlipidemia LDL goal <100      Hypothyroidism      Mumps      Obesity        Past Surgical  History   I have reviewed this patient's surgical history and updated it with pertinent information if needed.  Past Surgical History:   Procedure Laterality Date     COLONOSCOPY N/A 11/21/2014    Procedure: COMBINED COLONOSCOPY, SINGLE OR MULTIPLE BIOPSY/POLYPECTOMY BY BIOPSY;  Surgeon: Rolanda Bey MD;  Location:  GI     COLONOSCOPY N/A 1/20/2020    Procedure: COLONOSCOPY, WITH POLYPECTOMY AND BIOPSY;  Surgeon: Christina Vieira MD;  Location:  GI     COLONOSCOPY N/A 2/1/2021    Procedure: Colonoscopy, With Polypectomy And Biopsy;  Surgeon: Christina Vieira MD;  Location: [facility]     ESOPHAGOSCOPY, GASTROSCOPY, DUODENOSCOPY (EGD), COMBINED N/A 3/16/2021    Procedure: ESOPHAGOGASTRODUODENOSCOPY, WITH BIOPSY;  Surgeon: Jose Schrader MD;  Location:  GI     TESTICLE SURGERY       TONSILLECTOMY, ADENOIDECTOMY, COMBINED       VASECTOMY         Prior to Admission Medications   Prior to Admission Medications   Prescriptions Last Dose Informant Patient Reported? Taking?   Ferrous Sulfate 324 (65 Fe) MG TBEC 5/8/2021 at x1 Self Yes Yes   Sig: Take 324 mg by mouth 2 times daily    Fexofenadine HCl (ALLEGRA PO) 5/8/2021 at Unknown time Self Yes Yes   Sig: Take 180 mg by mouth daily    HUMALOG MIX 75/25 KWIKPEN (75-25) 100 UNIT/ML susp 5/7/2021 at Unknown time Self Yes Yes   Sig: Inject 18 Units Subcutaneous every evening Took 16 units in the evening   Lancets (ONETOUCH DELICA PLUS [id number]) MISC  Self Yes No   Multiple Vitamins-Minerals (CENTRUM SILVER) per tablet 5/8/2021 at Unknown time Self Yes Yes   Sig: Take 1 tablet by mouth daily   ONETOUCH ULTRA test strip  Self Yes No   ULTICARE SHORT 31G X 8 MM insulin pen needle  Self Yes No   Sig: USE TO INJECT TWICE DAILY   blood glucose monitoring (ONE TOUCH ULTRA 2) meter device kit  Self Yes No   carvedilol (COREG) 3.125 MG tablet 5/8/2021 at x1 Self No Yes   Sig: TAKE 1 TABLET BY MOUTH TWO  TIMES DAILY   finasteride (PROSCAR) 5 MG tablet 5/8/2021 at  Unknown time Self No Yes   Sig: TAKE 1 TABLET BY MOUTH  DAILY   insulin lispro protamine-insulin lispro (HUMALOG MIX 75/25 KWIKPEN) (75-25) 100 UNIT/ML pen 5/8/2021 at took 18units Self Yes Yes   Sig: Inject 14 Units Subcutaneous every morning (before breakfast)   latanoprost (XALATAN) 0.005 % ophthalmic solution 5/7/2021 at Unknown time Self Yes Yes   Sig: Place 1 drop into both eyes daily   levothyroxine (LEVOTHROID) 88 MCG tablet 5/8/2021 at Unknown time Self Yes Yes   Sig: Take 88 mcg by mouth daily.   losartan (COZAAR) 100 MG tablet 5/8/2021 at Unknown time Self No Yes   Sig: TAKE 1 TABLET BY MOUTH  DAILY   lovastatin (MEVACOR) 40 MG tablet 5/8/2021 at Unknown time Self No Yes   Sig: TAKE 1 TABLET BY MOUTH  DAILY AT BEDTIME   Patient taking differently: Take 40 mg by mouth every morning    metFORMIN (GLUCOPHAGE-XR) 500 MG 24 hr tablet 5/6/2021 at pm Self Yes Yes   Sig: Take 1,500 mg by mouth daily (with dinner)    pantoprazole (PROTONIX) 40 MG EC tablet 5/8/2021 at Unknown time Self No Yes   Sig: Take 1 tablet (40 mg) by mouth daily   sildenafil (VIAGRA) 100 MG tablet  Self No No   Sig: TAKE 1 TABLET BY MOUTH 30 MINUTES TO 4 HOURS PRIOR TO ACTIVITY. MAX  MG PER 24 HOURS   tamsulosin (FLOMAX) 0.4 MG capsule 5/8/2021 at Unknown time Self No Yes   Sig: TAKE 1 CAPSULE BY MOUTH  ONCE A DAY   Patient taking differently: 0.8 mg       Facility-Administered Medications: None     Allergies   No Known Allergies    Immunization History   Immunization History   Administered Date(s) Administered     Flu 65+ Years 01/03/2018     Flu, Unspecified 11/04/2019     Influenza (H1N1) 01/15/2010     Influenza (High Dose) 3 valent vaccine 10/20/2015, 10/19/2016, 11/14/2018     Influenza (IIV3) PF 10/06/2011, 01/03/2013, 11/15/2013     Influenza, Quad, High Dose, Pf, 65yr + 10/24/2020     Pneumo Conj 13-V (2010&after) 12/23/2014     Pneumococcal 23 valent 10/26/2011     TD (ADULT, 7+) 10/24/2020     Tdap (Adacel,Boostrix)  08/24/2010       Social History   I have reviewed this patient's social history and updated it with pertinent information if needed. Timmy Strange  reports that he quit smoking about 9 years ago. His smoking use included pipe. He smoked 0.00 packs per day. He has never used smokeless tobacco. He reports that he does not drink alcohol or use drugs.    Family History   I have reviewed this patient's family history and updated it with pertinent information if needed.   Family History   Problem Relation Age of Onset     Diabetes Maternal Grandmother      Diabetes Maternal Grandfather      Arthritis Maternal Grandfather      Arthritis Father      Thyroid Disease Father      Glaucoma Mother      Alcohol/Drug Mother 49        cirrhosis     Diabetes Daughter 44        type 2     Obesity Daughter      Glaucoma Maternal Aunt        Review of Systems   The 10 point Review of Systems is negative other than noted in the HPI or here.     Physical Exam   Temp: 99.7  F (37.6  C) Temp src: Oral BP: (!) 141/55 Pulse: 65   Resp: 16 SpO2: 97 % O2 Device: None (Room air)    Vital Signs with Ranges  Temp:  [98.2  F (36.8  C)-99.7  F (37.6  C)] 99.7  F (37.6  C)  Pulse:  [58-65] 65  Resp:  [16-18] 16  BP: (122-141)/(45-55) 141/55  SpO2:  [95 %-97 %] 97 %  192 lbs 8 oz  Body mass index is 30.15 kg/m .    GENERAL APPEARANCE:  awake  EYES: Eyes grossly normal to inspection, PERRL and conjunctivae and sclerae normal  HENT: ear canals and TM's normal and nose and mouth without ulcers or lesions  NECK: no adenopathy, no asymmetry, masses, or scars and thyroid normal to palpation  RESP: lungs clear to auscultation - no rales, rhonchi or wheezes  CV: regular rates and rhythm, normal S1 S2, no S3 or S4 and no murmur, click or rub  LYMPHATICS: normal ant/post cervical and supraclavicular nodes  ABDOMEN: soft, nontender, without hepatosplenomegaly or masses and bowel sounds normal  MS: right LE very swollen jonas appearing redness which is patchy    SKIN: no suspicious lesions or rashes      Data   Lab Results   Component Value Date    WBC 6.0 05/11/2021    HGB 9.2 (L) 05/11/2021    HCT 30.0 (L) 05/11/2021     05/11/2021     05/11/2021    POTASSIUM 4.1 05/11/2021    CHLORIDE 116 (H) 05/11/2021    CO2 22 05/11/2021    BUN 27 05/11/2021    CR 0.97 05/11/2021     (H) 05/11/2021    AST 68 (H) 05/11/2021    ALT 73 (H) 05/11/2021    ALKPHOS 55 05/11/2021    BILITOTAL 0.6 05/11/2021     No results for input(s): CULT in the last 168 hours.  Recent Labs   Lab Test 07/15/16  1537   CULT No Beta Streptococcus isolated

## 2021-05-12 NOTE — PROGRESS NOTES
[facility]  Hospitalist Progress Note    Name: Timmy Strange    MRN: [id number]      Summary of Stay:  79 yo M w/hx of DM2 on insulin who presents with right leg erythema with associated chills. Reports he has had some recent falls bumping his right leg, but no open sores or wounds. Denies history of MRSA. Temperature of 100F at [place]. On exam right lower extremity erythema and warmth from foot up into thigh.  Since admission erythema has begun improving however his creatinine has worsened.    Assessment & Plan      RLE Cellulitis:  TEnia pedis  -  At outside facility D-dimer elevated, but ultrasound reportedly negative for DVT  -  Vancomycin started initially but stopped as no definitive MRSA history.    - still with significant erythema and swelling / tenderness / not improving as expected  Plan:  -on IV zosyn cellulitis looks like possibly strep infection so switch to IV ceftriaxone 2grams daily   Scheduled ty;enol to help with RLE pain   - ID consulted  Added micozole cream to the skin between toes to help with tenia pedis   Elevated RLE as tolerated to help with swelling     CLAUDIA:  -  Patient with creatinine of 0.8 11/9/20.  On admission creat had increased to 1.2 and this AM is now 1.45.  This is possibly related to the infection or to the vancomycin.   Cr improved to  0.97 today   D/isabelle IVF     Generalized weakness   Recent mechanical falls x2  Possible transient infectious encephalopathy:  Patient fell twice while in [place] on recent travel.  One time sounds like he lost balance due to his backpack slipping off, another time sounds like he was having trouble seeing in the dark which caused him to fall.  He reports some generalized weakness, but otherwise is independent and ambulatory at baseline.  Plan:  --PT/OT evaluation - no major needs noted ok to go home per PT on discharge   --No acute neuro complaints, recent focal deficits.    Type 2 diabetes mellitus on chronic  insulin:  PTA regimen is Humalog mix 75/2518 units every evening, 14 units every morning, with Metformin 1500 mg daily.  Hemoglobin A1c on recheck here is 5.6.  --Transitioned initially to glargine 14 units daily + aspart 1 unit per 20 grams of carbohydrates + sliding scale insulin.  Monitor trend today and further adjust as needed.  Plan:  --Holding PTA Metformin with acute infection and worsening renal function  --Given his low hemoglobin A1c, may need to consider keeping his insulin dosing lower at discharge.  --Carbohydrate controlled diet   Blood sugars are well controlled currently     Hyperlipidemia  Hypertension:  --Continue PTA statin  --Continue PTA carvedilol 3.125 mg twice daily  Plan:  - placed losartan on HOLD due to CLAUDIA, can likely resume at discharge     Hypothyroidism:  --Continue PTA Synthroid.     BPH  --Continue PTA Flomax and Proscar.     Anemia:  Per patient and spouse, uncertain etiology.  He has a hematology appointment scheduled next week.  He has had negative EGD and colonoscopy recently.  --Monitor hgb for now, slightly lower but after initial significant IVF.  Plan:  - hematology consulted -> bone marrow biopsy as outpatient.  That will help to look for myelodysplastic syndrome.      Incidentally noted slight cardiac murmur:  -  No acute cardiac complaints.  Consider outpatient echo.         COVID Status:  COVID-19 PCR Results    COVID-19 PCR Results 1/28/21 1/28/21 3/12/21 3/12/21    1124 1124 1122 1122   COVID-19 Virus PCR to [facility] - Result Test received-See reflex to IDDL test SARS CoV2 (COVID-19) Virus RT-PCR  Test received-See reflex to IDDL test SARS CoV2 (COVID-19) Virus RT-PCR    COVID-19 Virus PCR to [facility] - Source Nasopharyngeal  Nasopharyngeal    SARS-CoV-2 Virus Specimen Source  Nasopharyngeal  Nasopharyngeal   SARS-CoV-2 PCR Result  NEGATIVE  NEGATIVE      Comments are available for some flowsheets but are not being displayed.         COVID-19 Antibody Results, Testing  for Immunity    COVID-19 Antibody Results, Testing for Immunity   No data to display.            Diet: Consistent Carbohydrate Diet 0665-7371 Calories: Moderate Consistent CHO (4-6 CHO units/meal)    DVT Prophylaxis:  Ambulation, heparin/lovenox not ordered with recent anemia issues.  If mobility declines could reconsider addition.  Tim Catheter: not present  Code Status: Full Code      Disposition Plan      Expected discharge in 1-2days when OK with ID     Erica Sullivan MD   Page [phone](7AM-6PM)         Patient, family, interdisciplinary team involved in care and agrees with plan.  Total time - Greater than 35 min. More than 50% of time spent in direct patient care, care coordination, patient/caregiver counseling, and formalizing plan of care.       Interval History      No acute events overnight, history reviewed.  RLE erythema not really better today and still with tenderness with ambulation. C/o RLE pain limiting walking     Denies chest pain, nausea, other new complaints. No fevers or chills. No other complaints    -Data reviewed today: I reviewed all new labs and imaging reports over the last 24 hours. I personally reviewed no images or EKG's today.    Physical Exam   Temp: 99.7  F (37.6  C) Temp src: Oral BP: (!) 141/55 Pulse: 65   Resp: 16 SpO2: 97 % O2 Device: None (Room air)    Vitals:    05/08/21 1526   Weight: 87.3 kg (192 lb 8 oz)     Vital Signs with Ranges  Temp:  [98.1  F (36.7  C)-99.7  F (37.6  C)] 99.7  F (37.6  C)  Pulse:  [53-65] 65  Resp:  [16-18] 16  BP: (122-142)/(45-55) 141/55  SpO2:  [95 %-99 %] 97 %  I/O last 3 completed shifts:  In: 350 [I.V.:350]  Out: -     GEN: no apparent distress  CV:  Regular rate and rhythm, very soft I/VI systolic murmur. No rub.  LUNGS:  Clear to auscultation bilaterally without rales/rhonchi/wheezing/retractions.  Symmetric chest rise on inhalation noted.  ABD:  Active bowel sounds, soft, non-tender/non-distended.  No rebound/guarding/rigidity.  EXT:   Pitting LE edema.  No cyanosis.  No acute joint synovitis noted.  SKIN:  Dry to touch, RLE erythema unchanged today. Central erythema still quite notable RLE with warmth to touch.  Skin between toes macerated with tenia pedis infection     Medications       acetaminophen  1,000 mg Oral Q8H     atorvastatin  10 mg Oral Daily     carvedilol  3.125 mg Oral BID w/meals     cefTRIAXone  2 g Intravenous Q24H     ferrous sulfate  325 mg Oral BID     fexofenadine  180 mg Oral Daily     finasteride  5 mg Oral Daily     insulin aspart  1-7 Units Subcutaneous TID AC     insulin aspart  1-5 Units Subcutaneous At Bedtime     insulin aspart   Subcutaneous TID AC     insulin glargine  14 Units Subcutaneous QAM AC     latanoprost  1 drop Both Eyes At Bedtime     levothyroxine  88 mcg Oral Daily     [Held by provider] losartan  100 mg Oral Daily     miconazole   Topical BID     pantoprazole  40 mg Oral Daily     sodium chloride (PF)  3 mL Intracatheter Q8H     tamsulosin  0.8 mg Oral Daily     Data     Recent Labs   Lab 05/11/21  0630 05/10/21  0617 05/09/21  0546   WBC 6.0 5.5 8.0   HGB 9.2* 9.3* 9.6*   HCT 30.0* 30.2* 30.9*   MCV 89 89 89    161 158       Recent Labs   Lab 05/11/21  0630 05/10/21  0617 05/09/21  0546    140 139   POTASSIUM 4.1 3.8 4.6   CHLORIDE 116* 112* 110*   CO2 22 25 26   ANIONGAP 6 3 3   * 131* 124*   BUN 27 31* 38*   CR 0.97 1.07 1.45*   GFRESTIMATED 75 66 46*   GFRESTBLACK 86 76 53*   ANIBAL 8.0* 7.9* 8.1*   PROTTOTAL 5.7*  --   --    ALBUMIN 2.5*  --   --    BILITOTAL 0.6  --   --    ALKPHOS 55  --   --    AST 68*  --   --    ALT 73*  --   --        No results found for this or any previous visit (from the past 24 hour(s)).

## 2021-05-12 NOTE — PROGRESS NOTES
Service Date: 05/11/2021    SUBJECTIVE:  Mr. Strange is a 80-year-old gentleman with multiple medical problems including hypertension and diabetes mellitus.  The patient is admitted with right lower extremity cellulitis.  Hematology consulted for anemia.  The patient has a normocytic anemia.  He had multiple workups done.  Recent EGD and colonoscopy did not reveal any bleeding.  I had ordered some labs.  He does have iron deficiency.  Vitamin B12, folate and TSH are normal.    The patient's anemia is due to iron deficiency and anemia of chronic disease. Another possibility is myelodysplastic syndrome.    I discussed regarding iron deficiency.  The patient denies bleeding.  EGD and colonoscopy have not revealed any bleeding. The patient has been on oral iron.  In spite of that, his iron and saturation index are very low.    The patient is being treated for cellulitis.  He is slowly improving.  His leg pain, swelling and redness or better.    PHYSICAL EXAMINATION:    GENERAL:  He is alert and oriented x 3.  VITAL SIGNS:  Rest of systems not examined.    LABS:  Reviewed.    ASSESSMENT:    1.  A 80-year-old gentleman with normocytic anemia secondary to iron deficiency, anemia of chronic kidney disease and ? myelodysplastic syndrome.  2.  Right lower extremity cellulitis.    PLAN:    1.  Labs were reviewed with the patient.  I explained to him he has iron deficiency. The patient has been on oral iron.  That is not helping.  We will give him IV Venofer 300 mg.  2.  We will see him in clinic in about 4 weeks' time with CBC and iron studies.  Further IV iron will be given if he is still iron deficient in the clinic.  We will discuss regarding bone marrow biopsy to rule out myelodysplastic syndrome.  He is agreeable for this plan.  3.  The patient had few questions, which were all answered. Called his wife and updated her. Oncology will sign off.    Chandrakant Panchal MD        D: 05/11/2021   T: 05/11/2021   MT: [facility]    Name:      YESI DENISE  MRN:      [id number]        Account:      [id number]   :      1940           Service Date: 2021       Document: [id number]

## 2021-05-12 NOTE — PROGRESS NOTES
Therapy: IV ABX  Insurance: [facility] MEDICARE PLAN     Patient Timmy Strange does not have iv abx coverage with his [facility] Medicare plan, he is self-pay. Cost for Ceftriaxone 2gm Q24 is $33.70 per day for drug and supplies, nursing is only covered if he is HB if not I charges $90.00 per visit.    He should have coverage in a TCU or infusion center    In reference to admission date 05/08/2021 [facility] to check iv abx coverage       Please contact Intake with any questions, [phone] or In Basket pool,  Home Infusion ([phone]).

## 2021-05-12 NOTE — PLAN OF CARE
Temp max 99.7. Right leg reddened and warm.Denies numbness or tingling. Pedal pulses 1+. Foam wedge obtained and elevation encouraged . Up independently. Denies pain. No change in condition of right lower leg. Await ID consult.

## 2021-05-12 NOTE — PROGRESS NOTES
Alert and oriented x4. VSS. CMS intact. Outlined reddened area to RLE remains same, still with +2 edema. Verbalizes RLE pain especially upon getting out of bed and standing, but pain goes away after a while. No PRN meds given. NS 0.9% infusing at 50 ml/hour.

## 2021-05-12 NOTE — CONSULTS
Care Management Initial Consult    General Information  Assessment completed with: Spouse or significant otherLadan [phone]  Type of CM/SW Visit: Initial Assessment  Primary Care Provider verified and updated as needed:     Readmission within the last 30 days: no previous admission in last 30 days    Advance Care Planning: Advance Care Planning Reviewed: present on chart      Communication Assessment  Patient's communication style: spoken language (English or Bilingual)    Hearing Difficulty or Deaf: yes   Wear Glasses or Blind: yes  Cognitive  Cognitive/Neuro/Behavioral: WDL                    Living Environment:   People in home: spouse     Current living Arrangements: house      Able to return to prior arrangements:       Family/Social Support:  Care provided by:  Self,spouse  Provides care for:     Description of Support System:       Current Resources:   Patient receiving home care services:       Community Resources:    Equipment currently used at home: none  Supplies currently used at home:      Employment/Financial:  Employment Status:        Financial Concerns:      Lifestyle & Psychosocial Needs:        Socioeconomic History     Marital status:      Spouse name: Not on file     Number of children: Not on file     Years of education: Not on file     Highest education level: Not on file     Tobacco Use     Smoking status: Former Smoker     Packs/day: 0.00     Types: Pipe     Quit date: 11/15/2011     Years since quittin.4     Smokeless tobacco: Never Used   Substance and Sexual Activity     Alcohol use: No     Alcohol/week: 0.0 standard drinks     Drug use: No     Sexual activity: Yes     Partners: Female       Functional Status:  Prior to admission patient needed assistance: none   Mental Health Status:      Chemical Dependency Status:  Values/Beliefs:  Spiritual, Cultural Beliefs, Rastafarian Practices, Values that affect care:                 Additional Information:  Spoke at length w/  spouse Ladan. Discharge plan is not yet determined.  Ladan has many concerns regarding Timmy at home. She works full time, and does not feel currently that there is enough support for him to be alone at home for any length of time  We discussed PT eval from 5/9 which did not identify any needs. She feels he is weaker/more deconditioned the last few days. She doesn't feel comfortable w/suggestion of HHC at this time. She feels TCU would be beneficial'  I encouraged her to come in to see her spouse- to see how he is doing. she did not think any visitors were allowed.  Await ID consult/plan ( +/- IVAbx?) Referral sent to check benefits [facility] in case IVAbx are ordered    Went to discuss w/ bedside RN. Patient is up ambulating independently in the halls at this time.    Addendum 1600  Per [facility]:  This patient does not have iv abx coverage with his [facility] Medicare plan, he is self-pay. Cost for Ceftriaxone 2gm Q24 is $33.70 per day for drug and supplies, nursing is only covered if he is HB if not I charges $90.00 per visit.  He should have coverage in a TCU or infusion center

## 2021-05-13 ENCOUNTER — PRE VISIT (OUTPATIENT)
Dept: ONCOLOGY | Facility: CLINIC | Age: 81
End: 2021-05-13

## 2021-05-13 LAB
GLUCOSE BLDC GLUCOMTR-MCNC: 118 MG/DL (ref 70–99)
GLUCOSE BLDC GLUCOMTR-MCNC: 132 MG/DL (ref 70–99)
GLUCOSE BLDC GLUCOMTR-MCNC: 148 MG/DL (ref 70–99)
GLUCOSE BLDC GLUCOMTR-MCNC: 187 MG/DL (ref 70–99)
GLUCOSE BLDC GLUCOMTR-MCNC: 221 MG/DL (ref 70–99)

## 2021-05-13 PROCEDURE — 250N000013 HC RX MED GY IP 250 OP 250 PS 637: Performed by: PHYSICIAN ASSISTANT

## 2021-05-13 PROCEDURE — 250N000013 HC RX MED GY IP 250 OP 250 PS 637: Performed by: INTERNAL MEDICINE

## 2021-05-13 PROCEDURE — 250N000009 HC RX 250: Performed by: INTERNAL MEDICINE

## 2021-05-13 PROCEDURE — 99233 SBSQ HOSP IP/OBS HIGH 50: CPT | Performed by: INTERNAL MEDICINE

## 2021-05-13 PROCEDURE — 120N000001 HC R&B MED SURG/OB

## 2021-05-13 PROCEDURE — 999N001017 HC STATISTIC GLUCOSE BY METER IP

## 2021-05-13 PROCEDURE — 250N000012 HC RX MED GY IP 250 OP 636 PS 637: Performed by: INTERNAL MEDICINE

## 2021-05-13 PROCEDURE — 250N000011 HC RX IP 250 OP 636: Performed by: INTERNAL MEDICINE

## 2021-05-13 RX ADMIN — ACETAMINOPHEN 1000 MG: 500 TABLET, FILM COATED ORAL at 21:29

## 2021-05-13 RX ADMIN — CEFTRIAXONE SODIUM 2 G: 2 INJECTION, POWDER, FOR SOLUTION INTRAMUSCULAR; INTRAVENOUS at 12:46

## 2021-05-13 RX ADMIN — INSULIN ASPART 2 UNITS: 100 INJECTION, SOLUTION INTRAVENOUS; SUBCUTANEOUS at 17:52

## 2021-05-13 RX ADMIN — CARVEDILOL 3.12 MG: 3.12 TABLET, FILM COATED ORAL at 17:15

## 2021-05-13 RX ADMIN — ACETAMINOPHEN 1000 MG: 500 TABLET, FILM COATED ORAL at 06:02

## 2021-05-13 RX ADMIN — INSULIN GLARGINE 14 UNITS: 100 INJECTION, SOLUTION SUBCUTANEOUS at 08:53

## 2021-05-13 RX ADMIN — Medication 1 CAPSULE: at 08:34

## 2021-05-13 RX ADMIN — MICONAZOLE NITRATE: 20 CREAM TOPICAL at 21:34

## 2021-05-13 RX ADMIN — ACETAMINOPHEN 1000 MG: 500 TABLET, FILM COATED ORAL at 14:00

## 2021-05-13 RX ADMIN — CLINDAMYCIN IN 5 PERCENT DEXTROSE 600 MG: 12 INJECTION, SOLUTION INTRAVENOUS at 07:43

## 2021-05-13 RX ADMIN — FERROUS SULFATE TAB 325 MG (65 MG ELEMENTAL FE) 325 MG: 325 (65 FE) TAB at 08:48

## 2021-05-13 RX ADMIN — CARVEDILOL 3.12 MG: 3.12 TABLET, FILM COATED ORAL at 08:48

## 2021-05-13 RX ADMIN — INSULIN ASPART 1 UNITS: 100 INJECTION, SOLUTION INTRAVENOUS; SUBCUTANEOUS at 12:52

## 2021-05-13 RX ADMIN — TAMSULOSIN HYDROCHLORIDE 0.8 MG: 0.4 CAPSULE ORAL at 08:35

## 2021-05-13 RX ADMIN — ATORVASTATIN CALCIUM 10 MG: 10 TABLET, FILM COATED ORAL at 08:34

## 2021-05-13 RX ADMIN — CLINDAMYCIN IN 5 PERCENT DEXTROSE 600 MG: 12 INJECTION, SOLUTION INTRAVENOUS at 17:18

## 2021-05-13 RX ADMIN — FINASTERIDE 5 MG: 5 TABLET, FILM COATED ORAL at 08:35

## 2021-05-13 RX ADMIN — LEVOTHYROXINE SODIUM 88 MCG: 88 TABLET ORAL at 06:04

## 2021-05-13 RX ADMIN — Medication 1 CAPSULE: at 21:29

## 2021-05-13 RX ADMIN — CLINDAMYCIN IN 5 PERCENT DEXTROSE 600 MG: 12 INJECTION, SOLUTION INTRAVENOUS at 01:41

## 2021-05-13 RX ADMIN — LATANOPROST 1 DROP: 50 SOLUTION/ DROPS OPHTHALMIC at 21:53

## 2021-05-13 RX ADMIN — FEXOFENADINE HYDROCHLORIDE 180 MG: 180 TABLET, FILM COATED ORAL at 08:34

## 2021-05-13 RX ADMIN — MICONAZOLE NITRATE: 20 CREAM TOPICAL at 08:39

## 2021-05-13 RX ADMIN — PANTOPRAZOLE SODIUM 40 MG: 40 TABLET, DELAYED RELEASE ORAL at 06:04

## 2021-05-13 RX ADMIN — FERROUS SULFATE TAB 325 MG (65 MG ELEMENTAL FE) 325 MG: 325 (65 FE) TAB at 21:29

## 2021-05-13 ASSESSMENT — ACTIVITIES OF DAILY LIVING (ADL)
ADLS_ACUITY_SCORE: 17

## 2021-05-13 NOTE — PROGRESS NOTES
"Care Management Follow Up    Length of Stay (days): 5    Expected Discharge Date: 05/15/21     Concerns to be Addressed:       Patient plan of care discussed at interdisciplinary rounds: Yes    Anticipated Discharge Disposition:       Anticipated Discharge Services:    Anticipated Discharge DME:      Patient/family educated on Medicare website which has current facility and service quality ratings:    Education Provided on the Discharge Plan:    Patient/Family in Agreement with the Plan:      Referrals Placed by CM/SW:    Private pay costs discussed: Not applicable    Additional Information:  Pt and wife requesting a \"care conference\" to discuss discharge planning.  CC met with Patient and wife (by phone).  Wife voices Pt still has a lot of anxiety about going home and \"caring for himself\" and \"what to do\".  Reviewed therapy recommendation and staff notes that indicate pt is Indep.  ID feels that patient will discharge on oral antibiotics.  Reviewed that no wound care is needed.  Pt is concerned about putting on socks, other ADL\"s and walking the dog.  Wife voices she will take care of dog.  Discussed options for Home Care, private duty care or having friends/family help out.   CC requested OT evaluation by MD  Encouraged Pt to work with staff on ADL's (bedside nurse updated as well)    Pt/family was provided with the Medicare Compare list for Home Care.  Discussed associated Medicare star ratings to assist with choice for referrals/discharge planning Yes    Education was given to pt/family that star ratings are updated/maintained by Medicare and can be reviewed by visiting www.medicare.gov Yes    Pt wife to consider Home care.  Would like RN visits 2-3 times a week.    Per [facility]:The frequency and duration of visits is determined by the admitting nurse upon assessment at home (and then RN requests orders from PCP for the anticipated visits). Unfortunately, we can t give an answer to this question prior to " discharge. I think 2 visits a week for 2 weeks is a reasonable expectation given this patients reason for hospitalization and medical history (I also think PT would be a good idea considering the falls and weakness), but again it depends on the patient s skilled needs and the assessment of the clinician.    Pt/wife also want to look at [facility].  CC to call on 5/14.     Await OT recommendation  Family to consider Home with Home RN (agency TBD)    Resources given on cellulitis, ,private duty home care, senior linkage line.     Pt/wife wish to make own appointments.   Anticipate discharge 2-3 days pend abx plan.   Much reassurance and education provided by CC throughout visit.     CC to follow for discharge plan.        Kat Abrams, RN

## 2021-05-13 NOTE — PLAN OF CARE
Patient vital signs are at baseline: Yes  Patient able to ambulate as they were prior to admission or with assist devices provided by therapies during their stay:  Yes  Patient MUST void prior to discharge:  Yes  Patient able to tolerate oral intake:  Yes  Pain has adequate pain control using Oral analgesics:  Yes    AOx4, VSS on RA, except bradycardia noted, Started on CLindamycin and Ceftriaxone abx. Plan to discharge in 1-2 days pending resolution of cellulitis.

## 2021-05-13 NOTE — PLAN OF CARE
A&O.  VSS, RA.  CMS intact.  Pain managed with tylenol.  Up ind in hallway.  Redness on RLE unchanged.  On IV abx.  Discharge pending.

## 2021-05-13 NOTE — PROGRESS NOTES
"SPIRITUAL HEALTH SERVICES Progress Note  [facility]    Visit with pt, per his lengthy hospital stay.  Pt briefly talked about his current health concerns, and shared a few details about his personal history.  Pt then said he had \"no need for emotional support.\"   team available per need or request.                                                                                                                                                 Inez [address] M.A.  Staff   Phone [phone]      "

## 2021-05-13 NOTE — PROGRESS NOTES
[facility]  Hospitalist Progress Note    Name: Timmy Strange    MRN: [id number]      Summary of Stay:  81 yo M w/hx of DM2 on insulin who presents with right leg erythema with associated chills. Reports he has had some recent falls bumping his right leg, but no open sores or wounds. Denies history of MRSA. Temperature of 100F at [place]. On exam right lower extremity erythema and warmth from foot up into thigh.  Since admission erythema has begun improving however his creatinine has worsened.    Assessment & Plan      RLE Cellulitis:  Tenia pedis  -  At outside facility D-dimer elevated, but ultrasound reportedly negative for DVT  -  Vancomycin started initially but stopped as no definitive MRSA history.    - still with significant erythema and swelling / tenderness / not improving as expected  Plan:  -on IV zosyn cellulitis looks like possibly strep infection so switched to IV ceftriaxone 2grams daily on 5/12/21. Added clindamycin IV by ID   Started on probiotics while on clindamycin   Scheduled ty;enol to help with RLE pain   - ID consulted  Added micozole cream to the skin between toes to help with tenia pedis   Elevated RLE as tolerated to help with swelling   RLE erythema slightly improved today       CLAUDIA:  -  Patient with creatinine of 0.8 11/9/20.  On admission creat had increased to 1.2 and this AM is now 1.45.  This is possibly related to the infection or to the vancomycin.   Cr improved to  0.97 today   D/isabelle IVF on 5/12/21     Generalized weakness   Recent mechanical falls x2  Possible transient infectious encephalopathy:  Patient fell twice while in [place] on recent travel.  One time sounds like he lost balance due to his backpack slipping off, another time sounds like he was having trouble seeing in the dark which caused him to fall.  He reports some generalized weakness, but otherwise is independent and ambulatory at baseline.  Plan:  --PT/OT evaluation - no major needs  noted ok to go home per PT on discharge     Type 2 diabetes mellitus on chronic insulin:  PTA regimen is Humalog mix 75/2518 units every evening, 14 units every morning, with Metformin 1500 mg daily.  Hemoglobin A1c on recheck here is 5.6.  --Transitioned initially to glargine 14 units daily + aspart 1 unit per 20 grams of carbohydrates + sliding scale insulin.  Monitor trend today and further adjust as needed.  Plan:  --Holding PTA Metformin with acute infection and worsening renal function  --Given his low hemoglobin A1c, may need to consider keeping his insulin dosing lower at discharge.  --Carbohydrate controlled diet   Blood sugars are well controlled currently     Hyperlipidemia  Hypertension:  --Continue PTA statin  --Continue PTA carvedilol 3.125 mg twice daily  Plan:  - placed losartan on HOLD due to CLAUDIA, can likely resume at discharge     Hypothyroidism:  --Continue PTA Synthroid.     BPH  --Continue PTA Flomax and Proscar.     Anemia:  Per patient and spouse, uncertain etiology.  He has a hematology appointment scheduled next week.  He has had negative EGD and colonoscopy recently.  --Monitor hgb for now, slightly lower but after initial significant IVF.  Plan:  - hematology consulted -> bone marrow biopsy as outpatient.  That will help to look for myelodysplastic syndrome.      Incidentally noted slight cardiac murmur:  -  No acute cardiac complaints.  Consider outpatient echo.         COVID Status:  COVID-19 PCR Results    COVID-19 PCR Results 1/28/21 1/28/21 3/12/21 3/12/21    1124 1124 1122 1122   COVID-19 Virus PCR to [facility] - Result Test received-See reflex to IDDL test SARS CoV2 (COVID-19) Virus RT-PCR  Test received-See reflex to IDDL test SARS CoV2 (COVID-19) Virus RT-PCR    COVID-19 Virus PCR to [facility] - Source Nasopharyngeal  Nasopharyngeal    SARS-CoV-2 Virus Specimen Source  Nasopharyngeal  Nasopharyngeal   SARS-CoV-2 PCR Result  NEGATIVE  NEGATIVE      Comments are available for some  flowsheets but are not being displayed.         COVID-19 Antibody Results, Testing for Immunity    COVID-19 Antibody Results, Testing for Immunity   No data to display.            Diet: Consistent Carbohydrate Diet 7203-2764 Calories: Moderate Consistent CHO (4-6 CHO units/meal)    DVT Prophylaxis:  Ambulation, heparin/lovenox not ordered with recent anemia issues.  If mobility declines could reconsider addition.  Tim Catheter: not present  Code Status: Full Code      Disposition Plan      Expected discharge in 2days when OK with ID and cellulitis improved     Erica Sullvian MD   Page [phone](7AM-6PM)         Patient, family, interdisciplinary team involved in care and agrees with plan.  Total time - Greater than 35 min. More than 50% of time spent in direct patient care, care coordination, patient/caregiver counseling, and formalizing plan of care.       Interval History      No acute events overnight, history reviewed.  RLE erythema slightly better since yesterday. C/o RLE pain limiting walking     Denies chest pain, nausea, other new complaints. No fevers or chills. No other complaints    -Data reviewed today: I reviewed all new labs and imaging reports over the last 24 hours. I personally reviewed no images or EKG's today.    Physical Exam   Temp: 98.8  F (37.1  C) Temp src: Oral BP: 139/54 Pulse: 67   Resp: 16 SpO2: 93 % O2 Device: None (Room air)    Vitals:    05/08/21 1526   Weight: 87.3 kg (192 lb 8 oz)     Vital Signs with Ranges  Temp:  [98.3  F (36.8  C)-99.8  F (37.7  C)] 98.8  F (37.1  C)  Pulse:  [54-74] 67  Resp:  [16-20] 16  BP: ()/(36-63) 139/54  SpO2:  [91 %-98 %] 93 %  I/O last 3 completed shifts:  In: 526 [P.O.:520; I.V.:6]  Out: -     GEN: no apparent distress  CV:  Regular rate and rhythm, very soft I/VI systolic murmur. No rub.  LUNGS:  Clear to auscultation bilaterally without rales/rhonchi/wheezing/retractions.  Symmetric chest rise on inhalation noted.  ABD:  Active bowel sounds,  soft, non-tender/non-distended.  No rebound/guarding/rigidity.  EXT:  Pitting LE edema.  No cyanosis.  No acute joint synovitis noted.  SKIN:  Dry to touch, RLE erythema very slow to improve . Central erythema still quite notable RLE with warmth to touch.  Skin between toes macerated with tenia pedis infection     Medications       acetaminophen  1,000 mg Oral Q8H     atorvastatin  10 mg Oral Daily     carvedilol  3.125 mg Oral BID w/meals     cefTRIAXone  2 g Intravenous Q24H     clindamycin  600 mg Intravenous Q8H     ferrous sulfate  325 mg Oral BID     fexofenadine  180 mg Oral Daily     finasteride  5 mg Oral Daily     insulin aspart  1-7 Units Subcutaneous TID AC     insulin aspart  1-5 Units Subcutaneous At Bedtime     insulin aspart   Subcutaneous TID AC     insulin glargine  14 Units Subcutaneous QAM AC     lactobacillus rhamnosus (GG)  1 capsule Oral BID     latanoprost  1 drop Both Eyes At Bedtime     levothyroxine  88 mcg Oral Daily     [Held by provider] losartan  100 mg Oral Daily     miconazole   Topical BID     pantoprazole  40 mg Oral Daily     sodium chloride (PF)  3 mL Intracatheter Q8H     tamsulosin  0.8 mg Oral Daily     Data     Recent Labs   Lab 05/11/21  0630 05/10/21  0617 05/09/21  0546   WBC 6.0 5.5 8.0   HGB 9.2* 9.3* 9.6*   HCT 30.0* 30.2* 30.9*   MCV 89 89 89    161 158       Recent Labs   Lab 05/11/21  0630 05/10/21  0617 05/09/21  0546    140 139   POTASSIUM 4.1 3.8 4.6   CHLORIDE 116* 112* 110*   CO2 22 25 26   ANIONGAP 6 3 3   * 131* 124*   BUN 27 31* 38*   CR 0.97 1.07 1.45*   GFRESTIMATED 75 66 46*   GFRESTBLACK 86 76 53*   ANIBAL 8.0* 7.9* 8.1*   PROTTOTAL 5.7*  --   --    ALBUMIN 2.5*  --   --    BILITOTAL 0.6  --   --    ALKPHOS 55  --   --    AST 68*  --   --    ALT 73*  --   --        No results found for this or any previous visit (from the past 24 hour(s)).

## 2021-05-13 NOTE — PROGRESS NOTES
[facility]    Infectious Disease Progress Note    Date of Service (when I saw the patient): 05/13/2021     Assessment & Plan   Timmy Strange is a 80 year old male who was admitted on 5/8/2021.     Impression:  1. 80 y.o male with diabetes, insulin dependent.   2. Arthritis   3. HTN, HLP  4. Admitted with fevers, chills, and right leg swelling and redness.   5. Started on vanco and zosyn, vanco stopped due to worsening creat.   6. Today switched to ceftriaxone.      Recommendations:   Continue on ceftriaxone and clindamycin   Added probiotics.   Keep the LE raised at all times sitting and lying down.   Streptococcal appearing cellulitis will take several days to improve. Anticipate next 2 -3 days in hospital on IV antibiotics.  Trend wbc and procal              Dipedward Swift MD    Interval History   Appears to be well, walking the hallway   Afebrile   The redness in the leg is about the same     Physical Exam   Temp: 98.8  F (37.1  C) Temp src: Oral BP: 139/54 Pulse: 67   Resp: 16 SpO2: 93 % O2 Device: None (Room air)    Vitals:    05/08/21 1526   Weight: 87.3 kg (192 lb 8 oz)     Vital Signs with Ranges  Temp:  [98.3  F (36.8  C)-99.8  F (37.7  C)] 98.8  F (37.1  C)  Pulse:  [54-74] 67  Resp:  [16-20] 16  BP: ()/(36-63) 139/54  SpO2:  [91 %-98 %] 93 %    Constitutional: Awake, alert, cooperative, no apparent distress  Lungs: Clear to auscultation bilaterally, no crackles or wheezing  Cardiovascular: Regular rate and rhythm, normal S1 and S2, and no murmur noted  Abdomen: Normal bowel sounds, soft, non-distended, non-tender  Skin: right leg cellulitis and swelling   Other:    Medications       acetaminophen  1,000 mg Oral Q8H     atorvastatin  10 mg Oral Daily     carvedilol  3.125 mg Oral BID w/meals     cefTRIAXone  2 g Intravenous Q24H     clindamycin  600 mg Intravenous Q8H     ferrous sulfate  325 mg Oral BID     fexofenadine  180 mg Oral Daily     finasteride  5 mg Oral Daily      insulin aspart  1-7 Units Subcutaneous TID AC     insulin aspart  1-5 Units Subcutaneous At Bedtime     insulin aspart   Subcutaneous TID AC     insulin glargine  14 Units Subcutaneous QAM AC     lactobacillus rhamnosus (GG)  1 capsule Oral BID     latanoprost  1 drop Both Eyes At Bedtime     levothyroxine  88 mcg Oral Daily     [Held by provider] losartan  100 mg Oral Daily     miconazole   Topical BID     pantoprazole  40 mg Oral Daily     sodium chloride (PF)  3 mL Intracatheter Q8H     tamsulosin  0.8 mg Oral Daily       Data   All microbiology laboratory data reviewed.  Recent Labs   Lab Test 05/11/21  0630 05/10/21  0617 05/09/21  0546   WBC 6.0 5.5 8.0   HGB 9.2* 9.3* 9.6*   HCT 30.0* 30.2* 30.9*   MCV 89 89 89    161 158     Recent Labs   Lab Test 05/11/21  0630 05/10/21  0617 05/09/21  0546   CR 0.97 1.07 1.45*     No lab results found.  Recent Labs   Lab Test 07/15/16  1537   CULT No Beta Streptococcus isolated       Attestation:  Total time on the floor involved in the patient's care: 35 minutes. Total time spent in counseling/care coordination: >50%

## 2021-05-13 NOTE — PROGRESS NOTES
Vitals stable. RLE remains reddened slightly improved. In bed much of day with RLE elevated. Pulses 1+ bilaterally. Taking tylenol every 8 hours with adequate pain control. Up ad teresa in hallway. Continue with scheduled antibx.

## 2021-05-14 ENCOUNTER — APPOINTMENT (OUTPATIENT)
Dept: OCCUPATIONAL THERAPY | Facility: CLINIC | Age: 81
DRG: 603 | End: 2021-05-14
Attending: INTERNAL MEDICINE
Payer: COMMERCIAL

## 2021-05-14 ENCOUNTER — APPOINTMENT (OUTPATIENT)
Dept: PHYSICAL THERAPY | Facility: CLINIC | Age: 81
DRG: 603 | End: 2021-05-14
Attending: INTERNAL MEDICINE
Payer: COMMERCIAL

## 2021-05-14 LAB
BASOPHILS # BLD AUTO: 0 10E9/L (ref 0–0.2)
BASOPHILS NFR BLD AUTO: 0.3 %
DIFFERENTIAL METHOD BLD: NORMAL
EOSINOPHIL # BLD AUTO: 0.1 10E9/L (ref 0–0.7)
EOSINOPHIL NFR BLD AUTO: 2.1 %
GLUCOSE BLDC GLUCOMTR-MCNC: 119 MG/DL (ref 70–99)
GLUCOSE BLDC GLUCOMTR-MCNC: 127 MG/DL (ref 70–99)
GLUCOSE BLDC GLUCOMTR-MCNC: 190 MG/DL (ref 70–99)
GLUCOSE BLDC GLUCOMTR-MCNC: 195 MG/DL (ref 70–99)
GLUCOSE BLDC GLUCOMTR-MCNC: 220 MG/DL (ref 70–99)
IMM GRANULOCYTES # BLD: 0.1 10E9/L (ref 0–0.4)
IMM GRANULOCYTES NFR BLD: 0.8 %
LYMPHOCYTES # BLD AUTO: 0.9 10E9/L (ref 0.8–5.3)
LYMPHOCYTES NFR BLD AUTO: 14.6 %
MONOCYTES # BLD AUTO: 0.7 10E9/L (ref 0–1.3)
MONOCYTES NFR BLD AUTO: 10.8 %
NEUTROPHILS # BLD AUTO: 4.5 10E9/L (ref 1.6–8.3)
NEUTROPHILS NFR BLD AUTO: 71.4 %
NRBC # BLD AUTO: 0 10*3/UL
NRBC BLD AUTO-RTO: 0 /100
WBC # BLD AUTO: 6.3 10E9/L (ref 4–11)

## 2021-05-14 PROCEDURE — 85048 AUTOMATED LEUKOCYTE COUNT: CPT | Performed by: INTERNAL MEDICINE

## 2021-05-14 PROCEDURE — 250N000009 HC RX 250: Performed by: INTERNAL MEDICINE

## 2021-05-14 PROCEDURE — 250N000013 HC RX MED GY IP 250 OP 250 PS 637: Performed by: INTERNAL MEDICINE

## 2021-05-14 PROCEDURE — 97535 SELF CARE MNGMENT TRAINING: CPT | Mod: GO

## 2021-05-14 PROCEDURE — 99233 SBSQ HOSP IP/OBS HIGH 50: CPT | Performed by: INTERNAL MEDICINE

## 2021-05-14 PROCEDURE — 250N000011 HC RX IP 250 OP 636: Performed by: INTERNAL MEDICINE

## 2021-05-14 PROCEDURE — 97530 THERAPEUTIC ACTIVITIES: CPT | Mod: GP | Performed by: PHYSICAL THERAPIST

## 2021-05-14 PROCEDURE — 36415 COLL VENOUS BLD VENIPUNCTURE: CPT | Performed by: INTERNAL MEDICINE

## 2021-05-14 PROCEDURE — 97165 OT EVAL LOW COMPLEX 30 MIN: CPT | Mod: GO

## 2021-05-14 PROCEDURE — 250N000012 HC RX MED GY IP 250 OP 636 PS 637: Performed by: INTERNAL MEDICINE

## 2021-05-14 PROCEDURE — 250N000013 HC RX MED GY IP 250 OP 250 PS 637: Performed by: PHYSICIAN ASSISTANT

## 2021-05-14 PROCEDURE — 999N001017 HC STATISTIC GLUCOSE BY METER IP

## 2021-05-14 PROCEDURE — 120N000001 HC R&B MED SURG/OB

## 2021-05-14 PROCEDURE — 97116 GAIT TRAINING THERAPY: CPT | Mod: GP | Performed by: PHYSICAL THERAPIST

## 2021-05-14 PROCEDURE — 85004 AUTOMATED DIFF WBC COUNT: CPT | Performed by: INTERNAL MEDICINE

## 2021-05-14 RX ADMIN — Medication 1 CAPSULE: at 21:01

## 2021-05-14 RX ADMIN — Medication 1 CAPSULE: at 08:38

## 2021-05-14 RX ADMIN — INSULIN GLARGINE 14 UNITS: 100 INJECTION, SOLUTION SUBCUTANEOUS at 08:34

## 2021-05-14 RX ADMIN — MICONAZOLE NITRATE: 20 CREAM TOPICAL at 11:22

## 2021-05-14 RX ADMIN — CARVEDILOL 3.12 MG: 3.12 TABLET, FILM COATED ORAL at 17:05

## 2021-05-14 RX ADMIN — TAMSULOSIN HYDROCHLORIDE 0.8 MG: 0.4 CAPSULE ORAL at 08:39

## 2021-05-14 RX ADMIN — ACETAMINOPHEN 1000 MG: 500 TABLET, FILM COATED ORAL at 06:18

## 2021-05-14 RX ADMIN — FEXOFENADINE HYDROCHLORIDE 180 MG: 180 TABLET, FILM COATED ORAL at 08:41

## 2021-05-14 RX ADMIN — CLINDAMYCIN IN 5 PERCENT DEXTROSE 600 MG: 12 INJECTION, SOLUTION INTRAVENOUS at 08:32

## 2021-05-14 RX ADMIN — CARVEDILOL 3.12 MG: 3.12 TABLET, FILM COATED ORAL at 08:39

## 2021-05-14 RX ADMIN — PANTOPRAZOLE SODIUM 40 MG: 40 TABLET, DELAYED RELEASE ORAL at 06:18

## 2021-05-14 RX ADMIN — CLINDAMYCIN IN 5 PERCENT DEXTROSE 600 MG: 12 INJECTION, SOLUTION INTRAVENOUS at 01:25

## 2021-05-14 RX ADMIN — LATANOPROST 1 DROP: 50 SOLUTION/ DROPS OPHTHALMIC at 21:11

## 2021-05-14 RX ADMIN — INSULIN ASPART 2 UNITS: 100 INJECTION, SOLUTION INTRAVENOUS; SUBCUTANEOUS at 12:33

## 2021-05-14 RX ADMIN — ACETAMINOPHEN 1000 MG: 500 TABLET, FILM COATED ORAL at 21:01

## 2021-05-14 RX ADMIN — MICONAZOLE NITRATE: 20 CREAM TOPICAL at 21:03

## 2021-05-14 RX ADMIN — FERROUS SULFATE TAB 325 MG (65 MG ELEMENTAL FE) 325 MG: 325 (65 FE) TAB at 08:42

## 2021-05-14 RX ADMIN — ACETAMINOPHEN 1000 MG: 500 TABLET, FILM COATED ORAL at 15:15

## 2021-05-14 RX ADMIN — FERROUS SULFATE TAB 325 MG (65 MG ELEMENTAL FE) 325 MG: 325 (65 FE) TAB at 21:01

## 2021-05-14 RX ADMIN — INSULIN ASPART 2 UNITS: 100 INJECTION, SOLUTION INTRAVENOUS; SUBCUTANEOUS at 17:03

## 2021-05-14 RX ADMIN — CLINDAMYCIN IN 5 PERCENT DEXTROSE 600 MG: 12 INJECTION, SOLUTION INTRAVENOUS at 15:22

## 2021-05-14 RX ADMIN — CEFTRIAXONE SODIUM 2 G: 2 INJECTION, POWDER, FOR SOLUTION INTRAMUSCULAR; INTRAVENOUS at 11:20

## 2021-05-14 RX ADMIN — ATORVASTATIN CALCIUM 10 MG: 10 TABLET, FILM COATED ORAL at 08:42

## 2021-05-14 RX ADMIN — FINASTERIDE 5 MG: 5 TABLET, FILM COATED ORAL at 08:42

## 2021-05-14 RX ADMIN — LEVOTHYROXINE SODIUM 88 MCG: 88 TABLET ORAL at 06:18

## 2021-05-14 ASSESSMENT — ACTIVITIES OF DAILY LIVING (ADL)
ADLS_ACUITY_SCORE: 18
ADLS_ACUITY_SCORE: 18
ADLS_ACUITY_SCORE: 17

## 2021-05-14 NOTE — PROGRESS NOTES
05/14/21 1025   Quick Adds   Type of Visit Initial Occupational Therapy Evaluation   Living Environment   People in home spouse   Current Living Arrangements house   Home Accessibility stairs within home   Number of Stairs, Within Home, Primary 8   Transportation Anticipated car, drives self   Living Environment Comments Bed on main floor. Reports spouse is working from home   Self-Care   Usual Activity Tolerance good   Current Activity Tolerance moderate   Equipment Currently Used at Home none   Activity/Exercise/Self-Care Comment At baseline is independent in self-cares without a gait aid. Reports floor level shower with nonslip floor and comfort height toilket   Disability/Function   Fall history within last six months yes   Number of times patient has fallen within last six months 2   General Information   Onset of Illness/Injury or Date of Surgery 05/08/21   Referring Physician Erica Sullivan MD   Patient/Family Therapy Goal Statement (OT) Return home   Additional Occupational Profile Info/Pertinent History of Current Problem w/hx of DM2 on insulin who presents with right leg erythema with associated chills. Found to have RLE cellulitis and CLAUDIA.    Existing Precautions/Restrictions   (elevate RLE. WBAT)   Cognitive Status Examination   Orientation Status orientation to person, place and time   Affect/Mental Status (Cognitive) WFL   Follows Commands follows multi-step commands   Visual Perception   Visual Impairment/Limitations corrective lenses full-time   Sensory   Sensory Comments Pt denies BUE/BLE numbness or tingling   Pain Assessment   Patient Currently in Pain Yes, see Vital Sign flowsheet   Strength Comprehensive (MMT)   Comment, General Manual Muscle Testing (MMT) Assessment WFL   Bed Mobility   Comment (Bed Mobility) Pt demonstrated independence in bed mobility, sit to stand, ambulation without gait aid   Activities of Daily Living   BADL Assessment/Intervention lower body dressing   Lower Body  Dressing Assessment/Training   [place] Level (Lower Body Dressing) minimum assist (75% patient effort)   Instrumental Activities of Daily Living (IADL)   Previous Responsibilities laundry;housekeeping;driving;medication management;finances   IADL Comments caring for dog. retired   Clinical Impression   Criteria for Skilled Therapeutic Interventions Met (OT) yes;meets criteria;skilled treatment is necessary   OT Diagnosis decline function   OT Problem List-Impairments impacting ADL activity tolerance impaired;balance   Assessment of Occupational Performance 1-3 Performance Deficits   Identified Performance Deficits strenuous IADLs, bathing, LB dressing   Planned Therapy Interventions (OT) ADL retraining;home program guidelines;progressive activity/exercise   Clinical Decision Making Complexity (OT) low complexity   Therapy Frequency (OT) 1x eval and treat   Anticipated Equipment Needs Upon Discharge (OT) reacher   Risk & Benefits of therapy have been explained evaluation/treatment results reviewed;care plan/treatment goals reviewed;risks/benefits reviewed;current/potential barriers reviewed;participants voiced agreement with care plan;participants included;patient   OT Discharge Planning    OT Discharge Recommendation (DC Rec) home   OT Rationale for DC Rec Pt appears to have a very good understanding of fall prevention strategies, self-care strategies, adaptive equipment recommendation, how to progress activity tolerance. Pt currently independent in mobility and demonstrated independence in light self-cares. Pt can have assist from spouse with strenuous IADLs if needed. No further OT services indicated; pt in agreement   Total Evaluation Time (Minutes)   Total Evaluation Time (Minutes) 6

## 2021-05-14 NOTE — PLAN OF CARE
Patient vital signs are at baseline: Yes  Patient able to ambulate as they were prior to admission or with assist devices provided by therapies during their stay:  No, Reason -- pt observed limping to BR this AM, reports RLE being more painful than usual, writer gave pt walker to use to ambulate to BR -- he's usually IND in   Patient MUST void prior to discharge:  Yes  Patient able to tolerate oral intake:  Yes  Pain has adequate pain control using Oral analgesics:  Yes    AOx4, VSS on RA, RLE erythema & edema +3. Pt anxious about swelling not resolving and verbalizes that wife wants him to discharge early prior to resolution of edema and redness. ID following, discharge pending resolution of swelling and redness.

## 2021-05-14 NOTE — PROGRESS NOTES
"Care Management Follow Up    Length of Stay (days): 6    Expected Discharge Date: 05/16/21(Home RN)     Concerns to be Addressed:       Patient plan of care discussed at interdisciplinary rounds: Yes    Anticipated Discharge Disposition:       Anticipated Discharge Services:    Anticipated Discharge DME:      Patient/family educated on Medicare website which has current facility and service quality ratings:    Education Provided on the Discharge Plan:    Patient/Family in Agreement with the Plan:      Referrals Placed by CM/SW:    Private pay costs discussed: Not applicable    Additional Information:  CC received wife from call Ladan this am to discuss pt's care.  She voices she was up to see patient last evening and is concerned with how anxious he is about cares, treatments, etc.  Ladan is frustrated with this and feels he can do more for himself.  She wants to support him and doesn't know how.  She wonders if Psychiatry consult would be helpful for patient's anxiety.  She then needed to take another call.  CC agreed to talk with both Pt/ at bedside this afternoon.   CC paged MD for [facility] consult and she feels this is not appropriate at this time.   CC spoke with Pt and wife Ladan at bedside. Pt voices that he is feeling much better today and feels he \"has turned a corner\"  More engaged than previous conversations.  Pt/wife had wanted to go to a cabin next week but now feel that may not be realistic as Pt is concerned about being out of town.  After working with PT/OT today Pt does not feel he needs home PT/OT.    Would like Home RN (assess vitals, leg, medications)    Pt/family was provided with the Medicare Compare list for Home Care.  Discussed associated Medicare star ratings to assist with choice for referrals/discharge planning Yes    Education was given to pt/family that star ratings are updated/maintained by Medicare and can be reviewed by visiting www.medicare.gov Yes    Heads up email sent to " [facility].  Note left for MD to add Home RN orders and F2F  CC on weekend to add information to AVS when ordered.    Wife would like to make Follow-up appointments.   Wife to transport  .         Kat Abrams RN

## 2021-05-14 NOTE — PLAN OF CARE
Physical Therapy Discharge Summary    Reason for therapy discharge:    All goals and outcomes met, no further needs identified.    Progress towards therapy goal(s). See goals on Care Plan in [facility] electronic health record for goal details.  Goals met    Therapy recommendation(s):    Continued therapy is recommended.  Rationale/Recommendations:  Pt will benefit from Home PT in order to assess home environment and ensure safety upon discharge. Pt is near baseline limited by pain. Pt reports he has been ambulating in hallways 6x/day.

## 2021-05-14 NOTE — PROGRESS NOTES
"BRIEF NUTRITION ASSESSMENT      REASON FOR ASSESSMENT:  Timmy Strange is a 80 year old male seen by Registered Dietitian for [facility]    NUTRITION HISTORY:  Visited with patient today via phone.  He notes that his appetite and intake are good - \"unfortunately!!\"  Patient also notes that he has been on a more restrictive diet while here in the hospital (than he would do at home).    CURRENT DIET AND INTAKE:  Diet:  Moderate CHO               Patient is consistently consuming 100% of meals     Breakfast this morning :  Pancake x 2, sausage, cola, Greek yogurt, and 1% milk.   Dinner last night:  Eggs, hashbrowns, yogurt, ham x 2, toast, and cola.    ANTHROPOMETRICS:  Height: 5' 7\"  Weight:  87.3 kg (193#)(5/8/)  Body mass index is 30.15 kg/m    Weight Status: Obesity Grade I BMI 30-34.9  IBW:  67.3 kg   %IBW: 130%  Weight History:   Patient notes that he usually runs around 198# but thinks that his weight is down due to the more restrictive diet he is on in the hospital.  \"Also with everything I've been through here\".  He does not have any concerns about recent or sudden weight loss.  \"Today they weighed me at 193.3#\".  Wt Readings from Last 10 Encounters:   05/08/21 87.3 kg (192 lb 8 oz)   03/16/21 89.4 kg (197 lb)   01/11/21 88.9 kg (196 lb)   12/22/20 88.9 kg (196 lb)   11/09/20 88.5 kg (195 lb)   01/28/20 94.3 kg (208 lb)   01/20/20 93.9 kg (207 lb)   12/16/19 94.6 kg (208 lb 8 oz)   10/16/19 94.1 kg (207 lb 8 oz)   06/10/19 96.6 kg (213 lb)         LABS:  Labs noted    MALNUTRITION:  Visual Nutrition Focused Physical Assessment (NFPA) not completed due to restrictions on face-to-face patient care during COVID-19 Pandemic. Do not suspect muscle/fat losses.  Patient does not meet two of the criteria necessary for diagnosing malnutrition.     NUTRITION INTERVENTION:  Nutrition Diagnosis:  No nutrition diagnosis at this time.    Implementation:  Nutrition Education:  Per Provider order if indicated.    FOLLOW " UP/MONITORING:   Will re-evaluate in 7 - 10 days, or sooner, if re-consulted.    Helen Delvalle RD, LD, CNSC   Clinical Dietitian - [facility]

## 2021-05-14 NOTE — PROGRESS NOTES
A&OX4.  Up independently.  RLE red/swollen.  C/o RLE pain when standing up.  Encouraged elevation on blue foam wedge.  Taking scheduled tylenol for pain.  IV abx given as ordered.  Blood glucose checks.

## 2021-05-14 NOTE — PROGRESS NOTES
[facility]    Infectious Disease Progress Note    Date of Service (when I saw the patient): 05/14/2021     Assessment & Plan   Timmy Strange is a 80 year old male who was admitted on 5/8/2021.     Impression:  1. 80 y.o male with diabetes, insulin dependent.   2. Arthritis   3. HTN, HLP  4. Admitted with fevers, chills, and right leg swelling and redness.   5. Started on vanco and zosyn, vanco stopped due to worsening creat.   6. Today switched to ceftriaxone.      Recommendations:   Continue on ceftriaxone and clindamycin   Added probiotics.   Keep the LE raised at all times sitting and lying down.   Streptococcal appearing cellulitis will take several days to improve. Anticipate next 2 -3 days in hospital on IV antibiotics.  Trend wbc and procal              Dipi MD Jamison    Interval History   Appears to be well, walking the hallway   Afebrile   The redness in the leg is about the same     Physical Exam   Temp: 98.9  F (37.2  C) Temp src: Oral BP: (!) 145/52 Pulse: 66   Resp: 16 SpO2: 95 % O2 Device: None (Room air)    Vitals:    05/08/21 1526   Weight: 87.3 kg (192 lb 8 oz)     Vital Signs with Ranges  Temp:  [98.3  F (36.8  C)-98.9  F (37.2  C)] 98.9  F (37.2  C)  Pulse:  [61-66] 66  Resp:  [16] 16  BP: (135-161)/(52-62) 145/52  SpO2:  [93 %-97 %] 95 %    Constitutional: Awake, alert, cooperative, no apparent distress  Lungs: Clear to auscultation bilaterally, no crackles or wheezing  Cardiovascular: Regular rate and rhythm, normal S1 and S2, and no murmur noted  Abdomen: Normal bowel sounds, soft, non-distended, non-tender  Skin: right leg cellulitis and swelling   Other:    Medications       acetaminophen  1,000 mg Oral Q8H     atorvastatin  10 mg Oral Daily     carvedilol  3.125 mg Oral BID w/meals     cefTRIAXone  2 g Intravenous Q24H     clindamycin  600 mg Intravenous Q8H     ferrous sulfate  325 mg Oral BID     fexofenadine  180 mg Oral Daily     finasteride  5 mg Oral Daily      insulin aspart  1-7 Units Subcutaneous TID AC     insulin aspart  1-5 Units Subcutaneous At Bedtime     insulin aspart   Subcutaneous TID AC     insulin glargine  14 Units Subcutaneous QAM AC     lactobacillus rhamnosus (GG)  1 capsule Oral BID     latanoprost  1 drop Both Eyes At Bedtime     levothyroxine  88 mcg Oral Daily     [Held by provider] losartan  100 mg Oral Daily     miconazole   Topical BID     pantoprazole  40 mg Oral Daily     sodium chloride (PF)  3 mL Intracatheter Q8H     tamsulosin  0.8 mg Oral Daily       Data   All microbiology laboratory data reviewed.  Recent Labs   Lab Test 05/11/21  0630 05/10/21  0617 05/09/21  0546   WBC 6.0 5.5 8.0   HGB 9.2* 9.3* 9.6*   HCT 30.0* 30.2* 30.9*   MCV 89 89 89    161 158     Recent Labs   Lab Test 05/11/21  0630 05/10/21  0617 05/09/21  0546   CR 0.97 1.07 1.45*     No lab results found.  Recent Labs   Lab Test 07/15/16  1537   CULT No Beta Streptococcus isolated       Attestation:  Total time on the floor involved in the patient's care: 35 minutes. Total time spent in counseling/care coordination: >50%

## 2021-05-14 NOTE — PROGRESS NOTES
[facility]  Hospitalist Progress Note    Name: Timmy Strange    MRN: [id number]      Summary of Stay:  81 yo M w/hx of DM2 on insulin who presents with right leg erythema with associated chills. Reports he has had some recent falls bumping his right leg, but no open sores or wounds. Denies history of MRSA. Temperature of 100F at [place]. On exam right lower extremity erythema and warmth from foot up into thigh.  Since admission erythema has begun improving however his creatinine has worsened.    Assessment & Plan      RLE Cellulitis:  Tenia pedis  -  At outside facility D-dimer elevated, but ultrasound reportedly negative for DVT  -  Vancomycin and Zosyn started initially but stopped as no definitive MRSA history.      -Patient with significant erythema and swelling / tenderness he was not improving on IV Zosyn so ID consulted and switched antibiotics to IV ceftriaxone and clindamycin    -on IV zosyn cellulitis looks like possibly strep infection so switched to IV ceftriaxone 2grams daily on 5/12/21. Added clindamycin IV by ID   Started on probiotics while on clindamycin   Scheduled ty;enol to help with RLE pain   - ID consulted  Added micozole cream to the skin between toes to help with tenia pedis   Elevated RLE as tolerated to help with swelling .  Compression stockings ordered to help with the lower extremity edema  RLE erythema slightly improved today   Patient will need to be on IV antibiotics for the next 2- 3 days per ID as strep cellulitis slowly improving      CLAUDIA:  -  Patient with creatinine of 0.8 11/9/20.  On admission creat had increased to 1.2 and this AM is now 1.45.  This is possibly related to the infection or to the vancomycin.   Cr improved to  0.97 today   D/isabelle IVF on 5/12/21     Generalized weakness   Recent mechanical falls x2  Possible transient infectious encephalopathy:  Patient fell twice while in [place] on recent travel.  One time sounds like he lost  balance due to his backpack slipping off, another time sounds like he was having trouble seeing in the dark which caused him to fall.  He reports some generalized weakness, but otherwise is independent and ambulatory at baseline.  Plan:  --PT/OT evaluation - no major needs noted ok to go home per PT on discharge     Type 2 diabetes mellitus on chronic insulin:  PTA regimen is Humalog mix 75/2518 units every evening, 14 units every morning, with Metformin 1500 mg daily.  Hemoglobin A1c on recheck here is 5.6.  --Transitioned initially to glargine 14 units daily + aspart 1 unit per 20 grams of carbohydrates + sliding scale insulin.  Monitor trend today and further adjust as needed.  Plan:  --Holding PTA Metformin with acute infection and worsening renal function  --Given his low hemoglobin A1c, may need to consider keeping his insulin dosing lower at discharge.  --Carbohydrate controlled diet   Blood sugars are well controlled currently     Hyperlipidemia  Hypertension:  --Continue PTA statin  --Continue PTA carvedilol 3.125 mg twice daily  Plan:  - placed losartan on HOLD due to CLAUDIA, can likely resume at discharge     Hypothyroidism:  --Continue PTA Synthroid.     BPH  --Continue PTA Flomax and Proscar.     Anemia:  Per patient and spouse, uncertain etiology.  He has a hematology appointment scheduled next week.  He has had negative EGD and colonoscopy recently.  --Monitor hgb for now, slightly lower but after initial significant IVF.  Plan:  - hematology consulted -> bone marrow biopsy as outpatient.  That will help to look for myelodysplastic syndrome.      Incidentally noted slight cardiac murmur:  -  No acute cardiac complaints.  Consider outpatient echo.         COVID Status:  COVID-19 PCR Results    COVID-19 PCR Results 1/28/21 1/28/21 3/12/21 3/12/21    1124 1124 1122 1122   COVID-19 Virus PCR to [facility] - Result Test received-See reflex to IDDL test SARS CoV2 (COVID-19) Virus RT-PCR  Test received-See reflex  to IDDL test SARS CoV2 (COVID-19) Virus RT-PCR    COVID-19 Virus PCR to [facility] - Source Nasopharyngeal  Nasopharyngeal    SARS-CoV-2 Virus Specimen Source  Nasopharyngeal  Nasopharyngeal   SARS-CoV-2 PCR Result  NEGATIVE  NEGATIVE      Comments are available for some flowsheets but are not being displayed.         COVID-19 Antibody Results, Testing for Immunity    COVID-19 Antibody Results, Testing for Immunity   No data to display.            Diet: Consistent Carbohydrate Diet 9245-2274 Calories: Moderate Consistent CHO (4-6 CHO units/meal)    DVT Prophylaxis:  Ambulation, heparin/lovenox not ordered with recent anemia issuesFoley Catheter: not present  Code Status: Full Code      Disposition Plan      Expected discharge in 2-3 days when OK with ID and cellulitis improved     Erica Sullivan MD   Page [phone](7AM-6PM)         Patient, family, interdisciplinary team involved in care and agrees with plan.  Total time - Greater than 35 min. More than 50% of time spent in direct patient care, care coordination, patient/caregiver counseling, and formalizing plan of care.       Interval History      No acute events overnight, history reviewed.  RLE erythema slightly better since yesterday.  RLE pain is improving     Denies chest pain, nausea, other new complaints. No fevers or chills. No other complaints    -Data reviewed today: I reviewed all new labs and imaging reports over the last 24 hours. I personally reviewed no images or EKG's today.    Physical Exam   Temp: 98.9  F (37.2  C) Temp src: Oral BP: (!) 145/52 Pulse: 66   Resp: 16 SpO2: 95 % O2 Device: None (Room air)    Vitals:    05/08/21 1526   Weight: 87.3 kg (192 lb 8 oz)     Vital Signs with Ranges  Temp:  [98.3  F (36.8  C)-98.9  F (37.2  C)] 98.9  F (37.2  C)  Pulse:  [61-66] 66  Resp:  [16] 16  BP: (135-161)/(52-62) 145/52  SpO2:  [93 %-97 %] 95 %  No intake/output data recorded.    GEN: no apparent distress  CV:  Regular rate and rhythm, very soft I/VI  systolic murmur. No rub.  LUNGS:  Clear to auscultation bilaterally without rales/rhonchi/wheezing/retractions.  Symmetric chest rise on inhalation noted.  ABD:  Active bowel sounds, soft, non-tender/non-distended.  No rebound/guarding/rigidity.  EXT:  Pitting LE edema.  No cyanosis.  No acute joint synovitis noted.  SKIN:  Dry to touch, RLE erythema very slow to improve . RLE with warm to touch.  Skin between toes macerated with tenia pedis infection     Medications       acetaminophen  1,000 mg Oral Q8H     atorvastatin  10 mg Oral Daily     carvedilol  3.125 mg Oral BID w/meals     cefTRIAXone  2 g Intravenous Q24H     clindamycin  600 mg Intravenous Q8H     ferrous sulfate  325 mg Oral BID     fexofenadine  180 mg Oral Daily     finasteride  5 mg Oral Daily     insulin aspart  1-7 Units Subcutaneous TID AC     insulin aspart  1-5 Units Subcutaneous At Bedtime     insulin aspart   Subcutaneous TID AC     insulin glargine  14 Units Subcutaneous QAM AC     lactobacillus rhamnosus (GG)  1 capsule Oral BID     latanoprost  1 drop Both Eyes At Bedtime     levothyroxine  88 mcg Oral Daily     [Held by provider] losartan  100 mg Oral Daily     miconazole   Topical BID     pantoprazole  40 mg Oral Daily     sodium chloride (PF)  3 mL Intracatheter Q8H     tamsulosin  0.8 mg Oral Daily     Data     Recent Labs   Lab 05/14/21  1200 05/11/21  0630 05/10/21  0617 05/09/21  0546   WBC 6.3 6.0 5.5 8.0   HGB  --  9.2* 9.3* 9.6*   HCT  --  30.0* 30.2* 30.9*   MCV  --  89 89 89   PLT  --  173 161 158       Recent Labs   Lab 05/11/21  0630 05/10/21  0617 05/09/21  0546    140 139   POTASSIUM 4.1 3.8 4.6   CHLORIDE 116* 112* 110*   CO2 22 25 26   ANIONGAP 6 3 3   * 131* 124*   BUN 27 31* 38*   CR 0.97 1.07 1.45*   GFRESTIMATED 75 66 46*   GFRESTBLACK 86 76 53*   ANIBAL 8.0* 7.9* 8.1*   PROTTOTAL 5.7*  --   --    ALBUMIN 2.5*  --   --    BILITOTAL 0.6  --   --    ALKPHOS 55  --   --    AST 68*  --   --    ALT 73*  --   --         No results found for this or any previous visit (from the past 24 hour(s)).

## 2021-05-15 LAB
ANION GAP SERPL CALCULATED.3IONS-SCNC: 4 MMOL/L (ref 3–14)
BUN SERPL-MCNC: 14 MG/DL (ref 7–30)
CALCIUM SERPL-MCNC: 8 MG/DL (ref 8.5–10.1)
CHLORIDE SERPL-SCNC: 111 MMOL/L (ref 94–109)
CO2 SERPL-SCNC: 28 MMOL/L (ref 20–32)
CREAT SERPL-MCNC: 0.8 MG/DL (ref 0.66–1.25)
ERYTHROCYTE [DISTWIDTH] IN BLOOD BY AUTOMATED COUNT: 16.2 % (ref 10–15)
GFR SERPL CREATININE-BSD FRML MDRD: 85 ML/MIN/{1.73_M2}
GLUCOSE BLDC GLUCOMTR-MCNC: 144 MG/DL (ref 70–99)
GLUCOSE BLDC GLUCOMTR-MCNC: 153 MG/DL (ref 70–99)
GLUCOSE BLDC GLUCOMTR-MCNC: 157 MG/DL (ref 70–99)
GLUCOSE BLDC GLUCOMTR-MCNC: 172 MG/DL (ref 70–99)
GLUCOSE SERPL-MCNC: 126 MG/DL (ref 70–99)
HCT VFR BLD AUTO: 25.1 % (ref 40–53)
HGB BLD-MCNC: 7.8 G/DL (ref 13.3–17.7)
HGB BLD-MCNC: 8.2 G/DL (ref 13.3–17.7)
MCH RBC QN AUTO: 27.3 PG (ref 26.5–33)
MCHC RBC AUTO-ENTMCNC: 31.1 G/DL (ref 31.5–36.5)
MCV RBC AUTO: 88 FL (ref 78–100)
PLATELET # BLD AUTO: 245 10E9/L (ref 150–450)
POTASSIUM SERPL-SCNC: 4 MMOL/L (ref 3.4–5.3)
PROCALCITONIN SERPL-MCNC: 0.05 NG/ML
RBC # BLD AUTO: 2.86 10E12/L (ref 4.4–5.9)
SODIUM SERPL-SCNC: 143 MMOL/L (ref 133–144)
WBC # BLD AUTO: 4.9 10E9/L (ref 4–11)

## 2021-05-15 PROCEDURE — 250N000011 HC RX IP 250 OP 636: Performed by: INTERNAL MEDICINE

## 2021-05-15 PROCEDURE — 84145 PROCALCITONIN (PCT): CPT | Performed by: INTERNAL MEDICINE

## 2021-05-15 PROCEDURE — 80048 BASIC METABOLIC PNL TOTAL CA: CPT | Performed by: INTERNAL MEDICINE

## 2021-05-15 PROCEDURE — 85018 HEMOGLOBIN: CPT | Performed by: INTERNAL MEDICINE

## 2021-05-15 PROCEDURE — 36415 COLL VENOUS BLD VENIPUNCTURE: CPT | Performed by: INTERNAL MEDICINE

## 2021-05-15 PROCEDURE — 250N000013 HC RX MED GY IP 250 OP 250 PS 637: Performed by: INTERNAL MEDICINE

## 2021-05-15 PROCEDURE — 250N000013 HC RX MED GY IP 250 OP 250 PS 637: Performed by: PHYSICIAN ASSISTANT

## 2021-05-15 PROCEDURE — 99232 SBSQ HOSP IP/OBS MODERATE 35: CPT | Performed by: INTERNAL MEDICINE

## 2021-05-15 PROCEDURE — 85027 COMPLETE CBC AUTOMATED: CPT | Performed by: INTERNAL MEDICINE

## 2021-05-15 PROCEDURE — 99207 PR MOONLIGHTING INDICATOR: CPT | Performed by: INTERNAL MEDICINE

## 2021-05-15 PROCEDURE — 120N000001 HC R&B MED SURG/OB

## 2021-05-15 PROCEDURE — 250N000009 HC RX 250: Performed by: INTERNAL MEDICINE

## 2021-05-15 PROCEDURE — 999N001017 HC STATISTIC GLUCOSE BY METER IP

## 2021-05-15 PROCEDURE — 250N000012 HC RX MED GY IP 250 OP 636 PS 637: Performed by: INTERNAL MEDICINE

## 2021-05-15 RX ADMIN — CEFTRIAXONE SODIUM 2 G: 2 INJECTION, POWDER, FOR SOLUTION INTRAMUSCULAR; INTRAVENOUS at 11:41

## 2021-05-15 RX ADMIN — LATANOPROST 1 DROP: 50 SOLUTION/ DROPS OPHTHALMIC at 22:07

## 2021-05-15 RX ADMIN — CLINDAMYCIN IN 5 PERCENT DEXTROSE 600 MG: 12 INJECTION, SOLUTION INTRAVENOUS at 08:56

## 2021-05-15 RX ADMIN — FEXOFENADINE HYDROCHLORIDE 180 MG: 180 TABLET, FILM COATED ORAL at 08:57

## 2021-05-15 RX ADMIN — TAMSULOSIN HYDROCHLORIDE 0.8 MG: 0.4 CAPSULE ORAL at 08:56

## 2021-05-15 RX ADMIN — FINASTERIDE 5 MG: 5 TABLET, FILM COATED ORAL at 08:56

## 2021-05-15 RX ADMIN — CARVEDILOL 3.12 MG: 3.12 TABLET, FILM COATED ORAL at 08:56

## 2021-05-15 RX ADMIN — LEVOTHYROXINE SODIUM 88 MCG: 88 TABLET ORAL at 06:33

## 2021-05-15 RX ADMIN — CARVEDILOL 3.12 MG: 3.12 TABLET, FILM COATED ORAL at 17:37

## 2021-05-15 RX ADMIN — INSULIN ASPART 1 UNITS: 100 INJECTION, SOLUTION INTRAVENOUS; SUBCUTANEOUS at 17:33

## 2021-05-15 RX ADMIN — FERROUS SULFATE TAB 325 MG (65 MG ELEMENTAL FE) 325 MG: 325 (65 FE) TAB at 22:00

## 2021-05-15 RX ADMIN — INSULIN ASPART 1 UNITS: 100 INJECTION, SOLUTION INTRAVENOUS; SUBCUTANEOUS at 13:54

## 2021-05-15 RX ADMIN — MICONAZOLE NITRATE: 20 CREAM TOPICAL at 11:47

## 2021-05-15 RX ADMIN — FERROUS SULFATE TAB 325 MG (65 MG ELEMENTAL FE) 325 MG: 325 (65 FE) TAB at 08:56

## 2021-05-15 RX ADMIN — ACETAMINOPHEN 1000 MG: 500 TABLET, FILM COATED ORAL at 06:33

## 2021-05-15 RX ADMIN — Medication 1 CAPSULE: at 22:00

## 2021-05-15 RX ADMIN — CLINDAMYCIN IN 5 PERCENT DEXTROSE 600 MG: 12 INJECTION, SOLUTION INTRAVENOUS at 16:05

## 2021-05-15 RX ADMIN — Medication 1 CAPSULE: at 08:56

## 2021-05-15 RX ADMIN — CLINDAMYCIN IN 5 PERCENT DEXTROSE 600 MG: 12 INJECTION, SOLUTION INTRAVENOUS at 00:12

## 2021-05-15 RX ADMIN — MICONAZOLE NITRATE: 20 CREAM TOPICAL at 22:08

## 2021-05-15 RX ADMIN — ACETAMINOPHEN 1000 MG: 500 TABLET, FILM COATED ORAL at 22:00

## 2021-05-15 RX ADMIN — ACETAMINOPHEN 1000 MG: 500 TABLET, FILM COATED ORAL at 14:00

## 2021-05-15 RX ADMIN — INSULIN GLARGINE 14 UNITS: 100 INJECTION, SOLUTION SUBCUTANEOUS at 08:51

## 2021-05-15 RX ADMIN — PANTOPRAZOLE SODIUM 40 MG: 40 TABLET, DELAYED RELEASE ORAL at 06:33

## 2021-05-15 RX ADMIN — ATORVASTATIN CALCIUM 10 MG: 10 TABLET, FILM COATED ORAL at 08:57

## 2021-05-15 ASSESSMENT — ACTIVITIES OF DAILY LIVING (ADL)
ADLS_ACUITY_SCORE: 18

## 2021-05-15 NOTE — PROVIDER NOTIFICATION
MD Notification    Notified Person: MD    Notified Person Name: Jamison    Notification Date/Time: May 15, 2021  3:47 PM      Notification Interaction: Answering service     Purpose of Notification: Pt asking when he will be seen by ID today.     Orders Received: provider called back, no updates to current plan. MD will see tomorrow. Pt updated.     Comments:

## 2021-05-15 NOTE — PROVIDER NOTIFICATION
MD Notification    Notified Person: MD    Notified Person Name: Derek    Notification Date/Time: 4:29 PM      Notification Interaction: text page    Purpose of Notification: Repeat HGB in am? Pt noticed slight redness in his stool yesterday.     Orders Received: provider called back. Okay to not check hgb.     Comments:

## 2021-05-15 NOTE — PLAN OF CARE
[id number]:   A&O. CMS intact. VSS. Mod carb diet. Up independent. Minimal pain @ rest, scheduled tylenol, patient wants to avoid narcotics. Wife concerned about hgb trending down from admit time and asking about reconsulting hematology, hx of rectal bleeding per patient, Dr. Reed updated via text message.  Pt scoring green on the Aggression Stop Light Tool.

## 2021-05-15 NOTE — PLAN OF CARE
Patient is A&O, VSS on RA, CMS intact, moderate carb, up independently, and denies pain. Bilateral leg edema and on IV antibiotic. Patient is progressing per plan of care, will continue to monitor.

## 2021-05-15 NOTE — PROGRESS NOTES
[facility]  Hospitalist Progress Note for 5/15/2021          Assessment and Plan:   Summary of Stay:  81 yo M w/hx of DM2 on insulin who presents with right leg erythema with associated chills. Reports he has had some recent falls bumping his right leg, but no open sores or wounds. Denies history of MRSA. Temperature of 100F at [place]. On exam right lower extremity erythema and warmth from foot up into thigh.  Since admission erythema has begun improving however his creatinine has worsened.     RLE Cellulitis:  Tenia pedis  -  At outside facility D-dimer elevated, but ultrasound reportedly negative for DVT  -  Vancomycin and Zosyn started initially but stopped as no definitive MRSA history.    - Patient still with significant erythema and swelling / tenderness  on IV Zosyn   - ID consulted, cellulitis looks like possibly strep infection IV ceftriaxone 2 gm/day and clindamycin since 5/12  Started on probiotics while on clindamycin   Scheduled ty;enol to help with RLE pain   - ID following  - added micozole cream to the skin between toes to help with tenia pedis   - Elevated RLE as tolerated to help with swelling .  Compression stockings ordered to help with the lower extremity edema  - cont above for now- considering the slow improvement     CLAUDIA:  -  Patient with creatinine of 0.8 11/9/20.  On admission creat had increased to 1.2 and this AM is now 1.45.  This is possibly related to the infection or to the vancomycin.   Cr improved to  0.97 today   D/isabelle IVF on 5/12/21      Generalized weakness   Recent mechanical falls x2  Possible transient infectious encephalopathy:  Patient fell twice while in [place] on recent travel.  One time sounds like he lost balance due to his backpack slipping off, another time sounds like he was having trouble seeing in the dark which caused him to fall.  He reports some generalized weakness, but otherwise is independent and ambulatory at baseline.  Plan:  --PT/OT  evaluation - no major needs noted ok to go home per PT on discharge      Type 2 diabetes mellitus on chronic insulin:  PTA regimen is Humalog mix 75/2518 units every evening, 14 units every morning, with Metformin 1500 mg daily.  Hemoglobin A1c on recheck here is 5.6.  --Transitioned initially to glargine 14 units daily + aspart 1 unit per 20 grams of carbohydrates + sliding scale insulin.   - PTA Metformin held on admission 2/2 acute infection and worsening renal function  -  's on 5/14th, 120-140's today  --at discharge resume PTA Metformin & given his low hemoglobin A1c, may need to consider keeping his insulin 75/25 mix dosing lower (14-> 10 U bid) at discharge.  --Carbohydrate controlled diet   Blood sugars are well controlled currently     Anemia:  Per patient and spouse, uncertain etiology.  He has a hematology appointment scheduled next week.  He has had negative EGD and colonoscopy recently.  --Monitor hgb for now, slightly lower but after initial significant IVF.  - no sign of bleeding. Hgb trended down  - Received 1 dose of iv iron & is on po iron. Cont to monitor  - repeat hgb trending down 9.2--7.8 this AM, no sign of bleeding. Repeat Hgb this afternoon stable    - hematology consulted on 5/10 -> bone marrow biopsy planned as outpatient.  That will help to look for myelodysplastic syndrome.       Hyperlipidemia  Hypertension:  --Continue PTA statin  --Continue PTA carvedilol 3.125 mg twice daily  Plan:  - placed losartan on HOLD due to CLAUDIA, can likely resume at discharge     Hypothyroidism:  --Continue PTA Synthroid.     BPH  --Continue PTA Flomax and Proscar.     Incidentally noted slight cardiac murmur:  -  No acute cardiac complaints.  Consider outpatient echo.     COVID Status: Negative PCR on 1/28, 3/12.    Diet: Consistent Carbohydrate Diet 9050-1573 Calories: Moderate Consistent CHO (4-6 CHO units/meal)    DVT Prophylaxis:  Ambulation, heparin/lovenox not ordered with recent anemia  "issuesFoley Catheter: not present  Code Status: Full Code          Disposition Plan: Expected discharge in 1-2 days when OK with ID and cellulitis improved   Discussed plan with pt, RN & pt's wife.    Myriam Reed MD.  Hospitalist [phone] (7am -6 pm)                 Interval History:   no new complaints              Medications:       acetaminophen  1,000 mg Oral Q8H     atorvastatin  10 mg Oral Daily     carvedilol  3.125 mg Oral BID w/meals     cefTRIAXone  2 g Intravenous Q24H     clindamycin  600 mg Intravenous Q8H     ferrous sulfate  325 mg Oral BID     fexofenadine  180 mg Oral Daily     finasteride  5 mg Oral Daily     insulin aspart  1-7 Units Subcutaneous TID AC     insulin aspart  1-5 Units Subcutaneous At Bedtime     insulin aspart   Subcutaneous TID AC     insulin glargine  14 Units Subcutaneous QAM AC     lactobacillus rhamnosus (GG)  1 capsule Oral BID     latanoprost  1 drop Both Eyes At Bedtime     levothyroxine  88 mcg Oral Daily     [Held by provider] losartan  100 mg Oral Daily     miconazole   Topical BID     pantoprazole  40 mg Oral Daily     sodium chloride (PF)  3 mL Intracatheter Q8H     tamsulosin  0.8 mg Oral Daily     acetaminophen, acetaminophen, calcium carbonate, glucose **OR** dextrose **OR** glucagon, hydrALAZINE, HYDROmorphone, lidocaine 4%, lidocaine (buffered or not buffered), melatonin, naloxone **OR** naloxone **OR** naloxone **OR** naloxone, ondansetron **OR** ondansetron, oxyCODONE, polyethylene glycol, prochlorperazine **OR** prochlorperazine **OR** prochlorperazine, senna-docusate **OR** senna-docusate, sodium chloride (PF)               Physical Exam:   Blood pressure (!) 153/59, pulse 59, temperature 97.6  F (36.4  C), temperature source Oral, resp. rate 16, height 1.702 m (5' 7\"), weight 87.3 kg (192 lb 8 oz), SpO2 94 %.  Wt Readings from Last 4 Encounters:   05/08/21 87.3 kg (192 lb 8 oz)   03/16/21 89.4 kg (197 lb)   01/11/21 88.9 kg (196 lb)   12/22/20 88.9 kg " (196 lb)         Vital Sign Ranges  Temperature Temp  Av.2  F (36.8  C)  Min: 97.6  F (36.4  C)  Max: 98.8  F (37.1  C)   Blood pressure Systolic (24hrs), Av , Min:98 , Max:153        Diastolic (24hrs), Av, Min:46, Max:59      Pulse Pulse  Av  Min: 59  Max: 65   Respirations Resp  Av  Min: 16  Max: 16   Pulse oximetry SpO2  Av %  Min: 94 %  Max: 97 %         Intake/Output Summary (Last 24 hours) at 5/15/2021 1151  Last data filed at 2021 2200  Gross per 24 hour   Intake 550 ml   Output --   Net 550 ml       Constitutional: Awake, alert, cooperative, no apparent distress   Lungs: Clear to auscultation bilaterally, no crackles or wheezing   Cardiovascular: Regular rate and rhythm, normal S1 and S2, and no murmur noted   Abdomen: Normal bowel sounds, soft, non-distended, non-tender   Skin, R lower leg Significant erythema below R knee & to above ankle , 3+ edema               Data:   All laboratory data reviewed

## 2021-05-15 NOTE — PLAN OF CARE
Alert and oriented x 4. Flat at times. VSS on RA. Denies pain. CMS's intact. RLE red, warm and edematous. Elevating leg PRN on wedge. Tolerating MOD CHO diet. Up independently in the halls. AM cares completed, including shower, but HORTENCIA stockings still need to be placed today. IVSL with intermittent antibiotics. Discharge pending antibiotic plan. ID following.

## 2021-05-16 LAB
GLUCOSE BLDC GLUCOMTR-MCNC: 110 MG/DL (ref 70–99)
GLUCOSE BLDC GLUCOMTR-MCNC: 111 MG/DL (ref 70–99)
GLUCOSE BLDC GLUCOMTR-MCNC: 121 MG/DL (ref 70–99)
GLUCOSE BLDC GLUCOMTR-MCNC: 163 MG/DL (ref 70–99)
GLUCOSE BLDC GLUCOMTR-MCNC: 211 MG/DL (ref 70–99)

## 2021-05-16 PROCEDURE — 250N000012 HC RX MED GY IP 250 OP 636 PS 637: Performed by: INTERNAL MEDICINE

## 2021-05-16 PROCEDURE — 250N000013 HC RX MED GY IP 250 OP 250 PS 637: Performed by: INTERNAL MEDICINE

## 2021-05-16 PROCEDURE — 999N001017 HC STATISTIC GLUCOSE BY METER IP

## 2021-05-16 PROCEDURE — 99232 SBSQ HOSP IP/OBS MODERATE 35: CPT | Performed by: INTERNAL MEDICINE

## 2021-05-16 PROCEDURE — 250N000013 HC RX MED GY IP 250 OP 250 PS 637: Performed by: PHYSICIAN ASSISTANT

## 2021-05-16 PROCEDURE — 250N000011 HC RX IP 250 OP 636: Performed by: INTERNAL MEDICINE

## 2021-05-16 PROCEDURE — 99207 PR MOONLIGHTING INDICATOR: CPT | Performed by: INTERNAL MEDICINE

## 2021-05-16 PROCEDURE — 250N000009 HC RX 250: Performed by: INTERNAL MEDICINE

## 2021-05-16 PROCEDURE — 120N000001 HC R&B MED SURG/OB

## 2021-05-16 RX ADMIN — MICONAZOLE NITRATE: 20 CREAM TOPICAL at 22:03

## 2021-05-16 RX ADMIN — FINASTERIDE 5 MG: 5 TABLET, FILM COATED ORAL at 08:16

## 2021-05-16 RX ADMIN — ACETAMINOPHEN 1000 MG: 500 TABLET, FILM COATED ORAL at 14:52

## 2021-05-16 RX ADMIN — INSULIN GLARGINE 14 UNITS: 100 INJECTION, SOLUTION SUBCUTANEOUS at 09:10

## 2021-05-16 RX ADMIN — MICONAZOLE NITRATE: 20 CREAM TOPICAL at 08:21

## 2021-05-16 RX ADMIN — METFORMIN HYDROCHLORIDE 500 MG: 500 TABLET, FILM COATED ORAL at 18:22

## 2021-05-16 RX ADMIN — PANTOPRAZOLE SODIUM 40 MG: 40 TABLET, DELAYED RELEASE ORAL at 08:16

## 2021-05-16 RX ADMIN — ACETAMINOPHEN 1000 MG: 500 TABLET, FILM COATED ORAL at 22:04

## 2021-05-16 RX ADMIN — ATORVASTATIN CALCIUM 10 MG: 10 TABLET, FILM COATED ORAL at 08:16

## 2021-05-16 RX ADMIN — FERROUS SULFATE TAB 325 MG (65 MG ELEMENTAL FE) 325 MG: 325 (65 FE) TAB at 08:17

## 2021-05-16 RX ADMIN — TAMSULOSIN HYDROCHLORIDE 0.8 MG: 0.4 CAPSULE ORAL at 08:14

## 2021-05-16 RX ADMIN — CLINDAMYCIN IN 5 PERCENT DEXTROSE 600 MG: 12 INJECTION, SOLUTION INTRAVENOUS at 16:09

## 2021-05-16 RX ADMIN — FEXOFENADINE HYDROCHLORIDE 180 MG: 180 TABLET, FILM COATED ORAL at 08:16

## 2021-05-16 RX ADMIN — CEFTRIAXONE SODIUM 2 G: 2 INJECTION, POWDER, FOR SOLUTION INTRAMUSCULAR; INTRAVENOUS at 11:07

## 2021-05-16 RX ADMIN — Medication 1 CAPSULE: at 08:17

## 2021-05-16 RX ADMIN — CLINDAMYCIN IN 5 PERCENT DEXTROSE 600 MG: 12 INJECTION, SOLUTION INTRAVENOUS at 08:12

## 2021-05-16 RX ADMIN — Medication 1 CAPSULE: at 20:36

## 2021-05-16 RX ADMIN — FERROUS SULFATE TAB 325 MG (65 MG ELEMENTAL FE) 325 MG: 325 (65 FE) TAB at 20:35

## 2021-05-16 RX ADMIN — LEVOTHYROXINE SODIUM 88 MCG: 88 TABLET ORAL at 08:15

## 2021-05-16 RX ADMIN — CARVEDILOL 3.12 MG: 3.12 TABLET, FILM COATED ORAL at 18:22

## 2021-05-16 RX ADMIN — INSULIN ASPART 2 UNITS: 100 INJECTION, SOLUTION INTRAVENOUS; SUBCUTANEOUS at 17:29

## 2021-05-16 RX ADMIN — INSULIN ASPART 1 UNITS: 100 INJECTION, SOLUTION INTRAVENOUS; SUBCUTANEOUS at 14:52

## 2021-05-16 RX ADMIN — CARVEDILOL 3.12 MG: 3.12 TABLET, FILM COATED ORAL at 08:16

## 2021-05-16 RX ADMIN — ACETAMINOPHEN 1000 MG: 500 TABLET, FILM COATED ORAL at 08:15

## 2021-05-16 RX ADMIN — LATANOPROST 1 DROP: 50 SOLUTION/ DROPS OPHTHALMIC at 22:04

## 2021-05-16 RX ADMIN — CLINDAMYCIN IN 5 PERCENT DEXTROSE 600 MG: 12 INJECTION, SOLUTION INTRAVENOUS at 23:40

## 2021-05-16 RX ADMIN — METFORMIN HYDROCHLORIDE 500 MG: 500 TABLET, FILM COATED ORAL at 14:52

## 2021-05-16 RX ADMIN — CLINDAMYCIN IN 5 PERCENT DEXTROSE 600 MG: 12 INJECTION, SOLUTION INTRAVENOUS at 00:33

## 2021-05-16 ASSESSMENT — ACTIVITIES OF DAILY LIVING (ADL)
ADLS_ACUITY_SCORE: 18

## 2021-05-16 NOTE — PROGRESS NOTES
Patient is A/O x 4, vss, a-febrile, denies pain, on scheduled tylenol, ambulating independently in the [address], tolerating well, pt admitted with rt lower extremity cellulitis, the affected leg is swollen and red, the redness is not spreading, patient is on two abx, ID following, patient wearing adrianna stockings, discharge pending.

## 2021-05-16 NOTE — PROGRESS NOTES
[facility]    Infectious Disease Progress Note    Date of Service (when I saw the patient): 05/16/2021     Assessment & Plan   Timmy Strange is a 80 year old male who was admitted on 5/8/2021.     Impression:  1. 80 y.o male with diabetes, insulin dependent.   2. Arthritis   3. HTN, HLP  4. Admitted with fevers, chills, and right leg swelling and redness.   5. Started on vanco and zosyn, vanco stopped due to worsening creat.   6. Today switched to ceftriaxone.      Recommendations:   Continue on ceftriaxone and clindamycin   Added probiotics.   Keep the LE raised at all times sitting and lying down.   Streptococcal appearing cellulitis will take several days to improve. Anticipate next day in hospital on IV antibiotics.  Trend wbc and procal          Discussed with wife over the phone     Manfred Swift MD    Interval History   Appears to be well, walking the hallway   Afebrile   The redness in the leg is about the same     Physical Exam   Temp: 98.3  F (36.8  C) Temp src: Oral BP: (!) 140/55 Pulse: 57   Resp: 16 SpO2: 96 % O2 Device: None (Room air)    Vitals:    05/08/21 1526   Weight: 87.3 kg (192 lb 8 oz)     Vital Signs with Ranges  Temp:  [98.1  F (36.7  C)-98.8  F (37.1  C)] 98.3  F (36.8  C)  Pulse:  [57-68] 57  Resp:  [16-18] 16  BP: (140-151)/(51-55) 140/55  SpO2:  [95 %-96 %] 96 %    Constitutional: Awake, alert, cooperative, no apparent distress  Lungs: Clear to auscultation bilaterally, no crackles or wheezing  Cardiovascular: Regular rate and rhythm, normal S1 and S2, and no murmur noted  Abdomen: Normal bowel sounds, soft, non-distended, non-tender  Skin: right leg cellulitis and swelling   Other:    Medications       acetaminophen  1,000 mg Oral Q8H     atorvastatin  10 mg Oral Daily     carvedilol  3.125 mg Oral BID w/meals     cefTRIAXone  2 g Intravenous Q24H     clindamycin  600 mg Intravenous Q8H     ferrous sulfate  325 mg Oral BID     fexofenadine  180 mg Oral Daily      finasteride  5 mg Oral Daily     insulin aspart  1-7 Units Subcutaneous TID AC     insulin aspart  1-5 Units Subcutaneous At Bedtime     insulin aspart   Subcutaneous TID AC     insulin glargine  14 Units Subcutaneous QAM AC     lactobacillus rhamnosus (GG)  1 capsule Oral BID     latanoprost  1 drop Both Eyes At Bedtime     levothyroxine  88 mcg Oral Daily     [Held by provider] losartan  100 mg Oral Daily     metFORMIN  500 mg Oral BID w/meals     miconazole   Topical BID     pantoprazole  40 mg Oral Daily     sodium chloride (PF)  3 mL Intracatheter Q8H     tamsulosin  0.8 mg Oral Daily       Data   All microbiology laboratory data reviewed.  Recent Labs   Lab Test 05/15/21  1202 05/15/21  0710 05/14/21  1200 05/11/21  0630 05/10/21  0617   WBC  --  4.9 6.3 6.0 5.5   HGB 8.2* 7.8*  --  9.2* 9.3*   HCT  --  25.1*  --  30.0* 30.2*   MCV  --  88  --  89 89   PLT  --  245  --  173 161     Recent Labs   Lab Test 05/15/21  0710 05/11/21  0630 05/10/21  0617   CR 0.80 0.97 1.07     No lab results found.  Recent Labs   Lab Test 07/15/16  1537   CULT No Beta Streptococcus isolated       Attestation:  Total time on the floor involved in the patient's care: 35 minutes. Total time spent in counseling/care coordination: >50%

## 2021-05-16 NOTE — PLAN OF CARE
Date: 5/15 [id number]   Summary: Cellulitis RLE.  Orientation: A&Ox4  VS/ O2/ IV: VSS on ra. Afebrile.   Mobility: Independent within room  Pain Management: Tylenol prn for RLE pain.  Diet: Mod CHO, carb and calorie counting  Bowel/ Bladder: Continent.  Labs/ BG: NA  Skin: RLE redness and 3+ edema. Small open sore R large toe, covered.  CMS: Intact  Consults/ Providers: Hospitalist and ID.  Discharge/ Plan: pending sign off from ID and improvement of cellulitis.

## 2021-05-16 NOTE — PLAN OF CARE
Alert and orientated. VSS. RA. New IV placed tonight. Tolerating diet. No insulin coverage needed at night. BLE edematous and LUE mild edema Elevating legs. Desmond Stockings placed. Small abrasion on R shin, bleeding a little. +BM some blood in stool, small amount MDs are aware. HGB improved today. Pain managed with scheduled tylenol. Plan to continue IV ABX. ID to see pt tomorrow.

## 2021-05-16 NOTE — PROGRESS NOTES
[facility]  Hospitalist Progress Note for 5/16/2021       Assessment and Plan:   of Stay:  81 yo M w/hx of DM2 on insulin who presents with right leg erythema with associated chills. Reports he has had some recent falls bumping his right leg, but no open sores or wounds. Denies history of MRSA. Temperature of 100F at [place]. On exam right lower extremity erythema and warmth from foot up into thigh.  Since admission erythema has begun improving however his creatinine has worsened.     RLE Cellulitis:  Tenia pedis  At outside facility D-dimer elevated, but ultrasound reportedly negative for DVT  -  Vancomycin and Zosyn started initially but stopped as no definitive MRSA history.    - Patient was still with significant erythema and swelling / tenderness  on IV Zosyn   - ID consulted, cellulitis looks like possibly strep infection-zosyn changed to IV ceftriaxone 2 gm/day and clindamycin since 5/12 & continued  -Started on probiotics while on clindamycin   Scheduled ty;enol to help with RLE pain   - ID following  - added micozole cream to the skin between toes to help with tenia pedis   - Elevated RLE as tolerated to help with swelling .  Compression stockings ordered to help with the lower extremity edema  - v slow to improve,cont same as above for now- considering the slow improvement     CLAUDIA:  -  Patient with creatinine of 0.8 11/9/20.  On admission creat had increased to 1.2 and this AM is now 1.45.  This is possibly related to the infection or to the vancomycin.   Cr improved to  0.97    D/isabelle IVF on 5/12/21      Generalized weakness   Recent mechanical falls x2  Possible transient infectious encephalopathy:  Patient fell twice while in [place] on recent travel.  One time sounds like he lost balance due to his backpack slipping off, another time sounds like he was having trouble seeing in the dark which caused him to fall.  He reports some generalized weakness, but otherwise is independent and  ambulatory at baseline.  Plan:  --PT/OT evaluation - no major needs noted ok to go home per PT on discharge      Type 2 diabetes mellitus on chronic insulin:  PTA regimen is Humalog mix 75/2518 units every evening, 14 units every morning, with Metformin 1500 mg daily.  Hemoglobin A1c on recheck here is 5.6.  --Transitioned initially to glargine 14 units daily + aspart 1 unit per 20 grams of carbohydrates + sliding scale insulin.   - PTA Metformin held on admission 2/2 acute infection and worsening renal function  -  's on 5/14th, 120-140's on 5/15th & 111-172 in past 24hrs  - BS trending up, creatinine normal -retart Metformin (lower dose)500 mg bid on 5/16  --at discharge given his low hemoglobin A1c, may need to consider keeping his insulin 75/25 mix dosing lower (14-> 10 U bid) at discharge.  --Carbohydrate controlled diet   Blood sugars are well controlled currently      Anemia:  Per patient and spouse, uncertain etiology.  He has a hematology appointment scheduled next week.  He has had negative EGD and colonoscopy recently.  --Monitor hgb for now, slightly lower but after initial significant IVF.  - no sign of bleeding. Hgb trended down  - Received 1 dose of iv iron & is on po iron. Cont to monitor  - repeat hgb trending down 9.2--7.8 this AM, no sign of bleeding. Repeat Hgb 8.2  - hematology consulted on 5/10 -> bone marrow biopsy planned as outpatient.  That will help to look for myelodysplastic syndrome.       Hyperlipidemia  Hypertension:  --Continue PTA statin  --Continue PTA carvedilol 3.125 mg twice daily & PTA Losartan 100 mg held since admission  - BP trending up, monitor for now & cont with carvedilol for now     Hypothyroidism:  --Continue PTA Synthroid.     BPH  --Continue PTA Flomax and Proscar.     Incidentally noted slight cardiac murmur:  -  No acute cardiac complaints.  Consider outpatient echo.     COVID Status: Negative PCR on 1/28, 3/12.     Diet: Consistent Carbohydrate Diet  6563-6812 Calories: Moderate Consistent CHO (4-6 CHO units/meal)    DVT Prophylaxis:  Ambulation, heparin/lovenox not ordered with recent anemia issuesFoley Catheter: not present  Code Status: Full Code          Disposition Plan: Expected discharge in 1-2 days when OK with ID and cellulitis improved.     Myriam Reed MD.  Hospitalist [phone] (7am -6 pm)               Interval History:   Has mild pain in R leg at rest, increased transiently to 3/10 with activity & subsides with continuing the activity.  - redness & swelling v slightly better from yesterday              Medications:       acetaminophen  1,000 mg Oral Q8H     atorvastatin  10 mg Oral Daily     carvedilol  3.125 mg Oral BID w/meals     cefTRIAXone  2 g Intravenous Q24H     clindamycin  600 mg Intravenous Q8H     ferrous sulfate  325 mg Oral BID     fexofenadine  180 mg Oral Daily     finasteride  5 mg Oral Daily     insulin aspart  1-7 Units Subcutaneous TID AC     insulin aspart  1-5 Units Subcutaneous At Bedtime     insulin aspart   Subcutaneous TID AC     insulin glargine  14 Units Subcutaneous QAM AC     lactobacillus rhamnosus (GG)  1 capsule Oral BID     latanoprost  1 drop Both Eyes At Bedtime     levothyroxine  88 mcg Oral Daily     [Held by provider] losartan  100 mg Oral Daily     miconazole   Topical BID     pantoprazole  40 mg Oral Daily     sodium chloride (PF)  3 mL Intracatheter Q8H     tamsulosin  0.8 mg Oral Daily     acetaminophen, acetaminophen, calcium carbonate, glucose **OR** dextrose **OR** glucagon, hydrALAZINE, HYDROmorphone, lidocaine 4%, lidocaine (buffered or not buffered), melatonin, naloxone **OR** naloxone **OR** naloxone **OR** naloxone, ondansetron **OR** ondansetron, oxyCODONE, polyethylene glycol, prochlorperazine **OR** prochlorperazine **OR** prochlorperazine, senna-docusate **OR** senna-docusate, sodium chloride (PF)               Physical Exam:   Blood pressure (!) 140/55, pulse 57, temperature 98.3  F  "(36.8  C), temperature source Oral, resp. rate 16, height 1.702 m (5' 7\"), weight 87.3 kg (192 lb 8 oz), SpO2 96 %.  Wt Readings from Last 4 Encounters:   21 87.3 kg (192 lb 8 oz)   21 89.4 kg (197 lb)   21 88.9 kg (196 lb)   20 88.9 kg (196 lb)         Vital Sign Ranges  Temperature Temp  Av.5  F (36.9  C)  Min: 98.1  F (36.7  C)  Max: 98.9  F (37.2  C)   Blood pressure Systolic (24hrs), Av , Min:140 , Max:159        Diastolic (24hrs), Av, Min:51, Max:64      Pulse Pulse  Av.3  Min: 57  Max: 68   Respirations Resp  Av.4  Min: 16  Max: 18   Pulse oximetry SpO2  Av %  Min: 95 %  Max: 97 %         Intake/Output Summary (Last 24 hours) at 2021 1130  Last data filed at 5/15/2021 1830  Gross per 24 hour   Intake 610 ml   Output --   Net 610 ml       Constitutional: Anxious,awake, alert, cooperative, no apparent distress.sitting at edge of bed   Lungs: Clear to auscultation bilaterally, no crackles or wheezing   Cardiovascular: Regular rate and rhythm, normal S1 and S2, and no murmur noted   Abdomen: Normal bowel sounds, soft, non-distended, non-tender   Skin, R lower leg Significant erythema below R knee & to above ankle , 3+ edema- sl better from yesterday                  Data:   All laboratory data reviewed    "

## 2021-05-17 LAB
GLUCOSE BLDC GLUCOMTR-MCNC: 111 MG/DL (ref 70–99)
GLUCOSE BLDC GLUCOMTR-MCNC: 154 MG/DL (ref 70–99)
GLUCOSE BLDC GLUCOMTR-MCNC: 157 MG/DL (ref 70–99)
GLUCOSE BLDC GLUCOMTR-MCNC: 64 MG/DL (ref 70–99)
GLUCOSE BLDC GLUCOMTR-MCNC: 92 MG/DL (ref 70–99)
GLUCOSE BLDC GLUCOMTR-MCNC: 97 MG/DL (ref 70–99)
HGB BLD-MCNC: 8.1 G/DL (ref 13.3–17.7)

## 2021-05-17 PROCEDURE — 250N000013 HC RX MED GY IP 250 OP 250 PS 637: Performed by: INTERNAL MEDICINE

## 2021-05-17 PROCEDURE — 250N000011 HC RX IP 250 OP 636: Performed by: INTERNAL MEDICINE

## 2021-05-17 PROCEDURE — 999N001017 HC STATISTIC GLUCOSE BY METER IP

## 2021-05-17 PROCEDURE — 250N000009 HC RX 250: Performed by: INTERNAL MEDICINE

## 2021-05-17 PROCEDURE — 250N000012 HC RX MED GY IP 250 OP 636 PS 637: Performed by: INTERNAL MEDICINE

## 2021-05-17 PROCEDURE — 120N000001 HC R&B MED SURG/OB

## 2021-05-17 PROCEDURE — 85018 HEMOGLOBIN: CPT | Performed by: INTERNAL MEDICINE

## 2021-05-17 PROCEDURE — 36415 COLL VENOUS BLD VENIPUNCTURE: CPT | Performed by: INTERNAL MEDICINE

## 2021-05-17 PROCEDURE — 99233 SBSQ HOSP IP/OBS HIGH 50: CPT | Performed by: INTERNAL MEDICINE

## 2021-05-17 PROCEDURE — 250N000013 HC RX MED GY IP 250 OP 250 PS 637: Performed by: PHYSICIAN ASSISTANT

## 2021-05-17 RX ORDER — CLINDAMYCIN HCL 300 MG
300 CAPSULE ORAL EVERY 8 HOURS SCHEDULED
Status: DISCONTINUED | OUTPATIENT
Start: 2021-05-17 | End: 2021-05-18 | Stop reason: HOSPADM

## 2021-05-17 RX ADMIN — INSULIN ASPART 2 UNITS: 100 INJECTION, SOLUTION INTRAVENOUS; SUBCUTANEOUS at 12:30

## 2021-05-17 RX ADMIN — FINASTERIDE 5 MG: 5 TABLET, FILM COATED ORAL at 09:44

## 2021-05-17 RX ADMIN — FERROUS SULFATE TAB 325 MG (65 MG ELEMENTAL FE) 325 MG: 325 (65 FE) TAB at 09:44

## 2021-05-17 RX ADMIN — CARVEDILOL 3.12 MG: 3.12 TABLET, FILM COATED ORAL at 18:31

## 2021-05-17 RX ADMIN — MICONAZOLE NITRATE: 20 CREAM TOPICAL at 12:31

## 2021-05-17 RX ADMIN — CEFTRIAXONE SODIUM 2 G: 2 INJECTION, POWDER, FOR SOLUTION INTRAMUSCULAR; INTRAVENOUS at 10:28

## 2021-05-17 RX ADMIN — TAMSULOSIN HYDROCHLORIDE 0.8 MG: 0.4 CAPSULE ORAL at 12:30

## 2021-05-17 RX ADMIN — METFORMIN HYDROCHLORIDE 500 MG: 500 TABLET, FILM COATED ORAL at 18:31

## 2021-05-17 RX ADMIN — ATORVASTATIN CALCIUM 10 MG: 10 TABLET, FILM COATED ORAL at 09:45

## 2021-05-17 RX ADMIN — Medication 1 CAPSULE: at 20:20

## 2021-05-17 RX ADMIN — CLINDAMYCIN HYDROCHLORIDE 300 MG: 300 CAPSULE ORAL at 23:00

## 2021-05-17 RX ADMIN — Medication 1 CAPSULE: at 09:43

## 2021-05-17 RX ADMIN — METFORMIN HYDROCHLORIDE 500 MG: 500 TABLET, FILM COATED ORAL at 09:44

## 2021-05-17 RX ADMIN — ACETAMINOPHEN 1000 MG: 500 TABLET, FILM COATED ORAL at 06:32

## 2021-05-17 RX ADMIN — LEVOTHYROXINE SODIUM 88 MCG: 88 TABLET ORAL at 06:32

## 2021-05-17 RX ADMIN — CARVEDILOL 3.12 MG: 3.12 TABLET, FILM COATED ORAL at 09:44

## 2021-05-17 RX ADMIN — ACETAMINOPHEN 1000 MG: 500 TABLET, FILM COATED ORAL at 23:00

## 2021-05-17 RX ADMIN — FERROUS SULFATE TAB 325 MG (65 MG ELEMENTAL FE) 325 MG: 325 (65 FE) TAB at 20:20

## 2021-05-17 RX ADMIN — MICONAZOLE NITRATE: 20 CREAM TOPICAL at 23:07

## 2021-05-17 RX ADMIN — CEPHALEXIN 250 MG: 250 CAPSULE ORAL at 23:00

## 2021-05-17 RX ADMIN — FEXOFENADINE HYDROCHLORIDE 180 MG: 180 TABLET, FILM COATED ORAL at 09:44

## 2021-05-17 RX ADMIN — PANTOPRAZOLE SODIUM 40 MG: 40 TABLET, DELAYED RELEASE ORAL at 06:32

## 2021-05-17 RX ADMIN — CLINDAMYCIN HYDROCHLORIDE 300 MG: 300 CAPSULE ORAL at 16:26

## 2021-05-17 RX ADMIN — CLINDAMYCIN IN 5 PERCENT DEXTROSE 600 MG: 12 INJECTION, SOLUTION INTRAVENOUS at 09:43

## 2021-05-17 RX ADMIN — ACETAMINOPHEN 1000 MG: 500 TABLET, FILM COATED ORAL at 13:36

## 2021-05-17 RX ADMIN — INSULIN GLARGINE 14 UNITS: 100 INJECTION, SOLUTION SUBCUTANEOUS at 07:12

## 2021-05-17 ASSESSMENT — ACTIVITIES OF DAILY LIVING (ADL)
ADLS_ACUITY_SCORE: 18

## 2021-05-17 NOTE — PROGRESS NOTES
[facility]    Infectious Disease Progress Note    Date of Service (when I saw the patient): 05/17/2021     Assessment & Plan   Timmy Strange is a 80 year old male who was admitted on 5/8/2021.     Impression:  1. 80 y.o male with diabetes, insulin dependent.   2. Arthritis   3. HTN, HLP  4. Admitted with fevers, chills, and right leg swelling and redness.   5. Started on vanco and zosyn, vanco stopped due to worsening creat.   6. Today switched to ceftriaxone.      Recommendations:   Keep the LE raised at all times sitting and lying down.   Streptococcal appearing cellulitis will take several days to improve.   Day 10 of IV antibiotics, WBC normal, procal normal, feels better, leg improving slowly will switch to oral antibiotics and watch in house next 24 hours, if continues to do well hopefully discharge tomorrow.          Discussed with wife over the phone     Manfred Swift MD    Interval History   Appears to be well, walking the hallway   Afebrile   The redness in the leg is about the same     Physical Exam   Temp: 98.2  F (36.8  C) Temp src: Oral BP: 138/50 Pulse: 67   Resp: 16 SpO2: 96 % O2 Device: None (Room air)    Vitals:    05/08/21 1526   Weight: 87.3 kg (192 lb 8 oz)     Vital Signs with Ranges  Temp:  [98.2  F (36.8  C)-99  F (37.2  C)] 98.2  F (36.8  C)  Pulse:  [60-67] 67  Resp:  [16] 16  BP: (138-158)/(50-57) 138/50  SpO2:  [94 %-96 %] 96 %    Constitutional: Awake, alert, cooperative, no apparent distress  Lungs: Clear to auscultation bilaterally, no crackles or wheezing  Cardiovascular: Regular rate and rhythm, normal S1 and S2, and no murmur noted  Abdomen: Normal bowel sounds, soft, non-distended, non-tender  Skin: right leg cellulitis and swelling   Other:    Medications       acetaminophen  1,000 mg Oral Q8H     atorvastatin  10 mg Oral Daily     carvedilol  3.125 mg Oral BID w/meals     cefTRIAXone  2 g Intravenous Q24H     clindamycin  600 mg Intravenous Q8H      ferrous sulfate  325 mg Oral BID     fexofenadine  180 mg Oral Daily     finasteride  5 mg Oral Daily     insulin aspart  1-7 Units Subcutaneous TID AC     insulin aspart  1-5 Units Subcutaneous At Bedtime     insulin aspart   Subcutaneous TID AC     insulin glargine  14 Units Subcutaneous QAM AC     lactobacillus rhamnosus (GG)  1 capsule Oral BID     latanoprost  1 drop Both Eyes At Bedtime     levothyroxine  88 mcg Oral Daily     [Held by provider] losartan  100 mg Oral Daily     metFORMIN  500 mg Oral BID w/meals     miconazole   Topical BID     pantoprazole  40 mg Oral Daily     sodium chloride (PF)  3 mL Intracatheter Q8H     tamsulosin  0.8 mg Oral Daily       Data   All microbiology laboratory data reviewed.  Recent Labs   Lab Test 05/15/21  1202 05/15/21  0710 05/14/21  1200 05/11/21  0630 05/10/21  0617   WBC  --  4.9 6.3 6.0 5.5   HGB 8.2* 7.8*  --  9.2* 9.3*   HCT  --  25.1*  --  30.0* 30.2*   MCV  --  88  --  89 89   PLT  --  245  --  173 161     Recent Labs   Lab Test 05/15/21  0710 05/11/21  0630 05/10/21  0617   CR 0.80 0.97 1.07     No lab results found.  Recent Labs   Lab Test 07/15/16  1537   CULT No Beta Streptococcus isolated       Attestation:  Total time on the floor involved in the patient's care: 35 minutes. Total time spent in counseling/care coordination: >50%

## 2021-05-17 NOTE — PROGRESS NOTES
"[facility]  Hospitalist Progress Note for 5/16/2021       Assessment and Plan:   Summary:  81 yo M w/hx of DM2 on insulin who presents with right leg erythema with associated chills. Reports he has had some recent falls bumping his right leg, but no open sores or wounds. Denies history of MRSA. Temperature of 100F at [place]. On exam right lower extremity erythema and warmth from foot up into thigh.  Since admission erythema has begun improving however his creatinine has worsened.     RLE Cellulitis:  Tenia pedis  At outside facility D-dimer elevated, but ultrasound reportedly negative for DVT    Vancomycin and Zosyn started initially but stopped as no definitive MRSA history.      Patient was still with significant erythema and swelling / tenderness  on IV Zosyn      ID consult requested, they are following    looks streptococcal; zosyn changed to IV ceftriaxone 2 gm/day,    clindamycin since 5/12, continue    Added probiotics while on clindamycin     Scheduled tylenol to help with RLE pain     added micozole cream to the skin between toes to help with tenia pedis     Elevated RLE as tolerated to help with swelling .  Compression stockings ordered to help with the lower extremity edema    slow to improve; no change in antibiotic Rx    Per Dr. Swift, \"Day 10 of IV antibiotics, WBC normal, procal normal, feels better, leg improving slowly will switch to oral antibiotics and watch in house next 24 hours, if continues to do well hopefully discharge tomorrow. \"  I appreciate her followup     CLAUDIA:    Patient with creatinine of 0.8 11/9/20.  On admission creat had increased to 1.2 and this AM is now 1.45.  This is possibly related to the infection or to the vancomycin.     Cr improved to  0.97      D/isabelle IVF on 5/12/21      Generalized weakness   Recent mechanical falls x2  Possible transient infectious encephalopathy:  Patient fell twice while in [place] on recent travel.  One time sounds like he lost " balance due to his backpack slipping off, another time sounds like he was having trouble seeing in the dark which caused him to fall.  He reports some generalized weakness, but otherwise is independent and ambulatory at baseline.  Plan:    PT/OT evaluation - no major needs    ok to go home per PT on discharge      Type 2 diabetes mellitus on chronic insulin:  PTA regimen is Humalog mix 75/2518 units every evening, 14 units every morning, with Metformin 1500 mg daily.  Hemoglobin A1c on recheck here is 5.6.    Transitioned initially to glargine 14 units daily + aspart 1 unit per 20 grams of carbohydrates + sliding scale insulin.     PTA Metformin held on admission 2/2 acute infection and worsening renal function    BS trending up, creatinine normal -retart Metformin (lower dose)500 mg bid on 5/16    at discharge given his low hemoglobin A1c, may need to consider keeping his insulin 75/25 mix dosing lower (14-> 10 U bid) at discharge.    Carbohydrate controlled diet     Blood sugar readings are at goal     Anemia:  Per patient and spouse, uncertain etiology.  He has a hematology appointment scheduled next week.  He has had negative EGD and colonoscopy recently.    Monitor hgb for now, slightly lower but after initial significant IVF.    no sign of bleeding     Received 1 dose of iv iron & is on po iron. Cont to monitor    repeat hgb trending down 9.2--7.8 this AM, no sign of bleeding. Repeat Hgb 8.2    hematology consulted on 5/10 -> bone marrow biopsy planned as outpatient.  That will help to look for myelodysplastic syndrome.       Hyperlipidemia  Hypertension:    Continue PTA statin    Continue PTA carvedilol 3.125 mg twice daily & PTA Losartan 100 mg held since admission    BP trending up, monitor for now & cont with carvedilol for now     Hypothyroidism:    Continue PTA Synthroid.     BPH    Continue PTA Flomax and Proscar.     Incidentally noted slight cardiac murmur:    No acute cardiac complaints.  Consider  "outpatient echo.     COVID Status: Negative PCR on 1/28, 3/12.     Diet: Consistent Carbohydrate Diet 7491-4719 Calories: Moderate Consistent CHO (4-6 CHO units/meal)    DVT Prophylaxis:  Ambulation, heparin/lovenox not ordered with recent anemia issuesFoley Catheter: not present  Code Status: Full Code          Disposition: See discussion above, \"watching house next 24 hours, if continues to do well hopefully discharge tomorrow\"     Total time spent:  > 35 minutes  Time spent counseling, coordination of care: 25 minutes including discussion with care team and patient and wife Ladan, also Dr. Panchal, personal review and interpretation of labs and studies as noted above   Jazmin Moreno MD  Hospitalist  [facility]  Text Page (7am - 6pm, M-F)                 Interval History:   \"The doctor over the weekend thought I should wear a compression sock.\"  Patient called his wife to participate in the discussion, she has numerous questions.  She has about iron deficiency, anemia, transfusion, duration of antibiotics.  All questions answered.  Patient has no new respiratory or GI complaints.              Medications:       acetaminophen  1,000 mg Oral Q8H     atorvastatin  10 mg Oral Daily     carvedilol  3.125 mg Oral BID w/meals     cefTRIAXone  2 g Intravenous Q24H     clindamycin  600 mg Intravenous Q8H     ferrous sulfate  325 mg Oral BID     fexofenadine  180 mg Oral Daily     finasteride  5 mg Oral Daily     insulin aspart  1-7 Units Subcutaneous TID AC     insulin aspart  1-5 Units Subcutaneous At Bedtime     insulin aspart   Subcutaneous TID AC     insulin glargine  14 Units Subcutaneous QAM AC     lactobacillus rhamnosus (GG)  1 capsule Oral BID     latanoprost  1 drop Both Eyes At Bedtime     levothyroxine  88 mcg Oral Daily     [Held by provider] losartan  100 mg Oral Daily     metFORMIN  500 mg Oral BID w/meals     miconazole   Topical BID     pantoprazole  40 mg Oral Daily     sodium chloride " "(PF)  3 mL Intracatheter Q8H     tamsulosin  0.8 mg Oral Daily     acetaminophen, acetaminophen, calcium carbonate, glucose **OR** dextrose **OR** glucagon, hydrALAZINE, HYDROmorphone, lidocaine 4%, lidocaine (buffered or not buffered), melatonin, naloxone **OR** naloxone **OR** naloxone **OR** naloxone, ondansetron **OR** ondansetron, oxyCODONE, polyethylene glycol, prochlorperazine **OR** prochlorperazine **OR** prochlorperazine, senna-docusate **OR** senna-docusate, sodium chloride (PF)               Physical Exam:   Blood pressure 138/50, pulse 67, temperature 98.2  F (36.8  C), temperature source Oral, resp. rate 16, height 1.702 m (5' 7\"), weight 87.3 kg (192 lb 8 oz), SpO2 96 %.  Wt Readings from Last 4 Encounters:   21 87.3 kg (192 lb 8 oz)   21 89.4 kg (197 lb)   21 88.9 kg (196 lb)   20 88.9 kg (196 lb)         Vital Sign Ranges  Temperature Temp  Av.5  F (36.9  C)  Min: 98.1  F (36.7  C)  Max: 98.9  F (37.2  C)   Blood pressure Systolic (24hrs), Av , Min:140 , Max:159        Diastolic (24hrs), Av, Min:51, Max:64      Pulse Pulse  Av.3  Min: 57  Max: 68   Respirations Resp  Av.4  Min: 16  Max: 18   Pulse oximetry SpO2  Av %  Min: 95 %  Max: 97 %         Intake/Output Summary (Last 24 hours) at 2021 1130  Last data filed at 5/15/2021 1830  Gross per 24 hour   Intake 610 ml   Output --   Net 610 ml       Constitutional: Anxious,awake, alert, cooperative, no apparent distress.sitting at edge of bed   Lungs: Clear to auscultation bilaterally, no crackles or wheezing   Cardiovascular: Regular rate and rhythm, normal S1 and S2, and no murmur noted   Abdomen: Normal bowel sounds, soft, non-distended, non-tender   Skin, R lower leg Significant erythema below R knee & to above ankle , 3+ edema- sl better from yesterday     Psych:  Mood is pleasant              Data:   All laboratory data reviewed    "

## 2021-05-17 NOTE — PLAN OF CARE
Pt a&ox4, vss on ra - afebrile, up independently, IVSL - intermittent iv abx, RLE red and swollen - no change overnight, denies most pain - scheduled tylenol given, mod cho diet, blood sugar checks - hypoglycemic @ 0200 - corrected with BARTOLO and andrea, voiding adequately in BR, discharge plan pending abx plan. Patient slept between cares.

## 2021-05-18 VITALS
TEMPERATURE: 98.7 F | RESPIRATION RATE: 16 BRPM | BODY MASS INDEX: 30.21 KG/M2 | OXYGEN SATURATION: 98 % | HEART RATE: 60 BPM | DIASTOLIC BLOOD PRESSURE: 66 MMHG | SYSTOLIC BLOOD PRESSURE: 148 MMHG | WEIGHT: 192.5 LBS | HEIGHT: 67 IN

## 2021-05-18 LAB
BASOPHILS # BLD AUTO: 0 10E9/L (ref 0–0.2)
BASOPHILS NFR BLD AUTO: 0.4 %
DIFFERENTIAL METHOD BLD: ABNORMAL
EOSINOPHIL # BLD AUTO: 0.2 10E9/L (ref 0–0.7)
EOSINOPHIL NFR BLD AUTO: 3.7 %
ERYTHROCYTE [DISTWIDTH] IN BLOOD BY AUTOMATED COUNT: 16.4 % (ref 10–15)
GLUCOSE BLDC GLUCOMTR-MCNC: 132 MG/DL (ref 70–99)
GLUCOSE BLDC GLUCOMTR-MCNC: 135 MG/DL (ref 70–99)
GLUCOSE BLDC GLUCOMTR-MCNC: 93 MG/DL (ref 70–99)
HCT VFR BLD AUTO: 27.6 % (ref 40–53)
HGB BLD-MCNC: 8.5 G/DL (ref 13.3–17.7)
IMM GRANULOCYTES # BLD: 0 10E9/L (ref 0–0.4)
IMM GRANULOCYTES NFR BLD: 0.4 %
LABORATORY COMMENT REPORT: NORMAL
LYMPHOCYTES # BLD AUTO: 1.5 10E9/L (ref 0.8–5.3)
LYMPHOCYTES NFR BLD AUTO: 29.1 %
MCH RBC QN AUTO: 27.3 PG (ref 26.5–33)
MCHC RBC AUTO-ENTMCNC: 30.8 G/DL (ref 31.5–36.5)
MCV RBC AUTO: 89 FL (ref 78–100)
MONOCYTES # BLD AUTO: 0.6 10E9/L (ref 0–1.3)
MONOCYTES NFR BLD AUTO: 11.2 %
NEUTROPHILS # BLD AUTO: 2.9 10E9/L (ref 1.6–8.3)
NEUTROPHILS NFR BLD AUTO: 55.2 %
NRBC # BLD AUTO: 0 10*3/UL
NRBC BLD AUTO-RTO: 0 /100
PLATELET # BLD AUTO: 318 10E9/L (ref 150–450)
RBC # BLD AUTO: 3.11 10E12/L (ref 4.4–5.9)
SARS-COV-2 RNA RESP QL NAA+PROBE: NEGATIVE
SPECIMEN SOURCE: NORMAL
WBC # BLD AUTO: 5.2 10E9/L (ref 4–11)

## 2021-05-18 PROCEDURE — 85025 COMPLETE CBC W/AUTO DIFF WBC: CPT | Performed by: INTERNAL MEDICINE

## 2021-05-18 PROCEDURE — 250N000013 HC RX MED GY IP 250 OP 250 PS 637: Performed by: PHYSICIAN ASSISTANT

## 2021-05-18 PROCEDURE — 87635 SARS-COV-2 COVID-19 AMP PRB: CPT | Performed by: INTERNAL MEDICINE

## 2021-05-18 PROCEDURE — 250N000013 HC RX MED GY IP 250 OP 250 PS 637: Performed by: INTERNAL MEDICINE

## 2021-05-18 PROCEDURE — 36415 COLL VENOUS BLD VENIPUNCTURE: CPT | Performed by: INTERNAL MEDICINE

## 2021-05-18 PROCEDURE — 250N000012 HC RX MED GY IP 250 OP 636 PS 637: Performed by: INTERNAL MEDICINE

## 2021-05-18 PROCEDURE — 99239 HOSP IP/OBS DSCHRG MGMT >30: CPT | Performed by: INTERNAL MEDICINE

## 2021-05-18 PROCEDURE — 999N001017 HC STATISTIC GLUCOSE BY METER IP

## 2021-05-18 RX ORDER — LOPERAMIDE HCL 2 MG
2 CAPSULE ORAL 4 TIMES DAILY PRN
Status: DISCONTINUED | OUTPATIENT
Start: 2021-05-18 | End: 2021-05-18 | Stop reason: HOSPADM

## 2021-05-18 RX ORDER — CLINDAMYCIN HCL 300 MG
300 CAPSULE ORAL EVERY 8 HOURS
Qty: 9 CAPSULE | Refills: 0 | Status: SHIPPED | OUTPATIENT
Start: 2021-05-18 | End: 2021-05-21

## 2021-05-18 RX ORDER — LACTOBACILLUS RHAMNOSUS GG 10B CELL
1 CAPSULE ORAL 2 TIMES DAILY
Qty: 28 CAPSULE | Refills: 0 | Status: SHIPPED | OUTPATIENT
Start: 2021-05-18 | End: 2021-08-11

## 2021-05-18 RX ADMIN — LOPERAMIDE HYDROCHLORIDE 2 MG: 2 CAPSULE ORAL at 12:03

## 2021-05-18 RX ADMIN — INSULIN GLARGINE 14 UNITS: 100 INJECTION, SOLUTION SUBCUTANEOUS at 08:41

## 2021-05-18 RX ADMIN — CARVEDILOL 3.12 MG: 3.12 TABLET, FILM COATED ORAL at 08:40

## 2021-05-18 RX ADMIN — CLINDAMYCIN HYDROCHLORIDE 300 MG: 300 CAPSULE ORAL at 06:01

## 2021-05-18 RX ADMIN — LEVOTHYROXINE SODIUM 88 MCG: 88 TABLET ORAL at 06:00

## 2021-05-18 RX ADMIN — ACETAMINOPHEN 1000 MG: 500 TABLET, FILM COATED ORAL at 13:37

## 2021-05-18 RX ADMIN — FEXOFENADINE HYDROCHLORIDE 180 MG: 180 TABLET, FILM COATED ORAL at 08:40

## 2021-05-18 RX ADMIN — CEPHALEXIN 250 MG: 250 CAPSULE ORAL at 13:37

## 2021-05-18 RX ADMIN — PANTOPRAZOLE SODIUM 40 MG: 40 TABLET, DELAYED RELEASE ORAL at 06:01

## 2021-05-18 RX ADMIN — CLINDAMYCIN HYDROCHLORIDE 300 MG: 300 CAPSULE ORAL at 13:37

## 2021-05-18 RX ADMIN — MICONAZOLE NITRATE: 20 CREAM TOPICAL at 08:43

## 2021-05-18 RX ADMIN — TAMSULOSIN HYDROCHLORIDE 0.8 MG: 0.4 CAPSULE ORAL at 08:40

## 2021-05-18 RX ADMIN — METFORMIN HYDROCHLORIDE 500 MG: 500 TABLET, FILM COATED ORAL at 08:40

## 2021-05-18 RX ADMIN — LOPERAMIDE HYDROCHLORIDE 2 MG: 2 CAPSULE ORAL at 14:01

## 2021-05-18 RX ADMIN — ATORVASTATIN CALCIUM 10 MG: 10 TABLET, FILM COATED ORAL at 08:41

## 2021-05-18 RX ADMIN — FERROUS SULFATE TAB 325 MG (65 MG ELEMENTAL FE) 325 MG: 325 (65 FE) TAB at 08:40

## 2021-05-18 RX ADMIN — ACETAMINOPHEN 1000 MG: 500 TABLET, FILM COATED ORAL at 06:00

## 2021-05-18 RX ADMIN — Medication 1 CAPSULE: at 08:40

## 2021-05-18 RX ADMIN — FINASTERIDE 5 MG: 5 TABLET, FILM COATED ORAL at 08:40

## 2021-05-18 RX ADMIN — CEPHALEXIN 250 MG: 250 CAPSULE ORAL at 06:01

## 2021-05-18 ASSESSMENT — ACTIVITIES OF DAILY LIVING (ADL)
ADLS_ACUITY_SCORE: 17
ADLS_ACUITY_SCORE: 18
ADLS_ACUITY_SCORE: 17

## 2021-05-18 NOTE — PROGRESS NOTES
[facility]    Infectious Disease Progress Note    Date of Service (when I saw the patient): 05/18/2021     Assessment & Plan   Timmy Strange is a 80 year old male who was admitted on 5/8/2021.     Impression:  1. 80 y.o male with diabetes, insulin dependent.   2. Arthritis   3. HTN, HLP  4. Admitted with fevers, chills, and right leg swelling and redness.   5. Started on vanco and zosyn, vanco stopped due to worsening creat.   6. Today switched to ceftriaxone.      Recommendations:   Keep the LE raised at all times sitting and lying down.   Streptococcal appearing cellulitis will take several days to improve.   Day 10 of IV antibiotics, WBC normal, procal normal, feels better, leg improving slowly switched  to oral antibiotics ok for discharge today with 3 more days of antibiotics   Continue probiotics for a month          Discussed with wife over the phone   Discussed with IM and examined patient with IM together     Manfred Swift MD    Interval History   Appears to be well, walking the hallway   Afebrile   The redness in the leg is about the same     Physical Exam   Temp: 98  F (36.7  C) Temp src: Oral BP: (!) 153/106 Pulse: 66   Resp: 16 SpO2: 98 % O2 Device: None (Room air)    Vitals:    05/08/21 1526   Weight: 87.3 kg (192 lb 8 oz)     Vital Signs with Ranges  Temp:  [97.5  F (36.4  C)-98.9  F (37.2  C)] 98  F (36.7  C)  Pulse:  [58-66] 66  Resp:  [16] 16  BP: (131-157)/() 153/106  SpO2:  [90 %-98 %] 98 %    Constitutional: Awake, alert, cooperative, no apparent distress  Lungs: Clear to auscultation bilaterally, no crackles or wheezing  Cardiovascular: Regular rate and rhythm, normal S1 and S2, and no murmur noted  Abdomen: Normal bowel sounds, soft, non-distended, non-tender  Skin: right leg cellulitis and swelling   Other:    Medications       acetaminophen  1,000 mg Oral Q8H     atorvastatin  10 mg Oral Daily     carvedilol  3.125 mg Oral BID w/meals     cephALEXin  250 mg  Oral Q8H KELLY     clindamycin  300 mg Oral Q8H KELLY     ferrous sulfate  325 mg Oral BID     fexofenadine  180 mg Oral Daily     finasteride  5 mg Oral Daily     insulin aspart  1-7 Units Subcutaneous TID AC     insulin aspart  1-5 Units Subcutaneous At Bedtime     insulin aspart   Subcutaneous TID AC     insulin glargine  14 Units Subcutaneous QAM AC     lactobacillus rhamnosus (GG)  1 capsule Oral BID     latanoprost  1 drop Both Eyes At Bedtime     levothyroxine  88 mcg Oral Daily     [Held by provider] losartan  100 mg Oral Daily     metFORMIN  500 mg Oral BID w/meals     miconazole   Topical BID     pantoprazole  40 mg Oral Daily     sodium chloride (PF)  3 mL Intracatheter Q8H     tamsulosin  0.8 mg Oral Daily       Data   All microbiology laboratory data reviewed.  Recent Labs   Lab Test 05/18/21  0646 05/17/21  1057 05/15/21  1202 05/15/21  0710 05/14/21  1200 05/11/21  0630   WBC 5.2  --   --  4.9 6.3 6.0   HGB 8.5* 8.1* 8.2* 7.8*  --  9.2*   HCT 27.6*  --   --  25.1*  --  30.0*   MCV 89  --   --  88  --  89     --   --  245  --  173     Recent Labs   Lab Test 05/15/21  0710 05/11/21  0630 05/10/21  0617   CR 0.80 0.97 1.07     No lab results found.  Recent Labs   Lab Test 07/15/16  1537   CULT No Beta Streptococcus isolated       Attestation:  Total time on the floor involved in the patient's care: 35 minutes. Total time spent in counseling/care coordination: >50%

## 2021-05-18 NOTE — PLAN OF CARE
Patient vital signs are at baseline: Yes  Patient able to ambulate as they were prior to admission or with assist devices provided by therapies during their stay:  Yes  Patient MUST void prior to discharge:  Yes  Patient able to tolerate oral intake:  Yes  Pain has adequate pain control using Oral analgesics:  Yes     AOx4, VSS on RA, erythema and swelling of RLE improving, tolerating oral abx well, potentially discharging to home today.

## 2021-05-18 NOTE — PROGRESS NOTES
Pt discharged now where wife is waiting to pick him up at [address]. Belongings sent with patient along with discharge papers and his medications that were filled here.  No concerns at this time.     Kat Jeffers RN

## 2021-05-18 NOTE — DISCHARGE SUMMARY
"[facility]  Hospitalist Discharge Summary      Date of Admission:  5/8/2021  Date of Discharge:  5/18/2021  Discharging Provider: Jazmin Moreno MD      Discharge Diagnoses   Cellulitis of right lower extremity, likely due to streptococcus  Tinea pedis  Acute kidney injury, possibly due to infection versus vancomycin  Type 2 diabetes mellitus, with long-term insulin use  Hyperlipidemia  Hypertension  Hypothyroid  BPH    Follow-ups Needed After Discharge   Follow-up Appointments     Follow-up and recommended labs and tests       Follow up with primary care provider, Samir Duque, within 7   days for hospital follow- up.  No follow up labs or test are needed.             Unresulted Labs Ordered in the Past 30 Days of this Admission     No orders found from 4/8/2021 to 5/9/2021.          Discharge Disposition   Discharged to home with home health care  Condition at discharge: Stable    Hospital Course    Timmy Strange is a 79 yo M w/hx of DM2 on insulin who presents with right leg erythema with associated chills.      RLE Cellulitis:  Tenia pedis  At outside facility D-dimer elevated, but ultrasound reportedly negative for DVT    Vancomycin and Zosyn started initially but stopped as no definitive MRSA history.      Patient was still with significant erythema and swelling / tenderness  on IV Zosyn      ID consult requested, they are following    looks streptococcal; zosyn changed to IV ceftriaxone 2 gm/day,    clindamycin since 5/12, continue    Added probiotics while on clindamycin     Scheduled tylenol to help with RLE pain     added micozole cream to the skin between toes to help with tenia pedis     Elevated RLE as tolerated to help with swelling .  Compression stockings ordered to help with the lower extremity edema    Per Dr. Swift, \"Day 11 of IV antibiotics, WBC normal, procal normal, feels better, leg improving slowly.  switched  to oral antibiotics ok for discharge today " "with 3 more days of antibiotics. Continue probiotics for a month.\"  I appreciate her followup     CLAUDIA:    Patient with creatinine of 0.8 11/9/20.  On admission creat had increased to 1.2 and this AM is now 1.45.  This is possibly related to the infection or to the vancomycin.     Cr improved to  0.97      D/isabelle IVF on 5/12/21      Generalized weakness   Recent mechanical falls x2  Possible transient infectious encephalopathy:  Patient fell twice while in [place] on recent travel.  One time sounds like he lost balance due to his backpack slipping off, another time sounds like he was having trouble seeing in the dark which caused him to fall.  He reports some generalized weakness, but otherwise is independent and ambulatory at baseline.  Plan:    PT/OT evaluation - no major needs    ok to go home per PT on discharge      Type 2 diabetes mellitus on chronic insulin:  PTA regimen is Humalog mix 75/2518 units every evening, 14 units every morning, with Metformin 1500 mg daily.  Hemoglobin A1c on recheck here is 5.6.    Transitioned initially to glargine 14 units daily + aspart 1 unit per 20 grams of carbohydrates + sliding scale insulin.     PTA Metformin held on admission 2/2 acute infection and worsening renal function    BS trending up, creatinine normal -retart Metformin (lower dose)500 mg bid on 5/16    Carbohydrate controlled diet     Blood sugar readings are at goal     Anemia:  Per patient and spouse, uncertain etiology.  He has a hematology appointment scheduled next week.  He has had negative EGD and colonoscopy recently.    Monitor hgb for now, slightly lower but after initial significant IVF.    no sign of bleeding     Received 1 dose of iv iron & is on po iron. Cont to monitor    repeat hgb trending down 9.2--7.8 this AM, no sign of bleeding. Repeat Hgb 8.2    hematology consulted on 5/10 -> bone marrow biopsy planned as outpatient.  That will help to look for myelodysplastic syndrome. "       Hyperlipidemia  Hypertension:    Continue PTA statin    Continue PTA carvedilol 3.125 mg twice daily & PTA Losartan 100 mg held since admission    BP trending up, monitor for now & cont with carvedilol for now      Hypothyroidism:    Continue PTA Synthroid.     BPH    Continue PTA Flomax and Proscar.     Incidentally noted slight cardiac murmur:  No acute cardiac complaints.  Consider outpatient echo.    Consultations This Hospital Stay   PHARMACY TO DOSE VANCO  PHYSICAL THERAPY ADULT IP CONSULT  OCCUPATIONAL THERAPY ADULT IP CONSULT  HEMATOLOGY & ONCOLOGY IP CONSULT  INFECTIOUS DISEASES IP CONSULT  CARE MANAGEMENT / SOCIAL WORK IP CONSULT  PHYSICAL THERAPY ADULT IP CONSULT  OCCUPATIONAL THERAPY ADULT IP CONSULT    Code Status   Full Code    Time Spent on this Encounter   I, Jazmin Moreno MD, personally saw the patient today and spent greater than 30 minutes discharging this patient.       Jazmin Moreno MD  [place] [address] [facility]  [address] [place] [address]  Phone: [phone]  ______________________________________________________________________    Physical Exam   Vital Signs: Temp: 98  F (36.7  C) Temp src: Oral BP: (!) 153/106 Pulse: 66   Resp: 16 SpO2: 98 % O2 Device: None (Room air)    Weight: 192 lbs 8 oz  I saw and examined the patient on the date of discharge.         Primary Care Physician   Samir Duque    Discharge Orders      Home care nursing referral      Reason for your hospital stay    You had redness and swelling in your leg due to infection.     Follow-up and recommended labs and tests     Follow up with primary care provider, Samir Duque, within 7 days for hospital follow- up.  No follow up labs or test are needed.     Activity    Your activity upon discharge: activity as tolerated     MD face to face encounter    Documentation of Face to Face and Certification for Home Health Services    I certify that patient: Timmy MILIAN  Alexandr is under my care and that I, or a nurse practitioner or physician's assistant working with me, had a face-to-face encounter that meets the physician face-to-face encounter requirements with this patient on: 5/18/2021.    This encounter with the patient was in whole, or in part, for the following medical condition, which is the primary reason for home health care: cellulitis.    I certify that, based on my findings, the following services are medically necessary home health services: Nursing.    My clinical findings support the need for the above services because: Nurse is needed: To assess cellulitis after changes in medications or other medical regimen..    Further, I certify that my clinical findings support that this patient is homebound (i.e. absences from home require considerable and taxing effort and are for medical reasons or Muslim services or infrequently or of short duration when for other reasons) because: Requires assistance of another person or specialized equipment to access medical services because patient: Requires supervision of another for safe transfer...    Based on the above findings. I certify that this patient is confined to the home and needs intermittent skilled nursing care, physical therapy and/or speech therapy.  The patient is under my care, and I have initiated the establishment of the plan of care.  This patient will be followed by a physician who will periodically review the plan of care.  Physician/Provider to provide follow up care: Samir Duque    [facility] physician (the Medicare certified [place] provider): Jazmin Moreno MD  Physician Signature: See electronic signature associated with these discharge orders.  Date: 5/18/2021     Diet    Follow this diet upon discharge: Orders Placed This Encounter      Consistent Carbohydrate Diet 8435-6096 Calories: Moderate Consistent CHO (4-6 CHO units/meal)       Significant Results and Procedures   Most  Recent 3 CBC's:  Recent Labs   Lab Test 21  0646 21  1057 05/15/21  1202 05/15/21  0710 21  1200 21  0630   WBC 5.2  --   --  4.9 6.3 6.0   HGB 8.5* 8.1* 8.2* 7.8*  --  9.2*   MCV 89  --   --  88  --  89     --   --  245  --  173     Most Recent 3 BMP's:  Recent Labs   Lab Test 05/15/21  0710 21  0630 05/10/21  0617    144 140   POTASSIUM 4.0 4.1 3.8   CHLORIDE 111* 116* 112*   CO2 28 22 25   BUN 14 27 31*   CR 0.80 0.97 1.07   ANIONGAP 4 6 3   ANIBAL 8.0* 8.0* 7.9*   * 155* 131*     Most Recent 6 Bacteria Isolates From Any Culture (See EPIC Reports for Culture Details):  Recent Labs   Lab Test 07/15/16  1537   CULT No Beta Streptococcus isolated   ,   Results for orders placed or performed during the hospital encounter of 16   ECHO STRESS WITH OPTISON    Narrative    Interpretation Summary              [facility]  [facility]  [address]  [address]  [place] [address]  Phone [phone]  Fax [phone]        Name: YESI DENISE  MRN: [id number]  : 1940  Study Date: 2016 10:49 AM  Age: 75 yrs  Gender: Male  [place] [facility]  Reason For Study: Precordial pain  Ordering Physician: RIVKA GATES  Referring Physician: RIVKA GATES  Performed By: Carlyn Rose  BSA: 2.1 m2  Height: 67 in  Weight: 217 lb  HR: 72  BP: 144/62 mmHg     ______________________________________________________________________________     Procedure  Stress Echo Complete. Contrast Optison.     ______________________________________________________________________________     Interpretation Summary  The patient did report mild chest pressure at the end of the treadmill  exercise. With this, there was a maximum 1.0mm ST segment depression in the  inferior lateral lead(s). that resolved slowly post exercise. This was an  abnormal but nondiagnostic stress EKG. The post exercise LV visual ejection  fraction  is estimated at >70% with normal resting wall motion and no stress-  induced wall motion abnormalities. This was a normal stress echocardiogram  with no evidence of stress-induced ischemia.  Further assessment may be required to more clearly rule in or rule out  inducible ischemia  The results of the stress echocardiogram were conveyed to the patient today     ______________________________________________________________________________     Stress  Exercise was stopped due to fatigue.  The patient exercised 6:00.  Target Heart Rate was achieved.  No arrhythmia noted.  A treadmill exercise test according to the Morales protocol was performed.  A moderately-high workload was achieved.  There was a maximum 1.0mm ST segment depression in the inferior lead(s).  There was a maximum 1.0mm ST segment depression in the lateral lead(s).  The patient did report mild chest pressure at the end of the treadmill  exercise. With this, t.  Arrhythmia noted in recovery: occasional PVC's.  Normal resting wall motion and no stress-induced wall motion abnormality.  The visual ejection fraction is estimated at >70%.  Left ventricular cavity size decreases with exercise.  Global LV systolic function augments with exercise.  This was a normal stress echocardiogram with no evidence of stress-induced  ischemia.     Baseline  The patient is in normal sinus rhythm.  Resting ECG is normal.  Normal left ventricular wall motion.  No regional wall motion abnormalities noted.  The visual ejection fraction is estimated at 55-60%.     Stress Results         Protocol: Morales Protocol         Maximum Predicted HR:  147 bpm        Target HR:     125 bpm% Max           imum Predicted HR:   93 %             +---------+--------+----------+------+---------------------+          :  Stage  :Duration:Heart Rate:  BPCom          ment       :          :         : (mm:ss):   (bpm)  :      :                     :           +---------+--------+----------+------+---------------------+          : Stage 1 :  3:00 11      7   :160/64:                     :          +---------+--------+----------+------+---------------------+          : Stage 2 :  3:00 13      6   :192/62:Chest tightness 3/10 :          +---------+--------+----------+------+---------------------+          :Recovery :  6:00 88          :140/68:RPP = 81487, duke = 2:          +---------+--------+----------+------+---------------------+             Stress Duration:  6:00 mm:ss        Recovery Time: 6:00 mm:ss         Maximum Stress HR:   136 bpmM              ETS:          7        Mitral Valve  The mitral valve is normal in structure and function.     Tricuspid Valve  The tricuspid valve is not well visualized, but is grossly normal. No  tricuspid regurgitation.  Aortic Valve  Normal tricuspid aortic valve.     Pericardium  There is no pericardial effusion.     ______________________________________________________________________________                    ______________________________________________________________________________        Report approved by: Leoncio Aparicio 02/19/2016 11:52 AM            Discharge Medications      Review of your medicines      START taking      Dose / Directions   cephALEXin 250 MG capsule  Commonly known as: KEFLEX  Indication: Infection of the Skin and/or Soft Tissue      Dose: 250 mg  Take 1 capsule (250 mg) by mouth every 8 hours for 3 days  Quantity: 9 capsule  Refills: 0     clindamycin 300 MG capsule  Commonly known as: CLEOCIN  Indication: Infection of the Skin and/or Soft Tissue      Dose: 300 mg  Take 1 capsule (300 mg) by mouth every 8 hours for 3 days  Quantity: 9 capsule  Refills: 0     lactobacillus rhamnosus (GG) capsule      Dose: 1 capsule  Take 1 capsule by mouth 2 times daily  Quantity: 28 capsule  Refills: 0        CONTINUE these medicines which may have CHANGED, or have new prescriptions. If we are uncertain of the  size of tablets/capsules you have at home, strength may be listed as something that might have changed.      Dose / Directions   lovastatin 40 MG tablet  Commonly known as: MEVACOR  This may have changed:     how much to take    how to take this    when to take this    additional instructions  Used for: Hyperlipidemia LDL goal <70      TAKE 1 TABLET BY MOUTH  DAILY AT BEDTIME  Quantity: 90 tablet  Refills: 2     tamsulosin 0.4 MG capsule  Commonly known as: FLOMAX  This may have changed: See the new instructions.  Used for: Benign non-nodular prostatic hyperplasia without lower urinary tract symptoms      TAKE 1 CAPSULE BY MOUTH  ONCE A DAY  Quantity: 90 capsule  Refills: 3        CONTINUE these medicines which have NOT CHANGED      Dose / Directions   ALLEGRA PO      Dose: 180 mg  Take 180 mg by mouth daily  Refills: 0     blood glucose monitoring meter device kit      Refills: 0     carvedilol 3.125 MG tablet  Commonly known as: COREG  Used for: Essential hypertension      TAKE 1 TABLET BY MOUTH TWO  TIMES DAILY  Quantity: 180 tablet  Refills: 3     Ferrous Sulfate 324 (65 Fe) MG Tbec      Dose: 324 mg  Take 324 mg by mouth 2 times daily  Refills: 0     finasteride 5 MG tablet  Commonly known as: PROSCAR  Used for: Benign non-nodular prostatic hyperplasia without lower urinary tract symptoms      TAKE 1 TABLET BY MOUTH  DAILY  Quantity: 90 tablet  Refills: 3     * HumaLOG MIX 75/25 KWIKPEN (75-25) 100 UNIT/ML pen  Generic drug: insulin lispro protamine-insulin lispro      Dose: 14 Units  Inject 14 Units Subcutaneous every morning (before breakfast)  Refills: 0     * HumaLOG MIX 75/25 KWIKPEN (75-25) 100 UNIT/ML pen  Generic drug: insulin lispro protamine-insulin lispro      Dose: 18 Units  Inject 18 Units Subcutaneous every evening Took 16 units in the evening  Refills: 0     latanoprost 0.005 % ophthalmic solution  Commonly known as: XALATAN      Dose: 1 drop  Place 1 drop into both eyes daily  Refills: 0      Levothroid 88 MCG tablet  Generic drug: levothyroxine      Dose: 88 mcg  Take 88 mcg by mouth daily.  Refills: 0     losartan 100 MG tablet  Commonly known as: COZAAR  Used for: Essential hypertension, benign      TAKE 1 TABLET BY MOUTH  DAILY  Quantity: 90 tablet  Refills: 3     metFORMIN 500 MG 24 hr tablet  Commonly known as: GLUCOPHAGE-XR      Dose: 1,500 mg  Take 1,500 mg by mouth daily (with dinner)  Refills: 0     multivitamin tablet      Dose: 1 tablet  Take 1 tablet by mouth daily  Refills: 0     OneTouch Delica Plus Lyaycg24Y Misc      Refills: 0     ONETOUCH ULTRA test strip  Generic drug: blood glucose      Refills: 0     pantoprazole 40 MG EC tablet  Commonly known as: PROTONIX  Used for: Weakness of voice      Dose: 40 mg  Take 1 tablet (40 mg) by mouth daily  Quantity: 90 tablet  Refills: 2     sildenafil 100 MG tablet  Commonly known as: VIAGRA  Used for: Erectile dysfunction, unspecified erectile dysfunction type      TAKE 1 TABLET BY MOUTH 30 MINUTES TO 4 HOURS PRIOR TO ACTIVITY. MAX  MG PER 24 HOURS  Quantity: 12 tablet  Refills: 0     ULTICARE SHORT 31G X 8 MM miscellaneous  Generic drug: insulin pen needle      USE TO INJECT TWICE DAILY  Refills: 1         * This list has 2 medication(s) that are the same as other medications prescribed for you. Read the directions carefully, and ask your doctor or other care provider to review them with you.               Where to get your medicines      These medications were sent to [facility] [place] - [place] - [address] [address]  [address] [address] [facility] [address]    Phone: [phone]     cephALEXin 250 MG capsule    clindamycin 300 MG capsule     Some of these will need a paper prescription and others can be bought over the counter. Ask your nurse if you have questions.    Bring a paper prescription for each of these medications    lactobacillus rhamnosus (GG) capsule         Allergies   No Known Allergies

## 2021-05-19 ENCOUNTER — PATIENT OUTREACH (OUTPATIENT)
Dept: CARE COORDINATION | Facility: CLINIC | Age: 81
End: 2021-05-19

## 2021-05-19 DIAGNOSIS — I10 ESSENTIAL HYPERTENSION, BENIGN: ICD-10-CM

## 2021-05-19 DIAGNOSIS — I10 ESSENTIAL HYPERTENSION: ICD-10-CM

## 2021-05-19 DIAGNOSIS — Z71.89 OTHER SPECIFIED COUNSELING: ICD-10-CM

## 2021-05-19 NOTE — LETTER
[facility] CARE COORDINATION  [address]  [facility] [address]    May 20, 2021    Timmy Strange  [address]  [facility] [address]      Dear Timmy,    I am a clinic community health worker who works with Samir Duque MD at [facility]. I have been trying to reach you recently to introduce Clinic Care Coordination and to see if there was anything I could assist you with.  I recently tried to call and was unable to reach you. Below is a description of clinic care coordination and how I can further assist you.      The clinic care coordination team is made up of a registered nurse,  and community health worker who understand the health care system. The goal of clinic care coordination is to help you manage your health and improve access to the health care system in the most efficient manner. The team can assist you in meeting your health care goals by providing education, coordinating services, strengthening the communication among your providers and supporting you with any resource needs.    Please feel free to contact the Community Health Worker at [phone] with any questions or concerns. We are focused on providing you with the highest-quality healthcare experience possible and that all starts with you.     Sincerely,     Vinita Valdes   Community Health Worker   Ph: [phone]  Fx: [phone]

## 2021-05-19 NOTE — PROGRESS NOTES
Clinic Care Coordination Contact  [facility]/Voicemail       Clinical Data: Care Coordinator Outreach  Outreach attempted x 1.  Left message on patient's voicemail with call back information and requested return call.  Plan:. Care Coordinator will try to reach patient again in 1-2 business days.

## 2021-05-20 ENCOUNTER — TELEPHONE (OUTPATIENT)
Dept: INTERNAL MEDICINE | Facility: CLINIC | Age: 81
End: 2021-05-20

## 2021-05-20 RX ORDER — CARVEDILOL 3.12 MG/1
TABLET ORAL
Qty: 180 TABLET | Refills: 2 | Status: SHIPPED | OUTPATIENT
Start: 2021-05-20 | End: 2022-03-24

## 2021-05-20 RX ORDER — LOSARTAN POTASSIUM 100 MG/1
100 TABLET ORAL DAILY
Qty: 90 TABLET | Refills: 2 | Status: SHIPPED | OUTPATIENT
Start: 2021-05-20 | End: 2022-03-24

## 2021-05-20 NOTE — TELEPHONE ENCOUNTER
[facility] and then weekly for a couple weeks and also OT lymphedema eval.    Ok to approve?    Call back Rc [phone]

## 2021-05-20 NOTE — PROGRESS NOTES
Clinic Care Coordination Contact  [facility]/Voicemail       Clinical Data: Care Coordinator Outreach  Outreach attempted x 2.  Left message on patient's voicemail with call back information and requested return call.  Plan: Care Coordinator will send care coordination introduction letter with care coordinator contact information and explanation of care coordination services via [employer]. Care Coordinator will do no further outreaches at this time.

## 2021-05-25 DIAGNOSIS — R35.0 URINARY FREQUENCY: Primary | ICD-10-CM

## 2021-05-26 ENCOUNTER — OFFICE VISIT (OUTPATIENT)
Dept: INTERNAL MEDICINE | Facility: CLINIC | Age: 81
End: 2021-05-26
Payer: COMMERCIAL

## 2021-05-26 VITALS
HEART RATE: 65 BPM | DIASTOLIC BLOOD PRESSURE: 76 MMHG | WEIGHT: 200.1 LBS | SYSTOLIC BLOOD PRESSURE: 138 MMHG | TEMPERATURE: 98.5 F | OXYGEN SATURATION: 98 % | BODY MASS INDEX: 31.34 KG/M2

## 2021-05-26 DIAGNOSIS — R60.0 LOCALIZED EDEMA: ICD-10-CM

## 2021-05-26 DIAGNOSIS — E11.51 TYPE II DIABETES MELLITUS WITH PERIPHERAL CIRCULATORY DISORDER (H): ICD-10-CM

## 2021-05-26 DIAGNOSIS — L03.115 CELLULITIS OF RIGHT LOWER EXTREMITY: Primary | ICD-10-CM

## 2021-05-26 DIAGNOSIS — I10 ESSENTIAL HYPERTENSION: ICD-10-CM

## 2021-05-26 PROCEDURE — 99215 OFFICE O/P EST HI 40 MIN: CPT | Performed by: FAMILY MEDICINE

## 2021-05-26 NOTE — PROGRESS NOTES
Assessment & Plan     Cellulitis of right lower extremity      Essential hypertension    - AMBULATORY ADULT DIABETES EDUCATOR REFERRAL; Future    Type II diabetes mellitus with peripheral circulatory disorder (H)    - AMBULATORY ADULT DIABETES EDUCATOR REFERRAL; Future    Localized edema    - AMBULATORY ADULT DIABETES EDUCATOR REFERRAL; Future      40 minutes spent on the date of the encounter doing chart review, history and exam, documentation and further activities per the note       Patient Instructions   The patient was accompanied by his wife today.  They know to contact us if his right lower extremity starts to get worse rather than continuing to improve.  His erythema has gotten much better.  He still has dependent edema.  He is wearing compression stockings.  We discussed elevating his legs for 30 minutes partway through the day.  I also encouraged him to get as much activity as he possibly can.  I made a referral for him to have CDE get a hold of him to talk about his diabetes.  I do not think he is actually ever had any diabetic education.  Otherwise we will see him back in 2 weeks.  Home care is involved.  OT will be stopping out to see if they can help with his dependent edema.  For the time being he will use his compression stockings starting first thing in the morning and taking them off when he goes to bed at night.  I spent 40 minutes with the patient and his wife going over his recent hospitalization and his current care plan.      Return in about 2 weeks (around 6/9/2021).    Samir Duque MD  [facility]   Timmy is a 80 year old who presents for the following health issues  accompanied by his spouse:    [facility]       Hospital Follow-up Visit:    Hospital/Nursing Home/IP Rehab Facility: [facility]  Date of Admission: 05/08/2021  Date of Discharge: 05/18/2021  Reason(s) for Admission: cellulitis of lower right leg      Was your  hospitalization related to COVID-19? No   Problems taking medications regularly:  None  Medication changes since discharge: None  Problems adhering to non-medication therapy:  None    Summary of hospitalization:  [facility] discharge summary reviewed  Diagnostic Tests/Treatments reviewed.  Follow up needed: Occupational Therapy is supposed to stop out and evaluate ways to improve the edema in his lower extremities.  Other Healthcare Providers Involved in Patient s Care:         Homecare and Hematology and infectious disease.  Update since discharge: improved. Post Discharge Medication Reconciliation: discharge medications reconciled, continue medications without change.  Plan of care communicated with patient and family              Review of Systems   Constitutional, HEENT, cardiovascular, pulmonary, gi and gu systems are negative, except as otherwise noted.      Objective    There were no vitals taken for this visit.  There is no height or weight on file to calculate BMI.  Physical Exam   GENERAL APPEARANCE: healthy, alert, no distress and over weight  CV: pitting B/L LE edema to just below the knees right worse than left.  SKIN: erythema - Right lower extremity.

## 2021-05-26 NOTE — PATIENT INSTRUCTIONS
The patient was accompanied by his wife today.  They know to contact us if his right lower extremity starts to get worse rather than continuing to improve.  His erythema has gotten much better.  He still has dependent edema.  He is wearing compression stockings.  We discussed elevating his legs for 30 minutes partway through the day.  I also encouraged him to get as much activity as he possibly can.  I made a referral for him to have CDE get a hold of him to talk about his diabetes.  I do not think he is actually ever had any diabetic education.  Otherwise we will see him back in 2 weeks.  Home care is involved.  OT will be stopping out to see if they can help with his dependent edema.  For the time being he will use his compression stockings starting first thing in the morning and taking them off when he goes to bed at night.  I spent 40 minutes with the patient and his wife going over his recent hospitalization and his current care plan.

## 2021-05-27 ENCOUNTER — TELEPHONE (OUTPATIENT)
Dept: INTERNAL MEDICINE | Facility: CLINIC | Age: 81
End: 2021-05-27

## 2021-05-27 NOTE — TELEPHONE ENCOUNTER
Diabetes Education Scheduling Outreach #1:    Call to patient to schedule. Left message with phone number to call to schedule.    Plan for 2nd outreach attempt within 1 week.    Renetta Street  [place] [facility]  Diabetes and Nutrition Scheduling

## 2021-06-01 ENCOUNTER — DOCUMENTATION ONLY (OUTPATIENT)
Dept: OTHER | Facility: CLINIC | Age: 81
End: 2021-06-01

## 2021-06-04 DIAGNOSIS — D64.9 ANEMIA, UNSPECIFIED TYPE: Primary | ICD-10-CM

## 2021-06-07 ENCOUNTER — HOSPITAL ENCOUNTER (OUTPATIENT)
Facility: CLINIC | Age: 81
Setting detail: SPECIMEN
Discharge: HOME | End: 2021-06-07
Attending: FAMILY MEDICINE | Admitting: INTERNAL MEDICINE
Payer: COMMERCIAL

## 2021-06-07 ENCOUNTER — INFUSION THERAPY VISIT (OUTPATIENT)
Dept: INFUSION THERAPY | Facility: CLINIC | Age: 81
End: 2021-06-07
Attending: FAMILY MEDICINE
Payer: COMMERCIAL

## 2021-06-07 DIAGNOSIS — R35.0 URINARY FREQUENCY: ICD-10-CM

## 2021-06-07 DIAGNOSIS — D64.9 ANEMIA, UNSPECIFIED TYPE: ICD-10-CM

## 2021-06-07 LAB
ALBUMIN UR-MCNC: NEGATIVE MG/DL
APPEARANCE UR: CLEAR
BASOPHILS # BLD AUTO: 0 10E9/L (ref 0–0.2)
BASOPHILS NFR BLD AUTO: 0.4 %
BILIRUB UR QL STRIP: NEGATIVE
COLOR UR AUTO: YELLOW
DIFFERENTIAL METHOD BLD: ABNORMAL
EOSINOPHIL # BLD AUTO: 0.1 10E9/L (ref 0–0.7)
EOSINOPHIL NFR BLD AUTO: 1.6 %
ERYTHROCYTE [DISTWIDTH] IN BLOOD BY AUTOMATED COUNT: 16.6 % (ref 10–15)
FERRITIN SERPL-MCNC: 58 NG/ML (ref 26–388)
GLUCOSE UR STRIP-MCNC: NEGATIVE MG/DL
HCT VFR BLD AUTO: 32.5 % (ref 40–53)
HGB BLD-MCNC: 9.9 G/DL (ref 13.3–17.7)
HGB UR QL STRIP: NEGATIVE
IMM GRANULOCYTES # BLD: 0 10E9/L (ref 0–0.4)
IMM GRANULOCYTES NFR BLD: 0.4 %
IRON SATN MFR SERPL: 10 % (ref 15–46)
IRON SERPL-MCNC: 31 UG/DL (ref 35–180)
KETONES UR STRIP-MCNC: NEGATIVE MG/DL
LEUKOCYTE ESTERASE UR QL STRIP: NEGATIVE
LYMPHOCYTES # BLD AUTO: 1.1 10E9/L (ref 0.8–5.3)
LYMPHOCYTES NFR BLD AUTO: 20.4 %
MCH RBC QN AUTO: 27.4 PG (ref 26.5–33)
MCHC RBC AUTO-ENTMCNC: 30.5 G/DL (ref 31.5–36.5)
MCV RBC AUTO: 90 FL (ref 78–100)
MONOCYTES # BLD AUTO: 0.6 10E9/L (ref 0–1.3)
MONOCYTES NFR BLD AUTO: 11.3 %
NEUTROPHILS # BLD AUTO: 3.7 10E9/L (ref 1.6–8.3)
NEUTROPHILS NFR BLD AUTO: 65.9 %
NITRATE UR QL: NEGATIVE
NRBC # BLD AUTO: 0 10*3/UL
NRBC BLD AUTO-RTO: 0 /100
PH UR STRIP: 7 PH (ref 5–7)
PLATELET # BLD AUTO: 266 10E9/L (ref 150–450)
RBC # BLD AUTO: 3.61 10E12/L (ref 4.4–5.9)
SOURCE: NORMAL
SP GR UR STRIP: 1.02 (ref 1–1.03)
TIBC SERPL-MCNC: 314 UG/DL (ref 240–430)
UROBILINOGEN UR STRIP-MCNC: 0 MG/DL (ref 0–2)
WBC # BLD AUTO: 5.6 10E9/L (ref 4–11)

## 2021-06-07 PROCEDURE — 82728 ASSAY OF FERRITIN: CPT | Performed by: INTERNAL MEDICINE

## 2021-06-07 PROCEDURE — 83550 IRON BINDING TEST: CPT | Performed by: INTERNAL MEDICINE

## 2021-06-07 PROCEDURE — 83540 ASSAY OF IRON: CPT | Performed by: INTERNAL MEDICINE

## 2021-06-07 PROCEDURE — 36415 COLL VENOUS BLD VENIPUNCTURE: CPT

## 2021-06-07 PROCEDURE — 85025 COMPLETE CBC W/AUTO DIFF WBC: CPT | Performed by: INTERNAL MEDICINE

## 2021-06-07 PROCEDURE — 81003 URINALYSIS AUTO W/O SCOPE: CPT | Performed by: UROLOGY

## 2021-06-07 NOTE — PROGRESS NOTES
Medical Assistant Note:  Timmy Strange presents today for lab draw.    Patient seen by provider today: No.   present during visit today: Not Applicable.    Concerns: No Concerns.    Procedure:  Lab draw site: RAC, Needle type: BF, Gauge: 21.  Stefania and coban applied  Post Assessment:  Labs drawn without difficulty: Yes.    Discharge Plan:  Departure Mode: Ambulatory.    Face to Face Time: 5.    Michelle Long CMA

## 2021-06-08 ENCOUNTER — ONCOLOGY VISIT (OUTPATIENT)
Dept: ONCOLOGY | Facility: CLINIC | Age: 81
End: 2021-06-08
Attending: INTERNAL MEDICINE
Payer: COMMERCIAL

## 2021-06-08 VITALS
HEART RATE: 65 BPM | TEMPERATURE: 98 F | BODY MASS INDEX: 30.1 KG/M2 | OXYGEN SATURATION: 99 % | DIASTOLIC BLOOD PRESSURE: 80 MMHG | HEIGHT: 67 IN | SYSTOLIC BLOOD PRESSURE: 154 MMHG | WEIGHT: 191.8 LBS | RESPIRATION RATE: 16 BRPM

## 2021-06-08 DIAGNOSIS — D64.9 ANEMIA, UNSPECIFIED TYPE: Primary | ICD-10-CM

## 2021-06-08 DIAGNOSIS — D50.9 IRON DEFICIENCY ANEMIA, UNSPECIFIED IRON DEFICIENCY ANEMIA TYPE: ICD-10-CM

## 2021-06-08 PROCEDURE — 99213 OFFICE O/P EST LOW 20 MIN: CPT | Performed by: INTERNAL MEDICINE

## 2021-06-08 PROCEDURE — G0463 HOSPITAL OUTPT CLINIC VISIT: HCPCS

## 2021-06-08 RX ORDER — FERROUS SULFATE 324(65)MG
324 TABLET, DELAYED RELEASE (ENTERIC COATED) ORAL 2 TIMES DAILY
COMMUNITY
Start: 2021-06-08

## 2021-06-08 ASSESSMENT — PAIN SCALES - GENERAL: PAINLEVEL: NO PAIN (0)

## 2021-06-08 ASSESSMENT — MIFFLIN-ST. JEOR: SCORE: 1548.63

## 2021-06-08 NOTE — LETTER
"    6/8/2021         RE: Timmy Strange  [address] [facility] [address]        Dear Colleague,    Thank you for referring your patient, Timmy Strange, to the [facility]. Please see a copy of my visit note below.    Oncology Rooming Note    June 8, 2021 9:43 AM   Timmy Strange is a 80 year old male who presents for:    Chief Complaint   Patient presents with     Oncology Clinic Visit     Initial Vitals: There were no vitals taken for this visit. Estimated body mass index is 31.34 kg/m  as calculated from the following:    Height as of 5/10/21: 1.702 m (5' 7\").    Weight as of 5/26/21: 90.8 kg (200 lb 1.6 oz). There is no height or weight on file to calculate BSA.  Data Unavailable Comment: Data Unavailable   No LMP for male patient.  Allergies reviewed: Yes  Medications reviewed: Yes    Medications: Medication refills not needed today.  Pharmacy name entered into [employer]:    [facility] - [address] PHARMACY #[id number] - [facility] [address] [address] DRUG STORE #[id number] - [facility] [address] [address] [address] [facility] [address]    Clinical concerns:  doctor was notified.      Minal Pina MA              HEMATOLOGY HISTORY: Mr. Strange is a gentleman with chronic normocytic anemia due to anemia of chronic disease and iron deficiency.    1.  On 11/07/2017, hemoglobin of 13.3.  -On 10/30/2018, hemoglobin of 12.5.  -On 06/16/2020, hemoglobin of 12.3.  -On 02/22/2021, WBC of 4.3, hemoglobin of 10.4, platelet of 226 and MCV of 90.  2.  On 11/09/2020:    -CMP normal except mildly low protein.  -Hemoglobin A1c normal at 5.5.  3.  On 02/01/2021, colonoscopy revealed colon polyps and diverticulosis.  It was a tubular adenoma.  4.  On 03/16/2021, EGD was essentially normal.  Biopsy did not reveal any H. Pylori.  5. On 05/11/2021:  -Iron of 13, saturation of 6% and ferritin of 69.  -Normal folate.  -Normal vitamin B12.  -Normal " TSH.  6. Patient received 1 dose of IV Venofer on 05/11/2021.    SUBJECTIVE:  Mr. Strange is a 80-year-old gentleman with chronic normocytic anemia.  Anemia is due to anemia of chronic disease and iron deficiency.  For iron deficiency, patient is on oral ferrous sulfate twice a day.  The patient also received 1 dose of IV Venofer on 05/11/2021.     The patient is here for followup.  The patient recently had cellulitis and was hospitalized.  Cellulitis has improved.     Overall, he feels better.  Fatigue is less.  He is trying to be more active.  No headache.  No dizziness.  No chest pain.  No shortness of breath.  No nausea or vomiting.  Appetite has been good.  No urinary or bowel complaints.  No bleeding.     He is tolerating oral iron fairly well.  Initially, he had some abdominal discomfort and constipation, which has improved.     PHYSICAL EXAMINATION:    GENERAL:  He is alert and oriented x 3.  Not in any distress.  VITAL SIGNS:  Reviewed.    Rest of systems not examined.     LABORATORY:  CBC, iron and ferritin reviewed.     ASSESSMENT:     1.  A 80-year-old gentleman with normocytic anemia secondary to anemia of chronic disease and iron deficiency.  2.  Iron deficiency.  3.  Multiple other medical problems including diabetes mellitus and hypertension.     PLAN:     1.  Labs were reviewed with the patient and his wife.  I explained to him that hemoglobin has improved to 9.9.  WBC, platelets and MCV are all normal.  His iron has also improved.  Ferritin is normal.     Again discussed regarding his anemia.  I explained to the patient that it is due to combination of anemia of chronic disease and iron deficiency.  I expect his anemia to improve further as his iron deficiency improves.  I do not expect his anemia to resolve because of anemia of chronic disease.  The patient is elderly.  He may be also developing myelodysplastic syndrome.     As his anemia and iron deficiency are improving, we will simply monitor  him.  He is agreeable for it.  I will see him in 3 months' time with labs, including CBC and iron studies.     2.  He has been taking oral iron twice a day.  We will reduce it to oral iron once a day.      3.  The patient advised to see a physician sooner if he has worsening weakness, chest pain, shortness of breath, bleeding or any other concerns.       This office note has been dictated.          Again, thank you for allowing me to participate in the care of your patient.        Sincerely,        Chandrakant Panchal MD

## 2021-06-08 NOTE — PROGRESS NOTES
"Oncology Rooming Note    June 8, 2021 9:43 AM   Timmy Strange is a 80 year old male who presents for:    Chief Complaint   Patient presents with     Oncology Clinic Visit     Initial Vitals: There were no vitals taken for this visit. Estimated body mass index is 31.34 kg/m  as calculated from the following:    Height as of 5/10/21: 1.702 m (5' 7\").    Weight as of 5/26/21: 90.8 kg (200 lb 1.6 oz). There is no height or weight on file to calculate BSA.  Data Unavailable Comment: Data Unavailable   No LMP for male patient.  Allergies reviewed: Yes  Medications reviewed: Yes    Medications: Medication refills not needed today.  Pharmacy name entered into [employer]:    [facility] - [place], [place] - [address] [place] [address] PHARMACY #[id number] [facility] [address] [address] DRUG STORE #[id number] [place] [address] AT [facility] OF [address]    Clinical concerns:  doctor was notified.      Minal Pina MA            "

## 2021-06-09 ENCOUNTER — OFFICE VISIT (OUTPATIENT)
Dept: INTERNAL MEDICINE | Facility: CLINIC | Age: 81
End: 2021-06-09
Payer: COMMERCIAL

## 2021-06-09 VITALS
OXYGEN SATURATION: 99 % | SYSTOLIC BLOOD PRESSURE: 128 MMHG | DIASTOLIC BLOOD PRESSURE: 78 MMHG | HEART RATE: 63 BPM | TEMPERATURE: 98.7 F | BODY MASS INDEX: 29.88 KG/M2 | WEIGHT: 190.8 LBS

## 2021-06-09 DIAGNOSIS — R60.0 LOCALIZED EDEMA: ICD-10-CM

## 2021-06-09 DIAGNOSIS — I10 ESSENTIAL HYPERTENSION: ICD-10-CM

## 2021-06-09 DIAGNOSIS — E66.01 MORBID OBESITY (H): ICD-10-CM

## 2021-06-09 DIAGNOSIS — E11.51 TYPE II DIABETES MELLITUS WITH PERIPHERAL CIRCULATORY DISORDER (H): ICD-10-CM

## 2021-06-09 DIAGNOSIS — L03.115 CELLULITIS OF RIGHT LOWER EXTREMITY: Primary | ICD-10-CM

## 2021-06-09 PROCEDURE — 99214 OFFICE O/P EST MOD 30 MIN: CPT | Performed by: FAMILY MEDICINE

## 2021-06-09 NOTE — PROGRESS NOTES
Assessment & Plan     Cellulitis of right lower extremity      Obesity (BMI 35.0-39.9) with comorbidity (H)      Localized edema      Type II diabetes mellitus with peripheral circulatory disorder (H)      Essential hypertension          30 minutes spent on the date of the encounter doing chart review, history and exam, documentation and further activities per the note       MEDICATIONS:  Continue current medications without change, I do not see a reason to add a diuretic at this point.  Work on weight loss  Regular exercise  Patient Instructions   I do not see any reason at this point to continue with antibiotics.  I also do not see any reason to start a diuretic.  Patient is exercising 30 to 60 minutes daily in 15 to 20-minute increments.  He is elevating his legs alf through the day and up on pillows at night.  He continues with compression stockings on his right lower extremity.  I do not think there is any need for the OT people to come in and do an evaluation to help with his dependent edema.  I also think home nursing can probably discontinue it he can go back to his regular lifestyle.  His wife was accompanying him on the visit through a conference call.  I answered all her questions.  The length of the visit was about 30 minutes.  He lives in [place] and we discussed my [facility].  The patient and his family are going on a trip for vacation and it will include flying.  I recommended the patient that he set his cell phone with a 45-minute timer so that when that comes off he gets up and moves around and either walks up and down the aisle or does toe lifts to make sure that the swelling in his legs does not increase while he is on the airplane.      Return in about 4 weeks (around 7/7/2021) for diabetes, hypertension, dependent edema.    Samir Duque MD  [facility]    Jefe Warner is a 80 year old who presents for the following health issues      HPI      Patient is following up for his cellulitis.     Diabetes Follow-up    How often are you checking your blood sugar? One time daily  What time of day are you checking your blood sugars (select all that apply)?  Before meals  Have you had any blood sugars above 200?  No  Have you had any blood sugars below 70?  No    What symptoms do you notice when your blood sugar is low?  None    What concerns do you have today about your diabetes? None     Do you have any of these symptoms? (Select all that apply)  No numbness or tingling in feet.  No redness, sores or blisters on feet.  No complaints of excessive thirst.  No reports of blurry vision.  No significant changes to weight.    Have you had a diabetic eye exam in the last 12 months? Didn't ask        BP Readings from Last 2 Encounters:   06/09/21 128/78   06/08/21 (!) 154/80     Hemoglobin A1C (%)   Date Value   05/08/2021 5.6   11/09/2020 5.5     LDL Cholesterol Calculated (mg/dL)   Date Value   11/09/2020 67   06/16/2020 55       Calculated 10  Hypertension Follow-up      Do you check your blood pressure regularly outside of the clinic? No     Are you following a low salt diet? Yes    Are your blood pressures ever more than 140 on the top number (systolic) OR more   than 90 on the bottom number (diastolic), for example 140/90? No        Review of Systems   Constitutional, HEENT, cardiovascular, pulmonary, gi and gu systems are negative, except as otherwise noted.      Objective    /78   Pulse 63   Temp 98.7  F (37.1  C) (Oral)   Wt 86.5 kg (190 lb 12.8 oz)   SpO2 99%   BMI 29.88 kg/m    Body mass index is 29.88 kg/m .  Physical Exam   GENERAL APPEARANCE: healthy, alert, no distress and over weight  MS: The edema in the patient's legs is virtually nonexistent today.  SKIN: no suspicious lesions or rashes and erythema is very minimal.  I do not see any signs of cellulitis at present.

## 2021-06-10 DIAGNOSIS — Z53.9 DIAGNOSIS NOT YET DEFINED: Primary | ICD-10-CM

## 2021-06-10 PROCEDURE — G0180 MD CERTIFICATION HHA PATIENT: HCPCS | Performed by: FAMILY MEDICINE

## 2021-06-10 NOTE — PATIENT INSTRUCTIONS
I do not see any reason at this point to continue with antibiotics.  I also do not see any reason to start a diuretic.  Patient is exercising 30 to 60 minutes daily in 15 to 20-minute increments.  He is elevating his legs care home through the day and up on pillows at night.  He continues with compression stockings on his right lower extremity.  I do not think there is any need for the OT people to come in and do an evaluation to help with his dependent edema.  I also think home nursing can probably discontinue it he can go back to his regular lifestyle.  His wife was accompanying him on the visit through a conference call.  I answered all her questions.  The length of the visit was about 30 minutes.  He lives in [place] and we discussed my shelter.  The patient and his family are going on a trip for vacation and it will include flying.  I recommended the patient that he set his cell phone with a 45-minute timer so that when that comes off he gets up and moves around and either walks up and down the aisle or does toe lifts to make sure that the swelling in his legs does not increase while he is on the airplane.

## 2021-06-13 NOTE — PROGRESS NOTES
HEMATOLOGY HISTORY: Mr. Strange is a gentleman with chronic normocytic anemia due to anemia of chronic disease and iron deficiency.    1.  On 11/07/2017, hemoglobin of 13.3.  -On 10/30/2018, hemoglobin of 12.5.  -On 06/16/2020, hemoglobin of 12.3.  -On 02/22/2021, WBC of 4.3, hemoglobin of 10.4, platelet of 226 and MCV of 90.  2.  On 11/09/2020:    -CMP normal except mildly low protein.  -Hemoglobin A1c normal at 5.5.  3.  On 02/01/2021, colonoscopy revealed colon polyps and diverticulosis.  It was a tubular adenoma.  4.  On 03/16/2021, EGD was essentially normal.  Biopsy did not reveal any H. Pylori.  5. On 05/11/2021:  -Iron of 13, saturation of 6% and ferritin of 69.  -Normal folate.  -Normal vitamin B12.  -Normal TSH.  6. Patient received 1 dose of IV Venofer on 05/11/2021.    SUBJECTIVE:  Mr. Strange is a 80-year-old gentleman with chronic normocytic anemia.  Anemia is due to anemia of chronic disease and iron deficiency.  For iron deficiency, patient is on oral ferrous sulfate twice a day.  The patient also received 1 dose of IV Venofer on 05/11/2021.     The patient is here for followup.  The patient recently had cellulitis and was hospitalized.  Cellulitis has improved.     Overall, he feels better.  Fatigue is less.  He is trying to be more active.  No headache.  No dizziness.  No chest pain.  No shortness of breath.  No nausea or vomiting.  Appetite has been good.  No urinary or bowel complaints.  No bleeding.     He is tolerating oral iron fairly well.  Initially, he had some abdominal discomfort and constipation, which has improved.     PHYSICAL EXAMINATION:    GENERAL:  He is alert and oriented x 3.  Not in any distress.  VITAL SIGNS:  Reviewed.    Rest of systems not examined.     LABORATORY:  CBC, iron and ferritin reviewed.     ASSESSMENT:     1.  A 80-year-old gentleman with normocytic anemia secondary to anemia of chronic disease and iron deficiency.  2.  Iron deficiency.  3.  Multiple other  medical problems including diabetes mellitus and hypertension.     PLAN:     1.  Labs were reviewed with the patient and his wife.  I explained to him that hemoglobin has improved to 9.9.  WBC, platelets and MCV are all normal.  His iron has also improved.  Ferritin is normal.     Again discussed regarding his anemia.  I explained to the patient that it is due to combination of anemia of chronic disease and iron deficiency.  I expect his anemia to improve further as his iron deficiency improves.  I do not expect his anemia to resolve because of anemia of chronic disease.  The patient is elderly.  He may be also developing myelodysplastic syndrome.     As his anemia and iron deficiency are improving, we will simply monitor him.  He is agreeable for it.  I will see him in 3 months' time with labs, including CBC and iron studies.     2.  He has been taking oral iron twice a day.  We will reduce it to oral iron once a day.      3.  The patient advised to see a physician sooner if he has worsening weakness, chest pain, shortness of breath, bleeding or any other concerns.

## 2021-06-18 ENCOUNTER — MEDICAL CORRESPONDENCE (OUTPATIENT)
Dept: HEALTH INFORMATION MANAGEMENT | Facility: CLINIC | Age: 81
End: 2021-06-18

## 2021-06-21 ENCOUNTER — OFFICE VISIT (OUTPATIENT)
Dept: UROLOGY | Facility: CLINIC | Age: 81
End: 2021-06-21
Payer: COMMERCIAL

## 2021-06-21 VITALS
BODY MASS INDEX: 29.76 KG/M2 | HEART RATE: 80 BPM | SYSTOLIC BLOOD PRESSURE: 120 MMHG | DIASTOLIC BLOOD PRESSURE: 62 MMHG | WEIGHT: 190 LBS

## 2021-06-21 DIAGNOSIS — N52.9 ED (ERECTILE DYSFUNCTION): Primary | ICD-10-CM

## 2021-06-21 DIAGNOSIS — N52.1 ERECTILE DYSFUNCTION DUE TO DISEASES CLASSIFIED ELSEWHERE: ICD-10-CM

## 2021-06-21 DIAGNOSIS — R35.0 URINARY FREQUENCY: Primary | ICD-10-CM

## 2021-06-21 LAB
ALBUMIN UR-MCNC: NEGATIVE MG/DL
APPEARANCE UR: CLEAR
BILIRUB UR QL STRIP: NEGATIVE
COLOR UR AUTO: YELLOW
GLUCOSE UR STRIP-MCNC: NEGATIVE MG/DL
HGB UR QL STRIP: NEGATIVE
KETONES UR STRIP-MCNC: NEGATIVE MG/DL
LEUKOCYTE ESTERASE UR QL STRIP: NEGATIVE
NITRATE UR QL: NEGATIVE
PH UR STRIP: 6 PH (ref 5–7)
SOURCE: NORMAL
SP GR UR STRIP: 1.02 (ref 1–1.03)
UROBILINOGEN UR STRIP-ACNC: 0.2 EU/DL (ref 0.2–1)

## 2021-06-21 PROCEDURE — 81003 URINALYSIS AUTO W/O SCOPE: CPT | Performed by: UROLOGY

## 2021-06-21 PROCEDURE — 99214 OFFICE O/P EST MOD 30 MIN: CPT | Performed by: UROLOGY

## 2021-06-21 RX ORDER — TAMSULOSIN HYDROCHLORIDE 0.4 MG/1
0.8 CAPSULE ORAL DAILY
Qty: 90 CAPSULE | Refills: 4 | Status: SHIPPED | OUTPATIENT
Start: 2021-06-21 | End: 2022-01-24

## 2021-06-21 RX ORDER — SILDENAFIL 100 MG/1
100 TABLET, FILM COATED ORAL PRN
Qty: 12 TABLET | Refills: 11 | Status: SHIPPED | OUTPATIENT
Start: 2021-06-21 | End: 2022-06-15

## 2021-06-21 RX ORDER — FINASTERIDE 5 MG/1
5 TABLET, FILM COATED ORAL DAILY
Qty: 90 TABLET | Refills: 4 | Status: SHIPPED | OUTPATIENT
Start: 2021-06-21 | End: 2022-06-15

## 2021-06-21 RX ORDER — SILDENAFIL 100 MG/1
100 TABLET, FILM COATED ORAL DAILY PRN
Qty: 12 TABLET | Refills: 11 | Status: SHIPPED | OUTPATIENT
Start: 2021-06-21 | End: 2021-08-11

## 2021-06-21 ASSESSMENT — PAIN SCALES - GENERAL: PAINLEVEL: MILD PAIN (2)

## 2021-06-21 NOTE — LETTER
6/21/2021       RE: Timmy Strange  [address] [facility] [id number]     Dear Colleague,    Thank you for referring your patient, Timmy Strange, to the [facility] at [facility]. Please see a copy of my visit note below.    History: It is a great pleasure to see this very pleasant 80-year-old gentleman in follow-up consultation today.  He is a patient with type 2 diabetes who is being concerned more recently about frequency of micturition but without significant nocturia; when I initially saw him he was going 12-13 times during the day with initially reduction in caffeinated beverages did reduce this to 9-10 times a day.  He had not been drinking sodas and only drinking simple fluids and drinking when thirsty.  When I saw him initially he is on both tamsulosin and finasteride the tamsulosin dose of 0.8 mg daily and finasteride 5 mg daily.  Renal function was normal creatinine of 0.82  We had considered further investigations including cystoscopy and even the possibility of urodynamics but decided that we should try anticholinergic medication with tolterodine 4 mg daily.  Subsequently that the patient decided not to continue this treatment and is finding that his symptoms are somewhat improved without having to do that.  He is currently being treated for cellulitis of the right leg which is probably secondary to diabetes type 2  Past Medical History:   Diagnosis Date     Arthritis 1970    Dx'd with RA when in the      BPH (benign prostatic hyperplasia) 12/16/2013     Cancer (H)     basal cell cancer behind ear     Diabetes mellitus      ED (erectile dysfunction) 1/6/2015     HTN (hypertension)      Hyperlipidemia LDL goal <100      Hypothyroidism      Mumps      Obesity        Social History     Socioeconomic History     Marital status:      Spouse name: None     Number of children: None     Years of education: None      Highest education level: None   Occupational History     None   Social Needs     Financial resource strain: None     Food insecurity     Worry: None     Inability: None     Transportation needs     Medical: None     Non-medical: None   Tobacco Use     Smoking status: Former Smoker     Packs/day: 0.00     Types: Pipe     Quit date: 11/15/2011     Years since quittin.6     Smokeless tobacco: Never Used   Substance and Sexual Activity     Alcohol use: No     Alcohol/week: 0.0 standard drinks     Drug use: No     Sexual activity: Yes     Partners: Female   Lifestyle     Physical activity     Days per week: None     Minutes per session: None     Stress: None   Relationships     Social connections     Talks on phone: None     Gets together: None     Attends Mosque service: None     Active member of club or organization: None     Attends meetings of clubs or organizations: None     Relationship status: None     Intimate partner violence     Fear of current or ex partner: None     Emotionally abused: None     Physically abused: None     Forced sexual activity: None   Other Topics Concern     Parent/sibling w/ CABG, MI or angioplasty before 65F 55M? No      Service Not Asked     Blood Transfusions Not Asked     Caffeine Concern Not Asked     Occupational Exposure Not Asked     Hobby Hazards Not Asked     Sleep Concern Not Asked     Stress Concern Not Asked     Weight Concern Not Asked     Special Diet No     Back Care Not Asked     Exercise Yes     Comment: walking 3-4 days a week     Bike Helmet Not Asked     Seat Belt Not Asked     Self-Exams Not Asked   Social History Narrative     None       Past Surgical History:   Procedure Laterality Date     COLONOSCOPY N/A 2014    Procedure: COMBINED COLONOSCOPY, SINGLE OR MULTIPLE BIOPSY/POLYPECTOMY BY BIOPSY;  Surgeon: Rolanda Bey MD;  Location:  GI     COLONOSCOPY N/A 2020    Procedure: COLONOSCOPY, WITH POLYPECTOMY AND BIOPSY;  Surgeon:  Christina Vieira MD;  Location:  GI     COLONOSCOPY N/A 2/1/2021    Procedure: Colonoscopy, With Polypectomy And Biopsy;  Surgeon: Christina Vieira MD;  Location:  GI     ESOPHAGOSCOPY, GASTROSCOPY, DUODENOSCOPY (EGD), COMBINED N/A 3/16/2021    Procedure: ESOPHAGOGASTRODUODENOSCOPY, WITH BIOPSY;  Surgeon: Jose Schrader MD;  Location:  GI     TESTICLE SURGERY       TONSILLECTOMY, ADENOIDECTOMY, COMBINED       VASECTOMY         Family History   Problem Relation Age of Onset     Diabetes Maternal Grandmother      Diabetes Maternal Grandfather      Arthritis Maternal Grandfather      Arthritis Father      Thyroid Disease Father      Glaucoma Mother      Alcohol/Drug Mother 49        cirrhosis     Diabetes Daughter 44        type 2     Obesity Daughter      Glaucoma Maternal Aunt          Current Outpatient Medications:      blood glucose monitoring (ONE TOUCH ULTRA 2) meter device kit, , Disp: , Rfl:      carvedilol (COREG) 3.125 MG tablet, TAKE 1 TABLET BY MOUTH TWO  TIMES DAILY, Disp: 180 tablet, Rfl: 2     Ferrous Sulfate 324 (65 Fe) MG TBEC, Take 1 tablet (324 mg) by mouth daily, Disp: , Rfl:      Fexofenadine HCl (ALLEGRA PO), Take 180 mg by mouth daily , Disp: , Rfl:      finasteride (PROSCAR) 5 MG tablet, Take 1 tablet (5 mg) by mouth daily, Disp: 90 tablet, Rfl: 4     finasteride (PROSCAR) 5 MG tablet, TAKE 1 TABLET BY MOUTH  DAILY, Disp: 90 tablet, Rfl: 3     HUMALOG MIX 75/25 KWIKPEN (75-25) 100 UNIT/ML susp, Inject 18 Units Subcutaneous every evening Took 16 units in the evening, Disp: , Rfl:      insulin lispro protamine-insulin lispro (HUMALOG MIX 75/25 KWIKPEN) (75-25) 100 UNIT/ML pen, Inject 14 Units Subcutaneous every morning (before breakfast), Disp: , Rfl:      Lancets (ONETOUCH DELICA PLUS [id number]) MISC, , Disp: , Rfl:      latanoprost (XALATAN) 0.005 % ophthalmic solution, Place 1 drop into both eyes daily, Disp: , Rfl:      levothyroxine (LEVOTHROID) 88 MCG tablet, Take 88 mcg by  mouth daily., Disp: , Rfl:      losartan (COZAAR) 100 MG tablet, Take 1 tablet (100 mg) by mouth daily, Disp: 90 tablet, Rfl: 2     lovastatin (MEVACOR) 40 MG tablet, TAKE 1 TABLET BY MOUTH  DAILY AT BEDTIME (Patient taking differently: Take 40 mg by mouth every morning ), Disp: 90 tablet, Rfl: 2     metFORMIN (GLUCOPHAGE-XR) 500 MG 24 hr tablet, Take 1,500 mg by mouth daily (with dinner) , Disp: , Rfl:      Multiple Vitamins-Minerals (CENTRUM SILVER) per tablet, Take 1 tablet by mouth daily, Disp: , Rfl:      ONETOUCH ULTRA test strip, , Disp: , Rfl:      sildenafil (VIAGRA) 100 MG tablet, Take 1 tablet (100 mg) by mouth daily as needed, Disp: 12 tablet, Rfl: 11     sildenafil (VIAGRA) 100 MG tablet, TAKE 1 TABLET BY MOUTH 30 MINUTES TO 4 HOURS PRIOR TO ACTIVITY. MAX  MG PER 24 HOURS, Disp: 12 tablet, Rfl: 0     tamsulosin (FLOMAX) 0.4 MG capsule, Take 2 capsules (0.8 mg) by mouth daily, Disp: 90 capsule, Rfl: 4     tamsulosin (FLOMAX) 0.4 MG capsule, TAKE 1 CAPSULE BY MOUTH  ONCE A DAY (Patient taking differently: 0.8 mg ), Disp: 90 capsule, Rfl: 3     ULTICARE SHORT 31G X 8 MM insulin pen needle, USE TO INJECT TWICE DAILY, Disp: , Rfl: 1     lactobacillus rhamnosus, GG, (CULTURELL) capsule, Take 1 capsule by mouth 2 times daily (Patient not taking: Reported on 6/21/2021), Disp: 28 capsule, Rfl: 0     pantoprazole (PROTONIX) 40 MG EC tablet, Take 1 tablet (40 mg) by mouth daily (Patient not taking: Reported on 6/21/2021), Disp: 90 tablet, Rfl: 2    10 point ROS of systems including Constitutional, Eyes, Respiratory, Cardiovascular, Gastroenterology, Genitourinary, Integumentary, Muscularskeletal, Psychiatric and Neurologic were all negative except for pertinent positives noted in my HPI.    Examination:   /62   Pulse 80   Wt 86.2 kg (190 lb)   BMI 29.76 kg/m    General Impression: Very pleasant patient in no acute distress, well-oriented in time place and person and quite conversational  Mental  Status: Normal  HEENT: Extraocular movements intact.  No clinical evidence of jaundice on examination of eyes.  Mucous membranes are unremarkable  Skin: Warm.  No other abnormalities  Respiratory System: Unlabored on room air.  Respiratory cycle normal  Lymph Nodes: Negative  Back/Flank Tenderness: Not examined  Cardiovascular System: No significant peripheral pitting edema  Abdominal Examination: Mild obesity  Extremities: There is evidence of cellulitis of the right lower extremity  Genitial: Not examined  Rectal Examination: Good sphincter tone, normal perianal sensation.  Smooth rectal mucosa without hemorrhoids.  Smooth soft normal size prostate without evidence of tenderness, bogginess or nodules.  Test seminal vesicles.  Not palpable.  Perineum is otherwise normal to examination  Neurologic System: There are no significant acute abnormal neurological signs in the central or peripheral nervous systems    Impression: Situation is very stable and the patient seems quite content at this time.  I would recommend we continue current medication i.e. tamsulosin 0.8 mg daily which he is taking without side effects, finasteride 5 mg daily, and is already taking sildenafil 100 mg on a as needed basis without significant side effects related to any interaction with tamsulosin.  I will renew these medications today.  At this point he can be seen on an annual basis for follow-up for PSA, bladder scan and symptom score with examination.  However I have recommended that he should contact and see us promptly under the following circumstances.  1.  Gross hematuria.  2.  Deterioration in his urinary symptoms.    I did carefully discuss the entire situation with the patient in detail today.  I reviewed his previous and current records labs and other pertinent studies in detail.  I had a wide-ranging discussion about his symptoms, their etiology potential alternative therapeutic strategies.  I addressed and answered all his  "questions    Plan: 1 year for PSA, bladder scan and symptom score with examination    Time: 30 minutes with greater than 50% in discussion consultation    \"This dictation was performed with voice recognition software and may contain errors,  omissions and inadvertent word substitution.\"    Samir Vazquez MD      "

## 2021-06-21 NOTE — PROGRESS NOTES
History: It is a great pleasure to see this very pleasant 80-year-old gentleman in follow-up consultation today.  He is a patient with type 2 diabetes who is being concerned more recently about frequency of micturition but without significant nocturia; when I initially saw him he was going 12-13 times during the day with initially reduction in caffeinated beverages did reduce this to 9-10 times a day.  He had not been drinking sodas and only drinking simple fluids and drinking when thirsty.  When I saw him initially he is on both tamsulosin and finasteride the tamsulosin dose of 0.8 mg daily and finasteride 5 mg daily.  Renal function was normal creatinine of 0.82  We had considered further investigations including cystoscopy and even the possibility of urodynamics but decided that we should try anticholinergic medication with tolterodine 4 mg daily.  Subsequently that the patient decided not to continue this treatment and is finding that his symptoms are somewhat improved without having to do that.  He is currently being treated for cellulitis of the right leg which is probably secondary to diabetes type 2  Past Medical History:   Diagnosis Date     Arthritis 1970    Dx'd with RA when in the      BPH (benign prostatic hyperplasia) 12/16/2013     Cancer (H)     basal cell cancer behind ear     Diabetes mellitus      ED (erectile dysfunction) 1/6/2015     HTN (hypertension)      Hyperlipidemia LDL goal <100      Hypothyroidism      Mumps      Obesity        Social History     Socioeconomic History     Marital status:      Spouse name: None     Number of children: None     Years of education: None     Highest education level: None   Occupational History     None   Social Needs     Financial resource strain: None     Food insecurity     Worry: None     Inability: None     Transportation needs     Medical: None     Non-medical: None   Tobacco Use     Smoking status: Former Smoker     Packs/day: 0.00      Types: Pipe     Quit date: 11/15/2011     Years since quittin.6     Smokeless tobacco: Never Used   Substance and Sexual Activity     Alcohol use: No     Alcohol/week: 0.0 standard drinks     Drug use: No     Sexual activity: Yes     Partners: Female   Lifestyle     Physical activity     Days per week: None     Minutes per session: None     Stress: None   Relationships     Social connections     Talks on phone: None     Gets together: None     Attends Restorationism service: None     Active member of club or organization: None     Attends meetings of clubs or organizations: None     Relationship status: None     Intimate partner violence     Fear of current or ex partner: None     Emotionally abused: None     Physically abused: None     Forced sexual activity: None   Other Topics Concern     Parent/sibling w/ CABG, MI or angioplasty before 65F 55M? No      Service Not Asked     Blood Transfusions Not Asked     Caffeine Concern Not Asked     Occupational Exposure Not Asked     Hobby Hazards Not Asked     Sleep Concern Not Asked     Stress Concern Not Asked     Weight Concern Not Asked     Special Diet No     Back Care Not Asked     Exercise Yes     Comment: walking 3-4 days a week     Bike Helmet Not Asked     Seat Belt Not Asked     Self-Exams Not Asked   Social History Narrative     None       Past Surgical History:   Procedure Laterality Date     COLONOSCOPY N/A 2014    Procedure: COMBINED COLONOSCOPY, SINGLE OR MULTIPLE BIOPSY/POLYPECTOMY BY BIOPSY;  Surgeon: Rolanda Bey MD;  Location:  GI     COLONOSCOPY N/A 2020    Procedure: COLONOSCOPY, WITH POLYPECTOMY AND BIOPSY;  Surgeon: Christina Vieira MD;  Location: [facility]     COLONOSCOPY N/A 2021    Procedure: Colonoscopy, With Polypectomy And Biopsy;  Surgeon: Christina Vieira MD;  Location: [facility]     ESOPHAGOSCOPY, GASTROSCOPY, DUODENOSCOPY (EGD), COMBINED N/A 3/16/2021    Procedure: ESOPHAGOGASTRODUODENOSCOPY, WITH BIOPSY;   Surgeon: Jose Schrader MD;  Location:  GI     TESTICLE SURGERY       TONSILLECTOMY, ADENOIDECTOMY, COMBINED       VASECTOMY         Family History   Problem Relation Age of Onset     Diabetes Maternal Grandmother      Diabetes Maternal Grandfather      Arthritis Maternal Grandfather      Arthritis Father      Thyroid Disease Father      Glaucoma Mother      Alcohol/Drug Mother 49        cirrhosis     Diabetes Daughter 44        type 2     Obesity Daughter      Glaucoma Maternal Aunt          Current Outpatient Medications:      blood glucose monitoring (ONE TOUCH ULTRA 2) meter device kit, , Disp: , Rfl:      carvedilol (COREG) 3.125 MG tablet, TAKE 1 TABLET BY MOUTH TWO  TIMES DAILY, Disp: 180 tablet, Rfl: 2     Ferrous Sulfate 324 (65 Fe) MG TBEC, Take 1 tablet (324 mg) by mouth daily, Disp: , Rfl:      Fexofenadine HCl (ALLEGRA PO), Take 180 mg by mouth daily , Disp: , Rfl:      finasteride (PROSCAR) 5 MG tablet, Take 1 tablet (5 mg) by mouth daily, Disp: 90 tablet, Rfl: 4     finasteride (PROSCAR) 5 MG tablet, TAKE 1 TABLET BY MOUTH  DAILY, Disp: 90 tablet, Rfl: 3     HUMALOG MIX 75/25 KWIKPEN (75-25) 100 UNIT/ML susp, Inject 18 Units Subcutaneous every evening Took 16 units in the evening, Disp: , Rfl:      insulin lispro protamine-insulin lispro (HUMALOG MIX 75/25 KWIKPEN) (75-25) 100 UNIT/ML pen, Inject 14 Units Subcutaneous every morning (before breakfast), Disp: , Rfl:      Lancets (ONETOUCH DELICA PLUS [id number]) MISC, , Disp: , Rfl:      latanoprost (XALATAN) 0.005 % ophthalmic solution, Place 1 drop into both eyes daily, Disp: , Rfl:      levothyroxine (LEVOTHROID) 88 MCG tablet, Take 88 mcg by mouth daily., Disp: , Rfl:      losartan (COZAAR) 100 MG tablet, Take 1 tablet (100 mg) by mouth daily, Disp: 90 tablet, Rfl: 2     lovastatin (MEVACOR) 40 MG tablet, TAKE 1 TABLET BY MOUTH  DAILY AT BEDTIME (Patient taking differently: Take 40 mg by mouth every morning ), Disp: 90 tablet, Rfl: 2     metFORMIN  (GLUCOPHAGE-XR) 500 MG 24 hr tablet, Take 1,500 mg by mouth daily (with dinner) , Disp: , Rfl:      Multiple Vitamins-Minerals (CENTRUM SILVER) per tablet, Take 1 tablet by mouth daily, Disp: , Rfl:      ONETOUCH ULTRA test strip, , Disp: , Rfl:      sildenafil (VIAGRA) 100 MG tablet, Take 1 tablet (100 mg) by mouth daily as needed, Disp: 12 tablet, Rfl: 11     sildenafil (VIAGRA) 100 MG tablet, TAKE 1 TABLET BY MOUTH 30 MINUTES TO 4 HOURS PRIOR TO ACTIVITY. MAX  MG PER 24 HOURS, Disp: 12 tablet, Rfl: 0     tamsulosin (FLOMAX) 0.4 MG capsule, Take 2 capsules (0.8 mg) by mouth daily, Disp: 90 capsule, Rfl: 4     tamsulosin (FLOMAX) 0.4 MG capsule, TAKE 1 CAPSULE BY MOUTH  ONCE A DAY (Patient taking differently: 0.8 mg ), Disp: 90 capsule, Rfl: 3     ULTICARE SHORT 31G X 8 MM insulin pen needle, USE TO INJECT TWICE DAILY, Disp: , Rfl: 1     lactobacillus rhamnosus, GG, (CULTURELL) capsule, Take 1 capsule by mouth 2 times daily (Patient not taking: Reported on 6/21/2021), Disp: 28 capsule, Rfl: 0     pantoprazole (PROTONIX) 40 MG EC tablet, Take 1 tablet (40 mg) by mouth daily (Patient not taking: Reported on 6/21/2021), Disp: 90 tablet, Rfl: 2    10 point ROS of systems including Constitutional, Eyes, Respiratory, Cardiovascular, Gastroenterology, Genitourinary, Integumentary, Muscularskeletal, Psychiatric and Neurologic were all negative except for pertinent positives noted in my HPI.    Examination:   /62   Pulse 80   Wt 86.2 kg (190 lb)   BMI 29.76 kg/m    General Impression: Very pleasant patient in no acute distress, well-oriented in time place and person and quite conversational  Mental Status: Normal  HEENT: Extraocular movements intact.  No clinical evidence of jaundice on examination of eyes.  Mucous membranes are unremarkable  Skin: Warm.  No other abnormalities  Respiratory System: Unlabored on room air.  Respiratory cycle normal  Lymph Nodes: Negative  Back/Flank Tenderness: Not  "examined  Cardiovascular System: No significant peripheral pitting edema  Abdominal Examination: Mild obesity  Extremities: There is evidence of cellulitis of the right lower extremity  Genitial: Not examined  Rectal Examination: Good sphincter tone, normal perianal sensation.  Smooth rectal mucosa without hemorrhoids.  Smooth soft normal size prostate without evidence of tenderness, bogginess or nodules.  Test seminal vesicles.  Not palpable.  Perineum is otherwise normal to examination  Neurologic System: There are no significant acute abnormal neurological signs in the central or peripheral nervous systems    Impression: Situation is very stable and the patient seems quite content at this time.  I would recommend we continue current medication i.e. tamsulosin 0.8 mg daily which he is taking without side effects, finasteride 5 mg daily, and is already taking sildenafil 100 mg on a as needed basis without significant side effects related to any interaction with tamsulosin.  I will renew these medications today.  At this point he can be seen on an annual basis for follow-up for PSA, bladder scan and symptom score with examination.  However I have recommended that he should contact and see us promptly under the following circumstances.  1.  Gross hematuria.  2.  Deterioration in his urinary symptoms.    I did carefully discuss the entire situation with the patient in detail today.  I reviewed his previous and current records labs and other pertinent studies in detail.  I had a wide-ranging discussion about his symptoms, their etiology potential alternative therapeutic strategies.  I addressed and answered all his questions    Plan: 1 year for PSA, bladder scan and symptom score with examination    Time: 30 minutes with greater than 50% in discussion consultation    \"This dictation was performed with voice recognition software and may contain errors,  omissions and inadvertent word substitution.\"      "

## 2021-06-21 NOTE — NURSING NOTE
Has seen improvement by changing diet . Nocturia is 1  Times. Did not start the Detrol as diet  Improved.Denise Barajas LPN

## 2021-06-24 ENCOUNTER — TRANSFERRED RECORDS (OUTPATIENT)
Dept: HEALTH INFORMATION MANAGEMENT | Facility: CLINIC | Age: 81
End: 2021-06-24

## 2021-07-07 ENCOUNTER — OFFICE VISIT (OUTPATIENT)
Dept: INTERNAL MEDICINE | Facility: CLINIC | Age: 81
End: 2021-07-07
Payer: COMMERCIAL

## 2021-07-07 VITALS
DIASTOLIC BLOOD PRESSURE: 56 MMHG | WEIGHT: 187 LBS | OXYGEN SATURATION: 100 % | HEIGHT: 67 IN | BODY MASS INDEX: 29.35 KG/M2 | HEART RATE: 63 BPM | TEMPERATURE: 97 F | SYSTOLIC BLOOD PRESSURE: 128 MMHG

## 2021-07-07 DIAGNOSIS — R41.3 MEMORY LOSS: ICD-10-CM

## 2021-07-07 DIAGNOSIS — I10 ESSENTIAL HYPERTENSION, BENIGN: Primary | ICD-10-CM

## 2021-07-07 DIAGNOSIS — E11.51 TYPE II DIABETES MELLITUS WITH PERIPHERAL CIRCULATORY DISORDER (H): ICD-10-CM

## 2021-07-07 DIAGNOSIS — R60.9 DEPENDENT EDEMA: ICD-10-CM

## 2021-07-07 PROBLEM — L03.90 CELLULITIS: Status: RESOLVED | Noted: 2021-05-08 | Resolved: 2021-07-07

## 2021-07-07 LAB
BASOPHILS # BLD AUTO: 0 10E9/L (ref 0–0.2)
BASOPHILS NFR BLD AUTO: 0.4 %
DIFFERENTIAL METHOD BLD: ABNORMAL
EOSINOPHIL # BLD AUTO: 0.1 10E9/L (ref 0–0.7)
EOSINOPHIL NFR BLD AUTO: 1.9 %
ERYTHROCYTE [DISTWIDTH] IN BLOOD BY AUTOMATED COUNT: 15.9 % (ref 10–15)
HCT VFR BLD AUTO: 35.2 % (ref 40–53)
HGB BLD-MCNC: 10.9 G/DL (ref 13.3–17.7)
LYMPHOCYTES # BLD AUTO: 1.7 10E9/L (ref 0.8–5.3)
LYMPHOCYTES NFR BLD AUTO: 35.5 %
MCH RBC QN AUTO: 28.1 PG (ref 26.5–33)
MCHC RBC AUTO-ENTMCNC: 31 G/DL (ref 31.5–36.5)
MCV RBC AUTO: 91 FL (ref 78–100)
MONOCYTES # BLD AUTO: 0.6 10E9/L (ref 0–1.3)
MONOCYTES NFR BLD AUTO: 12.3 %
NEUTROPHILS # BLD AUTO: 2.4 10E9/L (ref 1.6–8.3)
NEUTROPHILS NFR BLD AUTO: 49.9 %
PLATELET # BLD AUTO: 224 10E9/L (ref 150–450)
RBC # BLD AUTO: 3.88 10E12/L (ref 4.4–5.9)
WBC # BLD AUTO: 4.7 10E9/L (ref 4–11)

## 2021-07-07 PROCEDURE — 36415 COLL VENOUS BLD VENIPUNCTURE: CPT | Performed by: FAMILY MEDICINE

## 2021-07-07 PROCEDURE — 85025 COMPLETE CBC W/AUTO DIFF WBC: CPT | Performed by: FAMILY MEDICINE

## 2021-07-07 PROCEDURE — 99214 OFFICE O/P EST MOD 30 MIN: CPT | Performed by: FAMILY MEDICINE

## 2021-07-07 ASSESSMENT — MIFFLIN-ST. JEOR: SCORE: 1526.86

## 2021-07-07 NOTE — PROGRESS NOTES
"    Assessment & Plan     Essential hypertension, benign    - CBC with platelets differential    Memory loss    - NEUROLOGY ADULT REFERRAL    Type II diabetes mellitus with peripheral circulatory disorder (H)      Dependent edema          30 minutes spent on the date of the encounter doing chart review, history and exam, documentation and further activities per the note       BMI:   Estimated body mass index is 29.29 kg/m  as calculated from the following:    Height as of this encounter: 1.702 m (5' 7\").    Weight as of this encounter: 84.8 kg (187 lb).       There are no Patient Instructions on file for this visit.    No follow-ups on file.    Samir Duque MD  [facility]    Jefe Warner is a 80 year old who presents for the following health issues     HPI      pt suspects cognitive decline  Diabetes Follow-up    How often are you checking your blood sugar? twice daily-sometimes an extra time  What time of day are you checking your blood sugars (select all that apply)?  Before meals -sometimes before bed   Have you had any blood sugars above 200?  No  Have you had any blood sugars below 70?  Yes     What symptoms do you notice when your blood sugar is low?  Other: sweaty    What concerns do you have today about your diabetes? None     Do you have any of these symptoms? (Select all that apply)  Redness, sores, or blisters on feet and Weight loss -not intentional wt. Loss-previous blisters, none now    Have you had a diabetic eye exam in the last 12 months? Yes- Date of last eye exam: 5-2021,  Location: [facility]        BP Readings from Last 2 Encounters:   06/21/21 120/62   06/09/21 128/78     Hemoglobin A1C (%)   Date Value   05/08/2021 5.6   11/09/2020 5.5     LDL Cholesterol Calculated (mg/dL)   Date Value   11/09/2020 67   06/16/2020 55                 How many servings of fruits and vegetables do you eat daily?  0-1-2    On average, how many sweetened " beverages do you drink each day (Examples: soda, juice, sweet tea, etc.  Do NOT count diet or artificially sweetened beverages)?   0    How many days per week do you exercise enough to make your heart beat faster? 6    How many minutes a day do you exercise enough to make your heart beat faster? 215-60 minutes broken up into parts    How many days per week do you miss taking your medication? 0      Hospital Follow-up Visit:    Hospital/Nursing Home/IP Rehab Facility: [facility]  Date of Admission: 5-8-21  Date of Discharge: 5-18-21  Reason(s) for Admission: Cellulitis of right lower extremity      Was your hospitalization related to COVID-19? No   Problems taking medications regularly:  None  Medication changes since discharge: given ABX- finished course  Problems adhering to non-medication therapy:  None    Summary of hospitalization:  [facility] discharge summary reviewed  Diagnostic Tests/Treatments reviewed.  Follow up needed: none  Other Healthcare Providers Involved in Patient s Care:         None  Update since discharge: improved.       Post Discharge Medication Reconciliation: discharge medications reconciled, continue medications without change.  Plan of care communicated with patient and family                  Review of Systems   Constitutional, HEENT, cardiovascular, pulmonary, gi and gu systems are negative, except as otherwise noted.  The patient is complaining of short-term memory loss issues.  His wife, who is with him today, corroborates that over the past 24 months or so his memory has gotten poor.      Objective    There were no vitals taken for this visit.  There is no height or weight on file to calculate BMI.  Physical Exam   GENERAL APPEARANCE: healthy, alert, no distress and over weight  SKIN: Right lower extremity shows no further signs of infection.  He has very trace edema present in the right lower extremity.  NEURO: Normal strength and tone, speech normal  and gait is normal  PSYCH: mentation appears normal and affect normal/bright

## 2021-07-07 NOTE — PATIENT INSTRUCTIONS
The patient has no restrictions on his activities at present.  He will continue with compression stockings on his lower extremities as needed.  I did suggest the Internet site support hose[employer]com to get more stockings.  He has a follow-up appointment with Dr. Sharma at the [place] site.  I sent him for neurologic consultation about his memory loss issues.  His wife did corroborate that that is a problem over the past 24 months or so.  He is continuing to lose weight by doing 60 minutes of exercise daily with the time broken up into different segments.  He will need further follow-up on his diabetes and lab testing in September.  I spent 30 minutes with the patient and his wife reviewing all this.

## 2021-07-08 ENCOUNTER — TRANSFERRED RECORDS (OUTPATIENT)
Dept: HEALTH INFORMATION MANAGEMENT | Facility: CLINIC | Age: 81
End: 2021-07-08

## 2021-07-21 ENCOUNTER — TRANSFERRED RECORDS (OUTPATIENT)
Dept: HEALTH INFORMATION MANAGEMENT | Facility: CLINIC | Age: 81
End: 2021-07-21

## 2021-08-11 ENCOUNTER — OFFICE VISIT (OUTPATIENT)
Dept: FAMILY MEDICINE | Facility: CLINIC | Age: 81
End: 2021-08-11
Payer: COMMERCIAL

## 2021-08-11 VITALS
TEMPERATURE: 96.5 F | HEART RATE: 63 BPM | OXYGEN SATURATION: 96 % | WEIGHT: 185.2 LBS | DIASTOLIC BLOOD PRESSURE: 62 MMHG | SYSTOLIC BLOOD PRESSURE: 124 MMHG | BODY MASS INDEX: 29.01 KG/M2

## 2021-08-11 DIAGNOSIS — Z11.59 NEED FOR HEPATITIS C SCREENING TEST: ICD-10-CM

## 2021-08-11 DIAGNOSIS — M85.80 AGE-RELATED BONE LOSS: ICD-10-CM

## 2021-08-11 DIAGNOSIS — E11.51 TYPE II DIABETES MELLITUS WITH PERIPHERAL CIRCULATORY DISORDER (H): Primary | ICD-10-CM

## 2021-08-11 DIAGNOSIS — D64.9 ANEMIA, UNSPECIFIED TYPE: ICD-10-CM

## 2021-08-11 DIAGNOSIS — Z87.2 HISTORY OF CELLULITIS: ICD-10-CM

## 2021-08-11 DIAGNOSIS — I10 ESSENTIAL HYPERTENSION: ICD-10-CM

## 2021-08-11 PROBLEM — M79.609 PAIN DUE TO ONYCHOMYCOSIS OF NAIL: Status: ACTIVE | Noted: 2021-08-02

## 2021-08-11 PROBLEM — B35.1 PAIN DUE TO ONYCHOMYCOSIS OF NAIL: Status: ACTIVE | Noted: 2021-08-02

## 2021-08-11 PROCEDURE — 99214 OFFICE O/P EST MOD 30 MIN: CPT | Performed by: INTERNAL MEDICINE

## 2021-08-11 RX ORDER — METFORMIN HCL 500 MG
TABLET, EXTENDED RELEASE 24 HR ORAL
COMMUNITY
Start: 2021-05-17 | End: 2021-12-23

## 2021-08-11 RX ORDER — LOSARTAN POTASSIUM 100 MG/1
TABLET ORAL
COMMUNITY
Start: 2021-05-20 | End: 2021-08-11

## 2021-08-11 ASSESSMENT — PAIN SCALES - GENERAL: PAINLEVEL: NO PAIN (0)

## 2021-08-11 NOTE — PATIENT INSTRUCTIONS
As discussed, please do the recheck of renal function on 9/8/21 with  CBC check for you.     Please follow up with Endocrinology, Hematology as discussed.     I will take care of all the refills of all the there medications as discussed.     =====================    Dietary sources of iron  Food Approximate measure Iron (mg)   High-iron sources   Cereals, fortified 1 serving 4.5 to 17.8   Cream of Wheat (quick or instant)* 1/2 cup 7.8   Kidney, beef  2 oz (60 g) 5.3   Liver, beef  2 oz (60 g) 5.8   Liver, calf  2 oz (60 g) 9   Liver, chicken  2 oz (60 g) 6   Liverwurst  2 oz (60 g) 3.6   Nuts? 1 cup 5 to 7   Prune juice 1/2 cup 5.1   Seeds, sunflower? 3 1/2 oz (100 g) 7.1   Moderate-iron sources   Almonds, dried, unblanched 1/2 cup 3   Dried beans and peas   Baked beans, no pork 1/4 cup 1.5   Blackeye peas, cooked 1/4 cup 0.8   Chick peas, dry 1/4 cup 3.5   Great northern beans, cooked 1/4 cup 1.3   Green peas, cooked 1/4 cup 1.4   Lentils, dry 1/4 cup 3.4   Lima beans, cooked 1/4 cup 1.3   Navy beans, cooked 1/4 cup 1.3   Red beans, dry 1/4 cup 3.5   Soybeans, cooked 1/4 cup 1.4   White beans, dry 1/4 cup 3.9   Beef, cooked? 2 oz (60 g) 2 to 3   Ham, cooked 2 oz (60 g) 1.3   Stone, cooked 2 oz (60 g) 1.9   Peaches, dried 1/4 cup 2.4   Peanuts, roasted without skins 3 1/2 oz (100 g) 3.2   Pork, cooked  2 oz (60 g) 2 to 3   Prunes, dried 2 large 1.1   Raisins? 5/8 cup 3.5   Scallops 2 oz (60 g) 1.6   Spinach (cooked) 1/2 cup 3.2   Spinach (raw) 1 cup 0.9   Turkey, cooked 2 oz (60 g) 1.7   Approximate iron content of popular prepared foods   Hamburger   Small 1 3   Large 1 5.2   Spaghetti with meatballs 1 cup 3.3   Frankfurter and beans 1 cup 4.8   Pork and beans 1 cup 5.9   Chile con carne 1 cup 3.6   Beef burrito or kalie 1 medium 3.4 to 4.6   Cheese pizza 2 slices 3   Cheese pizza with beef 2 slices 4.8   White bread 1 piece 0.7   * Or other fortified cereals that contain 10 mg of iron per ounce or 100%  recommended dietary allowance per serving.    Because organ meats are generally high in cholesterol, these iron-rich foods should be eaten in moderation.  ? Raisins, nuts, and seeds are not generally recommended for children under age 3, because of risk of choking.  ? Depending on cut, the greatest amounts of iron are generally found in the gretta, flank, and bottom round cuts of beef.    Depending on cut, the greatest amounts of iron are generally found in the loin, sirloin, tenderloin, and picnic shoulder cuts of pork.

## 2021-08-11 NOTE — PROGRESS NOTES
Assessment and Plan  1. Type II diabetes mellitus with peripheral circulatory disorder (H)  Well controlled, last A1C in 5/2021 at 5.6, Last seen [place] [facility] in 7/2021 currently on Lantus 19 U QAM and Metformin 500 mg ER 3x/day . To follow up with endocrinology which is upcoming.     2. Need for hepatitis C screening test  - Hepatitis C Screen Reflex to HCV RNA Quant and Genotype; Future    3. Essential hypertension  Well controlled, Continue current Losartan 100 mg daily, Carvedilol 3.125 mg BID.   - Comprehensive metabolic panel (BMP + Alb, Alk Phos, ALT, AST, Total. Bili, TP); Future    4. Age-related bone loss  - Vitamin D Deficiency; Future    5. History of cellulitis  Resolved, last hospitalization in 5/2021 for Rt lower extremity Cellulitis which he was on antibiotics at that time. Todays physical exam completely normal, except few skin changes which is chronic stasis dermatitis which is mild. Recommend to wear compression stockings and moisturize the skin well.     6. Anemia, unspecified type  Improving, Does follow Hematology  for Anemia. Recent labs in 7/2021 does show HBG 10.9 mildly improved compared to the past.     Patient Instructions     As discussed, please do the recheck of renal function on 9/8/21 with  CBC check for you.     Please follow up with Endocrinology, Hematology as discussed.     I will take care of all the refills of all the there medications as discussed.     =====================    Dietary sources of iron  Food Approximate measure Iron (mg)   High-iron sources   Cereals, fortified 1 serving 4.5 to 17.8   Cream of Wheat (quick or instant)* 1/2 cup 7.8   Kidney, beef  2 oz (60 g) 5.3   Liver, beef  2 oz (60 g) 5.8   Liver, calf  2 oz (60 g) 9   Liver, chicken  2 oz (60 g) 6   Liverwurst  2 oz (60 g) 3.6   Nuts? 1 cup 5 to 7   Prune juice 1/2 cup 5.1   Seeds, sunflower? 3 1/2 oz (100 g) 7.1   Moderate-iron sources   Almonds, dried, unblanched 1/2 cup 3    Dried beans and peas   Baked beans, no pork 1/4 cup 1.5   Blackeye peas, cooked 1/4 cup 0.8   Chick peas, dry 1/4 cup 3.5   Great northern beans, cooked 1/4 cup 1.3   Green peas, cooked 1/4 cup 1.4   Lentils, dry 1/4 cup 3.4   Lima beans, cooked 1/4 cup 1.3   Navy beans, cooked 1/4 cup 1.3   Red beans, dry 1/4 cup 3.5   Soybeans, cooked 1/4 cup 1.4   White beans, dry 1/4 cup 3.9   Beef, cooked? 2 oz (60 g) 2 to 3   Ham, cooked 2 oz (60 g) 1.3   Stone, cooked 2 oz (60 g) 1.9   Peaches, dried 1/4 cup 2.4   Peanuts, roasted without skins 3 1/2 oz (100 g) 3.2   Pork, cooked  2 oz (60 g) 2 to 3   Prunes, dried 2 large 1.1   Raisins? 5/8 cup 3.5   Scallops 2 oz (60 g) 1.6   Spinach (cooked) 1/2 cup 3.2   Spinach (raw) 1 cup 0.9   Turkey, cooked 2 oz (60 g) 1.7   Approximate iron content of popular prepared foods   Hamburger   Small 1 3   Large 1 5.2   Spaghetti with meatballs 1 cup 3.3   Frankfurter and beans 1 cup 4.8   Pork and beans 1 cup 5.9   Chile con carne 1 cup 3.6   Beef burrito or kalie 1 medium 3.4 to 4.6   Cheese pizza 2 slices 3   Cheese pizza with beef 2 slices 4.8   White bread 1 piece 0.7   * Or other fortified cereals that contain 10 mg of iron per ounce or 100% recommended dietary allowance per serving.    Because organ meats are generally high in cholesterol, these iron-rich foods should be eaten in moderation.  ? Raisins, nuts, and seeds are not generally recommended for children under age 3, because of risk of choking.  ? Depending on cut, the greatest amounts of iron are generally found in the gretta, flank, and bottom round cuts of beef.    Depending on cut, the greatest amounts of iron are generally found in the loin, sirloin, tenderloin, and picnic shoulder cuts of pork.      Return in about 19 weeks (around 12/22/2021), or if symptoms worsen or fail to improve, for Annual Wellness Exam.    Michaela Sharma MD  [facility] LENARD Warner is a 80 year old who  presents for the following health issues      HPI     New Patient/Transfer of Care  Patient would also like right leg looked at for ongoing cellulitis    Pt is new to me, last seen  PCP on 21 for cellulitis       No Known Allergies     Past Medical History:   Diagnosis Date     Arthritis 1970    Dx'd with RA when in the      BPH (benign prostatic hyperplasia) 2013     Cancer (H)     basal cell cancer behind ear     Diabetes mellitus      ED (erectile dysfunction) 2015     HTN (hypertension)      Hyperlipidemia LDL goal <100      Hypothyroidism      Mumps      Obesity        Past Surgical History:   Procedure Laterality Date     COLONOSCOPY N/A 2014    Procedure: COMBINED COLONOSCOPY, SINGLE OR MULTIPLE BIOPSY/POLYPECTOMY BY BIOPSY;  Surgeon: Rolanda Bey MD;  Location:  GI     COLONOSCOPY N/A 2020    Procedure: COLONOSCOPY, WITH POLYPECTOMY AND BIOPSY;  Surgeon: Christina Vieira MD;  Location:  GI     COLONOSCOPY N/A 2021    Procedure: Colonoscopy, With Polypectomy And Biopsy;  Surgeon: Christina Vieira MD;  Location:  GI     ESOPHAGOSCOPY, GASTROSCOPY, DUODENOSCOPY (EGD), COMBINED N/A 3/16/2021    Procedure: ESOPHAGOGASTRODUODENOSCOPY, WITH BIOPSY;  Surgeon: Jose Schrader MD;  Location:  GI     TESTICLE SURGERY       TONSILLECTOMY, ADENOIDECTOMY, COMBINED       VASECTOMY         Family History   Problem Relation Age of Onset     Diabetes Maternal Grandmother      Diabetes Maternal Grandfather      Arthritis Maternal Grandfather      Arthritis Father      Thyroid Disease Father      Glaucoma Mother      Alcohol/Drug Mother 49        cirrhosis     Diabetes Daughter 44        type 2     Obesity Daughter      Glaucoma Maternal Aunt        Social History     Tobacco Use     Smoking status: Former Smoker     Packs/day: 0.00     Types: Pipe     Quit date: 11/15/2011     Years since quittin.7     Smokeless tobacco: Never Used   Substance Use  Topics     Alcohol use: No     Alcohol/week: 0.0 standard drinks        Current Outpatient Medications   Medication     blood glucose monitoring (ONE TOUCH ULTRA 2) meter device kit     carvedilol (COREG) 3.125 MG tablet     Ferrous Sulfate 324 (65 Fe) MG TBEC     Fexofenadine HCl (ALLEGRA PO)     finasteride (PROSCAR) 5 MG tablet     latanoprost (XALATAN) 0.005 % ophthalmic solution     levothyroxine (LEVOTHROID) 88 MCG tablet     losartan (COZAAR) 100 MG tablet     lovastatin (MEVACOR) 40 MG tablet     metFORMIN (GLUCOPHAGE-XR) 500 MG 24 hr tablet     Multiple Vitamins-Minerals (CENTRUM SILVER) per tablet     ONETOUCH ULTRA test strip     sildenafil (VIAGRA) 100 MG tablet     tamsulosin (FLOMAX) 0.4 MG capsule     No current facility-administered medications for this visit.        Review of Systems   Constitutional, HEENT, cardiovascular, pulmonary, GI, , musculoskeletal, neuro, skin, endocrine and psych systems are negative, except as otherwise noted.      Objective    /62 (Cuff Size: Adult Large)   Pulse 63   Temp (!) 96.5  F (35.8  C) (Tympanic)   Wt 84 kg (185 lb 3.2 oz)   SpO2 96%   BMI 29.01 kg/m    Body mass index is 29.01 kg/m .  Physical Exam   GENERAL: healthy, alert and no distress  NECK: no adenopathy, no asymmetry, masses, or scars and thyroid normal to palpation  RESP: lungs clear to auscultation - no rales, rhonchi or wheezes  CV: regular rate and rhythm, normal S1 S2, no S3 or S4, no murmur, click or rub, no peripheral edema and peripheral pulses strong  ABDOMEN: soft, nontender, no hepatosplenomegaly, no masses and bowel sounds normal  MS: no gross musculoskeletal defects noted, no edema. No Cellulitis on physical exam.

## 2021-08-25 ENCOUNTER — TRANSFERRED RECORDS (OUTPATIENT)
Dept: HEALTH INFORMATION MANAGEMENT | Facility: CLINIC | Age: 81
End: 2021-08-25

## 2021-09-03 DIAGNOSIS — E78.5 HYPERLIPIDEMIA LDL GOAL <70: ICD-10-CM

## 2021-09-03 RX ORDER — LOVASTATIN 40 MG
TABLET ORAL
Qty: 90 TABLET | Refills: 0 | Status: SHIPPED | OUTPATIENT
Start: 2021-09-03 | End: 2021-12-28

## 2021-09-03 NOTE — TELEPHONE ENCOUNTER
Routing refill request to provider for review/approval because:  Routing to PCP    Zheng Carrasco RN  [facility] Triage Nurse

## 2021-09-04 ENCOUNTER — HEALTH MAINTENANCE LETTER (OUTPATIENT)
Age: 81
End: 2021-09-04

## 2021-09-07 ENCOUNTER — LAB (OUTPATIENT)
Dept: LAB | Facility: CLINIC | Age: 81
End: 2021-09-07
Payer: COMMERCIAL

## 2021-09-07 DIAGNOSIS — M85.80 AGE-RELATED BONE LOSS: ICD-10-CM

## 2021-09-07 DIAGNOSIS — D64.9 ANEMIA, UNSPECIFIED TYPE: ICD-10-CM

## 2021-09-07 DIAGNOSIS — D50.9 IRON DEFICIENCY ANEMIA, UNSPECIFIED IRON DEFICIENCY ANEMIA TYPE: ICD-10-CM

## 2021-09-07 DIAGNOSIS — Z11.59 NEED FOR HEPATITIS C SCREENING TEST: ICD-10-CM

## 2021-09-07 DIAGNOSIS — I10 ESSENTIAL HYPERTENSION: ICD-10-CM

## 2021-09-07 LAB
ERYTHROCYTE [DISTWIDTH] IN BLOOD BY AUTOMATED COUNT: 16.5 % (ref 10–15)
HCT VFR BLD AUTO: 34.8 % (ref 40–53)
HGB BLD-MCNC: 11.1 G/DL (ref 13.3–17.7)
MCH RBC QN AUTO: 28.7 PG (ref 26.5–33)
MCHC RBC AUTO-ENTMCNC: 31.9 G/DL (ref 31.5–36.5)
MCV RBC AUTO: 90 FL (ref 78–100)
PLATELET # BLD AUTO: 207 10E3/UL (ref 150–450)
RBC # BLD AUTO: 3.87 10E6/UL (ref 4.4–5.9)
WBC # BLD AUTO: 4.7 10E3/UL (ref 4–11)

## 2021-09-07 PROCEDURE — 82728 ASSAY OF FERRITIN: CPT

## 2021-09-07 PROCEDURE — 85027 COMPLETE CBC AUTOMATED: CPT

## 2021-09-07 PROCEDURE — 82306 VITAMIN D 25 HYDROXY: CPT

## 2021-09-07 PROCEDURE — 80053 COMPREHEN METABOLIC PANEL: CPT

## 2021-09-07 PROCEDURE — 86803 HEPATITIS C AB TEST: CPT

## 2021-09-07 PROCEDURE — 36415 COLL VENOUS BLD VENIPUNCTURE: CPT

## 2021-09-07 PROCEDURE — 83550 IRON BINDING TEST: CPT

## 2021-09-08 ENCOUNTER — VIRTUAL VISIT (OUTPATIENT)
Dept: ONCOLOGY | Facility: CLINIC | Age: 81
End: 2021-09-08
Attending: INTERNAL MEDICINE
Payer: COMMERCIAL

## 2021-09-08 DIAGNOSIS — D50.9 IRON DEFICIENCY ANEMIA, UNSPECIFIED IRON DEFICIENCY ANEMIA TYPE: Primary | ICD-10-CM

## 2021-09-08 LAB
ALBUMIN SERPL-MCNC: 3.2 G/DL (ref 3.4–5)
ALP SERPL-CCNC: 41 U/L (ref 40–150)
ALT SERPL W P-5'-P-CCNC: 33 U/L (ref 0–70)
ANION GAP SERPL CALCULATED.3IONS-SCNC: 2 MMOL/L (ref 3–14)
AST SERPL W P-5'-P-CCNC: 18 U/L (ref 0–45)
BILIRUB SERPL-MCNC: 0.7 MG/DL (ref 0.2–1.3)
BUN SERPL-MCNC: 28 MG/DL (ref 7–30)
CALCIUM SERPL-MCNC: 7.8 MG/DL (ref 8.5–10.1)
CHLORIDE BLD-SCNC: 111 MMOL/L (ref 94–109)
CO2 SERPL-SCNC: 28 MMOL/L (ref 20–32)
CREAT SERPL-MCNC: 0.92 MG/DL (ref 0.66–1.25)
DEPRECATED CALCIDIOL+CALCIFEROL SERPL-MC: 36 UG/L (ref 20–75)
FERRITIN SERPL-MCNC: 12 NG/ML (ref 26–388)
GFR SERPL CREATININE-BSD FRML MDRD: 79 ML/MIN/1.73M2
GLUCOSE BLD-MCNC: 84 MG/DL (ref 70–99)
HCV AB SERPL QL IA: NONREACTIVE
IRON SATN MFR SERPL: 25 % (ref 15–46)
IRON SERPL-MCNC: 65 UG/DL (ref 35–180)
POTASSIUM BLD-SCNC: 4.7 MMOL/L (ref 3.4–5.3)
PROT SERPL-MCNC: 6.2 G/DL (ref 6.8–8.8)
SODIUM SERPL-SCNC: 141 MMOL/L (ref 133–144)
TIBC SERPL-MCNC: 260 UG/DL (ref 240–430)

## 2021-09-08 PROCEDURE — 99441 PR PHYSICIAN TELEPHONE EVALUATION 5-10 MIN: CPT | Performed by: INTERNAL MEDICINE

## 2021-09-08 NOTE — LETTER
9/8/2021         RE: Timmy Strange  [address] [address]        Dear Colleague,    Thank you for referring your patient, Timmy Strange, to the [facility]CER [facility]. Please see a copy of my visit note below.    Timmy is a 80 year old who is being evaluated via a billable telephone visit.      What phone number would you like to be contacted at? [phone]    How would you like to obtain your AVS? MyChart     Phone call duration: 5 minutes    HEMATOLOGY HISTORY: Mr. Strange is a gentleman with chronic normocytic anemia due to anemia of chronic disease and iron deficiency.    1.  On 11/07/2017, hemoglobin of 13.3.  -On 10/30/2018, hemoglobin of 12.5.  -On 06/16/2020, hemoglobin of 12.3.  -On 02/22/2021, WBC of 4.3, hemoglobin of 10.4, platelet of 226 and MCV of 90.  2.  On 11/09/2020:    -CMP normal except mildly low protein.  -Hemoglobin A1c normal at 5.5.  3.  On 02/01/2021, colonoscopy revealed colon polyps and diverticulosis.  It was a tubular adenoma.  4.  On 03/16/2021, EGD was essentially normal.  Biopsy did not reveal any H. Pylori.  5. On 05/11/2021:  -Iron of 13, saturation of 6% and ferritin of 69.  -Normal folate.  -Normal vitamin B12.  -Normal TSH.  6. Patient received 1 dose of IV Venofer on 05/11/2021.    SUBJECTIVE:  Mr. Strange is a 80-year-old gentleman with normocytic anemia secondary to iron deficiency and anemia of chronic disease.  He is currently on oral ferrous sulfate once a day.  He is tolerating it well.     Overall, he is doing well.  His fatigue has improved.  No headache.  No dizziness.  No chest pain.  No nausea or vomiting.  No bleeding.  Appetite overall has been good.     PHYSICAL EXAMINATION:     He is alert, oriented x 3.  This was a telephone visit.     LABORATORY:  CBC, CMP, iron and ferritin improved.     ASSESSMENT:     1.  A 80-year-old gentleman with normocytic anemia, which is improving.  2.  Iron deficiency.     PLAN:     1.  Labs  were reviewed with the patient.  I explained to him his hemoglobin has improved.  He was happy to know that.  Iron studies were reviewed.  Iron is normal.  His ferritin is low.  He still has some iron deficiency.  He is on ferrous sulfate once a day.  He will continue on it.  He is tolerating it well.  2.  We will recheck CBC, iron and ferritin in 3 months and see him after that.  I advised him to call us with any questions or concerns.     TOTAL TELEPHONE VISIT TIME:  6 minutes.    This office note has been dictated.          Again, thank you for allowing me to participate in the care of your patient.        Sincerely,        Chandrakant Panchal MD

## 2021-09-08 NOTE — LETTER
9/8/2021         RE: Timmy Strange  [address] [address]        Dear Colleague,    Thank you for referring your patient, Timmy Strange, to the [facility]. Please see a copy of my visit note below.    Timmy is a 80 year old who is being evaluated via a billable telephone visit.      What phone number would you like to be contacted at? [phone]    How would you like to obtain your AVS? MyChart     Phone call duration: 5 minutes    HEMATOLOGY HISTORY: Mr. Strange is a gentleman with chronic normocytic anemia due to anemia of chronic disease and iron deficiency.    1.  On 11/07/2017, hemoglobin of 13.3.  -On 10/30/2018, hemoglobin of 12.5.  -On 06/16/2020, hemoglobin of 12.3.  -On 02/22/2021, WBC of 4.3, hemoglobin of 10.4, platelet of 226 and MCV of 90.  2.  On 11/09/2020:    -CMP normal except mildly low protein.  -Hemoglobin A1c normal at 5.5.  3.  On 02/01/2021, colonoscopy revealed colon polyps and diverticulosis.  It was a tubular adenoma.  4.  On 03/16/2021, EGD was essentially normal.  Biopsy did not reveal any H. Pylori.  5. On 05/11/2021:  -Iron of 13, saturation of 6% and ferritin of 69.  -Normal folate.  -Normal vitamin B12.  -Normal TSH.  6. Patient received 1 dose of IV Venofer on 05/11/2021.    SUBJECTIVE:  Mr. Strange is a 80-year-old gentleman with normocytic anemia secondary to iron deficiency and anemia of chronic disease.  He is currently on oral ferrous sulfate once a day.  He is tolerating it well.     Overall, he is doing well.  His fatigue has improved.  No headache.  No dizziness.  No chest pain.  No nausea or vomiting.  No bleeding.  Appetite overall has been good.     PHYSICAL EXAMINATION:     He is alert, oriented x 3.  This was a telephone visit.     LABORATORY:  CBC, CMP, iron and ferritin improved.     ASSESSMENT:     1.  A 80-year-old gentleman with normocytic anemia, which is improving.  2.  Iron deficiency.     PLAN:     1.  Labs  were reviewed with the patient.  I explained to him his hemoglobin has improved.  He was happy to know that.  Iron studies were reviewed.  Iron is normal.  His ferritin is low.  He still has some iron deficiency.  He is on ferrous sulfate once a day.  He will continue on it.  He is tolerating it well.  2.  We will recheck CBC, iron and ferritin in 3 months and see him after that.  I advised him to call us with any questions or concerns.     TOTAL TELEPHONE VISIT TIME:  6 minutes.    This office note has been dictated.          Again, thank you for allowing me to participate in the care of your patient.        Sincerely,        Chandrakant Panchal MD

## 2021-09-08 NOTE — PROGRESS NOTES
Timmy is a 80 year old who is being evaluated via a billable telephone visit.      What phone number would you like to be contacted at? [phone]    How would you like to obtain your AVS? Paula     Phone call duration: 5 minutes

## 2021-09-09 ENCOUNTER — TELEPHONE (OUTPATIENT)
Dept: FAMILY MEDICINE | Facility: CLINIC | Age: 81
End: 2021-09-09

## 2021-09-09 NOTE — TELEPHONE ENCOUNTER
----- Message from Michaela Sharma MD sent at 9/8/2021 11:06 PM CDT -----  Your Calcium levels are low, take over the counter tums once daily for 2 weeks for improvement.     Your Iron levels and Hemoglobin improved from the past, please follow recommendations of hematology.     All your other labs normal, you may see some highlighted which do not have Clinical significance.  Michaela Sharma MD on 9/8/2021 at 11:05 PM

## 2021-09-20 ENCOUNTER — TRANSFERRED RECORDS (OUTPATIENT)
Dept: HEALTH INFORMATION MANAGEMENT | Facility: CLINIC | Age: 81
End: 2021-09-20
Payer: COMMERCIAL

## 2021-11-19 DIAGNOSIS — R35.0 URINARY FREQUENCY: ICD-10-CM

## 2021-11-19 RX ORDER — TAMSULOSIN HYDROCHLORIDE 0.4 MG/1
CAPSULE ORAL
Qty: 180 CAPSULE | Refills: 3 | OUTPATIENT
Start: 2021-11-19

## 2021-12-06 ENCOUNTER — LAB (OUTPATIENT)
Dept: LAB | Facility: CLINIC | Age: 81
End: 2021-12-06
Payer: COMMERCIAL

## 2021-12-06 DIAGNOSIS — D50.9 IRON DEFICIENCY ANEMIA, UNSPECIFIED IRON DEFICIENCY ANEMIA TYPE: ICD-10-CM

## 2021-12-06 DIAGNOSIS — Z13.220 SCREENING FOR HYPERLIPIDEMIA: ICD-10-CM

## 2021-12-06 DIAGNOSIS — I10 ESSENTIAL HYPERTENSION: Primary | ICD-10-CM

## 2021-12-06 DIAGNOSIS — E78.2 MIXED HYPERLIPIDEMIA: ICD-10-CM

## 2021-12-06 DIAGNOSIS — E11.51 TYPE II DIABETES MELLITUS WITH PERIPHERAL CIRCULATORY DISORDER (H): ICD-10-CM

## 2021-12-06 LAB
ERYTHROCYTE [DISTWIDTH] IN BLOOD BY AUTOMATED COUNT: 14.1 % (ref 10–15)
HBA1C MFR BLD: 5.8 % (ref 0–5.6)
HCT VFR BLD AUTO: 38.5 % (ref 40–53)
HGB BLD-MCNC: 12.3 G/DL (ref 13.3–17.7)
MCH RBC QN AUTO: 29.9 PG (ref 26.5–33)
MCHC RBC AUTO-ENTMCNC: 31.9 G/DL (ref 31.5–36.5)
MCV RBC AUTO: 93 FL (ref 78–100)
PLATELET # BLD AUTO: 190 10E3/UL (ref 150–450)
RBC # BLD AUTO: 4.12 10E6/UL (ref 4.4–5.9)
WBC # BLD AUTO: 4.2 10E3/UL (ref 4–11)

## 2021-12-06 PROCEDURE — 82043 UR ALBUMIN QUANTITATIVE: CPT

## 2021-12-06 PROCEDURE — 85027 COMPLETE CBC AUTOMATED: CPT

## 2021-12-06 PROCEDURE — 83550 IRON BINDING TEST: CPT

## 2021-12-06 PROCEDURE — 83036 HEMOGLOBIN GLYCOSYLATED A1C: CPT

## 2021-12-06 PROCEDURE — 36415 COLL VENOUS BLD VENIPUNCTURE: CPT

## 2021-12-06 PROCEDURE — 82728 ASSAY OF FERRITIN: CPT

## 2021-12-07 LAB
CREAT UR-MCNC: 90 MG/DL
MICROALBUMIN UR-MCNC: 6 MG/L
MICROALBUMIN/CREAT UR: 6.67 MG/G CR (ref 0–17)

## 2021-12-08 LAB
FERRITIN SERPL-MCNC: 18 NG/ML (ref 26–388)
IRON SATN MFR SERPL: 20 % (ref 15–46)
IRON SERPL-MCNC: 61 UG/DL (ref 35–180)
TIBC SERPL-MCNC: 303 UG/DL (ref 240–430)

## 2021-12-09 ENCOUNTER — VIRTUAL VISIT (OUTPATIENT)
Dept: ONCOLOGY | Facility: CLINIC | Age: 81
End: 2021-12-09
Attending: INTERNAL MEDICINE
Payer: COMMERCIAL

## 2021-12-09 DIAGNOSIS — D64.9 ANEMIA, UNSPECIFIED TYPE: Primary | ICD-10-CM

## 2021-12-09 DIAGNOSIS — D50.9 IRON DEFICIENCY ANEMIA, UNSPECIFIED IRON DEFICIENCY ANEMIA TYPE: ICD-10-CM

## 2021-12-09 PROCEDURE — 99212 OFFICE O/P EST SF 10 MIN: CPT | Mod: 95 | Performed by: INTERNAL MEDICINE

## 2021-12-09 NOTE — LETTER
12/9/2021         RE: Timmy Strange  [address] [address]        Dear Colleague,    Thank you for referring your patient, Timmy Strange, to the [facility] [place]. Please see a copy of my visit note below.    Timmy is a 81 year old who is being evaluated via a billable telephone visit.      Timmy Strange stated he is in the state of [place] for the visit today.    What phone number would you like to be contacted at? [phone]  How would you like to obtain your AVS? [facility]  Phone call duration:     Kathryn Yeboah, Virtual Visit Facilitator      HEMATOLOGY HISTORY: Mr. Strange is a gentleman with chronic normocytic anemia due to anemia of chronic disease and iron deficiency.    1.  On 11/07/2017, hemoglobin of 13.3.  -On 10/30/2018, hemoglobin of 12.5.  -On 06/16/2020, hemoglobin of 12.3.  -On 02/22/2021, WBC of 4.3, hemoglobin of 10.4, platelet of 226 and MCV of 90.  2.  On 11/09/2020:    -CMP normal except mildly low protein.  -Hemoglobin A1c normal at 5.5.  3.  On 02/01/2021, colonoscopy revealed colon polyps and diverticulosis.  It was a tubular adenoma.  4.  On 03/16/2021, EGD was essentially normal.  Biopsy did not reveal any H. Pylori.  5. On 05/11/2021:  -Iron of 13, saturation of 6% and ferritin of 69.  -Normal folate.  -Normal vitamin B12.  -Normal TSH.  6. Patient received 1 dose of IV Venofer on 05/11/2021.    SUBJECTIVE:  Mr. Strange is a 81-year-old gentleman with chronic normocytic anemia.  Anemia is due to chronic disease and iron deficiency.  He is on oral iron, which he is tolerating well.     REVIEW OF SYSTEMS:  He has mild fatigue, which would go along with his age.  No headache.  No dizziness.  No chest pain or shortness of breath.  No nausea or vomiting.  Appetite is fairly good.  No bleeding.  All other review of systems is negative.     PHYSICAL EXAMINATION:    GENERAL:  He is alert, oriented x 3.   This is a telephone visit.     LABORATORY:  CBC  and iron studies reviewed.     ASSESSMENT:    1.  A 81-year-old gentleman with normocytic anemia, which is improving.  2.  Iron deficiency, improving.     PLAN:    1.  Labs were reviewed with him.  Hemoglobin has improved to 12.3.  He was happy to know that.  His iron is normal.  Ferritin has improved to 18.  He still has mild iron deficiency.  2.  He will continue on ferrous sulfate once a day.  He is tolerating it well.  3.  We will recheck his labs in 3 months.  I advised him to call us with any questions or concerns.     Total telephone visit time of 5 minutes.    This office note has been dictated.          Again, thank you for allowing me to participate in the care of your patient.        Sincerely,        Chandrakant Panchal MD

## 2021-12-09 NOTE — PROGRESS NOTES
Timmy is a 81 year old who is being evaluated via a billable telephone visit.      Timmy Strange stated he is in the state [facility] for the visit today.    What phone number would you like to be contacted at? [phone]  How would you like to obtain your AVS? Paula  Phone call duration:     Kathryn Yeboah, Virtual Visit Facilitator

## 2021-12-09 NOTE — LETTER
12/9/2021         RE: Timmy Strange  [address] [address]        Dear Colleague,    Thank you for referring your patient, Timmy Strange, to the [facility] [place]. Please see a copy of my visit note below.    Timmy is a 81 year old who is being evaluated via a billable telephone visit.      Timmy Strange stated he is in the state of [place] for the visit today.    What phone number would you like to be contacted at? [phone]  How would you like to obtain your AVS? [facility]  Phone call duration:     Kathryn Yeboah, Virtual Visit Facilitator      HEMATOLOGY HISTORY: Mr. Strange is a gentleman with chronic normocytic anemia due to anemia of chronic disease and iron deficiency.    1.  On 11/07/2017, hemoglobin of 13.3.  -On 10/30/2018, hemoglobin of 12.5.  -On 06/16/2020, hemoglobin of 12.3.  -On 02/22/2021, WBC of 4.3, hemoglobin of 10.4, platelet of 226 and MCV of 90.  2.  On 11/09/2020:    -CMP normal except mildly low protein.  -Hemoglobin A1c normal at 5.5.  3.  On 02/01/2021, colonoscopy revealed colon polyps and diverticulosis.  It was a tubular adenoma.  4.  On 03/16/2021, EGD was essentially normal.  Biopsy did not reveal any H. Pylori.  5. On 05/11/2021:  -Iron of 13, saturation of 6% and ferritin of 69.  -Normal folate.  -Normal vitamin B12.  -Normal TSH.  6. Patient received 1 dose of IV Venofer on 05/11/2021.    SUBJECTIVE:  Mr. Strange is a 81-year-old gentleman with chronic normocytic anemia.  Anemia is due to chronic disease and iron deficiency.  He is on oral iron, which he is tolerating well.     REVIEW OF SYSTEMS:  He has mild fatigue, which would go along with his age.  No headache.  No dizziness.  No chest pain or shortness of breath.  No nausea or vomiting.  Appetite is fairly good.  No bleeding.  All other review of systems is negative.     PHYSICAL EXAMINATION:    GENERAL:  He is alert, oriented x 3.   This is a telephone visit.     LABORATORY:  CBC  and iron studies reviewed.     ASSESSMENT:    1.  A 81-year-old gentleman with normocytic anemia, which is improving.  2.  Iron deficiency, improving.     PLAN:    1.  Labs were reviewed with him.  Hemoglobin has improved to 12.3.  He was happy to know that.  His iron is normal.  Ferritin has improved to 18.  He still has mild iron deficiency.  2.  He will continue on ferrous sulfate once a day.  He is tolerating it well.  3.  We will recheck his labs in 3 months.  I advised him to call us with any questions or concerns.     Total telephone visit time of 5 minutes.    This office note has been dictated.          Again, thank you for allowing me to participate in the care of your patient.        Sincerely,        Chandrkaant Panchal MD

## 2021-12-09 NOTE — NURSING NOTE
Timmy Strange 81 year old male presents today to discuss lab results and if on going follow up is needed.    Timmy Strange verified meds and allergies are correct via patients echeck in. Pt is currently taking Lantus 60 mg daily in the mornings.    Kathryn Yeboah, Virtual Facilitator

## 2021-12-19 NOTE — PROGRESS NOTES
HEMATOLOGY HISTORY: Mr. Strange is a gentleman with chronic normocytic anemia due to anemia of chronic disease and iron deficiency.    1.  On 11/07/2017, hemoglobin of 13.3.  -On 10/30/2018, hemoglobin of 12.5.  -On 06/16/2020, hemoglobin of 12.3.  -On 02/22/2021, WBC of 4.3, hemoglobin of 10.4, platelet of 226 and MCV of 90.  2.  On 11/09/2020:    -CMP normal except mildly low protein.  -Hemoglobin A1c normal at 5.5.  3.  On 02/01/2021, colonoscopy revealed colon polyps and diverticulosis.  It was a tubular adenoma.  4.  On 03/16/2021, EGD was essentially normal.  Biopsy did not reveal any H. Pylori.  5. On 05/11/2021:  -Iron of 13, saturation of 6% and ferritin of 69.  -Normal folate.  -Normal vitamin B12.  -Normal TSH.  6. Patient received 1 dose of IV Venofer on 05/11/2021.    SUBJECTIVE:  Mr. Strange is a 81-year-old gentleman with chronic normocytic anemia.  Anemia is due to chronic disease and iron deficiency.  He is on oral iron, which he is tolerating well.     REVIEW OF SYSTEMS:  He has mild fatigue, which would go along with his age.  No headache.  No dizziness.  No chest pain or shortness of breath.  No nausea or vomiting.  Appetite is fairly good.  No bleeding.  All other review of systems is negative.     PHYSICAL EXAMINATION:    GENERAL:  He is alert, oriented x 3.   This is a telephone visit.     LABORATORY:  CBC and iron studies reviewed.     ASSESSMENT:    1.  A 81-year-old gentleman with normocytic anemia, which is improving.  2.  Iron deficiency, improving.     PLAN:    1.  Labs were reviewed with him.  Hemoglobin has improved to 12.3.  He was happy to know that.  His iron is normal.  Ferritin has improved to 18.  He still has mild iron deficiency.  2.  He will continue on ferrous sulfate once a day.  He is tolerating it well.  3.  We will recheck his labs in 3 months.  I advised him to call us with any questions or concerns.     Total telephone visit time of 5 minutes.

## 2021-12-20 ASSESSMENT — ENCOUNTER SYMPTOMS
DIARRHEA: 0
CHILLS: 1
WEAKNESS: 0
NAUSEA: 0
PARESTHESIAS: 0
HEMATOCHEZIA: 0
EYE PAIN: 0
SORE THROAT: 0
HEADACHES: 0
DIZZINESS: 0
FREQUENCY: 0
ABDOMINAL PAIN: 0
SHORTNESS OF BREATH: 0
PALPITATIONS: 0
ARTHRALGIAS: 0
JOINT SWELLING: 0
HEARTBURN: 0
NERVOUS/ANXIOUS: 0
DYSURIA: 0
COUGH: 0
CONSTIPATION: 0
MYALGIAS: 0
FEVER: 0
HEMATURIA: 0

## 2021-12-20 ASSESSMENT — ACTIVITIES OF DAILY LIVING (ADL): CURRENT_FUNCTION: NO ASSISTANCE NEEDED

## 2021-12-23 ENCOUNTER — OFFICE VISIT (OUTPATIENT)
Dept: FAMILY MEDICINE | Facility: CLINIC | Age: 81
End: 2021-12-23
Payer: COMMERCIAL

## 2021-12-23 VITALS
WEIGHT: 188 LBS | SYSTOLIC BLOOD PRESSURE: 122 MMHG | RESPIRATION RATE: 16 BRPM | BODY MASS INDEX: 29.51 KG/M2 | HEART RATE: 56 BPM | TEMPERATURE: 97.2 F | OXYGEN SATURATION: 95 % | DIASTOLIC BLOOD PRESSURE: 60 MMHG | HEIGHT: 67 IN

## 2021-12-23 DIAGNOSIS — E11.51 TYPE II DIABETES MELLITUS WITH PERIPHERAL CIRCULATORY DISORDER (H): ICD-10-CM

## 2021-12-23 DIAGNOSIS — Z00.00 ENCOUNTER FOR MEDICARE ANNUAL WELLNESS EXAM: Primary | ICD-10-CM

## 2021-12-23 DIAGNOSIS — R41.3 MEMORY DEFICITS: ICD-10-CM

## 2021-12-23 DIAGNOSIS — E78.2 MIXED HYPERLIPIDEMIA: ICD-10-CM

## 2021-12-23 DIAGNOSIS — Z12.5 ENCOUNTER FOR PROSTATE CANCER SCREENING: ICD-10-CM

## 2021-12-23 PROBLEM — R13.12 OROPHARYNGEAL DYSPHAGIA: Status: ACTIVE | Noted: 2021-12-23

## 2021-12-23 PROBLEM — S90.422A: Status: ACTIVE | Noted: 2021-06-28

## 2021-12-23 PROBLEM — R03.0 ELEVATED BLOOD PRESSURE READING WITHOUT DIAGNOSIS OF HYPERTENSION: Status: RESOLVED | Noted: 2017-07-03 | Resolved: 2021-12-23

## 2021-12-23 PROBLEM — S90.422A: Status: RESOLVED | Noted: 2021-06-28 | Resolved: 2021-12-23

## 2021-12-23 PROCEDURE — 99207 PR FOOT EXAM NO CHARGE: CPT | Mod: 25 | Performed by: INTERNAL MEDICINE

## 2021-12-23 PROCEDURE — 99397 PER PM REEVAL EST PAT 65+ YR: CPT | Performed by: INTERNAL MEDICINE

## 2021-12-23 RX ORDER — INSULIN LISPRO 100 [IU]/ML
INJECTION, SOLUTION INTRAVENOUS; SUBCUTANEOUS
COMMUNITY
End: 2021-12-23

## 2021-12-23 RX ORDER — INSULIN GLARGINE 100 [IU]/ML
INJECTION, SOLUTION SUBCUTANEOUS
COMMUNITY
Start: 2021-08-29

## 2021-12-23 RX ORDER — INSULIN GLARGINE 100 [IU]/ML
INJECTION, SOLUTION SUBCUTANEOUS
COMMUNITY
Start: 2021-08-29 | End: 2021-12-23

## 2021-12-23 RX ORDER — INSULIN GLARGINE 100 [IU]/ML
INJECTION, SOLUTION SUBCUTANEOUS
COMMUNITY
End: 2021-12-23

## 2021-12-23 ASSESSMENT — ENCOUNTER SYMPTOMS
CONSTIPATION: 0
PALPITATIONS: 0
FREQUENCY: 0
ABDOMINAL PAIN: 0
DIZZINESS: 0
JOINT SWELLING: 0
CHILLS: 1
HEMATOCHEZIA: 0
NERVOUS/ANXIOUS: 0
WEAKNESS: 0
DIARRHEA: 0
HEARTBURN: 0
HEADACHES: 0
PARESTHESIAS: 0
DYSURIA: 0
COUGH: 0
FEVER: 0
MYALGIAS: 0
SHORTNESS OF BREATH: 0
NAUSEA: 0
EYE PAIN: 0
HEMATURIA: 0
ARTHRALGIAS: 0
SORE THROAT: 0

## 2021-12-23 ASSESSMENT — MIFFLIN-ST. JEOR: SCORE: 1526.39

## 2021-12-23 ASSESSMENT — PAIN SCALES - GENERAL: PAINLEVEL: MILD PAIN (2)

## 2021-12-23 ASSESSMENT — ACTIVITIES OF DAILY LIVING (ADL): CURRENT_FUNCTION: NO ASSISTANCE NEEDED

## 2021-12-23 NOTE — PROGRESS NOTES
"SUBJECTIVE:   Timmy Strange is a 81 year old male who presents for Preventive Visit.      Patient has been advised of split billing requirements and indicates understanding: yes   Are you in the first 12 months of your Medicare coverage?  No    Healthy Habits:     In general, how would you rate your overall health?  Good    Frequency of exercise:  4-5 days/week    Duration of exercise:  30-45 minutes    Do you usually eat at least 4 servings of fruit and vegetables a day, include whole grains    & fiber and avoid regularly eating high fat or \"junk\" foods?  Yes    Taking medications regularly:  Yes    Medication side effects:  None    Ability to successfully perform activities of daily living:  No assistance needed    Home Safety:  No safety concerns identified    Hearing Impairment:  Difficult to understand a speaker at a public meeting or [employer] service    In the past 6 months, have you been bothered by leaking of urine?  No    In general, how would you rate your overall mental or emotional health?  Good      PHQ-2 Total Score: 0    Additional concerns today:  No    Do you feel safe in your environment? Yes    Have you ever done Advance Care Planning? (For example, a Health Directive, POLST, or a discussion with a medical provider or your loved ones about your wishes): N/A    Hearing test : On hearing aids bilaterally.     Fall risk  Fallen 2 or more times in the past year?: Yes  Any fall with injury in the past year?: No    Cognitive Screening recalled all 3 words and normal clock     Reviewed and updated as needed this visit by clinical staff  Tobacco  Allergies  Meds  Problems  Med Hx  Surg Hx  Fam Hx         Reviewed and updated as needed this visit by Provider  Tobacco  Allergies  Meds  Problems  Med Hx  Surg Hx  Fam Hx        Social History     Tobacco Use     Smoking status: Former Smoker     Packs/day: 0.00     Years: 0.00     Pack years: 0.00     Types: Pipe     Start date: 1/1/1960     " Quit date: 11/15/2011     Years since quitting: 10.1     Smokeless tobacco: Never Used     Tobacco comment: Smoked pipes 10 minutes a day started 22 yrs old , quit 10 yrs back.   Substance Use Topics     Alcohol use: No     Alcohol/week: 0.0 standard drinks     If you drink alcohol do you typically have >3 drinks per day or >7 drinks per week? Yes    Alcohol Use 12/20/2021   Prescreen: >3 drinks/day or >7 drinks/week? Not Applicable   Prescreen: >3 drinks/day or >7 drinks/week? -   No flowsheet data found.    Current providers sharing in care for this patient include:   Patient Care Team:  Michaela Sharma MD as PCP - General (Internal Medicine)  Samir Vazquez MD as Assigned Surgical Provider  Chandrakant Panchal MD as Assigned Cancer Care Provider  Michaela Sharma MD as Assigned PCP    The following health maintenance items are reviewed in Epic and correct as of today:  Health Maintenance Due   Topic Date Due     ZOSTER IMMUNIZATION (1 of 2) Never done     LUNG CANCER SCREENING  Never done     LIPID  11/09/2021     FALL RISK ASSESSMENT  12/22/2021     Lab work is in process  Labs reviewed in EPIC      Review of Systems   Constitutional: Positive for chills. Negative for fever.   HENT: Negative for congestion, ear pain, hearing loss and sore throat.    Eyes: Negative for pain and visual disturbance.   Respiratory: Negative for cough and shortness of breath.    Cardiovascular: Positive for peripheral edema. Negative for chest pain and palpitations.   Gastrointestinal: Negative for abdominal pain, constipation, diarrhea, heartburn, hematochezia and nausea.   Genitourinary: Positive for impotence. Negative for dysuria, frequency, genital sores, hematuria, penile discharge and urgency.   Musculoskeletal: Negative for arthralgias, joint swelling and myalgias.   Skin: Negative for rash.   Neurological: Negative for dizziness, weakness, headaches and paresthesias.   Psychiatric/Behavioral: Negative for mood  "changes. The patient is not nervous/anxious.      Constitutional, HEENT, cardiovascular, pulmonary, GI, , musculoskeletal, neuro, skin, endocrine and psych systems are negative, except as otherwise noted.    OBJECTIVE:   /60   Pulse 56   Temp 97.2  F (36.2  C) (Tympanic)   Resp 16   Ht 1.702 m (5' 7\")   Wt 85.3 kg (188 lb)   SpO2 95%   BMI 29.44 kg/m   Estimated body mass index is 29.44 kg/m  as calculated from the following:    Height as of this encounter: 1.702 m (5' 7\").    Weight as of this encounter: 85.3 kg (188 lb).  Physical Exam  GENERAL: healthy, alert and no distress  EYES: Eyes grossly normal to inspection, PERRL and conjunctivae and sclerae normal  HENT: ear canals and TM's normal, nose and mouth without ulcers or lesions  NECK: no adenopathy, no asymmetry, masses, or scars and thyroid normal to palpation  RESP: lungs clear to auscultation - no rales, rhonchi or wheezes  CV: regular rate and rhythm, normal S1 S2, no S3 or S4, no murmur, click or rub, no peripheral edema and peripheral pulses strong  ABDOMEN: soft, nontender, no hepatosplenomegaly, no masses and bowel sounds normal  MS: no gross musculoskeletal defects noted, no edema. Does have left foot sprain and mild swelling due to recent accidental injury.   SKIN: no suspicious lesions or rashes  NEURO: Normal strength and tone, mentation intact and speech normal  PSYCH: mentation appears normal, affect normal/bright    FOOT exam : Sensations on monofilament exam intact bilaterally. No ulcers or macerations on the foot.    Diagnostic Test Results:  Labs reviewed in Epic    ASSESSMENT / PLAN:     Assessment and Plan    1. Encounter for Medicare annual wellness exam  Last seen pt on 8/2021 for diabetes follow up , establish care. He is here for AWV. Last A1C in 12/6/21 at 5.8 , CMP ,  CBC in 12/2021. Lipid panel in 11/2020. Pt due for lipid panel, PSA at this time. Follows Endocrinology and currently on  Lantus 17 units bedtime daily " BG checks at mornings 80 - Afternoons 100 160  - Nights 120 .   - metFORMIN (GLUCOPHAGE) 500 MG tablet; metformin 500 mg oral tablet take 1 tablet by oral route 3 times a day   Active  - insulin glargine (LANTUS SOLOSTAR) 100 UNIT/ML pen  - Lipid panel reflex to direct LDL Fasting; Future  - PSA, screen; Future  - FOOT EXAM    2. Encounter for prostate cancer screening  - PSA, screen; Future    3. Mixed hyperlipidemia  - Lipid panel reflex to direct LDL Fasting; Future    4. Type II diabetes mellitus with peripheral circulatory disorder (H)  - FOOT EXAM    5. Memory deficits  Pt does state long term memory deficits with recent MRI showing age related atrophic changes. He does follow Neurology ( [facility] ) and has upcoming appointment. AVS given with instructions.      Patient Instructions     Please make a lab only appointment in fasting for your cholesterol check.  To follow up with Neurology and need for starting on Aricept for memory issues if needed.     ============================    Patient Education   Personalized Prevention Plan  You are due for the preventive services outlined below.  Your care team is available to assist you in scheduling these services.  If you have already completed any of these items, please share that information with your care team to update in your medical record.  Health Maintenance Due   Topic Date Due     Zoster (Shingles) Vaccine (1 of 2) Never done     LUNG CANCER SCREENING  Never done     Cholesterol Lab  11/09/2021     Diabetic Foot Exam  12/22/2021     FALL RISK ASSESSMENT  12/22/2021       Signs of Hearing Loss      Hearing much better with one ear can be a sign of hearing loss.   Hearing loss is a problem shared by many people. In fact, it is one of the most common health problems, particularly as people age. Most people age 65 and older have some hearing loss. By age 80, almost everyone does. Hearing loss often occurs slowly over the years. So you  "may not realize your hearing has gotten worse.  Have your hearing checked  Call your healthcare provider if you:    Have to strain to hear normal conversation    Have to watch other people s faces very carefully to follow what they re saying    Need to ask people to repeat what they ve said    Often misunderstand what people are saying    Turn the volume of the television or radio up so high that others complain    Feel that people are mumbling when they re talking to you    Find that the effort to hear leaves you feeling tired and irritated    Notice, when using the phone, that you hear better with one ear than the other  [employer] last reviewed this educational content on 1/1/2020 2000-2021 [employer]. All rights reserved. This information is not intended as a substitute for professional medical care. Always follow your healthcare professional's instructions.             Return in about 6 months (around 6/23/2022), or if symptoms worsen or fail to improve, for Follow up.    Michaela Sharma MD  [facility] [place]        Patient has been advised of split billing requirements and indicates understanding: Yes  COUNSELING:  Reviewed preventive health counseling, as reflected in patient instructions  Special attention given to:       Regular exercise       Healthy diet/nutrition       Vision screening       Hearing screening       Dental care       Bladder control       Fall risk prevention       Consider lung cancer screening for ages 55-80 years and 30 pack-year smoking history        Prostate cancer screening    Estimated body mass index is 29.44 kg/m  as calculated from the following:    Height as of this encounter: 1.702 m (5' 7\").    Weight as of this encounter: 85.3 kg (188 lb).    Weight management plan: Discussed healthy diet and exercise guidelines    He reports that he quit smoking about 10 years ago. His smoking use included pipe. He started smoking about 62 years ago. " He smoked 0.00 packs per day for 0.00 years. He has never used smokeless tobacco.      Appropriate preventive services were discussed with this patient, including applicable screening as appropriate for cardiovascular disease, diabetes, osteopenia/osteoporosis, and glaucoma.  As appropriate for age/gender, discussed screening for colorectal cancer, prostate cancer, breast cancer, and cervical cancer. Checklist reviewing preventive services available has been given to the patient.    Reviewed patients plan of care and provided an AVS. The Basic Care Plan (routine screening as documented in Health Maintenance) for Timmy meets the Care Plan requirement. This Care Plan has been established and reviewed with the Patient.    Counseling Resources:  ATP IV Guidelines  Pooled Cohorts Equation Calculator  Breast Cancer Risk Calculator  Breast Cancer: Medication to Reduce Risk  FRAX Risk Assessment  ICSI Preventive Guidelines  Dietary Guidelines for Americans, 2010  USDA's MyPlate  ASA Prophylaxis  Lung CA Screening    Michaela Sharma MD  [facility]    Identified Health Risks:

## 2021-12-23 NOTE — PATIENT INSTRUCTIONS
Please make a lab only appointment in fasting for your cholesterol check.  To follow up with Neurology and need for starting on Aricept for memory issues if needed.     ============================    Patient Education   Personalized Prevention Plan  You are due for the preventive services outlined below.  Your care team is available to assist you in scheduling these services.  If you have already completed any of these items, please share that information with your care team to update in your medical record.  Health Maintenance Due   Topic Date Due     Zoster (Shingles) Vaccine (1 of 2) Never done     LUNG CANCER SCREENING  Never done     Cholesterol Lab  11/09/2021     Diabetic Foot Exam  12/22/2021     FALL RISK ASSESSMENT  12/22/2021       Signs of Hearing Loss      Hearing much better with one ear can be a sign of hearing loss.   Hearing loss is a problem shared by many people. In fact, it is one of the most common health problems, particularly as people age. Most people age 65 and older have some hearing loss. By age 80, almost everyone does. Hearing loss often occurs slowly over the years. So you may not realize your hearing has gotten worse.  Have your hearing checked  Call your healthcare provider if you:    Have to strain to hear normal conversation    Have to watch other people s faces very carefully to follow what they re saying    Need to ask people to repeat what they ve said    Often misunderstand what people are saying    Turn the volume of the television or radio up so high that others complain    Feel that people are mumbling when they re talking to you    Find that the effort to hear leaves you feeling tired and irritated    Notice, when using the phone, that you hear better with one ear than the other  [employer] last reviewed this educational content on 1/1/2020 2000-2021 [employer]. All rights reserved. This information is not intended as a substitute for professional medical  care. Always follow your healthcare professional's instructions.

## 2021-12-23 NOTE — PROGRESS NOTES
"    The patient was provided with written information regarding signs of hearing loss.  Answers for HPI/ROS submitted by the patient on 12/20/2021  In general, how would you rate your overall physical health?: good  Frequency of exercise:: 4-5 days/week  Do you usually eat at least 4 servings of fruit and vegetables a day, include whole grains & fiber, and avoid regularly eating high fat or \"junk\" foods? : Yes  Taking medications regularly:: Yes  Medication side effects:: None  Activities of Daily Living: no assistance needed  Home safety: no safety concerns identified  Hearing Impairment:: difficult to understand a speaker at a public meeting or [employer] service  In the past 6 months, have you been bothered by leaking of urine?: No  abdominal pain: No  Blood in stool: No  Blood in urine: No  chest pain: No  chills: Yes  congestion: No  constipation: No  cough: No  diarrhea: No  dizziness: No  ear pain: No  eye pain: No  nervous/anxious: No  fever: No  frequency: No  genital sores: No  headaches: No  hearing loss: No  heartburn: No  arthralgias: No  joint swelling: No  peripheral edema: Yes  mood changes: No  myalgias: No  nausea: No  dysuria: No  palpitations: No  Skin sensation changes: No  sore throat: No  urgency: No  rash: No  shortness of breath: No  visual disturbance: No  weakness: No  impotence: Yes  penile discharge: No  In general, how would you rate your overall mental or emotional health?: good  Additional concerns today:: No  Duration of exercise:: 30-45 minutes        "

## 2021-12-26 DIAGNOSIS — E78.5 HYPERLIPIDEMIA LDL GOAL <70: ICD-10-CM

## 2021-12-28 RX ORDER — LOVASTATIN 40 MG
TABLET ORAL
Qty: 90 TABLET | Refills: 3 | Status: SHIPPED | OUTPATIENT
Start: 2021-12-28 | End: 2021-12-29

## 2021-12-29 DIAGNOSIS — E78.5 HYPERLIPIDEMIA LDL GOAL <70: ICD-10-CM

## 2021-12-29 RX ORDER — LOVASTATIN 40 MG
TABLET ORAL
Qty: 90 TABLET | Refills: 0 | Status: SHIPPED | OUTPATIENT
Start: 2021-12-29 | End: 2022-10-19

## 2021-12-29 NOTE — TELEPHONE ENCOUNTER
Failed protocol. Has future pending lab  please route to  team if patient needs an appointment     Mendy WYLIE RN BSN  [facility]  [phone]

## 2021-12-30 NOTE — TELEPHONE ENCOUNTER
Refill done.    Please let the patient know that , I would await for lab result note before he refills the medication sent to pharmacy >> for possible change in dosage predicted if lipid panel uncontrolled.    Thank you,  Michaela Sharma MD on 12/29/2021

## 2022-01-03 ENCOUNTER — LAB (OUTPATIENT)
Dept: LAB | Facility: CLINIC | Age: 82
End: 2022-01-03
Payer: COMMERCIAL

## 2022-01-03 DIAGNOSIS — Z12.5 ENCOUNTER FOR PROSTATE CANCER SCREENING: ICD-10-CM

## 2022-01-03 DIAGNOSIS — E78.2 MIXED HYPERLIPIDEMIA: ICD-10-CM

## 2022-01-03 DIAGNOSIS — Z00.00 ENCOUNTER FOR MEDICARE ANNUAL WELLNESS EXAM: ICD-10-CM

## 2022-01-03 LAB
CHOLEST SERPL-MCNC: 126 MG/DL
FASTING STATUS PATIENT QL REPORTED: YES
HDLC SERPL-MCNC: 45 MG/DL
LDLC SERPL CALC-MCNC: 69 MG/DL
NONHDLC SERPL-MCNC: 81 MG/DL
PSA SERPL-MCNC: 0.46 UG/L (ref 0–4)
TRIGL SERPL-MCNC: 61 MG/DL

## 2022-01-03 PROCEDURE — G0103 PSA SCREENING: HCPCS

## 2022-01-03 PROCEDURE — 36415 COLL VENOUS BLD VENIPUNCTURE: CPT

## 2022-01-03 PROCEDURE — 80061 LIPID PANEL: CPT

## 2022-01-24 ENCOUNTER — TELEPHONE (OUTPATIENT)
Dept: UROLOGY | Facility: CLINIC | Age: 82
End: 2022-01-24
Payer: COMMERCIAL

## 2022-01-24 DIAGNOSIS — R35.0 URINARY FREQUENCY: ICD-10-CM

## 2022-01-24 RX ORDER — TAMSULOSIN HYDROCHLORIDE 0.4 MG/1
CAPSULE ORAL
Qty: 90 CAPSULE | Refills: 1 | Status: SHIPPED | OUTPATIENT
Start: 2022-01-24 | End: 2022-03-10

## 2022-01-27 NOTE — TELEPHONE ENCOUNTER
Pt is calling, he is needing a refill on tamsulosin (FLOMAX) 0.4 MG capsule sent to [facility], [facility] not [facility]  [facility]ill yet, thank you

## 2022-01-27 NOTE — TELEPHONE ENCOUNTER
Returned phone call to patient and informed him that script was already called into his [facility] pharmacy. Explained to call back if they still don't have the script.    Dina Mejia LPN

## 2022-03-07 ENCOUNTER — LAB (OUTPATIENT)
Dept: LAB | Facility: CLINIC | Age: 82
End: 2022-03-07
Payer: COMMERCIAL

## 2022-03-07 DIAGNOSIS — D64.9 ANEMIA, UNSPECIFIED TYPE: ICD-10-CM

## 2022-03-07 DIAGNOSIS — D50.9 IRON DEFICIENCY ANEMIA, UNSPECIFIED IRON DEFICIENCY ANEMIA TYPE: ICD-10-CM

## 2022-03-07 LAB
ERYTHROCYTE [DISTWIDTH] IN BLOOD BY AUTOMATED COUNT: 13.7 % (ref 10–15)
HCT VFR BLD AUTO: 37.6 % (ref 40–53)
HGB BLD-MCNC: 12.1 G/DL (ref 13.3–17.7)
MCH RBC QN AUTO: 30.1 PG (ref 26.5–33)
MCHC RBC AUTO-ENTMCNC: 32.2 G/DL (ref 31.5–36.5)
MCV RBC AUTO: 94 FL (ref 78–100)
PLATELET # BLD AUTO: 184 10E3/UL (ref 150–450)
RBC # BLD AUTO: 4.02 10E6/UL (ref 4.4–5.9)
WBC # BLD AUTO: 3.7 10E3/UL (ref 4–11)

## 2022-03-07 PROCEDURE — 83550 IRON BINDING TEST: CPT

## 2022-03-07 PROCEDURE — 82728 ASSAY OF FERRITIN: CPT

## 2022-03-07 PROCEDURE — 36415 COLL VENOUS BLD VENIPUNCTURE: CPT

## 2022-03-07 PROCEDURE — 85027 COMPLETE CBC AUTOMATED: CPT

## 2022-03-08 LAB
FERRITIN SERPL-MCNC: 27 NG/ML (ref 26–388)
IRON SATN MFR SERPL: 33 % (ref 15–46)
IRON SERPL-MCNC: 90 UG/DL (ref 35–180)
TIBC SERPL-MCNC: 276 UG/DL (ref 240–430)

## 2022-03-09 ENCOUNTER — VIRTUAL VISIT (OUTPATIENT)
Dept: ONCOLOGY | Facility: CLINIC | Age: 82
End: 2022-03-09
Attending: INTERNAL MEDICINE
Payer: COMMERCIAL

## 2022-03-09 DIAGNOSIS — D64.9 ANEMIA, UNSPECIFIED TYPE: Primary | ICD-10-CM

## 2022-03-09 DIAGNOSIS — D72.819 LEUKOPENIA, UNSPECIFIED TYPE: ICD-10-CM

## 2022-03-09 PROCEDURE — 99213 OFFICE O/P EST LOW 20 MIN: CPT | Mod: 95 | Performed by: INTERNAL MEDICINE

## 2022-03-09 NOTE — LETTER
3/9/2022         RE: Timmy Strange  [address] [facility] [address]        Dear Colleague,    Thank you for referring your patient, Timmy Strange, to the [facility]. Please see a copy of my visit note below.    Please send a link to: [phone]    HEMATOLOGY HISTORY: Mr. Strange is a gentleman with chronic normocytic anemia due to anemia of chronic disease and iron deficiency.    1.  On 11/07/2017, hemoglobin of 13.3.  -On 10/30/2018, hemoglobin of 12.5.  -On 06/16/2020, hemoglobin of 12.3.  -On 02/22/2021, WBC of 4.3, hemoglobin of 10.4, platelet of 226 and MCV of 90.  2.  On 11/09/2020:    -CMP normal except mildly low protein.  -Hemoglobin A1c normal at 5.5.  3.  On 02/01/2021, colonoscopy revealed colon polyps and diverticulosis.  It was a tubular adenoma.  4.  On 03/16/2021, EGD was essentially normal.  Biopsy did not reveal any H. Pylori.  5. On 05/11/2021:  -Iron of 13, saturation of 6% and ferritin of 69.  -Normal folate.  -Normal vitamin B12.  -Normal TSH.  6. Patient received 1 dose of IV Venofer on 05/11/2021.     SUBJECTIVE:  Mr. Strange is a 81-year-old gentleman with chronic normocytic anemia due to chronic disease and iron deficiency.  He is on oral ferrous sulfate once a day. He is tolerating well.     REVIEW OF SYSTEMS: Overall his condition is stable. He has mild fatigue, which would go along with his age.  No headache.  No dizziness.  No chest pain or shortness of breath.  No nausea or vomiting.  Appetite is fairly good.  No bleeding.  All other review of systems is negative.     PHYSICAL EXAMINATION:    He is alert, oriented x 3.   Rest of the systems not examined as this is a video visit.     LABORATORY:  CBC and iron studies reviewed.     ASSESSMENT:    1.  A 81-year-old gentleman with normocytic anemia. Anemia is stable.  2.  Iron deficiency, resolved.  3.  Mild leukopenia.     PLAN:  1.  Patient is overall stable.  Labs were reviewed with him.   Explained to him his anemia is a stable with hemoglobin of 12.1.  His iron is normal.  Ferritin is also low normal.    He is on oral ferrous sulfate once a day.  He will continue on it.  He is tolerating it well.    Explained to the patient that we will continue to monitor his labs.  In 6 months time, will get CBC and iron studies.    2.  He has mild leukopenia.  Previous CBC reveals normal WBC.  At this time we will simply monitor it.  We will recheck it in 6 months time.  If it gets worse, further work-up will be done.    3.  He had a few questions which were all answered.  I will see him in 6 months.  Advised him to see us sooner if she has worsening weakness, chest pain, shortness of breath, dizziness or any other concerns.    Video visit time of 15 minutes.  Time is spent in today's visit, review of chart/investigations today and documentation.      Again, thank you for allowing me to participate in the care of your patient.        Sincerely,        Chandrakant Panchal MD

## 2022-03-09 NOTE — LETTER
3/9/2022         RE: Timmy Strange  [address] [address]        Dear Colleague,    Thank you for referring your patient, Timmy Strange, to the [facility]. Please see a copy of my visit note below.    Please send a link to: [phone]    HEMATOLOGY HISTORY: Mr. Strange is a gentleman with chronic normocytic anemia due to anemia of chronic disease and iron deficiency.    1.  On 11/07/2017, hemoglobin of 13.3.  -On 10/30/2018, hemoglobin of 12.5.  -On 06/16/2020, hemoglobin of 12.3.  -On 02/22/2021, WBC of 4.3, hemoglobin of 10.4, platelet of 226 and MCV of 90.  2.  On 11/09/2020:    -CMP normal except mildly low protein.  -Hemoglobin A1c normal at 5.5.  3.  On 02/01/2021, colonoscopy revealed colon polyps and diverticulosis.  It was a tubular adenoma.  4.  On 03/16/2021, EGD was essentially normal.  Biopsy did not reveal any H. Pylori.  5. On 05/11/2021:  -Iron of 13, saturation of 6% and ferritin of 69.  -Normal folate.  -Normal vitamin B12.  -Normal TSH.  6. Patient received 1 dose of IV Venofer on 05/11/2021.     SUBJECTIVE:  Mr. Strange is a 81-year-old gentleman with chronic normocytic anemia due to chronic disease and iron deficiency.  He is on oral ferrous sulfate once a day. He is tolerating well.     REVIEW OF SYSTEMS: Overall his condition is stable. He has mild fatigue, which would go along with his age.  No headache.  No dizziness.  No chest pain or shortness of breath.  No nausea or vomiting.  Appetite is fairly good.  No bleeding.  All other review of systems is negative.     PHYSICAL EXAMINATION:    He is alert, oriented x 3.   Rest of the systems not examined as this is a video visit.     LABORATORY:  CBC and iron studies reviewed.     ASSESSMENT:    1.  A 81-year-old gentleman with normocytic anemia. Anemia is stable.  2.  Iron deficiency, resolved.  3.  Mild leukopenia.     PLAN:  1.  Patient is overall stable.  Labs were reviewed with him.   Explained to him his anemia is a stable with hemoglobin of 12.1.  His iron is normal.  Ferritin is also low normal.    He is on oral ferrous sulfate once a day.  He will continue on it.  He is tolerating it well.    Explained to the patient that we will continue to monitor his labs.  In 6 months time, will get CBC and iron studies.    2.  He has mild leukopenia.  Previous CBC reveals normal WBC.  At this time we will simply monitor it.  We will recheck it in 6 months time.  If it gets worse, further work-up will be done.    3.  He had a few questions which were all answered.  I will see him in 6 months.  Advised him to see us sooner if she has worsening weakness, chest pain, shortness of breath, dizziness or any other concerns.    Video visit time of 15 minutes.  Time is spent in today's visit, review of chart/investigations today and documentation.      Again, thank you for allowing me to participate in the care of your patient.        Sincerely,        Chandrakant Panchal MD

## 2022-03-10 DIAGNOSIS — R35.0 URINARY FREQUENCY: ICD-10-CM

## 2022-03-10 RX ORDER — TAMSULOSIN HYDROCHLORIDE 0.4 MG/1
CAPSULE ORAL
Qty: 180 CAPSULE | Refills: 0 | Status: SHIPPED | OUTPATIENT
Start: 2022-03-10 | End: 2022-06-03

## 2022-03-11 NOTE — PROGRESS NOTES
HEMATOLOGY HISTORY: Mr. Strange is a gentleman with chronic normocytic anemia due to anemia of chronic disease and iron deficiency.    1.  On 11/07/2017, hemoglobin of 13.3.  -On 10/30/2018, hemoglobin of 12.5.  -On 06/16/2020, hemoglobin of 12.3.  -On 02/22/2021, WBC of 4.3, hemoglobin of 10.4, platelet of 226 and MCV of 90.  2.  On 11/09/2020:    -CMP normal except mildly low protein.  -Hemoglobin A1c normal at 5.5.  3.  On 02/01/2021, colonoscopy revealed colon polyps and diverticulosis.  It was a tubular adenoma.  4.  On 03/16/2021, EGD was essentially normal.  Biopsy did not reveal any H. Pylori.  5. On 05/11/2021:  -Iron of 13, saturation of 6% and ferritin of 69.  -Normal folate.  -Normal vitamin B12.  -Normal TSH.  6. Patient received 1 dose of IV Venofer on 05/11/2021.     SUBJECTIVE:  Mr. Strange is a 81-year-old gentleman with chronic normocytic anemia due to chronic disease and iron deficiency.  He is on oral ferrous sulfate once a day. He is tolerating well.     REVIEW OF SYSTEMS: Overall his condition is stable. He has mild fatigue, which would go along with his age.  No headache.  No dizziness.  No chest pain or shortness of breath.  No nausea or vomiting.  Appetite is fairly good.  No bleeding.  All other review of systems is negative.     PHYSICAL EXAMINATION:    He is alert, oriented x 3.   Rest of the systems not examined as this is a video visit.     LABORATORY:  CBC and iron studies reviewed.     ASSESSMENT:    1.  A 81-year-old gentleman with normocytic anemia. Anemia is stable.  2.  Iron deficiency, resolved.  3.  Mild leukopenia.     PLAN:  1.  Patient is overall stable.  Labs were reviewed with him.  Explained to him his anemia is a stable with hemoglobin of 12.1.  His iron is normal.  Ferritin is also low normal.    He is on oral ferrous sulfate once a day.  He will continue on it.  He is tolerating it well.    Explained to the patient that we will continue to monitor his labs.  In 6 months  time, will get CBC and iron studies.    2.  He has mild leukopenia.  Previous CBC reveals normal WBC.  At this time we will simply monitor it.  We will recheck it in 6 months time.  If it gets worse, further work-up will be done.    3.  He had a few questions which were all answered.  I will see him in 6 months.  Advised him to see us sooner if she has worsening weakness, chest pain, shortness of breath, dizziness or any other concerns.    Video visit time of 15 minutes.  Time is spent in today's visit, review of chart/investigations today and documentation.

## 2022-03-22 DIAGNOSIS — I10 ESSENTIAL HYPERTENSION: ICD-10-CM

## 2022-03-24 RX ORDER — CARVEDILOL 3.12 MG/1
TABLET ORAL
Qty: 180 TABLET | Refills: 2 | Status: SHIPPED | OUTPATIENT
Start: 2022-03-24 | End: 2022-03-24

## 2022-04-27 ENCOUNTER — LAB REQUISITION (OUTPATIENT)
Dept: LAB | Facility: CLINIC | Age: 82
End: 2022-04-27

## 2022-04-27 PROCEDURE — 86481 TB AG RESPONSE T-CELL SUSP: CPT | Performed by: INTERNAL MEDICINE

## 2022-04-27 PROCEDURE — 86787 VARICELLA-ZOSTER ANTIBODY: CPT | Performed by: INTERNAL MEDICINE

## 2022-04-27 PROCEDURE — 86765 RUBEOLA ANTIBODY: CPT | Performed by: INTERNAL MEDICINE

## 2022-04-27 PROCEDURE — 86735 MUMPS ANTIBODY: CPT | Performed by: INTERNAL MEDICINE

## 2022-04-27 PROCEDURE — 86762 RUBELLA ANTIBODY: CPT | Performed by: INTERNAL MEDICINE

## 2022-04-28 LAB
MEV IGG SER IA-ACNC: >300 AU/ML
MEV IGG SER IA-ACNC: POSITIVE
MUMPS ANTIBODY IGG INSTRUMENT VALUE: 249 AU/ML
MUV IGG SER QL IA: POSITIVE
QUANTIFERON MITOGEN: 10 IU/ML
QUANTIFERON NIL TUBE: 0.06 IU/ML
QUANTIFERON TB1 TUBE: 0.07 IU/ML
QUANTIFERON TB2 TUBE: 0.05
RUBV IGG SERPL QL IA: 25 INDEX
RUBV IGG SERPL QL IA: POSITIVE
VZV IGG SER QL IA: 1573 INDEX
VZV IGG SER QL IA: POSITIVE

## 2022-04-29 LAB
GAMMA INTERFERON BACKGROUND BLD IA-ACNC: 0.06 IU/ML
M TB IFN-G BLD-IMP: NEGATIVE
M TB IFN-G CD4+ BCKGRND COR BLD-ACNC: 9.94 IU/ML
MITOGEN IGNF BCKGRD COR BLD-ACNC: -0.01 IU/ML
MITOGEN IGNF BCKGRD COR BLD-ACNC: 0.01 IU/ML

## 2022-05-12 ENCOUNTER — TRANSFERRED RECORDS (OUTPATIENT)
Dept: HEALTH INFORMATION MANAGEMENT | Facility: CLINIC | Age: 82
End: 2022-05-12
Payer: COMMERCIAL

## 2022-05-12 LAB — RETINOPATHY: NEGATIVE

## 2022-05-17 ENCOUNTER — OFFICE VISIT (OUTPATIENT)
Dept: FAMILY MEDICINE | Facility: CLINIC | Age: 82
End: 2022-05-17
Payer: COMMERCIAL

## 2022-05-17 VITALS
SYSTOLIC BLOOD PRESSURE: 147 MMHG | HEIGHT: 66 IN | OXYGEN SATURATION: 99 % | HEART RATE: 69 BPM | WEIGHT: 187 LBS | BODY MASS INDEX: 30.05 KG/M2 | DIASTOLIC BLOOD PRESSURE: 65 MMHG | TEMPERATURE: 97.7 F

## 2022-05-17 DIAGNOSIS — J01.90 ACUTE SINUSITIS WITH SYMPTOMS > 10 DAYS: Primary | ICD-10-CM

## 2022-05-17 DIAGNOSIS — E11.51 TYPE II DIABETES MELLITUS WITH PERIPHERAL CIRCULATORY DISORDER (H): ICD-10-CM

## 2022-05-17 PROBLEM — E66.01 MORBID OBESITY (H): Status: RESOLVED | Noted: 2018-10-03 | Resolved: 2022-05-17

## 2022-05-17 PROCEDURE — 99213 OFFICE O/P EST LOW 20 MIN: CPT | Performed by: PHYSICIAN ASSISTANT

## 2022-05-17 RX ORDER — DOXYCYCLINE 100 MG/1
100 CAPSULE ORAL 2 TIMES DAILY
Qty: 14 CAPSULE | Refills: 0 | Status: SHIPPED | OUTPATIENT
Start: 2022-05-17 | End: 2022-05-24

## 2022-05-17 ASSESSMENT — PAIN SCALES - GENERAL: PAINLEVEL: NO PAIN (0)

## 2022-05-17 NOTE — PROGRESS NOTES
Answers for HPI/ROS submitted by the patient on 5/17/2022  How many servings of fruits and vegetables do you eat daily?: 2-3  On average, how many sweetened beverages do you drink each day (Examples: soda, juice, sweet tea, etc.  Do NOT count diet or artificially sweetened beverages)?: 1  How many minutes a day do you exercise enough to make your heart beat faster?: 20 to 29  How many days a week do you exercise enough to make your heart beat faster?: 5  How many days per week do you miss taking your medication?: 0  What is the reason for your visit today?: Sinus infection  When did your symptoms begin?: 1-2 weeks ago  What are your symptoms?: Runny nose cough  How would you describe these symptoms?: Mild  Are your symptoms:: Staying the same  Have you had these symptoms before?: Yes  Have you tried or received treatment for these symptoms before?: Yes  Did that treatment work? : Yes  Please describe the treatment you had:: Amoxicillin  Is there anything that makes you feel worse?: No  Is there anything that makes you feel better?: No      Assessment & Plan     Acute sinusitis with symptoms > 10 days  - doxycycline hyclate (VIBRAMYCIN) 100 MG capsule; Take 1 capsule (100 mg) by mouth 2 times daily for 7 days    Type II diabetes mellitus with peripheral circulatory disorder (H)  Per PCP      Return in about 2 weeks (around 5/31/2022) for if new or worsening symptoms.    CHRISTIANO Aguirre [facility] LENARD Warner is a 81 year old who presents for the following health issues     History of Present Illness       Reason for visit:  Sinus infection  Symptom onset:  1-2 weeks ago  Symptoms include:  Runny nose cough  Symptom intensity:  Mild  Symptom progression:  Staying the same  Had these symptoms before:  Yes  Has tried/received treatment for these symptoms:  Yes  Previous treatment was successful:  Yes  Prior treatment description:  Amoxicillin  What makes it worse:   "No  What makes it better:  No    He eats 2-3 servings of fruits and vegetables daily.He consumes 1 sweetened beverage(s) daily.He exercises with enough effort to increase his heart rate 20 to 29 minutes per day.  He exercises with enough effort to increase his heart rate 5 days per week.   He is taking medications regularly.     10 day hx of cough, post nasal drip and maxillary sinus pressure.  Hx of sinus infections in the past, last 1 year ago    Review of Systems   Constitutional, HEENT, cardiovascular, pulmonary, GI, , musculoskeletal, neuro, skin, endocrine and psych systems are negative, except as otherwise noted.      Objective    BP (!) 147/65   Pulse 69   Temp 97.7  F (36.5  C) (Tympanic)   Ht 1.676 m (5' 6\")   Wt 84.8 kg (187 lb)   SpO2 99%   BMI 30.18 kg/m    Body mass index is 30.18 kg/m .  Physical Exam   GENERAL: healthy, alert and no distress  EYES: Eyes grossly normal to inspection, PERRL and conjunctivae and sclerae normal  HENT: ear canals and TM's normal, nose and mouth without ulcers or lesions, TTP of maxillary sinuses bilaterally  NECK: no adenopathy  RESP: lungs clear to auscultation - no rales, rhonchi or wheezes  CV: regular rate and rhythm, normal S1 S2, no S3 or S4, no murmur        "

## 2022-05-31 DIAGNOSIS — R35.0 URINARY FREQUENCY: ICD-10-CM

## 2022-06-03 RX ORDER — TAMSULOSIN HYDROCHLORIDE 0.4 MG/1
0.8 CAPSULE ORAL DAILY
Qty: 180 CAPSULE | Refills: 0 | Status: SHIPPED | OUTPATIENT
Start: 2022-06-03 | End: 2022-06-15

## 2022-06-03 NOTE — TELEPHONE ENCOUNTER
Tamsulosin HCl 0.4 MG Oral Capsule    Last Written Prescription Date:   3/10/2022  Last Fill Quantity: 180,   # refills: 0  Last Office Visit :  6/21/2021  Future Office visit:  None    Routing refill request to provider for review/approval because:  We do not fill medications for [facility].  Forward to clinic for review       Luda Baird RN  Central Triage Red Flags/Med Refills

## 2022-06-11 ENCOUNTER — HEALTH MAINTENANCE LETTER (OUTPATIENT)
Age: 82
End: 2022-06-11

## 2022-06-15 ENCOUNTER — OFFICE VISIT (OUTPATIENT)
Dept: UROLOGY | Facility: CLINIC | Age: 82
End: 2022-06-15
Payer: COMMERCIAL

## 2022-06-15 ENCOUNTER — LAB (OUTPATIENT)
Dept: INFUSION THERAPY | Facility: CLINIC | Age: 82
End: 2022-06-15
Attending: INTERNAL MEDICINE
Payer: COMMERCIAL

## 2022-06-15 VITALS
HEIGHT: 66 IN | HEART RATE: 67 BPM | WEIGHT: 187 LBS | BODY MASS INDEX: 30.05 KG/M2 | DIASTOLIC BLOOD PRESSURE: 62 MMHG | SYSTOLIC BLOOD PRESSURE: 124 MMHG | OXYGEN SATURATION: 97 %

## 2022-06-15 DIAGNOSIS — N52.9 ERECTILE DYSFUNCTION, UNSPECIFIED ERECTILE DYSFUNCTION TYPE: ICD-10-CM

## 2022-06-15 DIAGNOSIS — R35.0 URINARY FREQUENCY: Primary | ICD-10-CM

## 2022-06-15 DIAGNOSIS — D64.9 ANEMIA, UNSPECIFIED TYPE: ICD-10-CM

## 2022-06-15 DIAGNOSIS — D72.819 LEUKOPENIA, UNSPECIFIED TYPE: ICD-10-CM

## 2022-06-15 LAB
ERYTHROCYTE [DISTWIDTH] IN BLOOD BY AUTOMATED COUNT: 13.4 % (ref 10–15)
HCT VFR BLD AUTO: 38.7 % (ref 40–53)
HGB BLD-MCNC: 12.2 G/DL (ref 13.3–17.7)
MCH RBC QN AUTO: 29.8 PG (ref 26.5–33)
MCHC RBC AUTO-ENTMCNC: 31.5 G/DL (ref 31.5–36.5)
MCV RBC AUTO: 95 FL (ref 78–100)
PLATELET # BLD AUTO: 223 10E3/UL (ref 150–450)
RBC # BLD AUTO: 4.09 10E6/UL (ref 4.4–5.9)
RESIDUAL VOLUME (RV) (EXTERNAL): 38
WBC # BLD AUTO: 5 10E3/UL (ref 4–11)

## 2022-06-15 PROCEDURE — 36415 COLL VENOUS BLD VENIPUNCTURE: CPT

## 2022-06-15 PROCEDURE — 85027 COMPLETE CBC AUTOMATED: CPT | Performed by: INTERNAL MEDICINE

## 2022-06-15 PROCEDURE — 51798 US URINE CAPACITY MEASURE: CPT | Performed by: UROLOGY

## 2022-06-15 PROCEDURE — 99214 OFFICE O/P EST MOD 30 MIN: CPT | Mod: 25 | Performed by: UROLOGY

## 2022-06-15 RX ORDER — SILDENAFIL 100 MG/1
100 TABLET, FILM COATED ORAL PRN
Qty: 12 TABLET | Refills: 11 | Status: SHIPPED | OUTPATIENT
Start: 2022-06-15 | End: 2022-06-16

## 2022-06-15 RX ORDER — TAMSULOSIN HYDROCHLORIDE 0.4 MG/1
0.8 CAPSULE ORAL DAILY
Qty: 180 CAPSULE | Refills: 3 | Status: SHIPPED | OUTPATIENT
Start: 2022-06-15 | End: 2023-07-18

## 2022-06-15 RX ORDER — FINASTERIDE 5 MG/1
5 TABLET, FILM COATED ORAL DAILY
Qty: 90 TABLET | Refills: 4 | Status: SHIPPED | OUTPATIENT
Start: 2022-06-15 | End: 2023-07-18

## 2022-06-15 ASSESSMENT — PAIN SCALES - GENERAL: PAINLEVEL: NO PAIN (0)

## 2022-06-15 NOTE — LETTER
"6/15/2022       RE: Timmy Strange  [address]  [facility] [address]     Dear Colleague,    Thank you for referring your patient, Timmy Strange, to the [facility] [place] at [facility]. Please see a copy of my visit note below.    [facility]  CHIEF COMPLAINT   It was my pleasure to see Timmy Strange who is a 81 year old male for follow-up of LUTS.      HPI   Timmy Strange is a very pleasant 81 year old male     Prior Dr. Vazquez patient - last seen 6/21/21:  History: It is a great pleasure to see this very pleasant 80-year-old gentleman in follow-up consultation today.  He is a patient with type 2 diabetes who is being concerned more recently about frequency of micturition but without significant nocturia; when I initially saw him he was going 12-13 times during the day with initially reduction in caffeinated beverages did reduce this to 9-10 times a day.  He had not been drinking sodas and only drinking simple fluids and drinking when thirsty.  When I saw him initially he is on both tamsulosin and finasteride the tamsulosin dose of 0.8 mg daily and finasteride 5 mg daily.  Renal function was normal creatinine of 0.82  We had considered further investigations including cystoscopy and even the possibility of urodynamics but decided that we should try anticholinergic medication with tolterodine 4 mg daily.  Subsequently that the patient decided not to continue this treatment and is finding that his symptoms are somewhat improved without having to do that.    TODAY 6/15/2022:  He has been doing well on tamsulosin 0.8mg (Flomax) and finasteride 5mg (Proscar)     He is using sildenafil 75mg (Viagra)   He notes that he has tried to 100 mg in the past, however had better results with 75 mg      PHYSICAL EXAM  Patient is a 81 year old  male   Vitals: Blood pressure 124/62, pulse 67, height 1.676 m (5' 6\"), weight 84.8 kg (187 lb), SpO2 97 %.  Body " mass index is 30.18 kg/m .  General Appearance Adult:   Alert, no acute distress, oriented  HENT: throat/mouth:normal, good dentition  Lungs: no respiratory distress, or pursed lip breathing  Heart: No obvious jugular venous distension present  Abdomen: soft, nontender, no organomegaly or masses  Musculoskeltal: extremities normal, no peripheral edema  Skin: no suspicious lesions or rashes  Neuro: Alert, oriented, speech and mentation normal  Psych: affect and mood normal  Gait: Normal    Component PSA   Latest Ref Rng & Units 0.00 - 4.00 ug/L   11/1/2011 3.34   12/17/2013 1.29   4/20/2015 0.83   7/15/2016 0.86   11/7/2017 0.79   6/16/2020 0.50   11/9/2020 0.78   1/3/2022 0.46     Creatinine   Date Value Ref Range Status   09/07/2021 0.92 0.66 - 1.25 mg/dL Final   05/15/2021 0.80 0.66 - 1.25 mg/dL Final     UA RESULTS:  Recent Labs   Lab Test 06/21/21  1237 06/07/21  1100 05/09/21  1600   COLOR Yellow   < > Light Yellow   APPEARANCE Clear   < > Clear   URINEGLC Negative   < > Negative   URINEBILI Negative   < > Negative   URINEKETONE Negative   < > Negative   SG 1.025   < > 1.033   UBLD Negative   < > Trace*   URINEPH 6.0   < > 5.5   PROTEIN Negative   < > 20*   UROBILINOGEN 0.2  --   --    NITRITE Negative   < > Negative   LEUKEST Negative   < > Negative   RBCU  --   --  1   WBCU  --   --  1    < > = values in this interval not displayed.     Lab Results   Component Value Date    A1C 5.8 12/06/2021    A1C 5.6 05/08/2021    A1C 5.5 11/09/2020    A1C 5.9 06/16/2020    A1C 6.5 06/10/2019    A1C 6.6 10/30/2018         ASSESSMENT and PLAN  81-year-old man with history of BPH and LUTS with erectile dysfunction    BPH and LUTS  - We discussed the pathophysiology of the bladder and the prostate and the normal changes associated with development of BPH and LUTS  - He has been doing very well with stable symptoms on tamsulosin 0.8 mg daily and finasteride 5 mg daily  - He has tolerated both of these medications well with no  bothersome side effects  - Refill prescription sent to the pharmacy    Erectile dysfunction  - He is doing well with sildenafil 75 mg as needed  - We discussed trial of other PDE 5 inhibitors, however we did discuss that all other options would likely be more expensive  - We discussed the other options for erectile dysfunction would be intracavernosal injections or consideration for an IPP  - Refill prescription for sildenafil sent to the pharmacy    Screening PSA  - I reviewed his PSA history and trend  - This has been very reassuring and nonconcerning  - We discussed AUA guidelines would recommend cessation of PSA monitoring and age of 77, and so I would recommend no further PSA monitoring at this time    Follow-up in 1 year for symptom check    Time spent: 20 minutes spent on the date of the encounter doing chart review, history and exam, documentation and further activities as noted above.    Pino Meier MD   Urology  [facility]  [facility] Phone: [phone]  [facility] Phone: [phone]

## 2022-06-15 NOTE — PROGRESS NOTES
"[facility]  CHIEF COMPLAINT   It was my pleasure to see Timmy Strange who is a 81 year old male for follow-up of LUTS.      HPI   Timmy Strange is a very pleasant 81 year old male     Prior Dr. Vazquez patient - last seen 6/21/21:  History: It is a great pleasure to see this very pleasant 80-year-old gentleman in follow-up consultation today.  He is a patient with type 2 diabetes who is being concerned more recently about frequency of micturition but without significant nocturia; when I initially saw him he was going 12-13 times during the day with initially reduction in caffeinated beverages did reduce this to 9-10 times a day.  He had not been drinking sodas and only drinking simple fluids and drinking when thirsty.  When I saw him initially he is on both tamsulosin and finasteride the tamsulosin dose of 0.8 mg daily and finasteride 5 mg daily.  Renal function was normal creatinine of 0.82  We had considered further investigations including cystoscopy and even the possibility of urodynamics but decided that we should try anticholinergic medication with tolterodine 4 mg daily.  Subsequently that the patient decided not to continue this treatment and is finding that his symptoms are somewhat improved without having to do that.    TODAY 6/15/2022:  He has been doing well on tamsulosin 0.8mg (Flomax) and finasteride 5mg (Proscar)     He is using sildenafil 75mg (Viagra)   He notes that he has tried to 100 mg in the past, however had better results with 75 mg      PHYSICAL EXAM  Patient is a 81 year old  male   Vitals: Blood pressure 124/62, pulse 67, height 1.676 m (5' 6\"), weight 84.8 kg (187 lb), SpO2 97 %.  Body mass index is 30.18 kg/m .  General Appearance Adult:   Alert, no acute distress, oriented  HENT: throat/mouth:normal, good dentition  Lungs: no respiratory distress, or pursed lip breathing  Heart: No obvious jugular venous distension present  Abdomen: soft, nontender, no organomegaly or " masses  Musculoskeltal: extremities normal, no peripheral edema  Skin: no suspicious lesions or rashes  Neuro: Alert, oriented, speech and mentation normal  Psych: affect and mood normal  Gait: Normal    Component PSA   Latest Ref Rng & Units 0.00 - 4.00 ug/L   11/1/2011 3.34   12/17/2013 1.29   4/20/2015 0.83   7/15/2016 0.86   11/7/2017 0.79   6/16/2020 0.50   11/9/2020 0.78   1/3/2022 0.46     Creatinine   Date Value Ref Range Status   09/07/2021 0.92 0.66 - 1.25 mg/dL Final   05/15/2021 0.80 0.66 - 1.25 mg/dL Final     UA RESULTS:  Recent Labs   Lab Test 06/21/21  1237 06/07/21  1100 05/09/21  1600   COLOR Yellow   < > Light Yellow   APPEARANCE Clear   < > Clear   URINEGLC Negative   < > Negative   URINEBILI Negative   < > Negative   URINEKETONE Negative   < > Negative   SG 1.025   < > 1.033   UBLD Negative   < > Trace*   URINEPH 6.0   < > 5.5   PROTEIN Negative   < > 20*   UROBILINOGEN 0.2  --   --    NITRITE Negative   < > Negative   LEUKEST Negative   < > Negative   RBCU  --   --  1   WBCU  --   --  1    < > = values in this interval not displayed.     Lab Results   Component Value Date    A1C 5.8 12/06/2021    A1C 5.6 05/08/2021    A1C 5.5 11/09/2020    A1C 5.9 06/16/2020    A1C 6.5 06/10/2019    A1C 6.6 10/30/2018         ASSESSMENT and PLAN  81-year-old man with history of BPH and LUTS with erectile dysfunction    BPH and LUTS  - We discussed the pathophysiology of the bladder and the prostate and the normal changes associated with development of BPH and LUTS  - He has been doing very well with stable symptoms on tamsulosin 0.8 mg daily and finasteride 5 mg daily  - He has tolerated both of these medications well with no bothersome side effects  - Refill prescription sent to the pharmacy    Erectile dysfunction  - He is doing well with sildenafil 75 mg as needed  - We discussed trial of other PDE 5 inhibitors, however we did discuss that all other options would likely be more expensive  - We discussed the  other options for erectile dysfunction would be intracavernosal injections or consideration for an IPP  - Refill prescription for sildenafil sent to the pharmacy    Screening PSA  - I reviewed his PSA history and trend  - This has been very reassuring and nonconcerning  - We discussed AUA guidelines would recommend cessation of PSA monitoring and age of 77, and so I would recommend no further PSA monitoring at this time    Follow-up in 1 year for symptom check    Time spent: 20 minutes spent on the date of the encounter doing chart review, history and exam, documentation and further activities as noted above.    Pino Meier MD   Urology  [facility]  [facility] Phone: [phone]  [facility] Phone: [phone]

## 2022-06-15 NOTE — PROGRESS NOTES
Medical Assistant Note:  Timmy Strange presents today for lab draw.    Patient seen by provider today: No.   present during visit today: Not Applicable.    Concerns: No Concerns.    Procedure:  Lab draw site: RAC, Needle type: Butterfly, Gauge: 23.    Post Assessment:  Labs drawn without difficulty: Yes.    Discharge Plan:  Departure Mode: Ambulatory.    Face to Face Time: 4 min.    Shari Schoenberger, CMA

## 2022-06-15 NOTE — NURSING NOTE
Chief Complaint   Patient presents with     Urinary Frequency     Patient states his frequency has gotten better since he stopped caffeine. He does have some occasional issues with incomplete emptying.     Erectile Dysfunction   post void residual today was 38 ml.  Denise Wang LPN

## 2022-06-15 NOTE — RESULT ENCOUNTER NOTE
Dear Mr. Strange,    Blood test reveals anemia to be stable.     Please, call me with any questions.    Chandrakant Panchal MD

## 2022-06-16 DIAGNOSIS — N52.9 ERECTILE DYSFUNCTION, UNSPECIFIED ERECTILE DYSFUNCTION TYPE: ICD-10-CM

## 2022-06-16 RX ORDER — SILDENAFIL 100 MG/1
100 TABLET, FILM COATED ORAL PRN
Qty: 12 TABLET | Refills: 11 | Status: SHIPPED | OUTPATIENT
Start: 2022-06-16 | End: 2023-02-14

## 2022-07-07 ENCOUNTER — VIRTUAL VISIT (OUTPATIENT)
Dept: INTERNAL MEDICINE | Facility: CLINIC | Age: 82
End: 2022-07-07
Payer: COMMERCIAL

## 2022-07-07 DIAGNOSIS — J01.01 ACUTE RECURRENT MAXILLARY SINUSITIS: Primary | ICD-10-CM

## 2022-07-07 PROCEDURE — 99213 OFFICE O/P EST LOW 20 MIN: CPT | Mod: 95 | Performed by: INTERNAL MEDICINE

## 2022-07-07 RX ORDER — CEFDINIR 300 MG/1
300 CAPSULE ORAL 2 TIMES DAILY
Qty: 20 CAPSULE | Refills: 0 | Status: SHIPPED | OUTPATIENT
Start: 2022-07-07 | End: 2022-07-17

## 2022-07-07 NOTE — PROGRESS NOTES
Timmy is a 81 year old who is being evaluated via a billable video visit.      How would you like to obtain your AVS? MyChart  If the video visit is dropped, the invitation should be resent by: Text to cell phone: [phone]  Will anyone else be joining your video visit? No          Assessment & Plan     Acute recurrent maxillary sinusitis  He has chronic rhinitis related to allergies and recurrent sinus infections so will start on treatment.  Since he was on doxycycline about 6 weeks ago will use Omnicef instead.  He has tolerated cephalosporins.  Recommend he start on Flonase and Mucinex as well.  Advised to follow-up urgently for significant fever, severe headache, significant shortness of breath.  - cefdinir (OMNICEF) 300 MG capsule; Take 1 capsule (300 mg) by mouth 2 times daily for 10 days          Return in about 6 months (around 12/24/2022) for Wellness visit with primary provider.    Naima Souza MD  [facility]   Timmy is a 81 year old, presenting for the following health issues:      He reports onset of significant purulent postnasal drainage with cough starting about 3 days ago.  Yesterday started to feel more significant sinus pain on both sides, and the maxillary area behind the eyes.  He has had a lot of chronic sinusitis with recurrences, allergies chronically.  He has been on his Allegra.  He has not been using anything else at this time.  This is like his usual sinus infections, usually not triggered by viruses.  His last infection was May and it did seem to resolve.  He tends to get sick fairly quickly with these so he would like to get it treated early.  He did 2 COVID test which were negative.  No fever, chills, some nasal congestion, no significant rhinorrhea, and small postnasal drainage.  This is triggering cough with the thick mucus.  No sore throat, no shortness of breath, no chest pain.    History of Present Illness       Reason for visit:  Get help  to minimize time sick  Symptom onset:  1-3 days ago  Symptoms include:  Headache, coughing up phelm, sore throat, ear pain , fatigue  Symptom intensity:  Moderate  Symptom progression:  Worsening  Had these symptoms before:  Yes  Has tried/received treatment for these symptoms:  Yes  Previous treatment was successful:  Yes  Prior treatment description:  Doxycycline  What makes it worse:  Don t know  What makes it better:  Getting help sooner vs later    He eats 2-3 servings of fruits and vegetables daily.He consumes 1 sweetened beverage(s) daily.He exercises with enough effort to increase his heart rate 30 to 60 minutes per day.  He exercises with enough effort to increase his heart rate 5 days per week.   He is taking medications regularly.       Patient Active Problem List   Diagnosis     Essential hypertension     Hypothyroidism     Type II diabetes mellitus with peripheral circulatory disorder (H)     BPH (benign prostatic hyperplasia)     ED (erectile dysfunction)     Mixed hyperlipidemia     Special screening for malignant neoplasm of prostate     Localized edema     Seasonal allergic rhinitis     Screening for prostate cancer     Residual hemorrhoidal skin tags     Excess skin of eyelid, unspecified laterality     Glaucoma of both eyes, unspecified glaucoma type     Flatulence, eructation, and gas pain     Weakness of voice     Anemia, unspecified type     Gastroesophageal reflux disease without esophagitis     Dependent edema     Memory deficits     Pain due to onychomycosis of nail     Oropharyngeal dysphagia     Current Outpatient Medications   Medication Sig Dispense Refill     blood glucose monitoring (ONE TOUCH ULTRA 2) meter device kit        carvedilol (COREG) 3.125 MG tablet TAKE 1 TABLET BY MOUTH TWO  TIMES DAILY 180 tablet 2     Ferrous Sulfate 324 (65 Fe) MG TBEC Take 1 tablet (324 mg) by mouth daily       Fexofenadine HCl (ALLEGRA PO) Take 180 mg by mouth daily        finasteride (PROSCAR) 5 MG  tablet Take 1 tablet (5 mg) by mouth daily 90 tablet 4     insulin glargine (LANTUS SOLOSTAR) 100 UNIT/ML pen        latanoprost (XALATAN) 0.005 % ophthalmic solution Place 1 drop into both eyes daily       levothyroxine (SYNTHROID/LEVOTHROID) 88 MCG tablet Take 88 mcg by mouth daily.       losartan (COZAAR) 100 MG tablet Take 1 tablet (100 mg) by mouth daily 90 tablet 2     lovastatin (MEVACOR) 40 MG tablet TAKE 1 TABLET BY MOUTH  DAILY AT BEDTIME 90 tablet 0     metFORMIN (GLUCOPHAGE) 500 MG tablet metformin 500 mg oral tablet take 1 tablet by oral route 3 times a day   Active       Multiple Vitamins-Minerals (CENTRUM SILVER) per tablet Take 1 tablet by mouth daily       ONETOUCH ULTRA test strip        sildenafil (VIAGRA) 100 MG tablet Take 1 tablet (100 mg) by mouth as needed (as needed) 12 tablet 11     tamsulosin (FLOMAX) 0.4 MG capsule Take 2 capsules (0.8 mg) by mouth daily 180 capsule 3          Review of Systems       No fever, chills, some nasal congestion, no significant rhinorrhea, and small postnasal drainage.  This is triggering cough with the thick mucus.  No sore throat, no shortness of breath, no chest pain.  Objective           Vitals:  No vitals were obtained today due to virtual visit.    Physical Exam   GENERAL: Healthy, alert and no distress  EYES: Eyes grossly normal to inspection.  No discharge or erythema, or obvious scleral/conjunctival abnormalities.  RESP: No audible wheeze, cough, or visible cyanosis.  No visible retractions or increased work of breathing.    SKIN: Visible skin clear. No significant rash, abnormal pigmentation or lesions.  NEURO: Cranial nerves grossly intact.  Mentation and speech appropriate for age.  PSYCH: Mentation appears normal, affect normal/bright, judgement and insight intact, normal speech and appearance well-groomed.                Video-Visit Details    Video Start Time: 2:06    Type of service:  Video Visit    Video End Time:w:15    Originating Location  (pt. Location): Home    Distant Location (provider location):  [facility]     Platform used for Video Visit: Darrin Levine

## 2022-07-25 ENCOUNTER — OFFICE VISIT (OUTPATIENT)
Dept: URGENT CARE | Facility: URGENT CARE | Age: 82
End: 2022-07-25
Payer: COMMERCIAL

## 2022-07-25 VITALS
OXYGEN SATURATION: 96 % | DIASTOLIC BLOOD PRESSURE: 72 MMHG | WEIGHT: 187 LBS | SYSTOLIC BLOOD PRESSURE: 145 MMHG | HEART RATE: 65 BPM | BODY MASS INDEX: 30.18 KG/M2 | RESPIRATION RATE: 16 BRPM | TEMPERATURE: 97.6 F

## 2022-07-25 DIAGNOSIS — R05.8 PRODUCTIVE COUGH: Primary | ICD-10-CM

## 2022-07-25 DIAGNOSIS — J01.90 ACUTE SINUSITIS WITH SYMPTOMS > 10 DAYS: ICD-10-CM

## 2022-07-25 PROCEDURE — 99213 OFFICE O/P EST LOW 20 MIN: CPT | Performed by: FAMILY MEDICINE

## 2022-07-25 RX ORDER — DOXYCYCLINE 100 MG/1
100 TABLET ORAL 2 TIMES DAILY
Qty: 14 TABLET | Refills: 0 | Status: SHIPPED | OUTPATIENT
Start: 2022-07-25 | End: 2022-08-01

## 2022-07-25 RX ORDER — METFORMIN HCL 500 MG
TABLET, EXTENDED RELEASE 24 HR ORAL
COMMUNITY
Start: 2022-05-31

## 2022-07-25 NOTE — PROGRESS NOTES
SUBJECTIVE: Timmy Strange is a 81 year old male presenting with a chief complaint of nasal congestion, cough  and facial pain/pressure.  Onset of symptoms was week(s) ago.  Course of illness is improving after omnicef.        Past Medical History:   Diagnosis Date     Arthritis 1970    Dx'd with RA when in the      BPH (benign prostatic hyperplasia) 12/16/2013     Cancer (H)     basal cell cancer behind ear     Diabetes mellitus      ED (erectile dysfunction) 1/6/2015     HTN (hypertension)      Hyperlipidemia LDL goal <100      Hypothyroidism      Mumps      Obesity      Allergies   Allergen Reactions     Penicillins Swelling     Ok with amoxicllin  Has tolerated Augmentin ok     Social History     Tobacco Use     Smoking status: Former Smoker     Packs/day: 0.00     Years: 0.00     Pack years: 0.00     Types: Pipe     Start date: 1/1/1960     Quit date: 11/15/2011     Years since quitting: 10.6     Smokeless tobacco: Never Used     Tobacco comment: Smoked pipes 10 minutes a day started 22 yrs old , quit 10 yrs back.   Substance Use Topics     Alcohol use: No     Alcohol/week: 0.0 standard drinks       ROS:  SKIN: no rash  GI: no vomiting    OBJECTIVE:  BP (!) 145/72   Pulse 65   Temp 97.6  F (36.4  C) (Tympanic)   Resp 16   Wt 84.8 kg (187 lb)   SpO2 96%   BMI 30.18 kg/m  GENERAL APPEARANCE: healthy, alert and no distress  EYES: EOMI,  PERRL, conjunctiva clear  HENT: TM's normal bilaterally, rhinorrhea yellow, oral mucous membranes moist, no erythema noted and maxillary sinus tenderness   RESP: lungs clear to auscultation - no rales, rhonchi or wheezes  SKIN: no suspicious lesions or rashes      ICD-10-CM    1. Productive cough  R05.8 doxycycline monohydrate (ADOXA) 100 MG tablet   2. Acute sinusitis with symptoms > 10 days  J01.90 doxycycline monohydrate (ADOXA) 100 MG tablet       Fluids/Rest, f/u if worse/not any better

## 2022-08-23 ENCOUNTER — MYC MEDICAL ADVICE (OUTPATIENT)
Dept: FAMILY MEDICINE | Facility: CLINIC | Age: 82
End: 2022-08-23

## 2022-08-23 DIAGNOSIS — K21.9 GASTROESOPHAGEAL REFLUX DISEASE, UNSPECIFIED WHETHER ESOPHAGITIS PRESENT: Primary | ICD-10-CM

## 2022-08-25 NOTE — TELEPHONE ENCOUNTER
Please see mychart from patient and advise on appropriate course of action.     Pt contacted regarding mychart message;   Pt reporting coughing is worse after eating.   Pt states he will call back to schedule an appointment with Dr. Sharma and triage symptoms, pt was driving at the time of the call.

## 2022-08-30 NOTE — TELEPHONE ENCOUNTER
Please just CALL for scheduling , my patients cannot respond [employer] messages though they view it.     Please call the patient and schedule  Virtual Visit for diabetes follow up which he is due at this time, to further take care of his medical conditions and medications. ( Please double book at same day slots on my schedule depending on pt availability if there is no opening on my schedule for next 2 weeks  )     Thank you,  Michaela Sharma MD on 8/29/2022 at 7:20 PM

## 2022-09-06 ENCOUNTER — LAB (OUTPATIENT)
Dept: LAB | Facility: CLINIC | Age: 82
End: 2022-09-06
Payer: COMMERCIAL

## 2022-09-06 DIAGNOSIS — Z13.220 SCREENING FOR HYPERLIPIDEMIA: ICD-10-CM

## 2022-09-06 DIAGNOSIS — D64.9 ANEMIA, UNSPECIFIED TYPE: ICD-10-CM

## 2022-09-06 DIAGNOSIS — E78.2 MIXED HYPERLIPIDEMIA: ICD-10-CM

## 2022-09-06 DIAGNOSIS — D72.819 LEUKOPENIA, UNSPECIFIED TYPE: ICD-10-CM

## 2022-09-06 LAB
BASOPHILS # BLD AUTO: 0 10E3/UL (ref 0–0.2)
BASOPHILS NFR BLD AUTO: 0 %
EOSINOPHIL # BLD AUTO: 0.1 10E3/UL (ref 0–0.7)
EOSINOPHIL NFR BLD AUTO: 2 %
ERYTHROCYTE [DISTWIDTH] IN BLOOD BY AUTOMATED COUNT: 13.9 % (ref 10–15)
HCT VFR BLD AUTO: 35.7 % (ref 40–53)
HGB BLD-MCNC: 11.7 G/DL (ref 13.3–17.7)
IRON SATN MFR SERPL: 18 % (ref 15–46)
IRON SERPL-MCNC: 55 UG/DL (ref 35–180)
LYMPHOCYTES # BLD AUTO: 1.4 10E3/UL (ref 0.8–5.3)
LYMPHOCYTES NFR BLD AUTO: 31 %
MCH RBC QN AUTO: 30.5 PG (ref 26.5–33)
MCHC RBC AUTO-ENTMCNC: 32.8 G/DL (ref 31.5–36.5)
MCV RBC AUTO: 93 FL (ref 78–100)
MONOCYTES # BLD AUTO: 0.6 10E3/UL (ref 0–1.3)
MONOCYTES NFR BLD AUTO: 13 %
NEUTROPHILS # BLD AUTO: 2.5 10E3/UL (ref 1.6–8.3)
NEUTROPHILS NFR BLD AUTO: 54 %
PLATELET # BLD AUTO: 194 10E3/UL (ref 150–450)
RBC # BLD AUTO: 3.84 10E6/UL (ref 4.4–5.9)
TIBC SERPL-MCNC: 309 UG/DL (ref 240–430)
WBC # BLD AUTO: 4.6 10E3/UL (ref 4–11)

## 2022-09-06 PROCEDURE — 36415 COLL VENOUS BLD VENIPUNCTURE: CPT

## 2022-09-06 PROCEDURE — 82728 ASSAY OF FERRITIN: CPT

## 2022-09-06 PROCEDURE — 80061 LIPID PANEL: CPT

## 2022-09-06 PROCEDURE — 85025 COMPLETE CBC W/AUTO DIFF WBC: CPT

## 2022-09-06 PROCEDURE — 83550 IRON BINDING TEST: CPT

## 2022-09-06 NOTE — RESULT ENCOUNTER NOTE
Dear Mr. Strange,    Anemia is stable with hemoglobin of 11.7.    Please, call me with any questions.    Chandrakant Panchal MD

## 2022-09-07 ENCOUNTER — TELEPHONE (OUTPATIENT)
Dept: FAMILY MEDICINE | Facility: CLINIC | Age: 82
End: 2022-09-07

## 2022-09-07 LAB
CHOLEST SERPL-MCNC: 128 MG/DL
FASTING STATUS PATIENT QL REPORTED: YES
FERRITIN SERPL-MCNC: 31 NG/ML (ref 26–388)
HDLC SERPL-MCNC: 43 MG/DL
LDLC SERPL CALC-MCNC: 70 MG/DL
NONHDLC SERPL-MCNC: 85 MG/DL
TRIGL SERPL-MCNC: 74 MG/DL

## 2022-09-07 NOTE — TELEPHONE ENCOUNTER
----- Message from Michaela Sharma MD sent at 9/7/2022 12:46 PM CDT -----  Labs ordered before appointment as requested by the pt. Will discuss in upcoming OV.     Thank you,   Michaela Sharma MD on 9/7/2022 at 12:45 PM

## 2022-09-07 NOTE — TELEPHONE ENCOUNTER
Patient Contact     S/w pt and relayed message from Dr. Sharma, see below. He stated his understanding.     Brit CHACKO RN  [facility]

## 2022-09-12 ENCOUNTER — VIRTUAL VISIT (OUTPATIENT)
Dept: ONCOLOGY | Facility: CLINIC | Age: 82
End: 2022-09-12
Attending: INTERNAL MEDICINE
Payer: COMMERCIAL

## 2022-09-12 DIAGNOSIS — D64.9 ANEMIA, UNSPECIFIED TYPE: Primary | ICD-10-CM

## 2022-09-12 PROCEDURE — 99212 OFFICE O/P EST SF 10 MIN: CPT | Mod: 95 | Performed by: INTERNAL MEDICINE

## 2022-09-12 NOTE — LETTER
9/12/2022         RE: Timmy Strange  [address] [address]        Dear Colleague,    Thank you for referring your patient, Timmy Strange, to the [facility] [place]. Please see a copy of my visit note below.    Timmy is a 81 year old who is being evaluated via a billable video visit.    Patient stated he is in the state of [place] for the visit today.    How would you like to obtain your AVS? MyChart  If the video visit is dropped, the invitation should be resent by: Text to cell phone: [phone]  Will anyone else be joining your video visit? No            HEMATOLOGY HISTORY: Mr. Strange is a gentleman with chronic normocytic anemia due to anemia of chronic disease and iron deficiency.    1.  On 11/07/2017, hemoglobin of 13.3.  -On 10/30/2018, hemoglobin of 12.5.  -On 06/16/2020, hemoglobin of 12.3.  -On 02/22/2021, WBC of 4.3, hemoglobin of 10.4, platelet of 226 and MCV of 90.  2.  On 11/09/2020:    -CMP normal except mildly low protein.  -Hemoglobin A1c normal at 5.5.  3.  On 02/01/2021, colonoscopy revealed colon polyps and diverticulosis.  It was a tubular adenoma.  4.  On 03/16/2021, EGD was essentially normal.  Biopsy did not reveal any H. Pylori.  5. On 05/11/2021:  -Iron of 13, saturation of 6% and ferritin of 69.  -Normal folate.  -Normal vitamin B12.  -Normal TSH.  6. Patient received 1 dose of IV Venofer on 05/11/2021.     SUBJECTIVE:  Mr. Strange is a 81-year-old gentleman with chronic normocytic anemia due to chronic disease and iron deficiency.  He is on oral ferrous sulfate once a day. He is tolerating well.     REVIEW OF SYSTEMS:   Overall his condition is stable. He has mild fatigue, which would go along with his age.  No headache.  No dizziness.  No chest pain or shortness of breath. Appetite is fairly good.  No bleeding.  All other review of systems is negative.     PHYSICAL EXAMINATION:    He is alert, oriented x 3.   Rest of the systems not examined as  this is a video visit.     LABORATORY:  CBC and iron studies reviewed.     ASSESSMENT:    1.  A 81-year-old gentleman with normocytic anemia. Anemia is stable.  2.  Iron deficiency, resolved.     PLAN:  1.  Patient's overall condition is stable.  Labs were reviewed with him.  His anemia is stable with hemoglobin of 11.7.  Iron and ferritin are normal.    Patient is on oral ferrous sulfate once a day.  He is tolerating it well.  He will continue on it.    2.  Advised him to have CBC checked every 6 months.  I will see him in 1 year time.  Advised him to call us with any questions or concerns.    Video visit time of 10 minutes.  Time is spent in today's visit, review of chart/investigations today and documentation.      Again, thank you for allowing me to participate in the care of your patient.        Sincerely,        Chandrakant Panchal MD

## 2022-09-12 NOTE — NURSING NOTE
Patient verified medications and allergies are correct via eCheck-in. Patient confirms no changes at this time and/or since last reviewed by clinic staff.    Kathryn Yeboah, Virtual Facilitator

## 2022-09-12 NOTE — LETTER
9/12/2022         RE: Timmy Strange  [address] [address]        Dear Colleague,    Thank you for referring your patient, Timmy Strange, to the [facility] [place]. Please see a copy of my visit note below.    Timmy is a 81 year old who is being evaluated via a billable video visit.    Patient stated he is in the state of [place] for the visit today.    How would you like to obtain your AVS? MyChart  If the video visit is dropped, the invitation should be resent by: Text to cell phone: [phone]  Will anyone else be joining your video visit? No            HEMATOLOGY HISTORY: Mr. Strange is a gentleman with chronic normocytic anemia due to anemia of chronic disease and iron deficiency.    1.  On 11/07/2017, hemoglobin of 13.3.  -On 10/30/2018, hemoglobin of 12.5.  -On 06/16/2020, hemoglobin of 12.3.  -On 02/22/2021, WBC of 4.3, hemoglobin of 10.4, platelet of 226 and MCV of 90.  2.  On 11/09/2020:    -CMP normal except mildly low protein.  -Hemoglobin A1c normal at 5.5.  3.  On 02/01/2021, colonoscopy revealed colon polyps and diverticulosis.  It was a tubular adenoma.  4.  On 03/16/2021, EGD was essentially normal.  Biopsy did not reveal any H. Pylori.  5. On 05/11/2021:  -Iron of 13, saturation of 6% and ferritin of 69.  -Normal folate.  -Normal vitamin B12.  -Normal TSH.  6. Patient received 1 dose of IV Venofer on 05/11/2021.     SUBJECTIVE:  Mr. Strange is a 81-year-old gentleman with chronic normocytic anemia due to chronic disease and iron deficiency.  He is on oral ferrous sulfate once a day. He is tolerating well.     REVIEW OF SYSTEMS:   Overall his condition is stable. He has mild fatigue, which would go along with his age.  No headache.  No dizziness.  No chest pain or shortness of breath. Appetite is fairly good.  No bleeding.  All other review of systems is negative.     PHYSICAL EXAMINATION:    He is alert, oriented x 3.   Rest of the systems not examined as  this is a video visit.     LABORATORY:  CBC and iron studies reviewed.     ASSESSMENT:    1.  A 81-year-old gentleman with normocytic anemia. Anemia is stable.  2.  Iron deficiency, resolved.     PLAN:  1.  Patient's overall condition is stable.  Labs were reviewed with him.  His anemia is stable with hemoglobin of 11.7.  Iron and ferritin are normal.    Patient is on oral ferrous sulfate once a day.  He is tolerating it well.  He will continue on it.    2.  Advised him to have CBC checked every 6 months.  I will see him in 1 year time.  Advised him to call us with any questions or concerns.    Video visit time of 10 minutes.  Time is spent in today's visit, review of chart/investigations today and documentation.      Again, thank you for allowing me to participate in the care of your patient.        Sincerely,        Chandrakant Panchal MD

## 2022-09-12 NOTE — PROGRESS NOTES
Timmy is a 81 year old who is being evaluated via a billable video visit.    Patient stated he is in the state of [place] for the visit today.    How would you like to obtain your AVS? MyChart  If the video visit is dropped, the invitation should be resent by: Text to cell phone: [phone]  Will anyone else be joining your video visit? No

## 2022-09-14 NOTE — PROGRESS NOTES
HEMATOLOGY HISTORY: Mr. Strange is a gentleman with chronic normocytic anemia due to anemia of chronic disease and iron deficiency.    1.  On 11/07/2017, hemoglobin of 13.3.  -On 10/30/2018, hemoglobin of 12.5.  -On 06/16/2020, hemoglobin of 12.3.  -On 02/22/2021, WBC of 4.3, hemoglobin of 10.4, platelet of 226 and MCV of 90.  2.  On 11/09/2020:    -CMP normal except mildly low protein.  -Hemoglobin A1c normal at 5.5.  3.  On 02/01/2021, colonoscopy revealed colon polyps and diverticulosis.  It was a tubular adenoma.  4.  On 03/16/2021, EGD was essentially normal.  Biopsy did not reveal any H. Pylori.  5. On 05/11/2021:  -Iron of 13, saturation of 6% and ferritin of 69.  -Normal folate.  -Normal vitamin B12.  -Normal TSH.  6. Patient received 1 dose of IV Venofer on 05/11/2021.     SUBJECTIVE:  Mr. Strange is a 81-year-old gentleman with chronic normocytic anemia due to chronic disease and iron deficiency.  He is on oral ferrous sulfate once a day. He is tolerating well.     REVIEW OF SYSTEMS:   Overall his condition is stable. He has mild fatigue, which would go along with his age.  No headache.  No dizziness.  No chest pain or shortness of breath. Appetite is fairly good.  No bleeding.  All other review of systems is negative.     PHYSICAL EXAMINATION:    He is alert, oriented x 3.   Rest of the systems not examined as this is a video visit.     LABORATORY:  CBC and iron studies reviewed.     ASSESSMENT:    1.  A 81-year-old gentleman with normocytic anemia. Anemia is stable.  2.  Iron deficiency, resolved.     PLAN:  1.  Patient's overall condition is stable.  Labs were reviewed with him.  His anemia is stable with hemoglobin of 11.7.  Iron and ferritin are normal.    Patient is on oral ferrous sulfate once a day.  He is tolerating it well.  He will continue on it.    2.  Advised him to have CBC checked every 6 months.  I will see him in 1 year time.  Advised him to call us with any questions or  concerns.    Video visit time of 10 minutes.  Time is spent in today's visit, review of chart/investigations today and documentation.

## 2022-09-21 ENCOUNTER — OFFICE VISIT (OUTPATIENT)
Dept: FAMILY MEDICINE | Facility: CLINIC | Age: 82
End: 2022-09-21
Payer: COMMERCIAL

## 2022-09-21 VITALS
TEMPERATURE: 98.3 F | WEIGHT: 189 LBS | SYSTOLIC BLOOD PRESSURE: 132 MMHG | OXYGEN SATURATION: 99 % | RESPIRATION RATE: 16 BRPM | BODY MASS INDEX: 30.51 KG/M2 | DIASTOLIC BLOOD PRESSURE: 60 MMHG | HEART RATE: 60 BPM

## 2022-09-21 DIAGNOSIS — K62.5 RECTAL HEMORRHAGE: ICD-10-CM

## 2022-09-21 DIAGNOSIS — R41.3 MEMORY DEFICITS: ICD-10-CM

## 2022-09-21 DIAGNOSIS — K21.9 GASTROESOPHAGEAL REFLUX DISEASE WITHOUT ESOPHAGITIS: Primary | ICD-10-CM

## 2022-09-21 DIAGNOSIS — D64.9 CHRONIC ANEMIA: ICD-10-CM

## 2022-09-21 DIAGNOSIS — Z23 NEED FOR PROPHYLACTIC VACCINATION AND INOCULATION AGAINST INFLUENZA: ICD-10-CM

## 2022-09-21 DIAGNOSIS — E11.51 TYPE II DIABETES MELLITUS WITH PERIPHERAL CIRCULATORY DISORDER (H): ICD-10-CM

## 2022-09-21 DIAGNOSIS — E83.51 HYPOCALCEMIA: ICD-10-CM

## 2022-09-21 PROBLEM — R19.4 CHANGE IN BOWEL HABITS: Status: ACTIVE | Noted: 2022-02-21

## 2022-09-21 LAB
ALBUMIN SERPL-MCNC: 3.7 G/DL (ref 3.4–5)
ALP SERPL-CCNC: 61 U/L (ref 40–150)
ALT SERPL W P-5'-P-CCNC: 34 U/L (ref 0–70)
ANION GAP SERPL CALCULATED.3IONS-SCNC: 6 MMOL/L (ref 3–14)
AST SERPL W P-5'-P-CCNC: 18 U/L (ref 0–45)
BILIRUB SERPL-MCNC: 0.8 MG/DL (ref 0.2–1.3)
BUN SERPL-MCNC: 23 MG/DL (ref 7–30)
CALCIUM SERPL-MCNC: 8.6 MG/DL (ref 8.5–10.1)
CHLORIDE BLD-SCNC: 108 MMOL/L (ref 94–109)
CO2 SERPL-SCNC: 28 MMOL/L (ref 20–32)
CREAT SERPL-MCNC: 0.8 MG/DL (ref 0.66–1.25)
GFR SERPL CREATININE-BSD FRML MDRD: 90 ML/MIN/1.73M2
GLUCOSE BLD-MCNC: 105 MG/DL (ref 70–99)
POTASSIUM BLD-SCNC: 4.7 MMOL/L (ref 3.4–5.3)
PROT SERPL-MCNC: 6.3 G/DL (ref 6.8–8.8)
SODIUM SERPL-SCNC: 142 MMOL/L (ref 133–144)
TSH SERPL DL<=0.005 MIU/L-ACNC: 1.21 MU/L (ref 0.4–4)
VIT B12 SERPL-MCNC: 335 PG/ML (ref 232–1245)

## 2022-09-21 PROCEDURE — 84443 ASSAY THYROID STIM HORMONE: CPT | Performed by: INTERNAL MEDICINE

## 2022-09-21 PROCEDURE — 80053 COMPREHEN METABOLIC PANEL: CPT | Performed by: INTERNAL MEDICINE

## 2022-09-21 PROCEDURE — 99215 OFFICE O/P EST HI 40 MIN: CPT | Mod: 25 | Performed by: INTERNAL MEDICINE

## 2022-09-21 PROCEDURE — 82607 VITAMIN B-12: CPT | Performed by: INTERNAL MEDICINE

## 2022-09-21 PROCEDURE — 90662 IIV NO PRSV INCREASED AG IM: CPT | Performed by: INTERNAL MEDICINE

## 2022-09-21 PROCEDURE — 36415 COLL VENOUS BLD VENIPUNCTURE: CPT | Performed by: INTERNAL MEDICINE

## 2022-09-21 PROCEDURE — G0008 ADMIN INFLUENZA VIRUS VAC: HCPCS | Performed by: INTERNAL MEDICINE

## 2022-09-21 RX ORDER — COVID-19 MOLECULAR TEST ASSAY
KIT MISCELLANEOUS
COMMUNITY
Start: 2022-08-04 | End: 2023-06-07

## 2022-09-21 RX ORDER — FAMOTIDINE 40 MG/1
40 TABLET, FILM COATED ORAL 2 TIMES DAILY
Qty: 60 TABLET | Refills: 1 | Status: SHIPPED | OUTPATIENT
Start: 2022-09-21 | End: 2022-10-10

## 2022-09-21 ASSESSMENT — PAIN SCALES - GENERAL: PAINLEVEL: NO PAIN (0)

## 2022-09-21 NOTE — PROGRESS NOTES
Assessment and Plan  1. Gastroesophageal reflux disease without esophagitis  Chronic problem, off PPI due to side effect of diarrhea. Will given Pepcid for improvement.   - Comprehensive metabolic panel (BMP + Alb, Alk Phos, ALT, AST, Total. Bili, TP); Future  - Comprehensive metabolic panel (BMP + Alb, Alk Phos, ALT, AST, Total. Bili, TP)  - famotidine (PEPCID) 40 MG tablet; Take 1 tablet (40 mg) by mouth 2 times daily  Dispense: 60 tablet; Refill: 1    2. Rectal hemorrhage  Secondary to hemorrhoids, S/P banding of it in the past - does have upcoming appointment with Colorectal for banding again at this time.     3. Type II diabetes mellitus with peripheral circulatory disorder (H)  Chronic stable, Fasting BG at 84. [facility] at [facility]9 % . Will get update records from them and no need of recheck at this time as per shared decision.     4. Memory deficits  Last Neurology note at [place] [facility] with  does show MCI , with MRI showing age related changes. Psychometric testing results are normal.    - TSH with free T4 reflex; Future  - Vitamin B12; Future  - TSH with free T4 reflex  - Vitamin B12    5. Need for prophylactic vaccination and inoculation against influenza  - INFLUENZA, QUAD, HIGH DOSE, PF, 65YR + (FLUZONE HD)    6. Hypocalcemia  - Comprehensive metabolic panel (BMP + Alb, Alk Phos, ALT, AST, Total. Bili, TP); Future  - Comprehensive metabolic panel (BMP + Alb, Alk Phos, ALT, AST, Total. Bili, TP)    7. Chronic anemia  Mildly worsened , but at baseline. Recent Oncology note on 9/12/22 reviewed with monitoring of his chronic anemia which was slowly worsening at HBG 10.4 in 2/2021. Last COlonoscpy at that time was showing tubular adenoma S/P IV venofer in 5/2021. EGD normal in 3/2021. Please follow up with Oncology recommendations and labs as planned by them.     Over 40 minutes spent on reviewing patient chart,  face to face encounter,  greater than 50% time spent with plan/cordination of care and documentation as above in my A/P.              Patient Instructions   As discussed , since your A1C is checked at your [place] [facility] of end[facility]    Will add Thyroid function , B12 levels for your memory issues.     Given your GERD and holding off on PPI for diarrhea side effect sent in Famotidine for improvement.     ==========================        Return in about 6 months (around 3/21/2023), or if symptoms worsen or fail to improve, for Follow up of last visit, If symptoms persist.    Michaela Sharma MD  [facility] LENARD Warner is a 81 year old, presenting for the following health issues:  Diabetes and Imm/Inj (Flu Shot)      History of Present Illness       Diabetes:   He presents for follow up of diabetes.  He is checking home blood glucose two times daily. He checks blood glucose before meals and at bedtime.  Blood glucose is sometimes over 200 and sometimes under 70. When his blood glucose is low, the patient is asymptomatic for confusion, blurred vision, lethargy and reports not feeling dizzy, shaky, or weak.  He has no concerns regarding his diabetes at this time.  He is not experiencing numbness or burning in feet, excessive thirst, blurry vision, weight changes or redness, sores or blisters on feet.         Reason for visit:  GERD    He eats 0-1 servings of fruits and vegetables daily.He consumes 0 sweetened beverage(s) daily.He exercises with enough effort to increase his heart rate 20 to 29 minutes per day.  He exercises with enough effort to increase his heart rate 5 days per week.   He is taking medications regularly.     Last seen pt in 12/2021 for AWV , follows ENdocrinology for diabetes on Insulin, Metformin.  Does have hemorrhoids which is causing rectal bleeding . DOes have upcoming visit for Colorectal.      No Active Allergies     Past Medical History:   Diagnosis Date     Arthritis 1970    Dx'd  with RA when in the      BPH (benign prostatic hyperplasia) 12/16/2013     Cancer (H)     basal cell cancer behind ear     Diabetes mellitus      ED (erectile dysfunction) 1/6/2015     HTN (hypertension)      Hyperlipidemia LDL goal <100      Hypothyroidism      Mumps      Obesity        Past Surgical History:   Procedure Laterality Date     BIOPSY       COLONOSCOPY N/A 11/21/2014    Procedure: COMBINED COLONOSCOPY, SINGLE OR MULTIPLE BIOPSY/POLYPECTOMY BY BIOPSY;  Surgeon: Rolanda Bey MD;  Location:  GI     COLONOSCOPY N/A 1/20/2020    Procedure: COLONOSCOPY, WITH POLYPECTOMY AND BIOPSY;  Surgeon: Christina Vieira MD;  Location:  GI     COLONOSCOPY N/A 2/1/2021    Procedure: Colonoscopy, With Polypectomy And Biopsy;  Surgeon: Christina Vieira MD;  Location:  GI     ESOPHAGOSCOPY, GASTROSCOPY, DUODENOSCOPY (EGD), COMBINED N/A 3/16/2021    Procedure: ESOPHAGOGASTRODUODENOSCOPY, WITH BIOPSY;  Surgeon: Jose Schrader MD;  Location:  GI     EYE SURGERY       TESTICLE SURGERY       TONSILLECTOMY, ADENOIDECTOMY, COMBINED       VASECTOMY         Family History   Problem Relation Age of Onset     Diabetes Maternal Grandmother      Diabetes Maternal Grandfather      Arthritis Maternal Grandfather      Arthritis Father      Thyroid Disease Father      Glaucoma Mother      Alcohol/Drug Mother 49        cirrhosis     Diabetes Daughter 44        type 2     Obesity Daughter      Glaucoma Maternal Aunt        Social History     Tobacco Use     Smoking status: Former Smoker     Packs/day: 0.00     Years: 0.00     Pack years: 0.00     Types: Pipe     Start date: 1/1/1960     Quit date: 11/15/2011     Years since quitting: 10.8     Smokeless tobacco: Never Used     Tobacco comment: Smoked pipes 10 minutes a day started 22 yrs old , quit 10 yrs back.   Substance Use Topics     Alcohol use: No     Alcohol/week: 0.0 standard drinks        Current Outpatient Medications   Medication     blood glucose  monitoring (ONE TOUCH ULTRA 2) meter device kit     carvedilol (COREG) 3.125 MG tablet     famotidine (PEPCID) 40 MG tablet     Ferrous Sulfate 324 (65 Fe) MG TBEC     Fexofenadine HCl (ALLEGRA PO)     finasteride (PROSCAR) 5 MG tablet     insulin glargine (LANTUS SOLOSTAR) 100 UNIT/ML pen     latanoprost (XALATAN) 0.005 % ophthalmic solution     levothyroxine (SYNTHROID/LEVOTHROID) 88 MCG tablet     losartan (COZAAR) 100 MG tablet     lovastatin (MEVACOR) 40 MG tablet     metFORMIN (GLUCOPHAGE XR) 500 MG 24 hr tablet     Multiple Vitamins-Minerals (CENTRUM SILVER) per tablet     ONETOUCH ULTRA test strip     sildenafil (VIAGRA) 100 MG tablet     tamsulosin (FLOMAX) 0.4 MG capsule     BINAXNOW COVID-19 AG HOME TEST KIT     omeprazole (PRILOSEC) 20 MG DR capsule     No current facility-administered medications for this visit.        Review of Systems   Constitutional, HEENT, cardiovascular, pulmonary, GI, , musculoskeletal, neuro, skin, endocrine and psych systems are negative, except as otherwise noted.      Objective    /60   Pulse 60   Temp 98.3  F (36.8  C) (Tympanic)   Resp 16   Wt 85.7 kg (189 lb)   SpO2 99%   BMI 30.51 kg/m    Body mass index is 30.51 kg/m .  Physical Exam   GENERAL: healthy, alert and no distress  NECK: no adenopathy, no asymmetry, masses, or scars and thyroid normal to palpation  RESP: lungs clear to auscultation - no rales, rhonchi or wheezes  CV: regular rate and rhythm, normal S1 S2, no S3 or S4, no murmur, click or rub, no peripheral edema and peripheral pulses strong  ABDOMEN: soft, nontender, no hepatosplenomegaly, no masses and bowel sounds normal  MS: no gross musculoskeletal defects noted, no edema    Labs and imaging reviewed and discussed with the patient.

## 2022-09-21 NOTE — PATIENT INSTRUCTIONS
As discussed , since your A1C is checked at your [place] [facility]    Will add Thyroid function , B12 levels for your memory issues.     Given your GERD and holding off on PPI for diarrhea side effect sent in Famotidine for improvement.     ==========================

## 2022-09-23 ENCOUNTER — MYC MEDICAL ADVICE (OUTPATIENT)
Dept: FAMILY MEDICINE | Facility: CLINIC | Age: 82
End: 2022-09-23

## 2022-10-04 ENCOUNTER — MYC MEDICAL ADVICE (OUTPATIENT)
Dept: FAMILY MEDICINE | Facility: CLINIC | Age: 82
End: 2022-10-04

## 2022-10-04 DIAGNOSIS — K21.9 GASTROESOPHAGEAL REFLUX DISEASE WITHOUT ESOPHAGITIS: Primary | ICD-10-CM

## 2022-10-06 NOTE — TELEPHONE ENCOUNTER
"I did not understand what he means by \" BOTH \" . So what other medications he is referring other than Prozac ?     Thank you,  Michaela Sharma MD on 10/6/2022   "

## 2022-10-10 NOTE — TELEPHONE ENCOUNTER
Please see [employer] message and advise.     Patient is wondering if famotidine bid be used with fluoxetine.     Radha Coreas RN  [facility] Triage Team

## 2022-10-14 DIAGNOSIS — I10 ESSENTIAL HYPERTENSION, BENIGN: ICD-10-CM

## 2022-10-16 DIAGNOSIS — E78.5 HYPERLIPIDEMIA LDL GOAL <70: ICD-10-CM

## 2022-10-17 RX ORDER — LOSARTAN POTASSIUM 100 MG/1
TABLET ORAL
Qty: 100 TABLET | Refills: 2 | Status: SHIPPED | OUTPATIENT
Start: 2022-10-17 | End: 2023-07-18

## 2022-10-19 ENCOUNTER — MYC REFILL (OUTPATIENT)
Dept: FAMILY MEDICINE | Facility: CLINIC | Age: 82
End: 2022-10-19

## 2022-10-19 DIAGNOSIS — E78.5 HYPERLIPIDEMIA LDL GOAL <70: ICD-10-CM

## 2022-10-19 NOTE — TELEPHONE ENCOUNTER
Routing refill request to provider for review/approval because:  A break in medication. Medication last filled December 2021 for 90 day supply.     Roselia Miles RN

## 2022-10-20 RX ORDER — LOVASTATIN 40 MG
TABLET ORAL
Qty: 90 TABLET | Refills: 3 | Status: SHIPPED | OUTPATIENT
Start: 2022-10-20 | End: 2023-09-22

## 2022-10-21 RX ORDER — LOVASTATIN 40 MG
40 TABLET ORAL AT BEDTIME
Qty: 90 TABLET | Refills: 0 | OUTPATIENT
Start: 2022-10-21

## 2022-10-21 NOTE — TELEPHONE ENCOUNTER
This refill request is a duplicate request, previously received or sent.  Sent denial notification to pharmacy.  Radha Coreas RN  [facility] Triage Team

## 2022-11-01 ENCOUNTER — TRANSFERRED RECORDS (OUTPATIENT)
Dept: FAMILY MEDICINE | Facility: CLINIC | Age: 82
End: 2022-11-01

## 2022-11-01 ENCOUNTER — OFFICE VISIT (OUTPATIENT)
Dept: FAMILY MEDICINE | Facility: CLINIC | Age: 82
End: 2022-11-01
Payer: COMMERCIAL

## 2022-11-01 VITALS
SYSTOLIC BLOOD PRESSURE: 126 MMHG | RESPIRATION RATE: 16 BRPM | TEMPERATURE: 98.6 F | BODY MASS INDEX: 30.09 KG/M2 | WEIGHT: 186.4 LBS | HEART RATE: 70 BPM | OXYGEN SATURATION: 97 % | DIASTOLIC BLOOD PRESSURE: 58 MMHG

## 2022-11-01 DIAGNOSIS — Z02.89 ENCOUNTER FOR COMPLETION OF FORM WITH PATIENT: ICD-10-CM

## 2022-11-01 DIAGNOSIS — E11.51 TYPE II DIABETES MELLITUS WITH PERIPHERAL CIRCULATORY DISORDER (H): Primary | ICD-10-CM

## 2022-11-01 DIAGNOSIS — E78.2 MIXED HYPERLIPIDEMIA: ICD-10-CM

## 2022-11-01 DIAGNOSIS — K62.5 RECTAL HEMORRHAGE: ICD-10-CM

## 2022-11-01 DIAGNOSIS — K21.9 GASTROESOPHAGEAL REFLUX DISEASE WITHOUT ESOPHAGITIS: ICD-10-CM

## 2022-11-01 DIAGNOSIS — D64.9 ANEMIA, UNSPECIFIED TYPE: ICD-10-CM

## 2022-11-01 DIAGNOSIS — K64.8 INTERNAL HEMORRHOIDS: ICD-10-CM

## 2022-11-01 LAB
HBA1C MFR BLD: 5.8 % (ref 0–5.6)
HGB BLD-MCNC: 11 G/DL (ref 13.3–17.7)

## 2022-11-01 PROCEDURE — 36415 COLL VENOUS BLD VENIPUNCTURE: CPT | Performed by: INTERNAL MEDICINE

## 2022-11-01 PROCEDURE — 83036 HEMOGLOBIN GLYCOSYLATED A1C: CPT | Performed by: INTERNAL MEDICINE

## 2022-11-01 PROCEDURE — 85018 HEMOGLOBIN: CPT | Performed by: INTERNAL MEDICINE

## 2022-11-01 PROCEDURE — 99215 OFFICE O/P EST HI 40 MIN: CPT | Performed by: INTERNAL MEDICINE

## 2022-11-01 RX ORDER — FLUOXETINE 10 MG/1
10 CAPSULE ORAL
COMMUNITY
Start: 2022-09-27 | End: 2023-03-15

## 2022-11-01 RX ORDER — CEFDINIR 300 MG/1
CAPSULE ORAL
COMMUNITY
Start: 2022-07-07 | End: 2023-02-14

## 2022-11-01 NOTE — PATIENT INSTRUCTIONS
As discussed , please keep up your visit with Colorectal specialist. I have placed orders for labs due at this time including A1C and Hemoglobin levels given your rectal bleeding.     =================

## 2022-11-01 NOTE — PROGRESS NOTES
Assessment and Plan  1. Type II diabetes mellitus with peripheral circulatory disorder (H)  Stable, last check in 12/2021 at 5.8% . Pt follows Endocrinology  who manages his current Lantus and Metformin.   - Hemoglobin A1c; Future  - Hemoglobin A1c    2. Mixed hyperlipidemia  Stable, Continue current Lovastatin at 40 mg daily.     3. Internal hemorrhoids  4. Rectal hemorrhage  5. Anemia, unspecified type  Chronic problem, Uncontrolled. Pt has been having on and off Lower GIB which he endorses as atleast a cup ful with BMs, Will check his HBG and emphasized to keep up the upcoming Colorectal appointment for banding on 11/3/22. Will need to get [facility].   - Hemoglobin; Future  - Hemoglobin    6. Gastroesophageal reflux disease without esophagitis  Chronic , stable. No symptoms at this time except lower GIB which I have also emphasized for need of closely monitoring to make sure no Upper GI work up needed with GI after his banding. Concerns of pt not being on any PPI or H2 blockers which he is supposed to be. Pt was started on Prozac by neurology which interacts with Pepcid and patient unable to tolerate PPI due to diarrhea as side effect for which he has not been on any of them.     7. Encounter for completion of form with patient  Filled in day care form with the patient and faxed to the concerned facility , also sent in for scanning in patient chart.     Over 40 minutes spent on reviewing patient chart,  face to face encounter, greater than 50% time spent with plan/cordination of care and documentation as above in my A/P.        Patient Instructions   As discussed , please keep up your visit with Colorectal specialist. I have placed orders for labs due at this time including A1C and Hemoglobin levels given your rectal bleeding.     =================        Return in about 2 months (around 1/1/2023), or if symptoms worsen or fail to improve, for Annual Wellness  Exam.    Michaela Sharma MD  [facility] LENARD Warner is a 82 year old, presenting for the following health issues:  Forms (Adult Day Care)      History of Present Illness       Reason for visit:  Adult day care/future pre-op    He eats 2-3 servings of fruits and vegetables daily.He consumes 0 sweetened beverage(s) daily.He exercises with enough effort to increase his heart rate 20 to 29 minutes per day.  He exercises with enough effort to increase his heart rate 4 days per week.   He is taking medications regularly.       Last seen pt on 9/2022 for follow up of GERD with Pepcid given which he is concerned on drug interaction .and changed to Omeprazole. He will be coming in for Preop clearance of Blepharoplasty but needs Labs of A1C  CBC , EKG since he is on Insulin for diabetes. ( COVID test only by surgeon ) . Also requesting Day care form.     Pt following [facility] for Banding of internal hemorrhoids.      No Known Allergies     Past Medical History:   Diagnosis Date     Arthritis 1970    Dx'd with RA when in the      BPH (benign prostatic hyperplasia) 12/16/2013     Cancer (H)     basal cell cancer behind ear     Diabetes mellitus      ED (erectile dysfunction) 1/6/2015     HTN (hypertension)      Hyperlipidemia LDL goal <100      Hypothyroidism      Mumps      Obesity        Past Surgical History:   Procedure Laterality Date     BIOPSY       COLONOSCOPY N/A 11/21/2014    Procedure: COMBINED COLONOSCOPY, SINGLE OR MULTIPLE BIOPSY/POLYPECTOMY BY BIOPSY;  Surgeon: Rolanda Bey MD;  Location:  GI     COLONOSCOPY N/A 1/20/2020    Procedure: COLONOSCOPY, WITH POLYPECTOMY AND BIOPSY;  Surgeon: Christina Vieira MD;  Location:  GI     COLONOSCOPY N/A 2/1/2021    Procedure: Colonoscopy, With Polypectomy And Biopsy;  Surgeon: Christina Vieira MD;  Location: [facility]     ESOPHAGOSCOPY, GASTROSCOPY, DUODENOSCOPY (EGD), COMBINED N/A  3/16/2021    Procedure: ESOPHAGOGASTRODUODENOSCOPY, WITH BIOPSY;  Surgeon: Jose Schrader MD;  Location:  GI     EYE SURGERY       TESTICLE SURGERY       TONSILLECTOMY, ADENOIDECTOMY, COMBINED       VASECTOMY         Family History   Problem Relation Age of Onset     Diabetes Maternal Grandmother      Diabetes Maternal Grandfather      Arthritis Maternal Grandfather      Arthritis Father      Thyroid Disease Father      Glaucoma Mother      Alcohol/Drug Mother 49        cirrhosis     Diabetes Daughter 44        type 2     Obesity Daughter      Glaucoma Maternal Aunt        Social History     Tobacco Use     Smoking status: Former     Packs/day: 0.00     Years: 0.00     Pack years: 0.00     Types: Pipe, Cigarettes     Start date: 1/1/1960     Quit date: 11/15/2011     Years since quitting: 10.9     Smokeless tobacco: Never     Tobacco comments:     Smoked pipes 10 minutes a day started 22 yrs old , quit 10 yrs back.   Substance Use Topics     Alcohol use: No     Alcohol/week: 0.0 standard drinks        Current Outpatient Medications   Medication     BINAXNOW COVID-19 AG HOME TEST KIT     blood glucose monitoring (ONE TOUCH ULTRA 2) meter device kit     carvedilol (COREG) 3.125 MG tablet     cefdinir (OMNICEF) 300 MG capsule     Ferrous Sulfate 324 (65 Fe) MG TBEC     Fexofenadine HCl (ALLEGRA PO)     finasteride (PROSCAR) 5 MG tablet     FLUoxetine (PROZAC) 10 MG capsule     insulin glargine (LANTUS SOLOSTAR) 100 UNIT/ML pen     latanoprost (XALATAN) 0.005 % ophthalmic solution     levothyroxine (SYNTHROID/LEVOTHROID) 88 MCG tablet     losartan (COZAAR) 100 MG tablet     lovastatin (MEVACOR) 40 MG tablet     metFORMIN (GLUCOPHAGE XR) 500 MG 24 hr tablet     Multiple Vitamins-Minerals (CENTRUM SILVER) per tablet     omeprazole (PRILOSEC) 20 MG DR capsule     omeprazole (PRILOSEC) 20 MG DR capsule     ONETOUCH ULTRA test strip     sildenafil (VIAGRA) 100 MG tablet     tamsulosin (FLOMAX) 0.4 MG capsule     No  current facility-administered medications for this visit.        Review of Systems   Constitutional, HEENT, cardiovascular, pulmonary, GI, , musculoskeletal, neuro, skin, endocrine and psych systems are negative, except as otherwise noted.      Objective    /58 (BP Location: Right arm, Patient Position: Sitting, Cuff Size: Adult Regular)   Pulse 70   Temp 98.6  F (37  C) (Temporal)   Resp 16   Wt 84.6 kg (186 lb 6.4 oz)   SpO2 97%   BMI 30.09 kg/m    Body mass index is 30.09 kg/m .  Physical Exam   GENERAL: healthy, alert and no distress  NECK: no adenopathy, no asymmetry, masses, or scars and thyroid normal to palpation  RESP: lungs clear to auscultation - no rales, rhonchi or wheezes  CV: regular rate and rhythm, normal S1 S2, no S3 or S4, no murmur, click or rub, no peripheral edema and peripheral pulses strong  ABDOMEN: soft, nontender, no hepatosplenomegaly, no masses and bowel sounds normal  MS: no gross musculoskeletal defects noted, no edema

## 2022-11-03 ENCOUNTER — TRANSFERRED RECORDS (OUTPATIENT)
Dept: HEALTH INFORMATION MANAGEMENT | Facility: CLINIC | Age: 82
End: 2022-11-03

## 2022-11-04 ENCOUNTER — TELEPHONE (OUTPATIENT)
Dept: FAMILY MEDICINE | Facility: CLINIC | Age: 82
End: 2022-11-04

## 2022-11-04 NOTE — TELEPHONE ENCOUNTER
Paperwork from [facility].    Enrollment to the [facility] Day [facility] Program.     Form completed and signed, faxed, and sent to [employer].    Chante Morris/   Gloria Coles

## 2022-11-07 ENCOUNTER — NURSE TRIAGE (OUTPATIENT)
Dept: FAMILY MEDICINE | Facility: CLINIC | Age: 82
End: 2022-11-07

## 2022-11-07 ENCOUNTER — MYC MEDICAL ADVICE (OUTPATIENT)
Dept: FAMILY MEDICINE | Facility: CLINIC | Age: 82
End: 2022-11-07

## 2022-11-07 NOTE — TELEPHONE ENCOUNTER
See telephone encounter 11/7/22.    Radha Coreas RN  [facility] Triage Team

## 2022-11-07 NOTE — TELEPHONE ENCOUNTER
I recommend he be seen at [facility] walk in clinic today, or [facility].    [facility] can do a detailed exam, XR, and determine if he may need an MRI.  I would start there.     Shailesh Wren PA-C

## 2022-11-07 NOTE — TELEPHONE ENCOUNTER
"Nurse Triage SBAR    Is this a 2nd Level Triage? NO    Situation: Patient sent a Webvantahart message (please see MyChart encounter 11/7/22). Called patient to obtain more information and to triage.    Background: [place] he fell backwards down the escalator going up. Someone was able to stop the escalator but patients right upper arm sustained \"scratches\". Patient stated that he was helped by two firefighters who dressed the wound/scratches.      Assessment: Patient sent the Webvantahart message because he is still experiencing pain in his right upper arm and right ribs. Patient currently rates the pain 6/10 with having consistently been taking tylenol and ibuprofen. Patient stated that when the pain medication wears off \"he would scream if there wasn't anyone else in the house\".  Patient reports that he can not lift his arm above his head. Patient denies any swelling or bruising, but states that there is redness on his arm around the scratches.     Protocol Recommended Disposition:   See in Office Today    Recommendation: Per protocol patient should be seen in the office today. Team scheduled patient with Dr. Sharma tomorrow. Is patient okay with waiting till tomorrow or should patient be seen at [facility] or [facility].     Routed to provider    Does the patient meet one of the following criteria for ADS visit consideration? 16+ years old, with an [facility] PCP     TIP  Providers, please consider if this condition is appropriate for management at one of our Acute and Diagnostic Services sites.     If patient is a good candidate, please use dotphrase <dot>triageresponse and select Refer to ADS to document.      Reason for Disposition    Can't move injured arm normally (bend or straighten completely)    Additional Information    Negative: Major bleeding (actively dripping or spurting) that can't be stopped    Negative: Serious injury with multiple fractures (broken bones)    Negative: " "Sounds like a life-threatening emergency to the triager    Negative: Wound looks infected    Negative: Arm pain from overuse (e.g., sports, lifting, physical work)    Negative: Arm pain not from an injury    Negative: Shoulder injury is main concern    Negative: Elbow injury is main concern    Negative: Hand or wrist injury is main concern    Negative: Bullet wound, stabbed by knife, or other serious penetrating wound    Negative: Looks like a broken bone or dislocated joint (crooked or deformed)    Negative: Can't move injured arm at all    Negative: Bleeding won't stop after 10 minutes of direct pressure (using correct technique)    Negative: Skin is split open or gaping (or length > 1/2 inch or 12 mm)    Negative: Dirt in the wound and not removed after 15 minutes of scrubbing    Negative: Sounds like a serious injury to the triager    Negative: SEVERE pain    Answer Assessment - Initial Assessment Questions  1. MECHANISM: \"How did the injury happen?\"        Patient fell backward down the up going escalator    2. ONSET: \"When did the injury happen?\" (Minutes or hours ago)         Friday 11/4/22     3. LOCATION: \"Where is the injury located?\" \"Which arm?\"        Top part of the right arm close to the shoulder    4. APPEARANCE of INJURY: \"What does the injury look like?\"         Scratches     5. SEVERITY: \"Can you use the arm normally?\"         Can not raise it above his head    6. SWELLING or BRUISING: \"is there any swelling or bruising?\" If Yes, ask: \"How large is it? (e.g., inches, centimeters)         None    7. PAIN: \"Is there pain?\" If Yes, ask: \"How bad is the pain?\"    (Scale 1-10; or mild, moderate, severe)    - NONE (0): no pain.    - MILD (1-3): doesn't interfere with normal activities    - MODERATE (4-7): interferes with normal activities (e.g., work or school) or awakens from sleep    - SEVERE (8-10): excruciating pain, unable to do any normal activities, unable to hold a cup of water        6/10    8. " "TETANUS: For any breaks in the skin, ask: \"When was the last tetanus booster?\"        Unsure    9. OTHER SYMPTOMS: \"Do you have any other symptoms?\"  (e.g., numbness in hand)        None    10. PREGNANCY: \"Is there any chance you are pregnant?\" \"When was your last menstrual period?\"          N/A    Protocols used: ARM INJURY-A-OH    SEE IN OFFICE TODAY:   * You need to be examined today. Let me give you an appointment.  * IF NO AVAILABLE APPOINTMENTS: You need to be seen in the Urgent Care Center. Go to the one at ____________. Go there today. A nearby Urgent Care Center is often a good source of care. Another choice is to go to the Emergency Department.      CALL BACK IF:  * Pain becomes severe  * Pain does not improve after 3 days  * Pain or swelling lasts more than 2 weeks  * You become worse      PAIN MEDICINES:  * For pain relief, you can take either acetaminophen, ibuprofen, or naproxen.  * They are over-the-counter (OTC) pain drugs. You can buy them at the [facility]  * ACETAMINOPHEN - REGULAR STRENGTH TYLENOL: Take 650 mg (two 325 mg pills) by mouth every 4 to 6 hours as needed. Each Regular Strength Tylenol pill has 325 mg of acetaminophen. The most you should take each day is 3,250 mg (10 pills a day).   * ACETAMINOPHEN - EXTRA STRENGTH TYLENOL: Take 1,000 mg (two 500 mg pills) every 8 hours as needed. Each Extra Strength Tylenol pill has 500 mg of acetaminophen. The most you should take each day is 3,000 mg (6 pills a day).  * IBUPROFEN (E.G., MOTRIN, ADVIL): Take 400 mg (two 200 mg pills) by mouth every 6 hours. The most you should take each day is 1,200 mg (six 200 mg pills), unless your doctor has told you to take more.  * NAPROXEN (E.G., ALEVE): Take 220 mg (one 220 mg pill) by mouth every 8 to 12 hours as needed. You may take 440 mg (two 220 mg pills) for your first dose. The most you should take each day is 660 mg (three 220 mg pills a day), unless your doctor has told you to take more.    "   ANTIBIOTIC OINTMENT FOR A CUT OR SCRAPE:  * Put a small amount of antibiotic ointment on the wound once a day for 3 days.  * You can get this over-the-counter (OTC) at a drugstore.  * Use Bacitracin ointment (OTC in [place] or Polysporin ointment (OTC in [place]) or one that you already have.  * Read the package instructions on all medicines that you use.        Patient/Caregiver understands and will follow care advice? Other, see documentation     Radha Coreas RN  [facility] Triage Team

## 2022-11-07 NOTE — TELEPHONE ENCOUNTER
Patient Contact     S/w pt and relayed message from Shailesh Wren PA-C, see below. Pt stated understanding but does not have transportation or a way to get to [facility] today. He wanted to speak with PCP at visit tomorrow and declines to go to UC today. He states if PCP still wants him to go to [facility] or elsewhere tomorrow he will do that.     Brit CHACKO RN  [facility]

## 2022-11-08 ENCOUNTER — ANCILLARY PROCEDURE (OUTPATIENT)
Dept: GENERAL RADIOLOGY | Facility: CLINIC | Age: 82
End: 2022-11-08
Attending: INTERNAL MEDICINE
Payer: COMMERCIAL

## 2022-11-08 ENCOUNTER — OFFICE VISIT (OUTPATIENT)
Dept: FAMILY MEDICINE | Facility: CLINIC | Age: 82
End: 2022-11-08
Payer: COMMERCIAL

## 2022-11-08 VITALS
DIASTOLIC BLOOD PRESSURE: 73 MMHG | BODY MASS INDEX: 29.89 KG/M2 | WEIGHT: 186 LBS | HEART RATE: 62 BPM | SYSTOLIC BLOOD PRESSURE: 167 MMHG | HEIGHT: 66 IN | OXYGEN SATURATION: 98 % | TEMPERATURE: 98.1 F

## 2022-11-08 DIAGNOSIS — S29.9XXA INJURY OF CHEST WALL, INITIAL ENCOUNTER: Primary | ICD-10-CM
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
## 2022-11-08 DIAGNOSIS — S29.9XXA INJURY OF CHEST WALL, INITIAL ENCOUNTER: ICD-10-CM

## 2022-11-08 DIAGNOSIS — E11.51 TYPE II DIABETES MELLITUS WITH PERIPHERAL CIRCULATORY DISORDER (H): ICD-10-CM

## 2022-11-08 DIAGNOSIS — S59.901A INJURY OF RIGHT ELBOW, INITIAL ENCOUNTER: ICD-10-CM

## 2022-11-08 LAB
CREAT UR-MCNC: 172 MG/DL
MICROALBUMIN UR-MCNC: 15 MG/L
MICROALBUMIN/CREAT UR: 8.72 MG/G CR (ref 0–17)

## 2022-11-08 PROCEDURE — 71101 X-RAY EXAM UNILAT RIBS/CHEST: CPT | Mod: TC | Performed by: RADIOLOGY

## 2022-11-08 PROCEDURE — 82043 UR ALBUMIN QUANTITATIVE: CPT | Performed by: INTERNAL MEDICINE

## 2022-11-08 PROCEDURE — 99214 OFFICE O/P EST MOD 30 MIN: CPT | Performed by: INTERNAL MEDICINE

## 2022-11-08 PROCEDURE — 71045 X-RAY EXAM CHEST 1 VIEW: CPT | Mod: TC | Performed by: RADIOLOGY

## 2022-11-08 RX ORDER — ACETAMINOPHEN 500 MG
500 TABLET ORAL EVERY 4 HOURS PRN
Qty: 30 TABLET | Refills: 0 | Status: SHIPPED | OUTPATIENT
Start: 2022-11-08

## 2022-11-08 RX ORDER — IBUPROFEN 600 MG/1
600 TABLET, FILM COATED ORAL EVERY 8 HOURS PRN
Qty: 30 TABLET | Refills: 1 | Status: SHIPPED | OUTPATIENT
Start: 2022-11-08 | End: 2023-03-15

## 2022-11-08 ASSESSMENT — PAIN SCALES - GENERAL: PAINLEVEL: MODERATE PAIN (4)

## 2022-11-08 NOTE — PROGRESS NOTES
Assessment and Plan  1. Injury of chest wall, initial encounter  New problem, patient does mention that he was at [place] airport on November 4, 2022 Friday when he was going to the food court by taking an escalator with stairs going up upwards, as he was carrying his backpack and a suitcase holding in his hand when he slipped down and fell.  This cleared and was turned off immediately for which he was thankful and states that major injury was avoided this week.  Denies any head injury except the fall with the sharp edges hitting on his right chest wall and right elbow.  -Physical exam positive for tenderness on the right lateral chest wall, mild bruising and swelling seen.  X-ray was checked to make sure there is no hemothorax and rib fractures.  -Patient was having issue with his hearing aids today which took me multiple times to explain the plan and x-ray report.  UPDATE - X ray of chest is showing POSITIVE for Rt rib fractures of 7,8 and 9th ribs.  Precautions given to the patient to not Overdo any lifting of weights with your right side or further injury due to falls again.  Please minimize movements on the right side and be very gentle while you move on your right upper extremity.  This is to avoid any lung injury since the fractures does show mild displacement.  - XR Ribs & Chest Right G/E 3 Views; Future  - ibuprofen (ADVIL/MOTRIN) 600 MG tablet; Take 1 tablet (600 mg) by mouth every 8 hours as needed for moderate pain  Dispense: 30 tablet; Refill: 1  - acetaminophen (TYLENOL) 500 MG tablet; Take 1 tablet (500 mg) by mouth every 4 hours as needed for mild pain  Dispense: 30 tablet; Refill: 0    2. Injury of right elbow, initial encounter  New problem, positive for soft tissue bruising on the elbow on the right side, no restriction of range of movements.  I do not suspect any fracture on the elbow joint, hence we will hold off on imaging at this joint.  -Dressing changed on the soft tissue bruising on the  right elbow joint.  - ibuprofen (ADVIL/MOTRIN) 600 MG tablet; Take 1 tablet (600 mg) by mouth every 8 hours as needed for moderate pain  Dispense: 30 tablet; Refill: 1  - acetaminophen (TYLENOL) 500 MG tablet; Take 1 tablet (500 mg) by mouth every 4 hours as needed for mild pain  Dispense: 30 tablet; Refill: 0    3. Fall (on)(from) incline, initial encounter  - XR Ribs & Chest Right G/E 3 Views; Future    4. Type II diabetes mellitus with peripheral circulatory disorder (H)  - Albumin Random Urine Quantitative with Creat Ratio; Future  - Albumin Random Urine Quantitative with Creat Ratio         Patient Instructions   As discussed , will check X rays and update the plan.    UPDATE - As per conversation with your on the X ray report >> Your X ray of chest is showing POSITIVE for Rt rib fractures of 7,8 and 9th ribs. Please make sure you do not Overdo any lifting of weights with your right side or further injury due to falls again.  Please minimize movements on the right side and be very gentle while you move on your right upper extremity.  This is to avoid any lung injury since the fractures does show mild displacement.    Please do pain management as per the prescription given with combination.         Return in about 1 week (around 11/15/2022), or if symptoms worsen or fail to improve, for Follow up of last visit, If symptoms persist.    Michaela Sharma MD  [facility] LENARD Warner is a 82 year old, presenting for the following health issues:  Pain (Rib and arm)      History of Present Illness       Reason for visit:  Adult day care/future pre-op    He eats 2-3 servings of fruits and vegetables daily.He consumes 0 sweetened beverage(s) daily.He exercises with enough effort to increase his heart rate 20 to 29 minutes per day.  He exercises with enough effort to increase his heart rate 4 days per week.   He is taking medications regularly.    Recently seen patient on November 1,  2022 for diabetes follow-up.     No Known Allergies     Past Medical History:   Diagnosis Date     Arthritis 1970    Dx'd with RA when in the      BPH (benign prostatic hyperplasia) 12/16/2013     Cancer (H)     basal cell cancer behind ear     Diabetes mellitus      ED (erectile dysfunction) 1/6/2015     HTN (hypertension)      Hyperlipidemia LDL goal <100      Hypothyroidism      Mumps      Obesity        Past Surgical History:   Procedure Laterality Date     BIOPSY       COLONOSCOPY N/A 11/21/2014    Procedure: COMBINED COLONOSCOPY, SINGLE OR MULTIPLE BIOPSY/POLYPECTOMY BY BIOPSY;  Surgeon: Rolanda Bey MD;  Location:  GI     COLONOSCOPY N/A 1/20/2020    Procedure: COLONOSCOPY, WITH POLYPECTOMY AND BIOPSY;  Surgeon: Christina Vieira MD;  Location:  GI     COLONOSCOPY N/A 2/1/2021    Procedure: Colonoscopy, With Polypectomy And Biopsy;  Surgeon: Christina Vieira MD;  Location: [facility]     ESOPHAGOSCOPY, GASTROSCOPY, DUODENOSCOPY (EGD), COMBINED N/A 3/16/2021    Procedure: ESOPHAGOGASTRODUODENOSCOPY, WITH BIOPSY;  Surgeon: Jose Schrader MD;  Location:  GI     EYE SURGERY       TESTICLE SURGERY       TONSILLECTOMY, ADENOIDECTOMY, COMBINED       VASECTOMY         Family History   Problem Relation Age of Onset     Diabetes Maternal Grandmother      Diabetes Maternal Grandfather      Arthritis Maternal Grandfather      Arthritis Father      Thyroid Disease Father      Glaucoma Mother      Alcohol/Drug Mother 49        cirrhosis     Diabetes Daughter 44        type 2     Obesity Daughter      Glaucoma Maternal Aunt        Social History     Tobacco Use     Smoking status: Former     Packs/day: 0.00     Years: 0.00     Pack years: 0.00     Types: Pipe, Cigarettes     Start date: 1/1/1960     Quit date: 11/15/2011     Years since quitting: 10.9     Smokeless tobacco: Never     Tobacco comments:     Smoked pipes 10 minutes a day started 22 yrs old , quit 10 yrs back.   Substance Use Topics  "    Alcohol use: No     Alcohol/week: 0.0 standard drinks        Current Outpatient Medications   Medication     acetaminophen (TYLENOL) 500 MG tablet     ibuprofen (ADVIL/MOTRIN) 600 MG tablet     BINAXNOW COVID-19 AG HOME TEST KIT     blood glucose monitoring (ONE TOUCH ULTRA 2) meter device kit     carvedilol (COREG) 3.125 MG tablet     cefdinir (OMNICEF) 300 MG capsule     Ferrous Sulfate 324 (65 Fe) MG TBEC     Fexofenadine HCl (ALLEGRA PO)     finasteride (PROSCAR) 5 MG tablet     FLUoxetine (PROZAC) 10 MG capsule     insulin glargine (LANTUS SOLOSTAR) 100 UNIT/ML pen     latanoprost (XALATAN) 0.005 % ophthalmic solution     levothyroxine (SYNTHROID/LEVOTHROID) 88 MCG tablet     losartan (COZAAR) 100 MG tablet     lovastatin (MEVACOR) 40 MG tablet     metFORMIN (GLUCOPHAGE XR) 500 MG 24 hr tablet     Multiple Vitamins-Minerals (CENTRUM SILVER) per tablet     omeprazole (PRILOSEC) 20 MG DR capsule     omeprazole (PRILOSEC) 20 MG DR capsule     ONETOUCH ULTRA test strip     sildenafil (VIAGRA) 100 MG tablet     tamsulosin (FLOMAX) 0.4 MG capsule     No current facility-administered medications for this visit.        Review of Systems   Constitutional, HEENT, cardiovascular, pulmonary, GI, , musculoskeletal, neuro, skin, endocrine and psych systems are negative, except as otherwise noted.      Objective    BP (!) 167/73   Pulse 62   Temp 98.1  F (36.7  C) (Temporal)   Ht 1.676 m (5' 6\")   Wt 84.4 kg (186 lb)   SpO2 98%   BMI 30.02 kg/m    Body mass index is 30.02 kg/m .  Physical Exam   GENERAL: healthy, alert and no distress  NECK: no adenopathy, no asymmetry, masses, or scars and thyroid normal to palpation  RESP: lungs clear to auscultation - no rales, rhonchi or wheezes  CV: regular rate and rhythm, normal S1 S2, no S3 or S4, no murmur, click or rub, no peripheral edema and peripheral pulses strong.  Positive for tenderness on the right lateral chest wall, mild bruising and swelling seen  ABDOMEN: " soft, nontender, no hepatosplenomegaly, no masses and bowel sounds normal  MS: no gross musculoskeletal defects noted, no edema  Rt Elbow : Positive for soft tissue bruising on the elbow on the right side, no restriction of range of movements.

## 2022-11-08 NOTE — PATIENT INSTRUCTIONS
As discussed , will check X rays and update the plan.    UPDATE - As per conversation with your on the X ray report >> Your X ray of chest is showing POSITIVE for Rt rib fractures of 7,8 and 9th ribs. Please make sure you do not Overdo any lifting of weights with your right side or further injury due to falls again.  Please minimize movements on the right side and be very gentle while you move on your right upper extremity.  This is to avoid any lung injury since the fractures does show mild displacement.    Please do pain management as per the prescription given with combination.

## 2022-11-08 NOTE — TELEPHONE ENCOUNTER
Provider Response to 2nd Level Triage Request    I have reviewed the RN documentation. My recommendation is:  As pt has already declined the recommendations of covering provider,, I will evaluate in his OV tomm and do further recommendations. Thank you,

## 2022-11-09 ENCOUNTER — MYC MEDICAL ADVICE (OUTPATIENT)
Dept: FAMILY MEDICINE | Facility: CLINIC | Age: 82
End: 2022-11-09

## 2022-11-11 NOTE — TELEPHONE ENCOUNTER
See Paula question.  LOV Instructions: Return in about 1 week (around 11/15/2022), or if symptoms worsen or fail to improve, for Follow up of last visit, If symptoms persist.      Patient states that he is getting better. States that life is better since increase in ibuprofen.  Patient agrees to call back if symptoms do not improve or if new or worsening symptoms.

## 2022-11-22 ENCOUNTER — TELEPHONE (OUTPATIENT)
Dept: FAMILY MEDICINE | Facility: CLINIC | Age: 82
End: 2022-11-22

## 2022-11-22 NOTE — TELEPHONE ENCOUNTER
Patient stopped by the clinic to inform that he needs a POLST for adult day care. Does he need an appointment for this or OK to drop off completed form for MD signature?    Can we leave a detailed message on this number? YES  Phone number patient can be reached at: Cell number on file:    Telephone Information:   Mobile [phone]       Cate Ward, RN  [facility] Triage

## 2022-11-23 NOTE — TELEPHONE ENCOUNTER
If patient himself is filling and signing it - OK to drop off at  for me to sign it.     Thank you,  Michaela Sharma MD on 11/23/2022

## 2022-11-24 DIAGNOSIS — I10 ESSENTIAL HYPERTENSION: ICD-10-CM

## 2022-11-25 NOTE — TELEPHONE ENCOUNTER
Left message on secure voicemail box.     Patient given instructions on form as listed below.     Patricia Palomo RN  [facility]

## 2022-11-28 ENCOUNTER — TELEPHONE (OUTPATIENT)
Dept: FAMILY MEDICINE | Facility: CLINIC | Age: 82
End: 2022-11-28

## 2022-11-28 DIAGNOSIS — Z78.9 POLST (PHYSICIAN ORDERS FOR LIFE-SUSTAINING TREATMENT): Primary | ICD-10-CM

## 2022-11-28 RX ORDER — CARVEDILOL 3.12 MG/1
TABLET ORAL
Qty: 180 TABLET | Refills: 3 | Status: SHIPPED | OUTPATIENT
Start: 2022-11-28 | End: 2023-09-23

## 2022-11-28 NOTE — TELEPHONE ENCOUNTER
Forms/Letter Request    Type of form/letter: Casi    Have you been seen for this request: N/A    Do we have the form/letter: Yes: Pt dropped off, is in team 3 box    When is form/letter needed by: ASAP    How would you like the form/letter returned: , call [phone] when ready    Patient Notified form requests are processed in 3-5 business days:Yes    Could we send this information to you in [place] or would you prefer to receive a phone call?:   Patient would prefer a phone call   Okay to leave a detailed message?: No at Home number on file [phone] (home)

## 2022-11-28 NOTE — TELEPHONE ENCOUNTER
Routing refill request to provider for review/approval because:  Patient's blood pressure is not within protocol range.    BP Readings from Last 3 Encounters:   11/08/22 (!) 167/73   11/01/22 126/58   09/21/22 132/60     Radha Coreas RN

## 2022-11-29 NOTE — TELEPHONE ENCOUNTER
Form placed in red folder on LN desk for completion    Khushboo Morris/Al-  Gloria Refugiodeon Joshi

## 2022-12-01 NOTE — TELEPHONE ENCOUNTER
Picked up completed forms, called pt and left voicemail indicating that form is ready to be picked up from the  at EP.    Tawana JOVEL    [facility]

## 2022-12-07 ENCOUNTER — MYC MEDICAL ADVICE (OUTPATIENT)
Dept: FAMILY MEDICINE | Facility: CLINIC | Age: 82
End: 2022-12-07

## 2022-12-07 DIAGNOSIS — I10 ESSENTIAL HYPERTENSION: Primary | ICD-10-CM

## 2022-12-13 ENCOUNTER — TELEPHONE (OUTPATIENT)
Dept: FAMILY MEDICINE | Facility: CLINIC | Age: 82
End: 2022-12-13

## 2022-12-15 NOTE — TELEPHONE ENCOUNTER
Pt states wife started having symptoms 5 days ago. Pt states he has been staying in different parts of the house with masking. Pt denies any symptoms at this time. Pt states he has not tested. Pt told to testif symptoms start. Pt told to call back if symptoms start. Pt given information for quarantine and masking per request. Pt sent COVID info via my chart per pt request.         Tawnya MILIAN RN  [facility] Triage

## 2022-12-23 NOTE — TELEPHONE ENCOUNTER
Please see [employer] message and advise.   Would you like to place DME order? Pended if needed.    Brit CHACKO RN  [facility]

## 2023-01-10 ENCOUNTER — OFFICE VISIT (OUTPATIENT)
Dept: FAMILY MEDICINE | Facility: CLINIC | Age: 83
End: 2023-01-10
Payer: COMMERCIAL

## 2023-01-10 VITALS
TEMPERATURE: 98.1 F | DIASTOLIC BLOOD PRESSURE: 70 MMHG | HEIGHT: 66 IN | RESPIRATION RATE: 16 BRPM | WEIGHT: 189.4 LBS | OXYGEN SATURATION: 96 % | HEART RATE: 66 BPM | BODY MASS INDEX: 30.44 KG/M2 | SYSTOLIC BLOOD PRESSURE: 130 MMHG

## 2023-01-10 DIAGNOSIS — D64.9 ANEMIA, UNSPECIFIED TYPE: ICD-10-CM

## 2023-01-10 DIAGNOSIS — H02.9 EYELID ABNORMALITY: ICD-10-CM

## 2023-01-10 DIAGNOSIS — E11.51 TYPE II DIABETES MELLITUS WITH PERIPHERAL CIRCULATORY DISORDER (H): ICD-10-CM

## 2023-01-10 DIAGNOSIS — Z01.818 PREOPERATIVE CLEARANCE: Primary | ICD-10-CM

## 2023-01-10 LAB
ERYTHROCYTE [DISTWIDTH] IN BLOOD BY AUTOMATED COUNT: 14 % (ref 10–15)
HCT VFR BLD AUTO: 33.1 % (ref 40–53)
HGB BLD-MCNC: 10.4 G/DL (ref 13.3–17.7)
MCH RBC QN AUTO: 28.2 PG (ref 26.5–33)
MCHC RBC AUTO-ENTMCNC: 31.4 G/DL (ref 31.5–36.5)
MCV RBC AUTO: 90 FL (ref 78–100)
PLATELET # BLD AUTO: 222 10E3/UL (ref 150–450)
RBC # BLD AUTO: 3.69 10E6/UL (ref 4.4–5.9)
WBC # BLD AUTO: 4.5 10E3/UL (ref 4–11)

## 2023-01-10 PROCEDURE — 93000 ELECTROCARDIOGRAM COMPLETE: CPT | Performed by: INTERNAL MEDICINE

## 2023-01-10 PROCEDURE — 99215 OFFICE O/P EST HI 40 MIN: CPT | Performed by: INTERNAL MEDICINE

## 2023-01-10 PROCEDURE — 36415 COLL VENOUS BLD VENIPUNCTURE: CPT | Performed by: INTERNAL MEDICINE

## 2023-01-10 PROCEDURE — 85027 COMPLETE CBC AUTOMATED: CPT | Performed by: INTERNAL MEDICINE

## 2023-01-10 RX ORDER — NEOMYCIN SULFATE, POLYMYXIN B SULFATE, AND DEXAMETHASONE 3.5; 10000; 1 MG/G; [USP'U]/G; MG/G
OINTMENT OPHTHALMIC
COMMUNITY
Start: 2023-01-02 | End: 2023-03-15

## 2023-01-10 ASSESSMENT — PAIN SCALES - GENERAL: PAINLEVEL: NO PAIN (0)

## 2023-01-10 NOTE — PATIENT INSTRUCTIONS
Please do pertinent work up needed for your surgery.     Please take only 1/2 the dose of Lantus on the day of surgery and Hold off Metformin since you will not be eating food.     ==============================    Preparing for Your Surgery  Getting started  A nurse will call you to review your health history and instructions. They will give you an arrival time based on your scheduled surgery time. Please be ready to share:  Your doctor's clinic name and phone number  Your medical, surgical, and anesthesia history  A list of allergies and sensitivities  A list of medicines, including herbal treatments and over-the-counter drugs  Whether the patient has a legal guardian (ask how to send us the papers in advance)  Please tell us if you're pregnant--or if there's any chance you might be pregnant. Some surgeries may injure a fetus (unborn baby), so they require a pregnancy test. Surgeries that are safe for a fetus don't always need a test, and you can choose whether to have one.   If you have a child who's having surgery, please ask for a copy of Preparing for Your Child's Surgery.    Preparing for surgery  Within 10 to 30 days of surgery: Have a pre-op exam (sometimes called an H&P, or History and Physical). This can be done at a clinic or pre-operative center.  If you're having a , you may not need this exam. Talk to your care team.  At your pre-op exam, talk to your care team about all medicines you take. If you need to stop any medicines before surgery, ask when to start taking them again.  We do this for your safety. Many medicines can make you bleed too much during surgery. Some change how well surgery (anesthesia) drugs work.  Call your insurance company to let them know you're having surgery. (If you don't have insurance, call [phone].)  Call your clinic if there's any change in your health. This includes signs of a cold or flu (sore throat, runny nose, cough, rash, fever). It also includes a  scrape or scratch near the surgery site.  If you have questions on the day of surgery, call your hospital or surgery center.  Eating and drinking guidelines  For your safety: Unless your surgeon tells you otherwise, follow the guidelines below.  Eat and drink as usual until 8 hours before you arrive for surgery. After that, no food or milk.  Drink clear liquids until 2 hours before you arrive. These are liquids you can see through, like water, Gatorade, and Propel Water. They also include plain black coffee and tea (no cream or milk), candy, and breath mints. You can spit out gum when you arrive.  If you drink alcohol: Stop drinking it the night before surgery.  If your care team tells you to take medicine on the morning of surgery, it's okay to take it with a sip of water.  Preventing infection  Shower or bathe the night before and morning of your surgery. Follow the instructions your clinic gave you. (If no instructions, use regular soap.)  Don't shave or clip hair near your surgery site. We'll remove the hair if needed.  Don't smoke or vape the morning of surgery. You may chew nicotine gum up to 2 hours before surgery. A nicotine patch is okay.  Note: Some surgeries require you to completely quit smoking and nicotine. Check with your surgeon.  Your care team will make every effort to keep you safe from infection. We will:  Clean our hands often with soap and water (or an alcohol-based hand rub).  Clean the skin at your surgery site with a special soap that kills germs.  Give you a special gown to keep you warm. (Cold raises the risk of infection.)  Wear special hair covers, masks, gowns and gloves during surgery.  Give antibiotic medicine, if prescribed. Not all surgeries need antibiotics.  What to bring on the day of surgery  Photo ID and insurance card  Copy of your health care directive, if you have one  Glasses and hearing aids (bring cases)  You can't wear contacts during surgery  Inhaler and eye drops, if  you use them (tell us about these when you arrive)  CPAP machine or breathing device, if you use them  A few personal items, if spending the night  If you have . . .  A pacemaker, ICD (cardiac defibrillator) or other implant: Bring the ID card.  An implanted stimulator: Bring the remote control.  A legal guardian: Bring a copy of the certified (court-stamped) guardianship papers.  Please remove any jewelry, including body piercings. Leave jewelry and other valuables at home.  If you're going home the day of surgery  You must have a responsible adult drive you home. They should stay with you overnight as well.  If you don't have someone to stay with you, and you aren't safe to go home alone, we may keep you overnight. Insurance often won't pay for this.  After surgery  If it's hard to control your pain or you need more pain medicine, please call your surgeon's office.  Questions?   If you have any questions for your care team, list them here: _________________________________________________________________________________________________________________________________________________________________________ ____________________________________ ____________________________________ ____________________________________

## 2023-01-10 NOTE — PROGRESS NOTES
[address] [address]  Phone: [phone]  Primary Provider: Kaykay Cuevas  Pre-op Performing Provider: KAYKAY CUEVAS      PREOPERATIVE EVALUATION:  Today's date: 1/10/2023    Timmy Strange is a 82 year old male who presents for a preoperative evaluation.    Surgical Information:  Surgery/Procedure: Blephavo Plasty  Surgery Location: [facility]  Surgeon:   Surgery Date: 01/18/2023  Time of Surgery:   Where patient plans to recover: At home with family  Fax number for surgical facility: [phone]    Type of Anesthesia Anticipated: Local with MAC    Assessment & Plan     The proposed surgical procedure is considered LOW risk.    Assessment and Plan  1. Preoperative clearance  2. Eyelid abnormality  Stable, here for Blepharoplasty to improve his vision  He does have uncontrolled HTN last OV and which is normalized on current Losartan. Endocrinology manages DMTs currently on 12 units daily and Metformin 1500 mg daily with holding parameters mentioned in AVS.   - EKG 12-lead complete w/read - Clinics    3. Anemia, unspecified type  - CBC with platelets; Future    4. Type II diabetes mellitus with peripheral circulatory disorder (H)  Last A1C in 11/2022 WNL , continue current medications as managed by Endocrinology. Placed the holding parameters for preoperative period on AVS below.         Over 40 minutes spent on reviewing patient chart,  face to face encounter, greater than 50% time spent with plan/cordination of care and documentation as above in my A/P.              Patient Instructions     Please do pertinent work up needed for your surgery.     Please take only 1/2 the dose of Lantus on the day of surgery and Hold off Metformin since you will not be eating food.     ==============================    Preparing for Your Surgery  Getting started  A nurse will call you to review your health history and  instructions. They will give you an arrival time based on your scheduled surgery time. Please be ready to share:    Your doctor's clinic name and phone number    Your medical, surgical, and anesthesia history    A list of allergies and sensitivities    A list of medicines, including herbal treatments and over-the-counter drugs    Whether the patient has a legal guardian (ask how to send us the papers in advance)  Please tell us if you're pregnant--or if there's any chance you might be pregnant. Some surgeries may injure a fetus (unborn baby), so they require a pregnancy test. Surgeries that are safe for a fetus don't always need a test, and you can choose whether to have one.   If you have a child who's having surgery, please ask for a copy of Preparing for Your Child's Surgery.    Preparing for surgery  1. Within 10 to 30 days of surgery: Have a pre-op exam (sometimes called an H&P, or History and Physical). This can be done at a clinic or pre-operative center.  ? If you're having a , you may not need this exam. Talk to your care team.  2. At your pre-op exam, talk to your care team about all medicines you take. If you need to stop any medicines before surgery, ask when to start taking them again.  ? We do this for your safety. Many medicines can make you bleed too much during surgery. Some change how well surgery (anesthesia) drugs work.  3. Call your insurance company to let them know you're having surgery. (If you don't have insurance, call [phone].)  4. Call your clinic if there's any change in your health. This includes signs of a cold or flu (sore throat, runny nose, cough, rash, fever). It also includes a scrape or scratch near the surgery site.  5. If you have questions on the day of surgery, call your hospital or surgery center.  Eating and drinking guidelines  For your safety: Unless your surgeon tells you otherwise, follow the guidelines below.    Eat and drink as usual until 8 hours before  you arrive for surgery. After that, no food or milk.    Drink clear liquids until 2 hours before you arrive. These are liquids you can see through, like water, Gatorade, and Propel Water. They also include plain black coffee and tea (no cream or milk), candy, and breath mints. You can spit out gum when you arrive.    If you drink alcohol: Stop drinking it the night before surgery.    If your care team tells you to take medicine on the morning of surgery, it's okay to take it with a sip of water.  Preventing infection  1. Shower or bathe the night before and morning of your surgery. Follow the instructions your clinic gave you. (If no instructions, use regular soap.)  2. Don't shave or clip hair near your surgery site. We'll remove the hair if needed.  3. Don't smoke or vape the morning of surgery. You may chew nicotine gum up to 2 hours before surgery. A nicotine patch is okay.  ? Note: Some surgeries require you to completely quit smoking and nicotine. Check with your surgeon.  4. Your care team will make every effort to keep you safe from infection. We will:  ? Clean our hands often with soap and water (or an alcohol-based hand rub).  ? Clean the skin at your surgery site with a special soap that kills germs.  ? Give you a special gown to keep you warm. (Cold raises the risk of infection.)  ? Wear special hair covers, masks, gowns and gloves during surgery.  ? Give antibiotic medicine, if prescribed. Not all surgeries need antibiotics.  What to bring on the day of surgery  1. Photo ID and insurance card  2. Copy of your health care directive, if you have one  3. Glasses and hearing aids (bring cases)  ? You can't wear contacts during surgery  4. Inhaler and eye drops, if you use them (tell us about these when you arrive)  5. CPAP machine or breathing device, if you use them  6. A few personal items, if spending the night  7. If you have . . .  ? A pacemaker, ICD (cardiac defibrillator) or other implant: Bring the  ID card.  ? An implanted stimulator: Bring the remote control.  ? A legal guardian: Bring a copy of the certified (court-stamped) guardianship papers.  Please remove any jewelry, including body piercings. Leave jewelry and other valuables at home.  If you're going home the day of surgery    You must have a responsible adult drive you home. They should stay with you overnight as well.    If you don't have someone to stay with you, and you aren't safe to go home alone, we may keep you overnight. Insurance often won't pay for this.  After surgery  If it's hard to control your pain or you need more pain medicine, please call your surgeon's office.  Questions?   If you have any questions for your care team, list them here: _________________________________________________________________________________________________________________________________________________________________________ ____________________________________ ____________________________________ ____________________________________      Return in about 3 months (around 4/10/2023), or if symptoms worsen or fail to improve, for Annual Wellness Exam.    Michaela Sharma MD  [facility] [place]         Risks and Recommendations:  The patient has the following additional risks and recommendations for perioperative complications:   - Consult Hospitalist / IM to assist with post-op medical management  Cardiovascular:   - Pt does have uncontrolled BP in the past which is normalized currently   Diabetes:  - Patient is on insulin therapy; diabetic NPO guidelines provided and discussed.  Anemia/Bleeding/Clotting:    - Anemia and does not require treatment prior to surgery. Monitor hemoglobin postoperatively   - Follows Hematology for Anemia management and has been stable.     Medication Instructions:  Patient is to take all scheduled medications on the day of surgery EXCEPT for modifications listed below:   - ACE/ARB: May be continued on the day  of surgery.    - Beta Blockers: Continue taking on the day of surgery.   - Statins: Continue taking on the day of surgery.    - Long acting insulin (e.g. glargine, detemir): Take 50% of the usual evening or morning dose before surgery.       - metformin: HOLD day of surgery.   - ibuprofen (Advil, Motrin): HOLD 1 day before surgery.    - SSRIs, SNRIs, TCAs, Antipsychotics: Continue without modification.     RECOMMENDATION:  APPROVAL GIVEN to proceed with proposed procedure, without further diagnostic evaluation.    Review of external notes as documented elsewhere in note  43 minutes spent on the date of the encounter doing chart review, review of outside records, review of test results, interpretation of tests, patient visit and documentation       Subjective     HPI related to upcoming procedure:     Last seen pt on 11/22 for injury on chest fall due to fall injury and X rays positive for Rt rib fractures 7,8,9 ribs. Completely stable and asymptomatic at this time.       Preop Questions 1/3/2023   1. Have you ever had a heart attack or stroke? No   2. Have you ever had surgery on your heart or blood vessels, such as a stent placement, a coronary artery bypass, or surgery on an artery in your head, neck, heart, or legs? No   3. Do you have chest pain with activity? No   4. Do you have a history of  heart failure? No   5. Do you currently have a cold, bronchitis or symptoms of other infection? No   6. Do you have a cough, shortness of breath, or wheezing? No   7. Do you or anyone in your family have previous history of blood clots? No   8. Do you or does anyone in your family have a serious bleeding problem such as prolonged bleeding following surgeries or cuts? No   9. Have you ever had problems with anemia or been told to take iron pills? YES    10. Have you had any abnormal blood loss such as black, tarry or bloody stools? No   11. Have you ever had a blood transfusion? No   12. Are you willing to have a blood  transfusion if it is medically needed before, during, or after your surgery? Yes   13. Have you or any of your relatives ever had problems with anesthesia? No   14. Do you have sleep apnea, excessive snoring or daytime drowsiness? No   15. Do you have any artifical heart valves or other implanted medical devices like a pacemaker, defibrillator, or continuous glucose monitor? No   16. Do you have artificial joints? No   17. Are you allergic to latex? No       Health Care Directive:  Patient has a Health Care Directive on file      Preoperative Review of :   reviewed - no record of controlled substances prescribed.    Status of Chronic Conditions:  See problem list for active medical problems.  Problems all longstanding and stable, except as noted/documented.  See ROS for pertinent symptoms related to these conditions.      Review of Systems  CONSTITUTIONAL: NEGATIVE for fever, chills, change in weight  INTEGUMENTARY/SKIN: NEGATIVE for worrisome rashes, moles or lesions  EYES: NEGATIVE for vision changes or irritation  ENT/MOUTH: NEGATIVE for ear, mouth and throat problems  RESP: NEGATIVE for significant cough or SOB  CV: NEGATIVE for chest pain, palpitations or peripheral edema  GI: NEGATIVE for nausea, abdominal pain, heartburn, or change in bowel habits  : NEGATIVE for frequency, dysuria, or hematuria  MUSCULOSKELETAL: NEGATIVE for significant arthralgias or myalgia  NEURO: NEGATIVE for weakness, dizziness or paresthesias  ENDOCRINE: NEGATIVE for temperature intolerance, skin/hair changes  HEME: NEGATIVE for bleeding problems  PSYCHIATRIC: NEGATIVE for changes in mood or affect    Patient Active Problem List    Diagnosis Date Noted     Internal hemorrhoids with Hx of banding 11/01/2022     Priority: Medium     Rectal hemorrhage 02/21/2022     Priority: Medium     Change in bowel habits 02/21/2022     Priority: Medium     Oropharyngeal dysphagia 12/23/2021     Priority: Medium     Pain due to onychomycosis  of nail 08/02/2021     Priority: Medium     Memory deficits 07/07/2021     Priority: Medium     Gastroesophageal reflux disease without esophagitis 03/02/2021     Priority: Medium     Anemia, unspecified type 12/22/2020     Priority: Medium     Weakness of voice 06/09/2020     Priority: Medium     Flatulence, eructation, and gas pain 10/17/2019     Priority: Medium     Excess skin of eyelid, unspecified laterality 10/03/2018     Priority: Medium     Bilateral       Glaucoma of both eyes, unspecified glaucoma type 10/03/2018     Priority: Medium     Screening for prostate cancer 11/07/2017     Priority: Medium     Residual hemorrhoidal skin tags 07/03/2017     Priority: Medium     Seasonal allergic rhinitis 01/02/2017     Priority: Medium     Mixed hyperlipidemia 07/15/2016     Priority: Medium     Special screening for malignant neoplasm of prostate 07/15/2016     Priority: Medium     Localized edema 07/15/2016     Priority: Medium     Dependent edema 07/15/2016     Priority: Medium     ED (erectile dysfunction) 01/06/2015     Priority: Medium     BPH (benign prostatic hyperplasia) 12/16/2013     Priority: Medium     Type II diabetes mellitus with peripheral circulatory disorder (H) 01/03/2012     Priority: Medium     Essential hypertension 12/26/2011     Priority: Medium     Hypothyroidism 12/26/2011     Priority: Medium      Past Medical History:   Diagnosis Date     Arthritis 1970    Dx'd with RA when in the      BPH (benign prostatic hyperplasia) 12/16/2013     Cancer (H)     basal cell cancer behind ear     Diabetes mellitus      ED (erectile dysfunction) 1/6/2015     HTN (hypertension)      Hyperlipidemia LDL goal <100      Hypothyroidism      Mumps      Obesity      Past Surgical History:   Procedure Laterality Date     BIOPSY       COLONOSCOPY N/A 11/21/2014    Procedure: COMBINED COLONOSCOPY, SINGLE OR MULTIPLE BIOPSY/POLYPECTOMY BY BIOPSY;  Surgeon: Rolanda Bey MD;  Location:  GI      COLONOSCOPY N/A 1/20/2020    Procedure: COLONOSCOPY, WITH POLYPECTOMY AND BIOPSY;  Surgeon: Christina Vieira MD;  Location:  GI     COLONOSCOPY N/A 2/1/2021    Procedure: Colonoscopy, With Polypectomy And Biopsy;  Surgeon: Christina Vieira MD;  Location:  GI     ESOPHAGOSCOPY, GASTROSCOPY, DUODENOSCOPY (EGD), COMBINED N/A 3/16/2021    Procedure: ESOPHAGOGASTRODUODENOSCOPY, WITH BIOPSY;  Surgeon: Jose Schrader MD;  Location:  GI     EYE SURGERY       TESTICLE SURGERY       TONSILLECTOMY, ADENOIDECTOMY, COMBINED       VASECTOMY       Current Outpatient Medications   Medication Sig Dispense Refill     acetaminophen (TYLENOL) 500 MG tablet Take 1 tablet (500 mg) by mouth every 4 hours as needed for mild pain 30 tablet 0     carvedilol (COREG) 3.125 MG tablet TAKE 1 TABLET BY MOUTH  TWICE DAILY 180 tablet 3     cefdinir (OMNICEF) 300 MG capsule Take 1 capsule (300 mg) by mouth 2 times daily for 10 days       Ferrous Sulfate 324 (65 Fe) MG TBEC Take 1 tablet (324 mg) by mouth daily       Fexofenadine HCl (ALLEGRA PO) Take 180 mg by mouth daily        finasteride (PROSCAR) 5 MG tablet Take 1 tablet (5 mg) by mouth daily 90 tablet 4     FLUoxetine (PROZAC) 10 MG capsule Take 10 mg by mouth       levothyroxine (SYNTHROID/LEVOTHROID) 88 MCG tablet Take 88 mcg by mouth daily.       losartan (COZAAR) 100 MG tablet TAKE 1 TABLET BY MOUTH  DAILY 100 tablet 2     lovastatin (MEVACOR) 40 MG tablet TAKE 1 TABLET BY MOUTH  DAILY AT BEDTIME 90 tablet 3     metFORMIN (GLUCOPHAGE XR) 500 MG 24 hr tablet        Multiple Vitamins-Minerals (CENTRUM SILVER) per tablet Take 1 tablet by mouth daily       omeprazole (PRILOSEC) 20 MG DR capsule Take 1 capsule (20 mg) by mouth daily 90 capsule 1     omeprazole (PRILOSEC) 20 MG DR capsule Take 1 capsule (20 mg) by mouth daily 90 capsule 1     sildenafil (VIAGRA) 100 MG tablet Take 1 tablet (100 mg) by mouth as needed (as needed) 12 tablet 11     tamsulosin (FLOMAX) 0.4 MG  "capsule Take 2 capsules (0.8 mg) by mouth daily 180 capsule 3     BINAXNOW COVID-19 AG HOME TEST KIT TEST AS DIRECTED TODAY       blood glucose monitoring (ONE TOUCH ULTRA 2) meter device kit        ibuprofen (ADVIL/MOTRIN) 600 MG tablet Take 1 tablet (600 mg) by mouth every 8 hours as needed for moderate pain 30 tablet 1     insulin glargine (LANTUS SOLOSTAR) 100 UNIT/ML pen        latanoprost (XALATAN) 0.005 % ophthalmic solution Place 1 drop into both eyes daily       neomycin-polymyxin-dexamethasone (MAXITROL) 3.5-60123-3.1 ophthalmic ointment APPLY 1/4 INCH FILM ON SURGICAL SITE INCISION FOUR TIMES A DAY FOR 1 WEEK, THEN STOP       ONETOUCH ULTRA test strip          Allergies   Allergen Reactions     Shellfish Allergy         Social History     Tobacco Use     Smoking status: Former     Packs/day: 0.00     Years: 0.00     Pack years: 0.00     Types: Pipe, Cigarettes     Start date: 1958     Quit date: 11/15/2011     Years since quittin.1     Smokeless tobacco: Never     Tobacco comments:     Smoked pipes 10 minutes a day started 22 yrs old , quit 10 yrs back.   Substance Use Topics     Alcohol use: No     Alcohol/week: 0.0 standard drinks     Family History   Problem Relation Age of Onset     Diabetes Maternal Grandmother      Diabetes Maternal Grandfather      Arthritis Maternal Grandfather      Arthritis Father      Thyroid Disease Father      Glaucoma Mother      Alcohol/Drug Mother 49        cirrhosis     Diabetes Daughter 44        type 2     Obesity Daughter      Glaucoma Maternal Aunt      History   Drug Use No         Objective     /70   Pulse 66   Temp 98.1  F (36.7  C) (Tympanic)   Resp 16   Ht 1.676 m (5' 6\")   Wt 85.9 kg (189 lb 6.4 oz)   SpO2 96%   BMI 30.57 kg/m      Physical Exam    GENERAL APPEARANCE: healthy, alert and no distress     EYES: EOMI,  PERRL     HENT: ear canals and TM's normal and nose and mouth without ulcers or lesions     NECK: no adenopathy, no asymmetry, " masses, or scars and thyroid normal to palpation     RESP: lungs clear to auscultation - no rales, rhonchi or wheezes     CV: regular rates and rhythm, normal S1 S2, no S3 or S4 and no murmur, click or rub     ABDOMEN:  soft, nontender, no HSM or masses and bowel sounds normal     MS: extremities normal- no gross deformities noted, no evidence of inflammation in joints, FROM in all extremities.     SKIN: no suspicious lesions or rashes     NEURO: Normal strength and tone, sensory exam grossly normal, mentation intact and speech normal     PSYCH: mentation appears normal. and affect normal/bright     LYMPHATICS: No cervical adenopathy    Recent Labs   Lab Test 11/01/22  1401 09/21/22  0943 09/06/22  1100 06/15/22  1433 03/07/22  1108 12/06/21  1058 09/07/21  1041   HGB 11.0*  --  11.7* 12.2*   < > 12.3* 11.1*   PLT  --   --  194 223   < > 190 207   NA  --  142  --   --   --   --  141   POTASSIUM  --  4.7  --   --   --   --  4.7   CR  --  0.80  --   --   --   --  0.92   A1C 5.8*  --   --   --   --  5.8*  --     < > = values in this interval not displayed.        Diagnostics:  Labs pending at this time.  Results will be reviewed when available.  No results found for this or any previous visit (from the past 720 hour(s)).   EKG required for Dainte son Insulin and Surgeon needs it.  and not completed in the last 90 days.   EKG: appears normal, NSR, normal axis, normal intervals, no acute ST/T changes c/w ischemia, no LVH by voltage criteria, unchanged from previous tracings    Revised Cardiac Risk Index (RCRI):  The patient has the following serious cardiovascular risks for perioperative complications:   - Diabetes Mellitus (on Insulin) = 1 point     RCRI Interpretation: 1 point: Class II (low risk - 0.9% complication rate)           Signed Electronically by: Michaela Sharma MD  Copy of this evaluation report is provided to requesting physician.

## 2023-01-11 ENCOUNTER — MYC MEDICAL ADVICE (OUTPATIENT)
Dept: FAMILY MEDICINE | Facility: CLINIC | Age: 83
End: 2023-01-11

## 2023-01-12 NOTE — TELEPHONE ENCOUNTER
Please see My Chart Message and advise. Patient was cleared for surgery. OK to respond with normal EKG?  Triage to contact the patient.  Cate Ward RN

## 2023-01-12 NOTE — TELEPHONE ENCOUNTER
Called pt for clarification, left voicemail requesting call back.    Tawana JOVEL    [facility]

## 2023-01-12 NOTE — TELEPHONE ENCOUNTER
Spoke to pt, pt clarified that the fax number was for his surgeon for preop.  Will send fax to the surgeon as well as the number listed on preop notes.    Tawana JOVEL    [facility]

## 2023-01-16 ENCOUNTER — MYC MEDICAL ADVICE (OUTPATIENT)
Dept: FAMILY MEDICINE | Facility: CLINIC | Age: 83
End: 2023-01-16

## 2023-02-13 ENCOUNTER — MYC MEDICAL ADVICE (OUTPATIENT)
Dept: FAMILY MEDICINE | Facility: CLINIC | Age: 83
End: 2023-02-13

## 2023-02-13 ENCOUNTER — OFFICE VISIT (OUTPATIENT)
Dept: URGENT CARE | Facility: URGENT CARE | Age: 83
End: 2023-02-13
Payer: COMMERCIAL

## 2023-02-13 VITALS
SYSTOLIC BLOOD PRESSURE: 126 MMHG | DIASTOLIC BLOOD PRESSURE: 48 MMHG | OXYGEN SATURATION: 98 % | TEMPERATURE: 99.4 F | RESPIRATION RATE: 20 BRPM | HEART RATE: 79 BPM

## 2023-02-13 DIAGNOSIS — R07.0 THROAT PAIN: Primary | ICD-10-CM

## 2023-02-13 DIAGNOSIS — R05.9 COUGH, UNSPECIFIED TYPE: ICD-10-CM

## 2023-02-13 LAB
DEPRECATED S PYO AG THROAT QL EIA: NEGATIVE
GROUP A STREP BY PCR: NOT DETECTED
SARS-COV-2 RNA RESP QL NAA+PROBE: POSITIVE

## 2023-02-13 PROCEDURE — U0003 INFECTIOUS AGENT DETECTION BY NUCLEIC ACID (DNA OR RNA); SEVERE ACUTE RESPIRATORY SYNDROME CORONAVIRUS 2 (SARS-COV-2) (CORONAVIRUS DISEASE [COVID-19]), AMPLIFIED PROBE TECHNIQUE, MAKING USE OF HIGH THROUGHPUT TECHNOLOGIES AS DESCRIBED BY CMS-2020-01-R: HCPCS | Performed by: FAMILY MEDICINE

## 2023-02-13 PROCEDURE — 87651 STREP A DNA AMP PROBE: CPT | Performed by: FAMILY MEDICINE

## 2023-02-13 PROCEDURE — U0005 INFEC AGEN DETEC AMPLI PROBE: HCPCS | Performed by: FAMILY MEDICINE

## 2023-02-13 PROCEDURE — 99213 OFFICE O/P EST LOW 20 MIN: CPT | Mod: CS | Performed by: FAMILY MEDICINE

## 2023-02-13 RX ORDER — BENZONATATE 100 MG/1
100 CAPSULE ORAL 3 TIMES DAILY PRN
Qty: 21 CAPSULE | Refills: 0 | Status: SHIPPED | OUTPATIENT
Start: 2023-02-13 | End: 2023-02-20

## 2023-02-13 NOTE — PROGRESS NOTES
SUBJECTIVE: Timmy Strange is a 82 year old male presenting with a chief complaint of cough  and sore throat.  Onset of symptoms was 3 day(s) ago.  Predisposing factors include None.    Past Medical History:   Diagnosis Date     Arthritis 1970    Dx'd with RA when in the      BPH (benign prostatic hyperplasia) 2013     Cancer (H)     basal cell cancer behind ear     Diabetes mellitus      ED (erectile dysfunction) 2015     HTN (hypertension)      Hyperlipidemia LDL goal <100      Hypothyroidism      Mumps      Obesity      Allergies   Allergen Reactions     Shellfish Allergy      Social History     Tobacco Use     Smoking status: Former     Packs/day: 0.00     Years: 0.00     Pack years: 0.00     Types: Pipe, Cigarettes     Start date: 1958     Quit date: 11/15/2011     Years since quittin.2     Smokeless tobacco: Never     Tobacco comments:     Smoked pipes 10 minutes a day started 22 yrs old , quit 10 yrs back.   Substance Use Topics     Alcohol use: No     Alcohol/week: 0.0 standard drinks       ROS:  SKIN: no rash  GI: no vomiting    OBJECTIVE:  /48   Pulse 79   Temp 99.4  F (37.4  C)   Resp 20   SpO2 98% GENERAL APPEARANCE: healthy, alert and no distress  EYES: EOMI,  PERRL, conjunctiva clear  HENT: ear canals and TM's normal.  Nose and mouth without ulcers, erythema or lesions  RESP: lungs clear to auscultation - no rales, rhonchi or wheezes  SKIN: no suspicious lesions or rashes      ICD-10-CM    1. Throat pain  R07.0 Streptococcus A Rapid Screen w/Reflex to PCR - Clinic Collect     Group A Streptococcus PCR Throat Swab      2. Cough, unspecified type  R05.9 Symptomatic COVID-19 Virus (Coronavirus) by PCR Nose     benzonatate (TESSALON) 100 MG capsule          Fluids/Rest, f/u if worse/not any better

## 2023-02-14 ENCOUNTER — TELEPHONE (OUTPATIENT)
Dept: FAMILY MEDICINE | Facility: CLINIC | Age: 83
End: 2023-02-14

## 2023-02-14 ENCOUNTER — APPOINTMENT (OUTPATIENT)
Dept: GENERAL RADIOLOGY | Facility: CLINIC | Age: 83
End: 2023-02-14
Attending: EMERGENCY MEDICINE
Payer: COMMERCIAL

## 2023-02-14 ENCOUNTER — NURSE TRIAGE (OUTPATIENT)
Dept: FAMILY MEDICINE | Facility: CLINIC | Age: 83
End: 2023-02-14

## 2023-02-14 ENCOUNTER — HOSPITAL ENCOUNTER (EMERGENCY)
Facility: CLINIC | Age: 83
Discharge: HOME | End: 2023-02-14
Attending: EMERGENCY MEDICINE | Admitting: EMERGENCY MEDICINE
Payer: COMMERCIAL

## 2023-02-14 VITALS
DIASTOLIC BLOOD PRESSURE: 53 MMHG | TEMPERATURE: 100.3 F | HEIGHT: 66 IN | HEART RATE: 69 BPM | OXYGEN SATURATION: 93 % | RESPIRATION RATE: 16 BRPM | WEIGHT: 190 LBS | BODY MASS INDEX: 30.53 KG/M2 | SYSTOLIC BLOOD PRESSURE: 139 MMHG

## 2023-02-14 DIAGNOSIS — U07.1 PNEUMONIA DUE TO 2019 NOVEL CORONAVIRUS: ICD-10-CM

## 2023-02-14 DIAGNOSIS — U07.1 INFECTION DUE TO 2019 NOVEL CORONAVIRUS: ICD-10-CM

## 2023-02-14 DIAGNOSIS — R05.9 COUGH: ICD-10-CM

## 2023-02-14 DIAGNOSIS — M79.10 MYALGIA: ICD-10-CM

## 2023-02-14 DIAGNOSIS — J12.82 PNEUMONIA DUE TO 2019 NOVEL CORONAVIRUS: ICD-10-CM

## 2023-02-14 DIAGNOSIS — B34.9 VIRAL ILLNESS: ICD-10-CM

## 2023-02-14 LAB
ALBUMIN UR-MCNC: 30 MG/DL
ANION GAP SERPL CALCULATED.3IONS-SCNC: 8 MMOL/L (ref 7–15)
APPEARANCE UR: CLEAR
ATRIAL RATE - MUSE: 65 BPM
BASE EXCESS BLDV CALC-SCNC: 3 MMOL/L (ref -7.7–1.9)
BASOPHILS # BLD AUTO: 0 10E3/UL (ref 0–0.2)
BASOPHILS NFR BLD AUTO: 0 %
BILIRUB UR QL STRIP: NEGATIVE
BUN SERPL-MCNC: 27.2 MG/DL (ref 8–23)
CALCIUM SERPL-MCNC: 8.7 MG/DL (ref 8.8–10.2)
CHLORIDE SERPL-SCNC: 101 MMOL/L (ref 98–107)
COLOR UR AUTO: YELLOW
CREAT SERPL-MCNC: 0.97 MG/DL (ref 0.67–1.17)
DEPRECATED HCO3 PLAS-SCNC: 28 MMOL/L (ref 22–29)
DIASTOLIC BLOOD PRESSURE - MUSE: NORMAL MMHG
EOSINOPHIL # BLD AUTO: 0 10E3/UL (ref 0–0.7)
EOSINOPHIL NFR BLD AUTO: 0 %
ERYTHROCYTE [DISTWIDTH] IN BLOOD BY AUTOMATED COUNT: 15 % (ref 10–15)
GFR SERPL CREATININE-BSD FRML MDRD: 79 ML/MIN/1.73M2
GLUCOSE SERPL-MCNC: 143 MG/DL (ref 70–99)
GLUCOSE UR STRIP-MCNC: NEGATIVE MG/DL
HCO3 BLDV-SCNC: 29 MMOL/L (ref 21–28)
HCT VFR BLD AUTO: 31.8 % (ref 40–53)
HGB BLD-MCNC: 9.9 G/DL (ref 13.3–17.7)
HGB UR QL STRIP: ABNORMAL
HOLD SPECIMEN: NORMAL
HOLD SPECIMEN: NORMAL
IMM GRANULOCYTES # BLD: 0 10E3/UL
IMM GRANULOCYTES NFR BLD: 0 %
INTERPRETATION ECG - MUSE: NORMAL
KETONES UR STRIP-MCNC: NEGATIVE MG/DL
LACTATE SERPL-SCNC: 1 MMOL/L (ref 0.7–2)
LEUKOCYTE ESTERASE UR QL STRIP: NEGATIVE
LYMPHOCYTES # BLD AUTO: 1.1 10E3/UL (ref 0.8–5.3)
LYMPHOCYTES NFR BLD AUTO: 19 %
MCH RBC QN AUTO: 27.7 PG (ref 26.5–33)
MCHC RBC AUTO-ENTMCNC: 31.1 G/DL (ref 31.5–36.5)
MCV RBC AUTO: 89 FL (ref 78–100)
MONOCYTES # BLD AUTO: 1.2 10E3/UL (ref 0–1.3)
MONOCYTES NFR BLD AUTO: 20 %
MUCOUS THREADS #/AREA URNS LPF: PRESENT /LPF
NEUTROPHILS # BLD AUTO: 3.5 10E3/UL (ref 1.6–8.3)
NEUTROPHILS NFR BLD AUTO: 61 %
NITRATE UR QL: NEGATIVE
NRBC # BLD AUTO: 0 10E3/UL
NRBC BLD AUTO-RTO: 0 /100
O2/TOTAL GAS SETTING VFR VENT: 99 %
P AXIS - MUSE: -8 DEGREES
PCO2 BLDV: 51 MM HG (ref 40–50)
PH BLDV: 7.36 [PH] (ref 7.32–7.43)
PH UR STRIP: 5.5 [PH] (ref 5–7)
PLATELET # BLD AUTO: 201 10E3/UL (ref 150–450)
PO2 BLDV: 25 MM HG (ref 25–47)
POTASSIUM SERPL-SCNC: 4.4 MMOL/L (ref 3.4–5.3)
PR INTERVAL - MUSE: 142 MS
QRS DURATION - MUSE: 80 MS
QT - MUSE: 374 MS
QTC - MUSE: 388 MS
R AXIS - MUSE: -11 DEGREES
RBC # BLD AUTO: 3.57 10E6/UL (ref 4.4–5.9)
RBC URINE: 4 /HPF
SODIUM SERPL-SCNC: 137 MMOL/L (ref 136–145)
SP GR UR STRIP: 1.03 (ref 1–1.03)
SYSTOLIC BLOOD PRESSURE - MUSE: NORMAL MMHG
T AXIS - MUSE: 11 DEGREES
UROBILINOGEN UR STRIP-MCNC: NORMAL MG/DL
VENTRICULAR RATE- MUSE: 65 BPM
WBC # BLD AUTO: 5.7 10E3/UL (ref 4–11)
WBC URINE: <1 /HPF

## 2023-02-14 PROCEDURE — 80048 BASIC METABOLIC PNL TOTAL CA: CPT | Performed by: EMERGENCY MEDICINE

## 2023-02-14 PROCEDURE — 96361 HYDRATE IV INFUSION ADD-ON: CPT

## 2023-02-14 PROCEDURE — 81001 URINALYSIS AUTO W/SCOPE: CPT | Performed by: EMERGENCY MEDICINE

## 2023-02-14 PROCEDURE — 96360 HYDRATION IV INFUSION INIT: CPT

## 2023-02-14 PROCEDURE — 82803 BLOOD GASES ANY COMBINATION: CPT | Performed by: EMERGENCY MEDICINE

## 2023-02-14 PROCEDURE — 36415 COLL VENOUS BLD VENIPUNCTURE: CPT | Performed by: EMERGENCY MEDICINE

## 2023-02-14 PROCEDURE — 83605 ASSAY OF LACTIC ACID: CPT | Performed by: EMERGENCY MEDICINE

## 2023-02-14 PROCEDURE — 258N000003 HC RX IP 258 OP 636: Performed by: EMERGENCY MEDICINE

## 2023-02-14 PROCEDURE — 99207 PR NO CHARGE LOS: CPT | Performed by: INTERNAL MEDICINE

## 2023-02-14 PROCEDURE — 99285 EMERGENCY DEPT VISIT HI MDM: CPT | Mod: 25

## 2023-02-14 PROCEDURE — 250N000013 HC RX MED GY IP 250 OP 250 PS 637: Performed by: EMERGENCY MEDICINE

## 2023-02-14 PROCEDURE — 85025 COMPLETE CBC W/AUTO DIFF WBC: CPT | Performed by: EMERGENCY MEDICINE

## 2023-02-14 PROCEDURE — 71045 X-RAY EXAM CHEST 1 VIEW: CPT

## 2023-02-14 PROCEDURE — 93005 ELECTROCARDIOGRAM TRACING: CPT

## 2023-02-14 RX ORDER — ACETAMINOPHEN 500 MG
500-1000 TABLET ORAL EVERY 4 HOURS PRN
Qty: 30 TABLET | Refills: 0 | Status: SHIPPED | OUTPATIENT
Start: 2023-02-14 | End: 2023-06-07

## 2023-02-14 RX ORDER — BENZONATATE 100 MG/1
100 CAPSULE ORAL 3 TIMES DAILY PRN
Qty: 30 CAPSULE | Refills: 0 | Status: SHIPPED | OUTPATIENT
Start: 2023-02-14 | End: 2023-03-15

## 2023-02-14 RX ORDER — ACETAMINOPHEN 500 MG
1000 TABLET ORAL ONCE
Status: COMPLETED | OUTPATIENT
Start: 2023-02-14 | End: 2023-02-14

## 2023-02-14 RX ADMIN — ACETAMINOPHEN 1000 MG: 500 TABLET ORAL at 12:55

## 2023-02-14 RX ADMIN — SODIUM CHLORIDE 1000 ML: 9 INJECTION, SOLUTION INTRAVENOUS at 12:48

## 2023-02-14 ASSESSMENT — ENCOUNTER SYMPTOMS
SHORTNESS OF BREATH: 0
SORE THROAT: 1
DIARRHEA: 0
CONFUSION: 0
VOMITING: 0
COUGH: 1
FEVER: 1
RHINORRHEA: 1
WEAKNESS: 1

## 2023-02-14 ASSESSMENT — ACTIVITIES OF DAILY LIVING (ADL): ADLS_ACUITY_SCORE: 37

## 2023-02-14 NOTE — TELEPHONE ENCOUNTER
RN COVID TREATMENT VISIT  02/14/23    Timmy Strange  82 year old  Current weight? 189 lbs    Has the patient been seen by a primary care provider at an [facility] or [facility] within the past two years? Yes.   Have you been in close proximity to/do you have a known exposure to a person with a confirmed case of influenza? No.     Date of positive COVID test (PCR or at home)?  2/13/23    Current COVID symptoms: fever or chills, cough, fatigue, muscle or body aches, new loss of taste or smell, sore throat, congestion or runny nose and nausea or vomiting. Some shortness of breath once in awhile. Checked yesterday at  and no worsening of breathing.     Date COVID symptoms began: 2/12/23    Do you have any of the following conditions that place you at risk of being very sick from COVID-19? 65 years or older, diabetes and overweight (BMI>25)    Is patient eligible to continue? Yes, established patient, 12 years or older weighing at least 88.2 lbs, who has COVID symptoms that started in the past 5 days and is at risk for being very sick from COVID-19.       Have you received monoclonal antibodies or oral antiviral medications since testing positive to COVID-19? No    Are you currently hospitalized for COVID-19? No    Do you have a history of hepatitis? No    Are you currently pregnant or nursing? No    Do you have a clinically significant hypersensitivity to nirmatrelvir, ritonavir, or molnupiravir? No    Do you have any history of severe renal impairment (eGFR < 30mL/min)? No    Do you have any history of hepatic impairment or abnormalities (e.g. hepatic panel, ALT, AST, ALK Phos, bilirubin)? No    Have you had a coronary stent placed in the previous 6 months? No    Is patient eligible to continue?   Yes, patient meets all eligibility requirements for the RN COVID treatment (as denoted by all no responses above).     Current Outpatient Medications   Medication Sig Dispense Refill      acetaminophen (TYLENOL) 500 MG tablet Take 1 tablet (500 mg) by mouth every 4 hours as needed for mild pain 30 tablet 0     benzonatate (TESSALON) 100 MG capsule Take 1 capsule (100 mg) by mouth 3 times daily as needed for cough 21 capsule 0     blood glucose monitoring (ONE TOUCH ULTRA 2) meter device kit        carvedilol (COREG) 3.125 MG tablet TAKE 1 TABLET BY MOUTH  TWICE DAILY 180 tablet 3     Ferrous Sulfate 324 (65 Fe) MG TBEC Take 1 tablet (324 mg) by mouth daily       Fexofenadine HCl (ALLEGRA PO) Take 180 mg by mouth daily        finasteride (PROSCAR) 5 MG tablet Take 1 tablet (5 mg) by mouth daily 90 tablet 4     FLUoxetine (PROZAC) 10 MG capsule Take 10 mg by mouth       ibuprofen (ADVIL/MOTRIN) 600 MG tablet Take 1 tablet (600 mg) by mouth every 8 hours as needed for moderate pain 30 tablet 1     insulin glargine (LANTUS SOLOSTAR) 100 UNIT/ML pen        latanoprost (XALATAN) 0.005 % ophthalmic solution Place 1 drop into both eyes daily       levothyroxine (SYNTHROID/LEVOTHROID) 88 MCG tablet Take 88 mcg by mouth daily.       losartan (COZAAR) 100 MG tablet TAKE 1 TABLET BY MOUTH  DAILY 100 tablet 2     lovastatin (MEVACOR) 40 MG tablet TAKE 1 TABLET BY MOUTH  DAILY AT BEDTIME 90 tablet 3     metFORMIN (GLUCOPHAGE XR) 500 MG 24 hr tablet        Multiple Vitamins-Minerals (CENTRUM SILVER) per tablet Take 1 tablet by mouth daily       neomycin-polymyxin-dexamethasone (MAXITROL) 3.5-27705-6.1 ophthalmic ointment APPLY 1/4 INCH FILM ON SURGICAL SITE INCISION FOUR TIMES A DAY FOR 1 WEEK, THEN STOP       omeprazole (PRILOSEC) 20 MG DR capsule Take 1 capsule (20 mg) by mouth daily 90 capsule 1     BINAXNOW COVID-19 AG HOME TEST KIT TEST AS DIRECTED TODAY       omeprazole (PRILOSEC) 20 MG DR capsule Take 1 capsule (20 mg) by mouth daily 90 capsule 1     ONETOUCH ULTRA test strip        tamsulosin (FLOMAX) 0.4 MG capsule Take 2 capsules (0.8 mg) by mouth daily 180 capsule 3       Medications from List 1 of the  standing order (on medications that exclude the use of Paxlovid) that patient is taking: NONE. Is patient taking Lazarus's Wort? No  Is patient taking Lazarus's Wort or any meds from List 1? No.   Medications from List 2 of the standing order (on meds that provider needs to adjust) that patient is taking: NONE. Is patient on any of the meds from List 2? No.   Medications from List 3 of standing order (on meds that a RN needs to adjust) that patient is taking: lovastatin (Altoprev): Instructed patient to stop taking lovastatin while taking Paxlovid and first dose of Paxlovid must be at least 12 hours after last dose of lovastatin.  Instructed to restart lovastatin 5 days after the completion of Paxlovid. Is patient on any meds from List 3? Yes. Patient is on meds from list 3. No meds require a provider visit and at least one med required RN to adjust.   In order of efficacy, Paxlovid has an approximate 90% reduction in hospitalization. Paxlovid can possibly cause altered sense of taste, diarrhea (loose, watery stools), high blood pressure, muscle aches.  The other option is molnupiravir which has an approximate 30% reduction in hospitalization. Molnupirarivr can possibly cause diarrhea (loose, watery stools), nausea (feeling sick to your stomach), dizziness, headaches.    Which treatment option does the patient prefer?   Paxlovid.   Lab Results   Component Value Date    GFRESTIMATED 90 09/21/2022       Was last eGFR reduced? No, eGFR 60 or greater/ No Result on record. Patient can receive the normal renal function dose. Paxlovid Rx sent to [place] pharmacy   [facility]    Temporary change to home medications: Lovastatin hold while taking Paxlovid and to restart 5 days after completion.     All medication adjustments (holds, etc) were discussed with the patient and patient was asked to repeat back (teachback) their med adjustment.  Did patient understand med adjustment? Yes, patient repeated back and understood  correctly.        Reviewed the following instructions with the patient:    Paxlovid (nimatrelvir and ritonavir)    How it works  Two medicines (nirmatrelvir and ritonavir) are taken together. They stop the virus from growing. Less amount of virus is easier for your body to fight.    How to take    Medicine comes in a daily container with both medicine tablets. Take by mouth twice daily (once in the morning, once at night) for 5 days.    The number of tablets to take varies by patient.    Don't chew or break capsules. Swallow whole.    When to take  Take as soon as possible after positive COVID-19 test result, and within 5 days of your first symptoms.    Possible side effects  Can cause altered sense of taste, diarrhea (loose, watery stools), high blood pressure, muscle aches.    Cate Ward RN

## 2023-02-14 NOTE — DISCHARGE INSTRUCTIONS
Return immediately if the oxygen is persistently less than 90%. Pulse oximeter provided.  Take Paxlovid.  Hold statin as instructed.  Return immediately with worsening weakness, confusion, or any other new or concerning symptoms.  Otherwise, please follow-up with primary care to ensure you are improving as expected.  Make sure you are resting and drinking plenty of fluids.  Use Tylenol or ibuprofen as needed for pain or fever.  Strict isolation so as not to get others ill. -- Per the CDC, you need to isolate for minimum of 5 days from when symptoms started AND 24 hours after fever resolves (without fever reducing medications) AND improvement of symptoms (whichever is longer).

## 2023-02-14 NOTE — ED NOTES
Bed: ED03  Expected date:   Expected time:   Means of arrival:   Comments:  [place] - 82 M confusion weakness covid positive eta 0943

## 2023-02-14 NOTE — ED TRIAGE NOTES
Pt has generalized weakness for the past few days   After getting covid     Pt's wife noted increased confusion compared to baseline       BGL -177

## 2023-02-14 NOTE — ED PROVIDER NOTES
"  History     Chief Complaint:  Generalized Weakness       The history is provided by the patient (Timmy).      Timmy Strange is an 82 year old male with a history of IDDM and HTN who presents with weakness. Two days ago he noticed he seemed more fatigued. He had a sore throat and runny nose but no fever.  Today he also has cough and feels like he has a subjective fever. He went to urgent care yesterday and was diagnosed with COVID.  He had a telephone visit subsequently wherein he was prescribed Paxlovid which he has not yet started.  He tells me he is here today because his wife made him come.  He tells me she thought he was confused but he does not feel like he was, remarking she is younger than him and feels all of his responses should be quite quick.  He tells me, \"I am 82 years old and nothing I do is fast\".  He denies chest pain, leg swelling, or shortness of breath. He denies vomiting or diarrhea.     Independent Historian:   See above    Review of External Notes: Reviewed patient's urgent care visit from yesterday and call this morning where the patient was prescribed Paxlovid and reviewed which medications to hold while on this.    ROS:  Review of Systems   Constitutional: Positive for fatigue and fever.   HENT: Positive for rhinorrhea and sore throat.    Respiratory: Positive for cough. Negative for shortness of breath.    Cardiovascular: Negative for chest pain and leg swelling.   Gastrointestinal: Negative for diarrhea and vomiting.   Neurological: Positive for weakness.   Psychiatric/Behavioral: Negative for confusion.   All other systems reviewed and are negative.      Allergies:  Shellfish Allergy     Medications:    carvedilol   finasteride   FLUoxetine   insulin glargine   levothyroxine   losartan   lovastatin   metFORMIN   nirmatrelvir and ritonavir - as per HPI  omeprazole   tamsulosin     Past Medical History:    HTN  Hypothyroidism  Type 2 diabetes  Benign prostatic hyperplasia  Erectile " "dysfunction  Edema  Glaucoma  Anemia  Gastroesophageal reflux disease  Internal hemorrhoids  Oropharyngeal dysphagia    Past Surgical History:    Colonoscopy  Eye surgery  Vasectomy  Tonsillectomy  Adenoidectomy     Family History:    Father-Arthritis, thyroid disease  Mother-Glaucoma, cirrhosis  Daughter-Diabetes, obesity    Social History:  The patient presents to the ED alone via EMS.  Patient sent with a DNR  Has had the COVID-19 vaccine.  Former smoker.    Physical Exam     Patient Vitals for the past 24 hrs:   BP Temp Temp src Pulse Resp SpO2 Height Weight   02/14/23 1230 -- -- -- -- -- 93 % -- --   02/14/23 1220 139/53 100.3  F (37.9  C) Oral 69 16 93 % -- --   02/14/23 1213 -- -- -- -- -- -- 1.676 m (5' 6\") 86.2 kg (190 lb)      Physical Exam  General: Well-developed and well-nourished. Well appearing elderly  man. Cooperative.  Head:  Atraumatic.  Eyes:  Conjunctivae, lids, and sclerae are normal.  Neck:  Supple. Normal range of motion.  CV:  Regular rate and rhythm. Normal heart sounds with no murmurs, rubs, or gallops detected.  Resp:  No respiratory distress. Clear to auscultation bilaterally without decreased breath sounds, wheezing, rales, or rhonchi.  GI:  Soft. Non-distended. Non-tender.    MS:  Normal ROM. No bilateral lower extremity edema.  Skin:  Warm. Non-diaphoretic. No pallor.  Neuro:  Awake. A&Ox3. No confusion. Normal strength.  Psych: Normal mood and affect. Normal speech.  Vitals reviewed.    Emergency Department Course   EKG  Indication: weakness and Covid  Time: 1238  Rate 65 bpm. SD interval 142. QRS duration 80. QT/QTc 374/388.   Normal sinus rhythm  Normal ECG   No acute ST changes.  No changes as compared to prior, dated 01/10/2023.    Imaging:  XR Chest Port 1 View   Final Result   IMPRESSION: Mild bibasilar patchy opacities suspicious for pneumonia   and/or atelectasis. No pleural effusion or pneumothorax stable   calcified granuloma right upper lobe and right lung base. " Normal heart   size. Tortuous aorta with atherosclerotic calcifications.      PRINCESS VALDIVIA MD            SYSTEM ID:  [id number]         Report per radiology    Laboratory:  Labs Ordered and Resulted from Time of ED Arrival to Time of ED Departure   BASIC METABOLIC PANEL - Abnormal       Result Value    Sodium 137      Potassium 4.4      Chloride 101      Carbon Dioxide (CO2) 28      Anion Gap 8      Urea Nitrogen 27.2 (*)     Creatinine 0.97      Calcium 8.7 (*)     Glucose 143 (*)     GFR Estimate 79     UA MACROSCOPIC WITH REFLEX TO MICRO AND CULTURE - Abnormal    Color Urine Yellow      Appearance Urine Clear      Glucose Urine Negative      Bilirubin Urine Negative      Ketones Urine Negative      Specific Gravity Urine 1.028      Blood Urine Small (*)     pH Urine 5.5      Protein Albumin Urine 30 (*)     Urobilinogen Urine Normal      Nitrite Urine Negative      Leukocyte Esterase Urine Negative      Mucus Urine Present (*)     RBC Urine 4 (*)     WBC Urine <1     CBC WITH PLATELETS AND DIFFERENTIAL - Abnormal    WBC Count 5.7      RBC Count 3.57 (*)     Hemoglobin 9.9 (*)     Hematocrit 31.8 (*)     MCV 89      MCH 27.7      MCHC 31.1 (*)     RDW 15.0      Platelet Count 201      % Neutrophils 61      % Lymphocytes 19      % Monocytes 20      % Eosinophils 0      % Basophils 0      % Immature Granulocytes 0      NRBCs per 100 WBC 0      Absolute Neutrophils 3.5      Absolute Lymphocytes 1.1      Absolute Monocytes 1.2      Absolute Eosinophils 0.0      Absolute Basophils 0.0      Absolute Immature Granulocytes 0.0      Absolute NRBCs 0.0     BLOOD GAS VENOUS - Abnormal    pH Venous 7.36      pCO2 Venous 51 (*)     pO2 Venous 25      Bicarbonate Venous 29 (*)     Base Excess/Deficit (+/-) 3.0 (*)     FIO2 99     LACTIC ACID WHOLE BLOOD - Normal    Lactic Acid 1.0          Emergency Department Course & Assessments:  Interventions:  Medications   0.9% sodium chloride BOLUS (1,000 mLs Intravenous New Bag 2/14/23  1248)   acetaminophen (TYLENOL) tablet 1,000 mg (1,000 mg Oral Given 2/14/23 1255)      Independent Interpretation (X-rays, CTs, rhythm strip):  Reviewed patient's xray.    Consultations/Discussion of Management or Tests:  ED Course as of 02/14/23 1417   Tue Feb 14, 2023   1405 Spoke with Timmy's RN who states he ambulated without hypoxia.   1413 Spoke with the patients wife, Ladan Strange, about discharge instructions.     Social Determinants of Health affecting care:   Supportive wife     Assessments:  1241 Obtained the patient's history and performed initial exam    Disposition:  The patient was discharged to home with a pulse oximeter.     Impression & Plan    Medical Decision Making:  Timmy is an 82 year old man developed URI symptoms 2 days ago and was diagnosed with COVID-19 yesterday at urgent care.  He has been prescribed Paxlovid but has not yet started it.  Reportedly he was sent here because his wife felt he was confused.  He tells me this is not the case and that she simply felt he was not responding as fast as he should which he attributes to his age.  Fortunately he appears well on exam.  He has clear lungs though his temperature is 100.3  F.  He is oriented x3 and not confused during my examination.    Due to report of weakness and confusion, a broad work-up was undertaken.  EKG is reassuring without acute ST changes or arrhythmias.  There is no lactic acidosis, leukocytosis, UTI, kidney injury, or electrolyte derangements.  Anemia of 9.9 is near his recent baseline of 10.4.  Chest x-ray reveals mild bibasilar patchy opacities suspicious for pneumonia which would be consistent with COVID-19.  However, he was ambulatory in the emergency department without hypoxia.  He continued to appear well after IV fluids and Tylenol.  As such, there is no indication for admission and he is appropriate for discharge.  I discussed this plan with Timmy and he verbalized understanding and is agreeable.  He did request  that I call his wife.  I spoke with Ladan over the phone who is disappointed the patient is being discharged as she felt he was not well at home.  I reviewed the results, his appearance here, and his trial of ambulation and she agrees to monitor him closely at home.  I will send him home with a pulse oximeter so she can make sure he is not becoming hypoxic.  She does agree to help him seek care if he has persistent hypoxia, recurrent confusion, or any other new or concerning symptoms.  He will start the previously prescribed Paxlovid and hold his statin as already instructed.  I recommended he follow-up with his primary care provider to ensure he is improving as expected and meanwhile practice supportive care with isolation, rest, fluids, and Tylenol or ibuprofen as needed for pain or fever.  All of Timmy and his wife's questions were answered prior to his discharge.      Diagnosis:    ICD-10-CM    1. Pneumonia due to 2019 novel coronavirus  U07.1     J12.82            Scribe Disclosure:  I, Rj Membreno, am serving as a scribe at 12:41 PM on 2/14/2023 to document services personally performed by Marjorie Welsh MD based on my observations and the provider's statements to me.     2/14/2023   Marjorie Welsh MD Dixson, Kylie S, MD  03/10/23 0128

## 2023-02-14 NOTE — TELEPHONE ENCOUNTER
COVID Positive/Requesting COVID treatment    Patient is positive for COVID and requesting treatment options.    Date of positive COVID test (PCR or at home)? 2/13/2023  Current COVID symptoms: fever or chills, cough, shortness of breath or difficulty breathing, fatigue, muscle or body aches, new loss of taste or smell, sore throat, congestion or runny nose and nausea or vomiting  Date COVID symptoms began: 2/12/2023    Message should be routed to clinic RN pool. Best phone number to use for call back: [phone]

## 2023-02-15 NOTE — TELEPHONE ENCOUNTER
OK to get the patient in for ER follow up visit in 7 days , on same day slot.     Thank you,  Michaela Sharma MD on 2/15/2023

## 2023-02-15 NOTE — TELEPHONE ENCOUNTER
Reason for Call:  Appointment Request    Patient requesting this type of appt:  Hospital/ED Follow-Up     Requested provider: Michaela Sharma    Reason patient unable to be scheduled: Not within requested timeframe    When does patient want to be seen/preferred time: 3-7 days    Comments:     Could we send this information to you in [employer] or would you prefer to receive a phone call?:   Patient would like to be contacted via [employer]    Call taken on 2/14/2023 at 6:03 PM by Yazmin Maldonado

## 2023-02-17 ENCOUNTER — VIRTUAL VISIT (OUTPATIENT)
Dept: FAMILY MEDICINE | Facility: CLINIC | Age: 83
End: 2023-02-17
Payer: COMMERCIAL

## 2023-02-17 DIAGNOSIS — U07.1 LAB TEST POSITIVE FOR DETECTION OF COVID-19 VIRUS: Primary | ICD-10-CM

## 2023-02-17 DIAGNOSIS — D64.9 ANEMIA, UNSPECIFIED TYPE: ICD-10-CM

## 2023-02-17 DIAGNOSIS — R53.83 FATIGUE, UNSPECIFIED TYPE: ICD-10-CM

## 2023-02-17 PROCEDURE — 99214 OFFICE O/P EST MOD 30 MIN: CPT | Mod: VID | Performed by: INTERNAL MEDICINE

## 2023-02-17 RX ORDER — NIRMATRELVIR AND RITONAVIR 300-100 MG
KIT ORAL
COMMUNITY
Start: 2023-02-14 | End: 2023-02-21

## 2023-02-17 NOTE — PROGRESS NOTES
Timmy is a 82 year old who is being evaluated via a billable video visit.      How would you like to obtain your AVS? MyChart  If the video visit is dropped, the invitation should be resent by: Text to cell phone: [phone]  Will anyone else be joining your video visit? Yes: Wife Ladan on her cell number. How would they like to receive their invitation? Text to cell phone: [phone]      Assessment and Plan  1. Lab test positive for detection of COVID-19 virus  Recent ER visit on 2/14/2023 for COVID positive and generalized weakness. Pt was positive on chest x-ray for Mild bibasilar patchy opacities suspicious for pneumonia. No pleural effusion or pneumothorax stable , Calcified granuloma right upper lobe and right lung base.  Findings discussed with the patient, reassured to complete his Paxlovid since he is on day 3 of his medication at this time.  ER precautions given on red flag symptoms when he should be in the ER.  Patient and wife Toshia on the call agreed and understood the plan.    2. Fatigue, unspecified type  Ongoing problem, uncontrolled.  Discussed other COVID presentation which he is likely having and also given his age of 82 years, emphasized on well hydration and good nutrition for improvement    3. Anemia, unspecified type  Chronic problem, follows hematology.  Given the recent decrease in his anemia levels on the ER visit ,will consider rechecking his labs in upcoming follow-up visit.    Wife Ladan was helping on the discussion on the call     Patient Instructions   As discussed , please complete the course of Paxlovid for next 2 days and keep well hydrated and good nutrition along with as needed Tylenol for helping you on the fatigue symptoms/     Will recheck your labs for anemia and also physical exam for your ongoing fatigue on 2/21/23. Go to ER immediately for any worsening symptoms of fatigue.     ===========================    As you recover from COVID-19     Patients who have  symptoms (cough, fever, or shortness of breath), need to isolate for 5 days from when symptoms started AND 72 hours after fever resolves (without fever reducing medications) AND improvement of respiratory symptoms (whichever is longer).     During this time:    Isolate yourself at home (in own room/own bathroom if possible)    Do not allow any visitors    Do not go to work or school    Do not go to [employer],  centers, shopping, or other public places    Do not shake hands    Avoid close and intimate contact with others (hugging, kissing)    Follow CDC recommendations for household cleaning of frequently touched services     After the initial 10 days, continue to isolate yourself from household members as much as possible. To continue decrease the risk of community spread and exposure, you and any members of your household should limit activities in public for 14 days after starting home isolation.      You can reference the following CDC link for helpful home isolation/care tips:  https://www.cdc.gov/coronavirus/2019-ncov/downloads/10Things.pdf     Protect Others:    Cover your mouth and nose with a mask, disposable tissue or wash cloth to avoid spreading germs.    Wash your hands and face often. Use soap and water     Call Back If: Breathing difficulty develops or you become worse.        For more information about COVID19 and options for caring for yourself at home, please visit the CDC website at https://www.cdc.gov/coronavirus/2019-ncov/about/steps-when-sick.html  For more options for care at [facility], please visit our website at [email or web address]    For information about [facility] COVID-19 Clinical Trials, please visit [email or web address]     For more information, please use the [place] Department of Health COVID-19 Website: [email or web address]/[email or web address]  [facility]  Health ([facility]) COVID-19 Hotlines (Interpreters   available):                Health questions: Phone Number: [phone] or [phone] and Hours: 7 a.m. to 7 p.m.    Schools and  questions: Phone Number: [phone] or [phone] and Hours 7 a.m. to 7 p.m.         Coping with Life After COVID-19  Being in the hospital because of COVID-19 is scary. Going home can be scary, too. You may face changes to your life, the way you work or what you can eat. It s hard to adjust to change, and it s normal to feel afraid, frustrated or even angry. These feelings usually go away over time. If your feelings don t start to get better, it s called  adjustment disorder.       What signs should I look out for?  Adjustment disorder can happen to anyone in a time of stress. It makes it hard to cope with daily life. You may feel lonely or fight with loved ones, even if you re glad to be home. Watch for these signs:    Fear or worry    Hard time focusing    Sadness or anger    Trouble talking to family or friends    Feeling like you don t fit in or isolating yourself    Problems with sleep     Drinking alcohol or taking drugs to cope     What can I do?  You can help yourself get better. Feeling you have control helps you move forward. You may wonder if you ll be able to do things you did before. Be patient. Do your best to make the most of every day. Try to build relationships, be as active as you can, eat right and keep a sense of humor. Avoid smoking and drinking too much alcohol. Call your family doctor or clinic if you re not sure what to do. They can guide you to care or other services.     When should I get help?  Think about getting counseling if your sadness or frustration gets worse. Together with a trained counselor, you can talk about your problems adjusting to life after your hospital stay. You can come up with new ways to handle changes that give you more control. Your family doctor or care team can help you  find a counselor.      Get help if you re thinking about hurting yourself. If you need help right away, call 911 or go to the nearest emergency room. You can also try the Crisis Text Line.     Crisis Text Line: 456-247 (http://www.crisistextline.org)  The Crisis Text Line serves anyone, in any crisis. It gives free, 24/7 support. Here's how it works:  1. Text HOME to 114031 from anywhere in the [place], anytime, about any type of crisis.  2. A live, trained Crisis Counselor will text you back quickly.  3. The volunteer Crisis Counselor can help you move from a  hot moment  to a  cool moment.  They can also help you work out a safety plan.     Return in about 4 days (around 2/21/2023), or if symptoms worsen or fail to improve, for Follow up of last visit, needed labs/ .    Michaela Sharma MD  [facility] LENARD Warner is a 82 year old presenting for the following health issues:  Follow Up (ED- Covid Pneumonia )      HPI     Tested Positive for Covid 2/13/23  Taking Paxlovid    ED/UC Followup:    Facility:  [facility]    Date of visit: 2/14/23  Reason for visit: Weakness   Current Status: Starting to feel better. Still has productive cough and congestion.        Allergies   Allergen Reactions     Shellfish Allergy         Past Medical History:   Diagnosis Date     Arthritis 1970    Dx'd with RA when in the      BPH (benign prostatic hyperplasia) 12/16/2013     Cancer (H)     basal cell cancer behind ear     Diabetes mellitus      ED (erectile dysfunction) 1/6/2015     HTN (hypertension)      Hyperlipidemia LDL goal <100      Hypothyroidism      Mumps      Obesity        Past Surgical History:   Procedure Laterality Date     BIOPSY       COLONOSCOPY N/A 11/21/2014    Procedure: COMBINED COLONOSCOPY, SINGLE OR MULTIPLE BIOPSY/POLYPECTOMY BY BIOPSY;  Surgeon: Rolanda Bey MD;  Location:  GI     COLONOSCOPY N/A 1/20/2020    Procedure:  COLONOSCOPY, WITH POLYPECTOMY AND BIOPSY;  Surgeon: Christina Vieira MD;  Location:  GI     COLONOSCOPY N/A 2021    Procedure: Colonoscopy, With Polypectomy And Biopsy;  Surgeon: Christina Vieira MD;  Location:  GI     ESOPHAGOSCOPY, GASTROSCOPY, DUODENOSCOPY (EGD), COMBINED N/A 3/16/2021    Procedure: ESOPHAGOGASTRODUODENOSCOPY, WITH BIOPSY;  Surgeon: Jose Schrader MD;  Location:  GI     EYE SURGERY       TESTICLE SURGERY       TONSILLECTOMY, ADENOIDECTOMY, COMBINED       VASECTOMY         Family History   Problem Relation Age of Onset     Diabetes Maternal Grandmother      Diabetes Maternal Grandfather      Arthritis Maternal Grandfather      Arthritis Father      Thyroid Disease Father      Glaucoma Mother      Alcohol/Drug Mother 49        cirrhosis     Diabetes Daughter 44        type 2     Obesity Daughter      Glaucoma Maternal Aunt        Social History     Tobacco Use     Smoking status: Former     Packs/day: 0.00     Years: 0.00     Pack years: 0.00     Types: Pipe, Cigarettes     Start date: 1960     Quit date: 11/15/2011     Years since quittin.2     Smokeless tobacco: Never     Tobacco comments:     Smoked pipes 10 minutes a day started 22 yrs old , quit 10 yrs back.   Substance Use Topics     Alcohol use: No     Alcohol/week: 0.0 standard drinks        Current Outpatient Medications   Medication     acetaminophen (TYLENOL) 500 MG tablet     benzonatate (TESSALON) 100 MG capsule     blood glucose monitoring (ONE TOUCH ULTRA 2) meter device kit     carvedilol (COREG) 3.125 MG tablet     Ferrous Sulfate 324 (65 Fe) MG TBEC     Fexofenadine HCl (ALLEGRA PO)     finasteride (PROSCAR) 5 MG tablet     FLUoxetine (PROZAC) 10 MG capsule     ibuprofen (ADVIL/MOTRIN) 600 MG tablet     insulin glargine (LANTUS SOLOSTAR) 100 UNIT/ML pen     latanoprost (XALATAN) 0.005 % ophthalmic solution     levothyroxine (SYNTHROID/LEVOTHROID) 88 MCG tablet     losartan (COZAAR) 100 MG tablet      metFORMIN (GLUCOPHAGE XR) 500 MG 24 hr tablet     Multiple Vitamins-Minerals (CENTRUM SILVER) per tablet     neomycin-polymyxin-dexamethasone (MAXITROL) 3.5-66760-5.1 ophthalmic ointment     nirmatrelvir and ritonavir (PAXLOVID) therapy pack     omeprazole (PRILOSEC) 20 MG DR capsule     omeprazole (PRILOSEC) 20 MG DR capsule     ONETOUCH ULTRA test strip     tamsulosin (FLOMAX) 0.4 MG capsule     acetaminophen (TYLENOL) 500 MG tablet     benzonatate (TESSALON) 100 MG capsule     BINAXNOW COVID-19 AG HOME TEST KIT     lovastatin (MEVACOR) 40 MG tablet     PAXLOVID, 300/100, therapy pack     No current facility-administered medications for this visit.        Review of Systems   Constitutional, HEENT, cardiovascular, pulmonary, GI, , musculoskeletal, neuro, skin, endocrine and psych systems are negative, except as otherwise noted.      Objective           Vitals:  No vitals were obtained today due to virtual visit.    Physical Exam   GENERAL: Healthy, alert and no distress  EYES: Eyes grossly normal to inspection.  No discharge or erythema, or obvious scleral/conjunctival abnormalities.  RESP: No audible wheeze, cough, or visible cyanosis.  No visible retractions or increased work of breathing.    SKIN: Visible skin clear. No significant rash, abnormal pigmentation or lesions.  NEURO: Cranial nerves grossly intact.  Mentation and speech appropriate for age.  PSYCH: Mentation appears normal, affect normal/bright, judgement and insight intact, normal speech and appearance well-groomed.    Labs and imaging reviewed and discussed with the patient.    Video-Visit Details    Type of service:  Video Visit   Video Start Time: Joined the call at 2/17/2023, 9:37:58 am.  Video End Time:Left the call at 2/17/2023, 9:57:26 am.    Originating Location (pt. Location): Home  Distant Location (provider location):  On-site  Platform used for Video Visit: Darrin

## 2023-02-17 NOTE — PATIENT INSTRUCTIONS
As discussed , please complete the course of Paxlovid for next 2 days and keep well hydrated and good nutrition along with as needed Tylenol for helping you on the fatigue symptoms/     Will recheck your labs for anemia and also physical exam for your ongoing fatigue on 2/21/23. Go to ER immediately for any worsening symptoms of fatigue.     ===========================    As you recover from COVID-19     Patients who have symptoms (cough, fever, or shortness of breath), need to isolate for 5 days from when symptoms started AND 72 hours after fever resolves (without fever reducing medications) AND improvement of respiratory symptoms (whichever is longer).     During this time:  Isolate yourself at home (in own room/own bathroom if possible)  Do not allow any visitors  Do not go to work or school  Do not go to [employer],  centers, shopping, or other public places  Do not shake hands  Avoid close and intimate contact with others (hugging, kissing)  Follow CDC recommendations for household cleaning of frequently touched services     After the initial 10 days, continue to isolate yourself from household members as much as possible. To continue decrease the risk of community spread and exposure, you and any members of your household should limit activities in public for 14 days after starting home isolation.      You can reference the following CDC link for helpful home isolation/care tips:  https://www.cdc.gov/coronavirus/2019-ncov/downloads/10Things.pdf     Protect Others:  Cover your mouth and nose with a mask, disposable tissue or wash cloth to avoid spreading germs.  Wash your hands and face often. Use soap and water     Call Back If: Breathing difficulty develops or you become worse.        For more information about COVID19 and options for caring for yourself at home, please visit the CDC website at https://www.cdc.gov/coronavirus/2019-ncov/about/steps-when-sick.html  For more options for care at [facility]  [place], please visit our website at [email or web address]    For information about [facility] COVID-19 Clinical Trials, please visit [email or web address]     For more information, please use the [place] Department of Health COVID-19 Website: https://www.[email or web address]/diseases/coronavirus/index.html  [place] Department of Health ([facility]) COVID-19 Hotlines (Interpreters   available):              Health questions: Phone Number: [phone] or [phone] and Hours: 7 a.m. to 7 p.m.  Schools and  questions: Phone Number: [phone] or [phone] and Hours 7 a.m. to 7 p.m.       Coping with Life After COVID-19  Being in the hospital because of COVID-19 is scary. Going home can be scary, too. You may face changes to your life, the way you work or what you can eat. It s hard to adjust to change, and it s normal to feel afraid, frustrated or even angry. These feelings usually go away over time. If your feelings don t start to get better, it s called  adjustment disorder.       What signs should I look out for?  Adjustment disorder can happen to anyone in a time of stress. It makes it hard to cope with daily life. You may feel lonely or fight with loved ones, even if you re glad to be home. Watch for these signs:  Fear or worry  Hard time focusing  Sadness or anger  Trouble talking to family or friends  Feeling like you don t fit in or isolating yourself  Problems with sleep   Drinking alcohol or taking drugs to cope     What can I do?  You can help yourself get better. Feeling you have control helps you move forward. You may wonder if you ll be able to do things you did before. Be patient. Do your best to make the most of every day. Try to build relationships, be as active as you can, eat right and keep a sense of humor. Avoid smoking and drinking too much alcohol. Call your family doctor or clinic if you re not sure what to  do. They can guide you to care or other services.     When should I get help?  Think about getting counseling if your sadness or frustration gets worse. Together with a trained counselor, you can talk about your problems adjusting to life after your hospital stay. You can come up with new ways to handle changes that give you more control. Your family doctor or care team can help you find a counselor.      Get help if you re thinking about hurting yourself. If you need help right away, call 911 or go to the nearest emergency room. You can also try the Crisis Text Line.     Crisis Text Line: 275-625 (http://www.crisistextline.org)  The Crisis Text Line serves anyone, in any crisis. It gives free, 24/7 support. Here's how it works:  Text HOME to 793633 from anywhere in the [place], anytime, about any type of crisis.  A live, trained Crisis Counselor will text you back quickly.  The volunteer Crisis Counselor can help you move from a  hot moment  to a  cool moment.  They can also help you work out a safety plan.

## 2023-02-21 ENCOUNTER — ANCILLARY PROCEDURE (OUTPATIENT)
Dept: GENERAL RADIOLOGY | Facility: CLINIC | Age: 83
End: 2023-02-21
Attending: INTERNAL MEDICINE
Payer: COMMERCIAL

## 2023-02-21 ENCOUNTER — OFFICE VISIT (OUTPATIENT)
Dept: FAMILY MEDICINE | Facility: CLINIC | Age: 83
End: 2023-02-21
Payer: COMMERCIAL

## 2023-02-21 VITALS
HEIGHT: 66 IN | BODY MASS INDEX: 30.67 KG/M2 | HEART RATE: 72 BPM | DIASTOLIC BLOOD PRESSURE: 54 MMHG | OXYGEN SATURATION: 97 % | SYSTOLIC BLOOD PRESSURE: 118 MMHG | RESPIRATION RATE: 20 BRPM | TEMPERATURE: 97.4 F

## 2023-02-21 DIAGNOSIS — R05.9 COUGH, UNSPECIFIED TYPE: ICD-10-CM

## 2023-02-21 DIAGNOSIS — J20.9 ACUTE BRONCHITIS, UNSPECIFIED ORGANISM: Primary | ICD-10-CM

## 2023-02-21 DIAGNOSIS — J18.9 PNEUMONIA OF BOTH LOWER LOBES DUE TO INFECTIOUS ORGANISM: ICD-10-CM

## 2023-02-21 DIAGNOSIS — U07.1 INFECTION DUE TO 2019 NOVEL CORONAVIRUS: ICD-10-CM

## 2023-02-21 DIAGNOSIS — D64.9 ANEMIA, UNSPECIFIED TYPE: ICD-10-CM

## 2023-02-21 DIAGNOSIS — J20.9 ACUTE BRONCHITIS, UNSPECIFIED ORGANISM: ICD-10-CM

## 2023-02-21 DIAGNOSIS — I10 ESSENTIAL HYPERTENSION: ICD-10-CM

## 2023-02-21 LAB
ERYTHROCYTE [DISTWIDTH] IN BLOOD BY AUTOMATED COUNT: 14.9 % (ref 10–15)
HCT VFR BLD AUTO: 30.5 % (ref 40–53)
HGB BLD-MCNC: 9.6 G/DL (ref 13.3–17.7)
MCH RBC QN AUTO: 27.4 PG (ref 26.5–33)
MCHC RBC AUTO-ENTMCNC: 31.5 G/DL (ref 31.5–36.5)
MCV RBC AUTO: 87 FL (ref 78–100)
PLATELET # BLD AUTO: 322 10E3/UL (ref 150–450)
RBC # BLD AUTO: 3.51 10E6/UL (ref 4.4–5.9)
WBC # BLD AUTO: 5.6 10E3/UL (ref 4–11)

## 2023-02-21 PROCEDURE — 99214 OFFICE O/P EST MOD 30 MIN: CPT | Performed by: INTERNAL MEDICINE

## 2023-02-21 PROCEDURE — 36415 COLL VENOUS BLD VENIPUNCTURE: CPT | Performed by: INTERNAL MEDICINE

## 2023-02-21 PROCEDURE — 71046 X-RAY EXAM CHEST 2 VIEWS: CPT | Mod: TC | Performed by: RADIOLOGY

## 2023-02-21 PROCEDURE — 85027 COMPLETE CBC AUTOMATED: CPT | Performed by: INTERNAL MEDICINE

## 2023-02-21 RX ORDER — PREDNISONE 20 MG/1
40 TABLET ORAL DAILY
Qty: 10 TABLET | Refills: 0 | Status: SHIPPED | OUTPATIENT
Start: 2023-02-21 | End: 2023-03-15

## 2023-02-21 RX ORDER — AZITHROMYCIN 250 MG/1
TABLET, FILM COATED ORAL
Qty: 6 TABLET | Refills: 0 | Status: SHIPPED | OUTPATIENT
Start: 2023-02-21 | End: 2023-02-26

## 2023-02-21 RX ORDER — ALBUTEROL SULFATE 90 UG/1
2 AEROSOL, METERED RESPIRATORY (INHALATION) EVERY 6 HOURS PRN
Qty: 18 G | Refills: 1 | Status: SHIPPED | OUTPATIENT
Start: 2023-02-21

## 2023-02-21 ASSESSMENT — PAIN SCALES - GENERAL: PAINLEVEL: NO PAIN (0)

## 2023-02-21 NOTE — PATIENT INSTRUCTIONS
As discussed , sent in emperic bronchitis therapy to pharmacy.     Given your X ray 10 days back showing pneumonia , with worsening cough will make sure no post pneumonia complication by a recheck of X ray .     =============================

## 2023-02-21 NOTE — PROGRESS NOTES
Assessment and Plan      1. Acute bronchitis, unspecified organism  New problem, possible secondary bacterial infection following the COVID-pneumonia as mentioned below.  We will treat him empirically with azithromycin, prednisone and as needed albuterol.  Please see below for further details to substantiate this.  - Blood Pressure Monitoring (BLOOD PRESSURE KIT) KELLY; 1 each 2 times daily  Dispense: 1 each; Refill: 0  - CBC with platelets; Future  - XR Chest 2 Views; Future  - azithromycin (ZITHROMAX) 250 MG tablet; Take 2 tablets (500 mg) by mouth daily for 1 day, THEN 1 tablet (250 mg) daily for 4 days.  Dispense: 6 tablet; Refill: 0  - predniSONE (DELTASONE) 20 MG tablet; Take 2 tablets (40 mg) by mouth daily  Dispense: 10 tablet; Refill: 0  - albuterol (PROAIR HFA/PROVENTIL HFA/VENTOLIN HFA) 108 (90 Base) MCG/ACT inhaler; Inhale 2 puffs into the lungs every 6 hours as needed for shortness of breath, wheezing or cough  Dispense: 18 g; Refill: 1  - CBC with platelets    2. Pneumonia of both lower lobes due to infectious organism  3. Cough, unspecified type  4. Infection due to 2019 novel coronavirus  Recent ER visit with ongoing symptoms of fatigue and chest x-ray positive for bibasal pneumonias.  Given the physical exam positive for severely decreased breath sounds bilaterally on the lungs we will recheck his chest x-ray to make sure there is no posttraumatic complications given the patient having ongoing productive cough in spite of day 9 today.  - XR Chest 2 Views; Future    5. Anemia, unspecified type  Chronic anemia, following hematology.  But given the current low hemoglobin levels will check once at this time, he will follow-up with hematology for the standing orders placed.  - CBC with platelets; Future  - CBC with platelets    6. Essential hypertension  Given the ongoing fatigue and blood pressure today in the 100/50s discussed on possibly cutting down his losartan if his blood pressure remains low.   Patient does not have a blood pressure monitor at home, will send to pharmacy.  - Blood Pressure Monitoring (BLOOD PRESSURE KIT) KELLY; 1 each 2 times daily  Dispense: 1 each; Refill: 0      Wife Ladan is also in the room, answered all the questions       Patient Instructions   As discussed , sent in emperic bronchitis therapy to pharmacy.     Given your X ray 10 days back showing pneumonia , with worsening cough will make sure no post pneumonia complication by a recheck of X ray .     =============================    Return in about 22 days (around 3/15/2023), or if symptoms worsen or fail to improve, for Annual Wellness Exam.    Michaela Sharma MD  [facility] LENARD Warner is a 82 year old, presenting for the following health issues:  Covid Concern (Follow up )      History of Present Illness       Diabetes:   He presents for follow up of diabetes.  He is checking home blood glucose two times daily. He checks blood glucose before meals.  Blood glucose is never over 200 and never under 70. He is aware of hypoglycemia symptoms including confusion. He has no concerns regarding his diabetes at this time.  He is not experiencing numbness or burning in feet, excessive thirst, blurry vision, weight changes or redness, sores or blisters on feet.         Hypertension: He presents for follow up of hypertension.  He does not check blood pressure  regularly outside of the clinic. Outside blood pressures have been over 140/90. He follows a low salt diet.     Reason for visit:  Covid19    He eats 2-3 servings of fruits and vegetables daily.He consumes 1 sweetened beverage(s) daily.He exercises with enough effort to increase his heart rate 10 to 19 minutes per day.  He exercises with enough effort to increase his heart rate 3 or less days per week.   He is taking medications regularly.    Recently seen for virtual visit during the COVID, continues to have ongoing cough and fatigue.        Allergies   Allergen Reactions     Shellfish Allergy         Past Medical History:   Diagnosis Date     Arthritis 1970    Dx'd with RA when in the      BPH (benign prostatic hyperplasia) 2013     Cancer (H)     basal cell cancer behind ear     Diabetes mellitus      ED (erectile dysfunction) 2015     HTN (hypertension)      Hyperlipidemia LDL goal <100      Hypothyroidism      Mumps      Obesity        Past Surgical History:   Procedure Laterality Date     BIOPSY       COLONOSCOPY N/A 2014    Procedure: COMBINED COLONOSCOPY, SINGLE OR MULTIPLE BIOPSY/POLYPECTOMY BY BIOPSY;  Surgeon: Rolanda Bey MD;  Location:  GI     COLONOSCOPY N/A 2020    Procedure: COLONOSCOPY, WITH POLYPECTOMY AND BIOPSY;  Surgeon: Christina Vieira MD;  Location:  GI     COLONOSCOPY N/A 2021    Procedure: Colonoscopy, With Polypectomy And Biopsy;  Surgeon: Christina Vieira MD;  Location:  GI     ESOPHAGOSCOPY, GASTROSCOPY, DUODENOSCOPY (EGD), COMBINED N/A 3/16/2021    Procedure: ESOPHAGOGASTRODUODENOSCOPY, WITH BIOPSY;  Surgeon: Jose Schrader MD;  Location:  GI     EYE SURGERY       TESTICLE SURGERY       TONSILLECTOMY, ADENOIDECTOMY, COMBINED       VASECTOMY         Family History   Problem Relation Age of Onset     Diabetes Maternal Grandmother      Diabetes Maternal Grandfather      Arthritis Maternal Grandfather      Arthritis Father      Thyroid Disease Father      Glaucoma Mother      Alcohol/Drug Mother 49        cirrhosis     Diabetes Daughter 44        type 2     Obesity Daughter      Glaucoma Maternal Aunt        Social History     Tobacco Use     Smoking status: Former     Packs/day: 0.00     Years: 0.00     Pack years: 0.00     Types: Pipe, Cigarettes     Start date: 1958     Quit date: 11/15/2011     Years since quittin.2     Smokeless tobacco: Never     Tobacco comments:     Smoked pipes 10 minutes a day started 22 yrs old , quit 10 yrs back.   Substance Use  "Topics     Alcohol use: No     Alcohol/week: 0.0 standard drinks        Current Outpatient Medications   Medication     albuterol (PROAIR HFA/PROVENTIL HFA/VENTOLIN HFA) 108 (90 Base) MCG/ACT inhaler     azithromycin (ZITHROMAX) 250 MG tablet     benzonatate (TESSALON) 100 MG capsule     blood glucose monitoring (ONE TOUCH ULTRA 2) meter device kit     Blood Pressure Monitoring (BLOOD PRESSURE KIT) KELLY     carvedilol (COREG) 3.125 MG tablet     Ferrous Sulfate 324 (65 Fe) MG TBEC     Fexofenadine HCl (ALLEGRA PO)     finasteride (PROSCAR) 5 MG tablet     FLUoxetine (PROZAC) 10 MG capsule     ibuprofen (ADVIL/MOTRIN) 600 MG tablet     insulin glargine (LANTUS SOLOSTAR) 100 UNIT/ML pen     latanoprost (XALATAN) 0.005 % ophthalmic solution     levothyroxine (SYNTHROID/LEVOTHROID) 88 MCG tablet     losartan (COZAAR) 100 MG tablet     metFORMIN (GLUCOPHAGE XR) 500 MG 24 hr tablet     Multiple Vitamins-Minerals (CENTRUM SILVER) per tablet     ONETOUCH ULTRA test strip     predniSONE (DELTASONE) 20 MG tablet     tamsulosin (FLOMAX) 0.4 MG capsule     acetaminophen (TYLENOL) 500 MG tablet     acetaminophen (TYLENOL) 500 MG tablet     BINAXNOW COVID-19 AG HOME TEST KIT     lovastatin (MEVACOR) 40 MG tablet     neomycin-polymyxin-dexamethasone (MAXITROL) 3.5-03092-8.1 ophthalmic ointment     omeprazole (PRILOSEC) 20 MG DR capsule     omeprazole (PRILOSEC) 20 MG DR capsule     No current facility-administered medications for this visit.        Review of Systems   Constitutional, HEENT, cardiovascular, pulmonary, GI, , musculoskeletal, neuro, skin, endocrine and psych systems are negative, except as otherwise noted.      Objective    /54 (BP Location: Left arm, Patient Position: Sitting, Cuff Size: Adult Regular)   Pulse 72   Temp 97.4  F (36.3  C) (Tympanic)   Resp 20   Ht 1.676 m (5' 6\")   SpO2 97%   BMI 30.67 kg/m    Body mass index is 30.67 kg/m .  Physical Exam   GENERAL: healthy, alert and no " distress  NECK: no adenopathy, no asymmetry, masses, or scars and thyroid normal to palpation  RESP: lungs clear to auscultation - no rales, rhonchi or wheezes  CV: regular rate and rhythm, normal S1 S2, no S3 or S4, no murmur, click or rub, no peripheral edema and peripheral pulses strong  ABDOMEN: soft, nontender, no hepatosplenomegaly, no masses and bowel sounds normal  MS: no gross musculoskeletal defects noted, no edema    Labs and imaging reviewed and discussed with the patient.

## 2023-02-22 ENCOUNTER — TELEPHONE (OUTPATIENT)
Dept: FAMILY MEDICINE | Facility: CLINIC | Age: 83
End: 2023-02-22
Payer: COMMERCIAL

## 2023-02-22 NOTE — TELEPHONE ENCOUNTER
Spoke with patient and he has upcoming lab 3/2/2023.     Test results are given to patient. He will continue OTC iron.  He continues with rectal bleeding and is seen by colon rectal services.     Patricia Palomo RN  [facility]

## 2023-02-22 NOTE — TELEPHONE ENCOUNTER
Pt seen yesterday for acute bronchitis, Rx for antibiotics given. Pt calling to see if provider would like him to take course of antibiotics given results from chest Xray?    Routing to provider for review.     Patient would like a my chart message back with result from provider.     Chante Mendez RN

## 2023-02-22 NOTE — TELEPHONE ENCOUNTER
----- Message from Michaela Sharma MD sent at 2/22/2023 12:33 AM CST -----  Your Anemia levels are remaining as seen 1 week back though mildly decreased. Please follow up with  Hematology and standing lab orders as placed by him.

## 2023-02-22 NOTE — TELEPHONE ENCOUNTER
----- Message from Michaela Sharma MD sent at 2/22/2023 12:37 AM CST -----  Your X ray is negative for any acute concerns though showing benign calcified granulomas as seen in the past per radiology review.    Please let me know if you have any questions.  Michaela Sharma MD on 2/22/2023

## 2023-02-22 NOTE — RESULT ENCOUNTER NOTE
Your X ray is negative for any acute concerns though showing benign calcified granulomas as seen in the past per radiology review.    Please let me know if you have any questions.  Michaela Sharma MD on 2/22/2023

## 2023-02-23 ENCOUNTER — DOCUMENTATION ONLY (OUTPATIENT)
Dept: OTHER | Facility: CLINIC | Age: 83
End: 2023-02-23
Payer: COMMERCIAL

## 2023-02-23 NOTE — TELEPHONE ENCOUNTER
Spoke to patient and he will finish  his antibiotics.     Patricia Palomo RN  [facility]

## 2023-02-23 NOTE — TELEPHONE ENCOUNTER
Yes please, chest x-ray will be normal and acute bronchitis.  Okay to complete empiric medications given for improvement of his symptoms given his age and risk factors.    Thank you,  Michaela Sharma MD on 2/23/2023

## 2023-02-27 ENCOUNTER — MYC MEDICAL ADVICE (OUTPATIENT)
Dept: FAMILY MEDICINE | Facility: CLINIC | Age: 83
End: 2023-02-27
Payer: COMMERCIAL

## 2023-03-01 NOTE — TELEPHONE ENCOUNTER
Please see  message and advise    Tawnya MILIAN RN  [facility] Triage Team

## 2023-03-02 ENCOUNTER — VIRTUAL VISIT (OUTPATIENT)
Dept: ONCOLOGY | Facility: CLINIC | Age: 83
End: 2023-03-02
Attending: INTERNAL MEDICINE
Payer: COMMERCIAL

## 2023-03-02 ENCOUNTER — TELEPHONE (OUTPATIENT)
Dept: ONCOLOGY | Facility: CLINIC | Age: 83
End: 2023-03-02

## 2023-03-02 DIAGNOSIS — D64.9 ANEMIA, UNSPECIFIED TYPE: Primary | ICD-10-CM

## 2023-03-02 PROCEDURE — G0463 HOSPITAL OUTPT CLINIC VISIT: HCPCS | Mod: PN,GT | Performed by: INTERNAL MEDICINE

## 2023-03-02 PROCEDURE — 99214 OFFICE O/P EST MOD 30 MIN: CPT | Mod: VID | Performed by: INTERNAL MEDICINE

## 2023-03-02 NOTE — PROGRESS NOTES
Video-Visit Details    Type of service:  Video Visit    Originating Location (pt. Location): Home        Distant Location (provider location):  On-site    Mode of Communication:  Video Conference via [employer]

## 2023-03-02 NOTE — LETTER
3/2/2023         RE: Timmy Strange  [address]  [facility] [address]        Dear Colleague,    Thank you for referring your patient, Timmy Strange, to the [facility]. Please see a copy of my visit note below.      Video-Visit Details    Type of service:  Video Visit    Originating Location (pt. Location): Home        Distant Location (provider location):  On-site    Mode of Communication:  Video Conference via [facility]          HEMATOLOGY HISTORY: Mr. Strange is a gentleman with chronic normocytic anemia due to anemia of chronic disease and iron deficiency.    1.  On 11/07/2017, hemoglobin of 13.3.  -On 10/30/2018, hemoglobin of 12.5.  -On 06/16/2020, hemoglobin of 12.3.  -On 02/22/2021, WBC of 4.3, hemoglobin of 10.4, platelet of 226 and MCV of 90.  2.  On 11/09/2020:    -CMP normal except mildly low protein.  -Hemoglobin A1c normal at 5.5.  3.  On 02/01/2021, colonoscopy revealed colon polyps, hemorrhoids and diverticulosis.  It was a tubular adenoma.  4.  On 03/16/2021, EGD was essentially normal.  Biopsy did not reveal any H. Pylori.  5. On 05/11/2021:  -Iron of 13, saturation of 6% and ferritin of 69.  -Normal folate.  -Normal vitamin B12.  -Normal TSH.  6. Patient received 1 dose of IV Venofer on 05/11/2021.     SUBJECTIVE:  Mr. Strange is a 82-year-old gentleman with chronic normocytic anemia due to chronic disease and iron deficiency.  He is on oral ferrous sulfate once a day. He is tolerating well.    Patient has been getting more fatigued.  He gets tired easily.  He has been sleeping more.  Also his memory is not very good.  He is following up with neurologist.  He is more forgetful.    He had COVID 19 infection about 2 weeks ago.  He is recovering from it.     He is not in pain.  No headache.  Some dizziness.  No chest pain or shortness of breath.  No abdominal pain.  No nausea or vomiting.  Appetite is fairly good.  Patient has hemorrhoid and gets some  hemorrhoidal bleed.  He follows up with colorectal surgery.  He had rubber band ligation done in November.  Stools are dark because of oral ferrous sulfate.  All other review of systems is negative.     PHYSICAL EXAMINATION:    He is alert, oriented x 3.   Rest of the systems not examined as this is a video visit.     LABORATORY:  CBC reviewed.     ASSESSMENT:    1.  A 82-year-old gentleman with normocytic anemia.  Anemia is slowly getting worse.  Anemia due to to iron deficiency and anemia of chronic disease.  Some of the anemia is also from hemorrhoidal bleed.  Given his age, he may also have primary bone marrow pathology like MDS.  2.  Iron deficiency.  3.  Generalized weakness.  4.  Forgetfulness.  5.  Hemorrhoidal bleed.     PLAN:  1.  Patient has generalized weakness.  This is multifactorial from his age, recent COVID-19 infection, anemia and other medical problem.  I am hoping some of the fatigue will improve as he is recovering from COVID-19 infection.    2.  Discussed regarding anemia.  Explained to him that his anemia has been slowly getting worse in last few months.  Anemia is multifactorial from chronic disease, iron deficiency and also from hemorrhoidal bleed.  There could also be component of primary bone marrow pathology.    We will recheck iron studies next week.  If there is iron deficiency, we will give him IV iron.  He is already on oral iron and his anemia has gotten worse.    If iron studies are normal, will consider bone marrow biopsy to rule out primary bone marrow pathology.  Further discussion after iron studies are done.    3.  Patient has hemorrhoidal bleed.  He will follow-up with colorectal surgery.    4.  Patient has forgetfulness.  He is following up with neurologist.  They wanted to know if anemia is contributing to it.  Worsening anemia can cause worsening of forgetfulness.  His hemoglobin is 9.6.  I do not think it is contributing much to his memory problem.    5.  He is on ferrous  sulfate once a day.  He will continue on it pending iron studies which will be done next week.    6.  I will see him in 3 months time with labs.  Advised him/family to call us with any questions or concerns.    Total video visit time of 30 minutes. Time is spent in today's visit, review of chart/investigations today and documentation.      Again, thank you for allowing me to participate in the care of your patient.        Sincerely,        Chandrakant Panchal MD

## 2023-03-02 NOTE — TELEPHONE ENCOUNTER
Left Voicemail (1st Attempt) for the patient to call back and schedule the following:    Appointment type: video  Provider: KATHLEEN Panchal   Return date:  06/02/23  Specialty phone number: [phone]  Additional appointment(s) needed: 2  Additonal Notes: labs due 1 week prior to appt

## 2023-03-02 NOTE — NURSING NOTE
Is the patient currently in the state of [place]? YES    Visit mode:VIDEO    If the visit is dropped, the patient can be reconnected by: VIDEO VISIT: Send to e-mail at: [email or web address]    Will anyone else be joining the visit? NO      How would you like to obtain your AVS? MyChart    Are changes needed to the allergy or medication list? NO    Reason for visit: follow up

## 2023-03-02 NOTE — PROGRESS NOTES
HEMATOLOGY HISTORY: Mr. Strange is a gentleman with chronic normocytic anemia due to anemia of chronic disease and iron deficiency.    1.  On 11/07/2017, hemoglobin of 13.3.  -On 10/30/2018, hemoglobin of 12.5.  -On 06/16/2020, hemoglobin of 12.3.  -On 02/22/2021, WBC of 4.3, hemoglobin of 10.4, platelet of 226 and MCV of 90.  2.  On 11/09/2020:    -CMP normal except mildly low protein.  -Hemoglobin A1c normal at 5.5.  3.  On 02/01/2021, colonoscopy revealed colon polyps, hemorrhoids and diverticulosis.  It was a tubular adenoma.  4.  On 03/16/2021, EGD was essentially normal.  Biopsy did not reveal any H. Pylori.  5. On 05/11/2021:  -Iron of 13, saturation of 6% and ferritin of 69.  -Normal folate.  -Normal vitamin B12.  -Normal TSH.  6. Patient received 1 dose of IV Venofer on 05/11/2021.     SUBJECTIVE:  Mr. Strange is a 82-year-old gentleman with chronic normocytic anemia due to chronic disease and iron deficiency.  He is on oral ferrous sulfate once a day. He is tolerating well.    Patient has been getting more fatigued.  He gets tired easily.  He has been sleeping more.  Also his memory is not very good.  He is following up with neurologist.  He is more forgetful.    He had COVID 19 infection about 2 weeks ago.  He is recovering from it.     He is not in pain.  No headache.  Some dizziness.  No chest pain or shortness of breath.  No abdominal pain.  No nausea or vomiting.  Appetite is fairly good.  Patient has hemorrhoid and gets some hemorrhoidal bleed.  He follows up with colorectal surgery.  He had rubber band ligation done in November.  Stools are dark because of oral ferrous sulfate.  All other review of systems is negative.     PHYSICAL EXAMINATION:    He is alert, oriented x 3.   Rest of the systems not examined as this is a video visit.     LABORATORY:  CBC reviewed.     ASSESSMENT:    1.  A 82-year-old gentleman with normocytic anemia.  Anemia is slowly getting worse.  Anemia due to to iron deficiency  and anemia of chronic disease.  Some of the anemia is also from hemorrhoidal bleed.  Given his age, he may also have primary bone marrow pathology like MDS.  2.  Iron deficiency.  3.  Generalized weakness.  4.  Forgetfulness.  5.  Hemorrhoidal bleed.     PLAN:  1.  Patient has generalized weakness.  This is multifactorial from his age, recent COVID-19 infection, anemia and other medical problem.  I am hoping some of the fatigue will improve as he is recovering from COVID-19 infection.    2.  Discussed regarding anemia.  Explained to him that his anemia has been slowly getting worse in last few months.  Anemia is multifactorial from chronic disease, iron deficiency and also from hemorrhoidal bleed.  There could also be component of primary bone marrow pathology.    We will recheck iron studies next week.  If there is iron deficiency, we will give him IV iron.  He is already on oral iron and his anemia has gotten worse.    If iron studies are normal, will consider bone marrow biopsy to rule out primary bone marrow pathology.  Further discussion after iron studies are done.    3.  Patient has hemorrhoidal bleed.  He will follow-up with colorectal surgery.    4.  Patient has forgetfulness.  He is following up with neurologist.  They wanted to know if anemia is contributing to it.  Worsening anemia can cause worsening of forgetfulness.  His hemoglobin is 9.6.  I do not think it is contributing much to his memory problem.    5.  He is on ferrous sulfate once a day.  He will continue on it pending iron studies which will be done next week.    6.  I will see him in 3 months time with labs.  Advised him/family to call us with any questions or concerns.    Total video visit time of 30 minutes. Time is spent in today's visit, review of chart/investigations today and documentation.

## 2023-03-02 NOTE — LETTER
3/2/2023         RE: Timmy Strange  [address]  [facility] [address]        Dear Colleague,    Thank you for referring your patient, Timmy Strange, to the [facility]CER [facility]. Please see a copy of my visit note below.      Video-Visit Details    Type of service:  Video Visit    Originating Location (pt. Location): Home        Distant Location (provider location):  On-site    Mode of Communication:  Video Conference via [facility]          HEMATOLOGY HISTORY: Mr. Strange is a gentleman with chronic normocytic anemia due to anemia of chronic disease and iron deficiency.    1.  On 11/07/2017, hemoglobin of 13.3.  -On 10/30/2018, hemoglobin of 12.5.  -On 06/16/2020, hemoglobin of 12.3.  -On 02/22/2021, WBC of 4.3, hemoglobin of 10.4, platelet of 226 and MCV of 90.  2.  On 11/09/2020:    -CMP normal except mildly low protein.  -Hemoglobin A1c normal at 5.5.  3.  On 02/01/2021, colonoscopy revealed colon polyps, hemorrhoids and diverticulosis.  It was a tubular adenoma.  4.  On 03/16/2021, EGD was essentially normal.  Biopsy did not reveal any H. Pylori.  5. On 05/11/2021:  -Iron of 13, saturation of 6% and ferritin of 69.  -Normal folate.  -Normal vitamin B12.  -Normal TSH.  6. Patient received 1 dose of IV Venofer on 05/11/2021.     SUBJECTIVE:  Mr. Strange is a 82-year-old gentleman with chronic normocytic anemia due to chronic disease and iron deficiency.  He is on oral ferrous sulfate once a day. He is tolerating well.    Patient has been getting more fatigued.  He gets tired easily.  He has been sleeping more.  Also his memory is not very good.  He is following up with neurologist.  He is more forgetful.    He had COVID 19 infection about 2 weeks ago.  He is recovering from it.     He is not in pain.  No headache.  Some dizziness.  No chest pain or shortness of breath.  No abdominal pain.  No nausea or vomiting.  Appetite is fairly good.  Patient has hemorrhoid and gets some  hemorrhoidal bleed.  He follows up with colorectal surgery.  He had rubber band ligation done in November.  Stools are dark because of oral ferrous sulfate.  All other review of systems is negative.     PHYSICAL EXAMINATION:    He is alert, oriented x 3.   Rest of the systems not examined as this is a video visit.     LABORATORY:  CBC reviewed.     ASSESSMENT:    1.  A 82-year-old gentleman with normocytic anemia.  Anemia is slowly getting worse.  Anemia due to to iron deficiency and anemia of chronic disease.  Some of the anemia is also from hemorrhoidal bleed.  Given his age, he may also have primary bone marrow pathology like MDS.  2.  Iron deficiency.  3.  Generalized weakness.  4.  Forgetfulness.  5.  Hemorrhoidal bleed.     PLAN:  1.  Patient has generalized weakness.  This is multifactorial from his age, recent COVID-19 infection, anemia and other medical problem.  I am hoping some of the fatigue will improve as he is recovering from COVID-19 infection.    2.  Discussed regarding anemia.  Explained to him that his anemia has been slowly getting worse in last few months.  Anemia is multifactorial from chronic disease, iron deficiency and also from hemorrhoidal bleed.  There could also be component of primary bone marrow pathology.    We will recheck iron studies next week.  If there is iron deficiency, we will give him IV iron.  He is already on oral iron and his anemia has gotten worse.    If iron studies are normal, will consider bone marrow biopsy to rule out primary bone marrow pathology.  Further discussion after iron studies are done.    3.  Patient has hemorrhoidal bleed.  He will follow-up with colorectal surgery.    4.  Patient has forgetfulness.  He is following up with neurologist.  They wanted to know if anemia is contributing to it.  Worsening anemia can cause worsening of forgetfulness.  His hemoglobin is 9.6.  I do not think it is contributing much to his memory problem.    5.  He is on ferrous  sulfate once a day.  He will continue on it pending iron studies which will be done next week.    6.  I will see him in 3 months time with labs.  Advised him/family to call us with any questions or concerns.    Total video visit time of 30 minutes. Time is spent in today's visit, review of chart/investigations today and documentation.      Again, thank you for allowing me to participate in the care of your patient.        Sincerely,        Chandrakant Panchal MD

## 2023-03-03 ENCOUNTER — PATIENT OUTREACH (OUTPATIENT)
Dept: CARE COORDINATION | Facility: CLINIC | Age: 83
End: 2023-03-03
Payer: COMMERCIAL

## 2023-03-03 NOTE — PROGRESS NOTES
"Oncology Distress Screening Follow-up  Clinical Social Work  [facility]    Identified Concern and Score From Distress Screenin. How concerned are you about your ability to eat?  0       2. How concerned are you about unintended weight loss or your current weight?  0       3. How concerned are you about feeling depressed or very sad?  5       4. How concerned are you about feeling anxious or very scared?  5       5. Do you struggle with the loss of meaning and jackie in your life?  Not at all       6. How concerned are you about work and home life issues that may be affected by your cancer?  0       7. How concerned are you about knowing what resources are available to help you?  10 Abnormal        8. Do you currently have what you would describe as Scientologist or spiritual struggles?             Not at all         Date of Distress Screening: 3/2/23    Intervention:   Timmy is a 82-year-old gentleman with a diagnosis of chronic normocytic anemia who is followed by Dr. Panchal at [facility]. At time of visit yesterday with Dr. Panchal, Timmy indicated a desire to know about additional resources available.     Timmy reports that his aging process has been difficult on his marriage, that wife who is 12 years younger, has been an \"excellent caregiver\" but that she would like to see Timmy transition into a Senior Living Center, which Timmy is not interested in doing. Timmy endorses that he has some \"mild cognitive impairment\" but that he is able to cook food, dress, drive, and bathe self. Timmy has strong feelings about not wanting to transition into a . Living Facility, and reports that difference of opinions on this has added a significant strain on marriage.     Timmy reports that he has attempted socializing more. Timmy reports that he is a  and worked for the [employer] for a period of time. Timmy endorses that he has had a hard time connecting with others as he has a unique " background.    Timmy reported that he is not interested in additional resources at present time as he is aware that his wife is likely also pursuing resource support and he does not want to duplicate services. Timmy receptive to taking  contact information as needed for future.     Education Provided: Paula   contact information    Follow-up Required:   This clinician will continue to be available as needed for ongoing psychosocial support. Timmy knows to outreach with questions or concerns.       Katrina Redmond, MSW, LICSW, OSW-C  Clinical - Adult Oncology  She/Her/Hers  Phone: [phone]  [facility]: M, Thu  *every other Tue, 8am-4:30pm  [facility]: W, F, *every other Tue, 8am-4:30pm

## 2023-03-06 ENCOUNTER — LAB (OUTPATIENT)
Dept: LAB | Facility: CLINIC | Age: 83
End: 2023-03-06
Payer: COMMERCIAL

## 2023-03-06 ENCOUNTER — ALLIED HEALTH/NURSE VISIT (OUTPATIENT)
Dept: NURSING | Facility: CLINIC | Age: 83
End: 2023-03-06
Payer: COMMERCIAL

## 2023-03-06 VITALS — DIASTOLIC BLOOD PRESSURE: 50 MMHG | SYSTOLIC BLOOD PRESSURE: 130 MMHG

## 2023-03-06 DIAGNOSIS — I10 ESSENTIAL HYPERTENSION: Primary | ICD-10-CM

## 2023-03-06 DIAGNOSIS — D64.9 ANEMIA, UNSPECIFIED TYPE: ICD-10-CM

## 2023-03-06 LAB
ERYTHROCYTE [DISTWIDTH] IN BLOOD BY AUTOMATED COUNT: 15.8 % (ref 10–15)
FERRITIN SERPL-MCNC: 17 NG/ML (ref 31–409)
HCT VFR BLD AUTO: 27.4 % (ref 40–53)
HGB BLD-MCNC: 8.5 G/DL (ref 13.3–17.7)
IRON BINDING CAPACITY (ROCHE): 314 UG/DL (ref 240–430)
IRON SATN MFR SERPL: 7 % (ref 15–46)
IRON SERPL-MCNC: 22 UG/DL (ref 61–157)
MCH RBC QN AUTO: 27.1 PG (ref 26.5–33)
MCHC RBC AUTO-ENTMCNC: 31 G/DL (ref 31.5–36.5)
MCV RBC AUTO: 87 FL (ref 78–100)
PLATELET # BLD AUTO: 289 10E3/UL (ref 150–450)
RBC # BLD AUTO: 3.14 10E6/UL (ref 4.4–5.9)
WBC # BLD AUTO: 4.5 10E3/UL (ref 4–11)

## 2023-03-06 PROCEDURE — 83540 ASSAY OF IRON: CPT

## 2023-03-06 PROCEDURE — 82728 ASSAY OF FERRITIN: CPT

## 2023-03-06 PROCEDURE — 85027 COMPLETE CBC AUTOMATED: CPT

## 2023-03-06 PROCEDURE — 83550 IRON BINDING TEST: CPT

## 2023-03-06 PROCEDURE — 36415 COLL VENOUS BLD VENIPUNCTURE: CPT

## 2023-03-06 PROCEDURE — 99207 PR NO CHARGE NURSE ONLY: CPT

## 2023-03-06 NOTE — PROGRESS NOTES
Timmy Strange is a 82 year old patient who comes in today for a Blood Pressure check because of ongoing blood pressure monitoring.  Vital Signs as repeated by /50  Patient is taking medication as prescribed  Patient is tolerating medications well.  Patient is not monitoring Blood Pressure at home.  Current complaints: none  Disposition:  patient to continue with the same medication and routing to provider as CANDE CHACKO RN  [facility]

## 2023-03-07 DIAGNOSIS — D50.9 IRON DEFICIENCY ANEMIA, UNSPECIFIED IRON DEFICIENCY ANEMIA TYPE: Primary | ICD-10-CM

## 2023-03-07 DIAGNOSIS — K90.9 MALABSORPTION OF IRON: ICD-10-CM

## 2023-03-07 RX ORDER — HEPARIN SODIUM (PORCINE) LOCK FLUSH IV SOLN 100 UNIT/ML 100 UNIT/ML
5 SOLUTION INTRAVENOUS
Status: CANCELLED | OUTPATIENT
Start: 2023-03-13

## 2023-03-07 RX ORDER — EPINEPHRINE 1 MG/ML
0.3 INJECTION, SOLUTION, CONCENTRATE INTRAVENOUS EVERY 5 MIN PRN
Status: CANCELLED | OUTPATIENT
Start: 2023-03-13

## 2023-03-07 RX ORDER — MEPERIDINE HYDROCHLORIDE 25 MG/ML
25 INJECTION INTRAMUSCULAR; INTRAVENOUS; SUBCUTANEOUS EVERY 30 MIN PRN
Status: CANCELLED | OUTPATIENT
Start: 2023-03-13

## 2023-03-07 RX ORDER — DIPHENHYDRAMINE HYDROCHLORIDE 50 MG/ML
50 INJECTION INTRAMUSCULAR; INTRAVENOUS
Status: CANCELLED
Start: 2023-03-13

## 2023-03-07 RX ORDER — ALBUTEROL SULFATE 90 UG/1
1-2 AEROSOL, METERED RESPIRATORY (INHALATION)
Status: CANCELLED
Start: 2023-03-13

## 2023-03-07 RX ORDER — ALBUTEROL SULFATE 0.83 MG/ML
2.5 SOLUTION RESPIRATORY (INHALATION)
Status: CANCELLED | OUTPATIENT
Start: 2023-03-13

## 2023-03-07 RX ORDER — HEPARIN SODIUM,PORCINE 10 UNIT/ML
5 VIAL (ML) INTRAVENOUS
Status: CANCELLED | OUTPATIENT
Start: 2023-03-13

## 2023-03-08 NOTE — RESULT ENCOUNTER NOTE
Dear Mr. Strange,    Blood test reveals low iron. Please, call clinic to schedule iron infusion.    Please, call me with any questions.    Chandrakant Panchal MD

## 2023-03-08 NOTE — PROGRESS NOTES
Labs reveal iron deficiency. Will schedule IV venofer.  
General Sunscreen Counseling: I recommended a broad spectrum sunscreen with a SPF of 30 or higher.  I explained that SPF 30 sunscreens block approximately 97 percent of the sun's harmful rays.  Sunscreens should be applied at least 15 minutes prior to expected sun exposure and then every 2 hours after that as long as sun exposure continues. If swimming or exercising sunscreen should be reapplied every 45 minutes to an hour after getting wet or sweating.  One ounce, or the equivalent of a shot glass full of sunscreen, is adequate to protect the skin not covered by a bathing suit. I also recommended a lip balm with a sunscreen as well. Sun protective clothing can be used in lieu of sunscreen but must be worn the entire time you are exposed to the sun's rays.
Detail Level: Detailed

## 2023-03-10 ENCOUNTER — TRANSFERRED RECORDS (OUTPATIENT)
Dept: HEALTH INFORMATION MANAGEMENT | Facility: CLINIC | Age: 83
End: 2023-03-10

## 2023-03-10 ASSESSMENT — ENCOUNTER SYMPTOMS: FATIGUE: 1

## 2023-03-15 ENCOUNTER — OFFICE VISIT (OUTPATIENT)
Dept: FAMILY MEDICINE | Facility: CLINIC | Age: 83
End: 2023-03-15
Payer: COMMERCIAL

## 2023-03-15 VITALS
SYSTOLIC BLOOD PRESSURE: 132 MMHG | OXYGEN SATURATION: 96 % | HEART RATE: 73 BPM | WEIGHT: 193 LBS | RESPIRATION RATE: 23 BRPM | TEMPERATURE: 97.6 F | BODY MASS INDEX: 31.02 KG/M2 | DIASTOLIC BLOOD PRESSURE: 60 MMHG | HEIGHT: 66 IN

## 2023-03-15 DIAGNOSIS — D64.9 ANEMIA, UNSPECIFIED TYPE: ICD-10-CM

## 2023-03-15 DIAGNOSIS — E11.51 TYPE II DIABETES MELLITUS WITH PERIPHERAL CIRCULATORY DISORDER (H): ICD-10-CM

## 2023-03-15 DIAGNOSIS — E03.9 ACQUIRED HYPOTHYROIDISM: ICD-10-CM

## 2023-03-15 DIAGNOSIS — Z78.9 POLST (PHYSICIAN ORDERS FOR LIFE-SUSTAINING TREATMENT): ICD-10-CM

## 2023-03-15 DIAGNOSIS — I10 ESSENTIAL HYPERTENSION: ICD-10-CM

## 2023-03-15 DIAGNOSIS — F33.0 MILD EPISODE OF RECURRENT MAJOR DEPRESSIVE DISORDER (H): Primary | ICD-10-CM

## 2023-03-15 DIAGNOSIS — K90.9 MALABSORPTION OF IRON: ICD-10-CM

## 2023-03-15 DIAGNOSIS — G31.84 MILD COGNITIVE IMPAIRMENT: ICD-10-CM

## 2023-03-15 DIAGNOSIS — D50.9 IRON DEFICIENCY ANEMIA, UNSPECIFIED IRON DEFICIENCY ANEMIA TYPE: Primary | ICD-10-CM

## 2023-03-15 PROCEDURE — 99214 OFFICE O/P EST MOD 30 MIN: CPT | Performed by: INTERNAL MEDICINE

## 2023-03-15 RX ORDER — ALBUTEROL SULFATE 90 UG/1
1-2 AEROSOL, METERED RESPIRATORY (INHALATION)
Status: CANCELLED
Start: 2023-03-15

## 2023-03-15 RX ORDER — MEPERIDINE HYDROCHLORIDE 25 MG/ML
25 INJECTION INTRAMUSCULAR; INTRAVENOUS; SUBCUTANEOUS EVERY 30 MIN PRN
Status: CANCELLED | OUTPATIENT
Start: 2023-03-15

## 2023-03-15 RX ORDER — EPINEPHRINE 1 MG/ML
0.3 INJECTION, SOLUTION INTRAMUSCULAR; SUBCUTANEOUS EVERY 5 MIN PRN
Status: CANCELLED | OUTPATIENT
Start: 2023-03-15

## 2023-03-15 RX ORDER — DIPHENHYDRAMINE HYDROCHLORIDE 50 MG/ML
50 INJECTION INTRAMUSCULAR; INTRAVENOUS
Status: CANCELLED
Start: 2023-03-15

## 2023-03-15 RX ORDER — FLUOXETINE 10 MG/1
10 TABLET, FILM COATED ORAL DAILY
Qty: 20 TABLET | Refills: 0 | Status: SHIPPED | OUTPATIENT
Start: 2023-03-15 | End: 2023-03-17 | Stop reason: ALTCHOICE

## 2023-03-15 RX ORDER — ESCITALOPRAM OXALATE 10 MG/1
10 TABLET ORAL DAILY
Qty: 30 TABLET | Refills: 1 | Status: SHIPPED | OUTPATIENT
Start: 2023-03-15 | End: 2023-04-27

## 2023-03-15 RX ORDER — ALBUTEROL SULFATE 0.83 MG/ML
2.5 SOLUTION RESPIRATORY (INHALATION)
Status: CANCELLED | OUTPATIENT
Start: 2023-03-15

## 2023-03-15 RX ORDER — HEPARIN SODIUM (PORCINE) LOCK FLUSH IV SOLN 100 UNIT/ML 100 UNIT/ML
5 SOLUTION INTRAVENOUS
Status: CANCELLED | OUTPATIENT
Start: 2023-03-15

## 2023-03-15 RX ORDER — HEPARIN SODIUM,PORCINE 10 UNIT/ML
5 VIAL (ML) INTRAVENOUS
Status: CANCELLED | OUTPATIENT
Start: 2023-03-15

## 2023-03-15 RX ORDER — METHYLPREDNISOLONE SODIUM SUCCINATE 125 MG/2ML
125 INJECTION, POWDER, LYOPHILIZED, FOR SOLUTION INTRAMUSCULAR; INTRAVENOUS
Status: CANCELLED
Start: 2023-03-15

## 2023-03-15 ASSESSMENT — PATIENT HEALTH QUESTIONNAIRE - PHQ9
SUM OF ALL RESPONSES TO PHQ QUESTIONS 1-9: 2
5. POOR APPETITE OR OVEREATING: SEVERAL DAYS

## 2023-03-15 ASSESSMENT — ANXIETY QUESTIONNAIRES
3. WORRYING TOO MUCH ABOUT DIFFERENT THINGS: SEVERAL DAYS
1. FEELING NERVOUS, ANXIOUS, OR ON EDGE: SEVERAL DAYS
2. NOT BEING ABLE TO STOP OR CONTROL WORRYING: NOT AT ALL
GAD7 TOTAL SCORE: 3
6. BECOMING EASILY ANNOYED OR IRRITABLE: NOT AT ALL
5. BEING SO RESTLESS THAT IT IS HARD TO SIT STILL: NOT AT ALL
IF YOU CHECKED OFF ANY PROBLEMS ON THIS QUESTIONNAIRE, HOW DIFFICULT HAVE THESE PROBLEMS MADE IT FOR YOU TO DO YOUR WORK, TAKE CARE OF THINGS AT HOME, OR GET ALONG WITH OTHER PEOPLE: SOMEWHAT DIFFICULT
7. FEELING AFRAID AS IF SOMETHING AWFUL MIGHT HAPPEN: NOT AT ALL
GAD7 TOTAL SCORE: 3

## 2023-03-15 ASSESSMENT — PAIN SCALES - GENERAL: PAINLEVEL: NO PAIN (0)

## 2023-03-15 NOTE — PROGRESS NOTES
Assessment and Plan  1. Mild episode of recurrent major depressive disorder (H)  Ongoing problem, patient feels not feel improved on Prozac started by the neurology.  Requesting for different medication.  We will do a long taper of slowly weaning down Prozac started on Lexapro.  Please see AVS below for detailed plan of titrating down Prozac.  - escitalopram (LEXAPRO) 10 MG tablet; Take 1 tablet (10 mg) by mouth daily  Dispense: 30 tablet; Refill: 1  - FLUoxetine (PROZAC) 10 MG tablet; Take 1 tablet (10 mg) by mouth daily  Dispense: 20 tablet; Refill: 0  - Primary Care - Care Coordination Referral; Future    2. Anemia, unspecified type  Chronic problem, upcoming iron infusions as managed by heme oncology.  - Primary Care - Care Coordination Referral; Future    3. Mild cognitive impairment  Ongoing issue, probable overlap of depression causing this.  Follow-up with neurology.  Titrate up new antidepressant and see if this improves.  - Primary Care - Care Coordination Referral; Future    4. Acquired hypothyroidism  - Primary Care - Care Coordination Referral; Future    5. Essential hypertension  - Primary Care - Care Coordination Referral; Future    6. Type II diabetes mellitus with peripheral circulatory disorder (H)  - Primary Care - Care Coordination Referral; Future    7. POLST (Physician Orders for Life-Sustaining Treatment)  - Primary Care - Care Coordination Referral; Future    Wife Ladan has been discussing most part on patient current condition which appears well improved. Requesting for primary care coordination referral for future plans of [facility] resources. Went ahead and placed the referral as requested. Wife is POA for pt decisions as she endorses.          Patient Instructions   As discussed , we will slowly titrate Prozac and start off on Lexapro in the night which will get better for your depression.     Weaning down of Prozac in 3 weeks duration before stopping. Sent in tablet forms instead of current  capsule which you cannot break.     1 st week >> Prozac 10 mg ( 1 tab ) & 5 mg ( 1/2 tab ) alternate days   2nd week >> Prozac 5 mg daily ( 1/2 tab )   3rd week >> Prozac 5 mg alternate days and stop it     Simultaneously start off on Lexapro in nights     ==================================    Please follow up with Hemoncology for Iron infusions as managed by them.     ==================================    Placed referral to Care coordination as requested - Pt and wife requesting for future plans of Assisted living facilities resources    ===================================        Return in about 3 months (around 6/15/2023), or if symptoms worsen or fail to improve, for Annual Wellness Exam.    Michaela Sharma MD  [facility] [place] is a 82 year old, presenting for the following health issues:  Hypertension and Depression    Patient declined PHYSICAL.     History of Present Illness       Hypertension: He presents for follow up of hypertension.  He does check blood pressure  regularly outside of the clinic. Outside blood pressures have been over 140/90. He does not follow a low salt diet.     Reason for visit:  Blood pressure and caregiver resources and antidepressant    He eats 2-3 servings of fruits and vegetables daily.He consumes 0 sweetened beverage(s) daily.He exercises with enough effort to increase his heart rate 20 to 29 minutes per day.  He exercises with enough effort to increase his heart rate 3 or less days per week.   He is taking medications regularly.     Last seen pt on 2/2023 for acute bronchitis and CXR work up done  He is here for follow up on HBG at 8.5 - follows Oncology  who manages his Iron repletion and also Colorectal for rectal bleeds.         Allergies   Allergen Reactions     Shellfish Allergy         Past Medical History:   Diagnosis Date     Arthritis 1970    Dx'd with RA when in the      BPH (benign prostatic hyperplasia)  2013     Cancer (H)     basal cell cancer behind ear     Diabetes mellitus      ED (erectile dysfunction) 2015     HTN (hypertension)      Hyperlipidemia LDL goal <100      Hypothyroidism      Mumps      Obesity        Past Surgical History:   Procedure Laterality Date     BIOPSY       COLONOSCOPY N/A 2014    Procedure: COMBINED COLONOSCOPY, SINGLE OR MULTIPLE BIOPSY/POLYPECTOMY BY BIOPSY;  Surgeon: Rolanda Bey MD;  Location:  GI     COLONOSCOPY N/A 2020    Procedure: COLONOSCOPY, WITH POLYPECTOMY AND BIOPSY;  Surgeon: Christina Vieira MD;  Location:  GI     COLONOSCOPY N/A 2021    Procedure: Colonoscopy, With Polypectomy And Biopsy;  Surgeon: Christina Vieira MD;  Location:  GI     ESOPHAGOSCOPY, GASTROSCOPY, DUODENOSCOPY (EGD), COMBINED N/A 3/16/2021    Procedure: ESOPHAGOGASTRODUODENOSCOPY, WITH BIOPSY;  Surgeon: Jose Schrader MD;  Location:  GI     EYE SURGERY       TESTICLE SURGERY       TONSILLECTOMY, ADENOIDECTOMY, COMBINED       VASECTOMY         Family History   Problem Relation Age of Onset     Diabetes Maternal Grandmother      Diabetes Maternal Grandfather      Arthritis Maternal Grandfather      Arthritis Father      Thyroid Disease Father      Glaucoma Mother      Alcohol/Drug Mother 49        cirrhosis     Diabetes Daughter 44        type 2     Obesity Daughter      Glaucoma Maternal Aunt        Social History     Tobacco Use     Smoking status: Former     Packs/day: 0.00     Years: 0.00     Pack years: 0.00     Types: Pipe, Cigarettes     Start date: 1958     Quit date: 11/15/2011     Years since quittin.3     Smokeless tobacco: Never     Tobacco comments:     Smoked pipes 10 minutes a day started 22 yrs old , quit 10 yrs back.   Substance Use Topics     Alcohol use: No     Alcohol/week: 0.0 standard drinks        Current Outpatient Medications   Medication     albuterol (PROAIR HFA/PROVENTIL HFA/VENTOLIN HFA) 108 (90 Base) MCG/ACT inhaler  "    BINAXNOW COVID-19 AG HOME TEST KIT     blood glucose monitoring (ONE TOUCH ULTRA 2) meter device kit     Blood Pressure Monitoring (BLOOD PRESSURE KIT) KELLY     carvedilol (COREG) 3.125 MG tablet     escitalopram (LEXAPRO) 10 MG tablet     Ferrous Sulfate 324 (65 Fe) MG TBEC     Fexofenadine HCl (ALLEGRA PO)     finasteride (PROSCAR) 5 MG tablet     FLUoxetine (PROZAC) 10 MG tablet     insulin glargine (LANTUS SOLOSTAR) 100 UNIT/ML pen     latanoprost (XALATAN) 0.005 % ophthalmic solution     levothyroxine (SYNTHROID/LEVOTHROID) 88 MCG tablet     losartan (COZAAR) 100 MG tablet     lovastatin (MEVACOR) 40 MG tablet     metFORMIN (GLUCOPHAGE XR) 500 MG 24 hr tablet     Multiple Vitamins-Minerals (CENTRUM SILVER) per tablet     ONETOUCH ULTRA test strip     tamsulosin (FLOMAX) 0.4 MG capsule     acetaminophen (TYLENOL) 500 MG tablet     acetaminophen (TYLENOL) 500 MG tablet     No current facility-administered medications for this visit.        Review of Systems   Constitutional, HEENT, cardiovascular, pulmonary, GI, , musculoskeletal, neuro, skin, endocrine and psych systems are negative, except as otherwise noted.      Objective    /60   Pulse 73   Temp 97.6  F (36.4  C) (Temporal)   Resp 23   Ht 1.676 m (5' 6\")   Wt 87.5 kg (193 lb)   SpO2 96%   BMI 31.15 kg/m    Body mass index is 31.15 kg/m .  Physical Exam   GENERAL: healthy, alert and no distress  NECK: no adenopathy, no asymmetry, masses, or scars and thyroid normal to palpation  RESP: lungs clear to auscultation - no rales, rhonchi or wheezes  CV: regular rate and rhythm, normal S1 S2, no S3 or S4, no murmur, click or rub, no peripheral edema and peripheral pulses strong  ABDOMEN: soft, nontender, no hepatosplenomegaly, no masses and bowel sounds normal  MS: no gross musculoskeletal defects noted, no edema        Labs and imaging reviewed and discussed with the patient.  "

## 2023-03-15 NOTE — PATIENT INSTRUCTIONS
As discussed , we will slowly titrate Prozac and start off on Lexapro in the night which will get better for your depression.     Weaning down of Prozac in 3 weeks duration before stopping. Sent in tablet forms instead of current capsule which you cannot break.     1 st week >> Prozac 10 mg ( 1 tab ) & 5 mg ( 1/2 tab ) alternate days   2nd week >> Prozac 5 mg daily ( 1/2 tab )   3rd week >> Prozac 5 mg alternate days and stop it     Simultaneously start off on Lexapro in nights     ==================================    Please follow up with Hemoncology for Iron infusions as managed by them.     ==================================    Placed referral to Care coordination as requested - Pt and wife requesting for future plans of Assisted living facilities resources    ===================================       PE: afebrile, swollen/enlarge turbinates, no exudates/erythema of pharynx, lungs clear to ascultation.  Continue to take tylenol every 6 hours  -Claritin 10mg daily  -Afrin nasal spray 2 sprays in each nostil  -Robitussin 3 times daily for cough  -pt instructed to call clinic if she does not get better in 10-14 days, develop fevers, or if symptoms get better but then worsen

## 2023-03-16 ENCOUNTER — PATIENT OUTREACH (OUTPATIENT)
Dept: CARE COORDINATION | Facility: CLINIC | Age: 83
End: 2023-03-16
Payer: COMMERCIAL

## 2023-03-16 NOTE — PROGRESS NOTES
Clinic Care Coordination Contact  [facility]/Voicemail    Referral Source: PCP  Clinical Data: Care Coordinator Outreach    Reason for Referral:    Care Transition    Patient/Caregiver Support    Other-  Pt and wife requesting for future plans of Assisted living facilities resources      Other-   Wife Ladan Sanders is POA for him and requests to talk to her     Pt and wife requesting for future plans of Assisted living facilities resources       Outreach attempted x 1.  Left message on patient's wife, Ladan's voicemail with call back information and requested return call.    Plan: Care Coordinator will try to reach patient again in 1-2 business days.    CHANDLER Briscoe  Clinic Care Coordination  [facility] Clinics: [facility] [place], [place], [facility], and [facility]  Phone: [phone]

## 2023-03-16 NOTE — LETTER
[facility] CARE COORDINATION  [address]  [place] [address]      March 17, 2023      Timmy Strange  [address] [place] [address]      Dear Timmy,        I am a clinic community health worker who works with Michaela Sharma MD with the [facility]. I recently tried to call and was unable to reach you. Below is a description of clinic care coordination and how I can further assist you.       The clinic care coordination team is made up of a registered nurse, , financial resource worker and community health worker who understand the health care system. The goal of clinic care coordination is to help you manage your health and improve access to the health care system. Our team works alongside your provider to assist you in determining your health and social needs. We can help you obtain health care and community resources, providing you with necessary information and education. We can work with you through any barriers and develop a care plan that helps coordinate and strengthen the communication between you and your care team.    Please feel free to contact me with any questions or concerns regarding care coordination and what we can offer.      We are focused on providing you with the highest-quality healthcare experience possible.    Sincerely,         Paris Gilbert, CHANDLER  Clinic Care Coordination  [facility]: [facility] [place], and [facility]  Phone: [phone]

## 2023-03-16 NOTE — LETTER
[facility] CARE COORDINATION  [address]  [place] [address]    March 17, 2023    Timmy Strange  [address] [address]      Dear Timmy,        I am a clinic community health worker who works with Michaela Sharma MD with the [facility]. I wanted to introduce myself and provide you with my contact information for you to be able to call me with any questions or concerns. Below is a description of clinic care coordination and how I can further assist you.       The clinic care coordination team is made up of a registered nurse, , financial resource worker and community health worker who understand the health care system. The goal of clinic care coordination is to help you manage your health and improve access to the health care system. Our team works alongside your provider to assist you in determining your health and social needs. We can help you obtain health care and community resources, providing you with necessary information and education. We can work with you through any barriers and develop a care plan that helps coordinate and strengthen the communication between you and your care team.    Please feel free to contact me with any questions or concerns regarding care coordination and what we can offer.      We are focused on providing you with the highest-quality healthcare experience possible.    Sincerely,       Paris Gilbert ROSA  Clinic Care Coordination  [facility]: [place] [place], and [place] [facility]  Phone: [phone]

## 2023-03-17 ENCOUNTER — INFUSION THERAPY VISIT (OUTPATIENT)
Dept: INFUSION THERAPY | Facility: CLINIC | Age: 83
End: 2023-03-17
Attending: INTERNAL MEDICINE
Payer: COMMERCIAL

## 2023-03-17 VITALS
HEART RATE: 70 BPM | OXYGEN SATURATION: 97 % | RESPIRATION RATE: 16 BRPM | DIASTOLIC BLOOD PRESSURE: 76 MMHG | SYSTOLIC BLOOD PRESSURE: 147 MMHG | TEMPERATURE: 98 F

## 2023-03-17 DIAGNOSIS — K90.9 MALABSORPTION OF IRON: Primary | ICD-10-CM

## 2023-03-17 DIAGNOSIS — D50.9 IRON DEFICIENCY ANEMIA, UNSPECIFIED IRON DEFICIENCY ANEMIA TYPE: ICD-10-CM

## 2023-03-17 PROCEDURE — 258N000003 HC RX IP 258 OP 636: Performed by: INTERNAL MEDICINE

## 2023-03-17 PROCEDURE — 250N000011 HC RX IP 250 OP 636: Performed by: INTERNAL MEDICINE

## 2023-03-17 PROCEDURE — 96374 THER/PROPH/DIAG INJ IV PUSH: CPT

## 2023-03-17 RX ORDER — DIPHENHYDRAMINE HYDROCHLORIDE 50 MG/ML
50 INJECTION INTRAMUSCULAR; INTRAVENOUS
Status: CANCELLED
Start: 2023-03-20

## 2023-03-17 RX ORDER — ALBUTEROL SULFATE 0.83 MG/ML
2.5 SOLUTION RESPIRATORY (INHALATION)
Status: CANCELLED | OUTPATIENT
Start: 2023-03-20

## 2023-03-17 RX ORDER — HEPARIN SODIUM (PORCINE) LOCK FLUSH IV SOLN 100 UNIT/ML 100 UNIT/ML
5 SOLUTION INTRAVENOUS
Status: CANCELLED | OUTPATIENT
Start: 2023-03-20

## 2023-03-17 RX ORDER — HEPARIN SODIUM,PORCINE 10 UNIT/ML
5 VIAL (ML) INTRAVENOUS
Status: CANCELLED | OUTPATIENT
Start: 2023-03-20

## 2023-03-17 RX ORDER — MEPERIDINE HYDROCHLORIDE 25 MG/ML
25 INJECTION INTRAMUSCULAR; INTRAVENOUS; SUBCUTANEOUS EVERY 30 MIN PRN
Status: CANCELLED | OUTPATIENT
Start: 2023-03-20

## 2023-03-17 RX ORDER — METHYLPREDNISOLONE SODIUM SUCCINATE 125 MG/2ML
125 INJECTION, POWDER, LYOPHILIZED, FOR SOLUTION INTRAMUSCULAR; INTRAVENOUS
Status: CANCELLED
Start: 2023-03-20

## 2023-03-17 RX ORDER — EPINEPHRINE 1 MG/ML
0.3 INJECTION, SOLUTION INTRAMUSCULAR; SUBCUTANEOUS EVERY 5 MIN PRN
Status: CANCELLED | OUTPATIENT
Start: 2023-03-20

## 2023-03-17 RX ORDER — ALBUTEROL SULFATE 90 UG/1
1-2 AEROSOL, METERED RESPIRATORY (INHALATION)
Status: CANCELLED
Start: 2023-03-20

## 2023-03-17 RX ADMIN — SODIUM CHLORIDE 250 ML: 9 INJECTION, SOLUTION INTRAVENOUS at 13:51

## 2023-03-17 RX ADMIN — FERUMOXYTOL 510 MG: 510 INJECTION INTRAVENOUS at 13:51

## 2023-03-17 ASSESSMENT — PAIN SCALES - GENERAL: PAINLEVEL: NO PAIN (0)

## 2023-03-17 NOTE — PROGRESS NOTES
Clinic Care Coordination Contact  [facility]/Voicemail    Referral Source: PCP  Clinical Data: Care Coordinator Outreach  Outreach attempted x 2.  Left message on patient's voicemail with call back information and requested return call.    Reason for Referral:    Care Transition    Patient/Caregiver Support    Other-  Pt and wife requesting for future plans of Assisted living facilities resources      Other-   Wife Ladan Sanders is POA for him and requests to talk to her     Pt and wife requesting for future plans of Assisted living facilities resources        Plan: Care Coordinator will send care coordination introduction letter with care coordinator contact information and explanation of care coordination services via MyChart and mail. Care Coordinator will do no further outreaches at this time.    CHANDLER Briscoe  Clinic Care Coordination  [facility] Clinics: [place] [place], and [facility]  Phone: [phone]

## 2023-03-17 NOTE — PROGRESS NOTES
Infusion Nursing Note:  Timmy MAICOL Strange presents today for feraheme 1/2.    Patient seen by provider today: No   present during visit today: Not Applicable.    Note: Reviewed feraheme as this is his first dose.    Intravenous Access:  Peripheral IV placed.    Treatment Conditions:  Not Applicable.    Post Infusion Assessment:  Patient tolerated infusion without incident.  Patient observed for 30 minutes post feraheme per protocol.  Blood return noted pre and post infusion.  Site patent and intact, free from redness, edema or discomfort.  No evidence of extravasations.  Access discontinued per protocol.     Discharge Plan:   AVS to patient via MYCHART.  Patient will return as prev teodoro for next appointment.   Patient discharged in stable condition accompanied by: self.  Departure Mode: Ambulatory.      Gibson Bajwa RN

## 2023-03-20 ENCOUNTER — INFUSION THERAPY VISIT (OUTPATIENT)
Dept: INFUSION THERAPY | Facility: CLINIC | Age: 83
End: 2023-03-20
Attending: INTERNAL MEDICINE
Payer: COMMERCIAL

## 2023-03-20 VITALS
OXYGEN SATURATION: 95 % | DIASTOLIC BLOOD PRESSURE: 54 MMHG | HEART RATE: 69 BPM | RESPIRATION RATE: 18 BRPM | SYSTOLIC BLOOD PRESSURE: 113 MMHG | TEMPERATURE: 98.5 F

## 2023-03-20 DIAGNOSIS — K90.9 MALABSORPTION OF IRON: ICD-10-CM

## 2023-03-20 DIAGNOSIS — D50.9 IRON DEFICIENCY ANEMIA, UNSPECIFIED IRON DEFICIENCY ANEMIA TYPE: Primary | ICD-10-CM

## 2023-03-20 PROCEDURE — 96365 THER/PROPH/DIAG IV INF INIT: CPT

## 2023-03-20 PROCEDURE — 258N000003 HC RX IP 258 OP 636: Performed by: INTERNAL MEDICINE

## 2023-03-20 PROCEDURE — 250N000011 HC RX IP 250 OP 636: Performed by: INTERNAL MEDICINE

## 2023-03-20 RX ORDER — HEPARIN SODIUM,PORCINE 10 UNIT/ML
5 VIAL (ML) INTRAVENOUS
Status: CANCELLED | OUTPATIENT
Start: 2023-03-20

## 2023-03-20 RX ORDER — ALBUTEROL SULFATE 90 UG/1
1-2 AEROSOL, METERED RESPIRATORY (INHALATION)
Status: CANCELLED
Start: 2023-03-20

## 2023-03-20 RX ORDER — MEPERIDINE HYDROCHLORIDE 25 MG/ML
25 INJECTION INTRAMUSCULAR; INTRAVENOUS; SUBCUTANEOUS EVERY 30 MIN PRN
Status: CANCELLED | OUTPATIENT
Start: 2023-03-20

## 2023-03-20 RX ORDER — HEPARIN SODIUM (PORCINE) LOCK FLUSH IV SOLN 100 UNIT/ML 100 UNIT/ML
5 SOLUTION INTRAVENOUS
Status: CANCELLED | OUTPATIENT
Start: 2023-03-20

## 2023-03-20 RX ORDER — METHYLPREDNISOLONE SODIUM SUCCINATE 125 MG/2ML
125 INJECTION, POWDER, LYOPHILIZED, FOR SOLUTION INTRAMUSCULAR; INTRAVENOUS
Status: CANCELLED
Start: 2023-03-20

## 2023-03-20 RX ORDER — ALBUTEROL SULFATE 0.83 MG/ML
2.5 SOLUTION RESPIRATORY (INHALATION)
Status: CANCELLED | OUTPATIENT
Start: 2023-03-20

## 2023-03-20 RX ORDER — DIPHENHYDRAMINE HYDROCHLORIDE 50 MG/ML
50 INJECTION INTRAMUSCULAR; INTRAVENOUS
Status: CANCELLED
Start: 2023-03-20

## 2023-03-20 RX ORDER — EPINEPHRINE 1 MG/ML
0.3 INJECTION, SOLUTION INTRAMUSCULAR; SUBCUTANEOUS EVERY 5 MIN PRN
Status: CANCELLED | OUTPATIENT
Start: 2023-03-20

## 2023-03-20 RX ADMIN — FERUMOXYTOL 510 MG: 510 INJECTION INTRAVENOUS at 12:53

## 2023-03-20 RX ADMIN — SODIUM CHLORIDE 250 ML: 9 INJECTION, SOLUTION INTRAVENOUS at 12:53

## 2023-03-20 ASSESSMENT — PAIN SCALES - GENERAL: PAINLEVEL: NO PAIN (0)

## 2023-03-20 NOTE — PROGRESS NOTES
Infusion Nursing Note:  Timmy Strange presents today for feraheme infusion.    Patient seen by provider today: No   present during visit today: Not Applicable.    Note: N/A.    Intravenous Access:  Peripheral IV placed.    Treatment Conditions:  Not Applicable.    Post Infusion Assessment:  Patient tolerated infusion without incident.  Blood return noted pre and post infusion.  Site patent and intact, free from redness, edema or discomfort.  No evidence of extravasations.  Access discontinued per protocol.     Discharge Plan:   Discharge instructions reviewed with: Patient.  Patient and/or family verbalized understanding of discharge instructions and all questions answered.  Patient discharged in stable condition accompanied by: self.  Departure Mode: Ambulatory.      Marjan Street RN

## 2023-03-23 ENCOUNTER — TELEPHONE (OUTPATIENT)
Dept: ONCOLOGY | Facility: CLINIC | Age: 83
End: 2023-03-23
Payer: COMMERCIAL

## 2023-03-23 NOTE — TELEPHONE ENCOUNTER
Patient called in to speak with Tiffanie. Patient states he came to clinic intending to receive an iron infusion today- Venofer, but appointment had been cancelled. Patient states he received a different iron (Feraheme) but wants a call to help him understand what is the plan going forward.

## 2023-03-24 ENCOUNTER — PATIENT OUTREACH (OUTPATIENT)
Dept: CARE COORDINATION | Facility: CLINIC | Age: 83
End: 2023-03-24
Payer: COMMERCIAL

## 2023-03-24 NOTE — PROGRESS NOTES
Clinic Care Coordination Contact  [facility]/Voicemail    Patient's wife, Ladan called and left a voicemail message.  CHW returned call.     Clinical Data: Care Coordinator Outreach  Outreach attempted x 1.  Left message on patient's voicemail with call back information and requested return call.    Plan: Care Coordinator will try to reach patient again in 3-5 business days.    CHANDLER Briscoe  Clinic Care Coordination  [facility] Clinics: [place], and [place] [facility]  Phone: [phone]

## 2023-03-27 ENCOUNTER — MYC MEDICAL ADVICE (OUTPATIENT)
Dept: FAMILY MEDICINE | Facility: CLINIC | Age: 83
End: 2023-03-27
Payer: COMMERCIAL

## 2023-03-29 ENCOUNTER — PATIENT OUTREACH (OUTPATIENT)
Dept: CARE COORDINATION | Facility: CLINIC | Age: 83
End: 2023-03-29
Payer: COMMERCIAL

## 2023-03-29 NOTE — PROGRESS NOTES
Clinic Care Coordination Contact  Community Health Worker Initial Outreach    CHW introduced self, intent of call, gave contact info, and offered the CC program.  Spoke with patient' wife, Ladan.    Discussed:    Patient's wife stated she is the only support for her  at home.  Patient's wife works full-time.    Patient's wife stated they are having conversations about next steps and need guidance for this process.    Patient wife is looking for assisted living resources.    CHW Initial Information Gathering:  Referral Source: PCP    Reason for Referral:    Care Transition    Patient/Caregiver Support    Other-  Pt and wife requesting for future plans of Assisted living facilities resources      Other-   Wife Ladan Sanders is POA for him and requests to talk to her     Pt and wife requesting for future plans of Assisted living facilities resources        Preferred Hospital: [facility]  [phone]  Current living arrangement:: I live in a private home with spouse  Type of residence:: Private home - stairs  Community Resources: None  Supplies Currently Used at Home: Diabetic Supplies, Hearing Aid Batteries  Equipment Currently Used at Home: none  Informal Support system:: Children, Spouse  Transportation means:: Regular car, Family, Other  CHW Additional Questions  If ED/Hospital discharge, follow-up appointment scheduled as recommended?: N/A  Medication changes made following ED/Hospital discharge?: N/A  MyChart active?: Yes  Patient sent Social Determinants of Health questionnaire?: Yes    Patient accepts CC: Yes. Patient scheduled for assessment with JAYDE Painter on 3/30/23 at 12:30pm. Patient noted desire to discuss support at home, resources for assisted living facilities / nursing homes, and CC support.     CHANDLER Briscoe  Clinic Care Coordination  [facility] Clinics: [facility] [place], [place], [facility], and [facility]  Phone: [phone]

## 2023-03-29 NOTE — PROGRESS NOTES
Clinic Care Coordination Contact    Patient called CC.     Patient asked why the questionnaire was sent, and why the phone assessment is scheduled.    CHW explained to patient, his wife called regarding support from CC.  A questionnaire was sent for him, his wife, or both to complete if possible.    CHW discussed with patient that he is anibal to attend the phone appointment with his wife if he would like to.    CHANDLER Briscoe  Clinic Care Coordination  [facility] Clinics: [facility] [place], [place], [place], and [place] for Women  Phone: [phone]

## 2023-03-30 ENCOUNTER — TELEPHONE (OUTPATIENT)
Dept: FAMILY MEDICINE | Facility: CLINIC | Age: 83
End: 2023-03-30

## 2023-03-30 ENCOUNTER — PATIENT OUTREACH (OUTPATIENT)
Dept: NURSING | Facility: CLINIC | Age: 83
End: 2023-03-30
Payer: COMMERCIAL

## 2023-03-30 ENCOUNTER — VIRTUAL VISIT (OUTPATIENT)
Dept: FAMILY MEDICINE | Facility: CLINIC | Age: 83
End: 2023-03-30
Payer: COMMERCIAL

## 2023-03-30 DIAGNOSIS — R55 PRE-SYNCOPE: ICD-10-CM

## 2023-03-30 DIAGNOSIS — R94.31 ABNORMAL ELECTROCARDIOGRAM: ICD-10-CM

## 2023-03-30 DIAGNOSIS — R42 LIGHTHEADEDNESS: ICD-10-CM

## 2023-03-30 DIAGNOSIS — R55 PRE-SYNCOPE: Primary | ICD-10-CM

## 2023-03-30 DIAGNOSIS — D64.9 ANEMIA, UNSPECIFIED TYPE: ICD-10-CM

## 2023-03-30 DIAGNOSIS — R42 LIGHTHEADEDNESS: Primary | ICD-10-CM

## 2023-03-30 DIAGNOSIS — R26.89 BALANCE PROBLEMS: ICD-10-CM

## 2023-03-30 DIAGNOSIS — R60.9 DEPENDENT EDEMA: ICD-10-CM

## 2023-03-30 PROCEDURE — 99215 OFFICE O/P EST HI 40 MIN: CPT | Mod: VID | Performed by: INTERNAL MEDICINE

## 2023-03-30 ASSESSMENT — ACTIVITIES OF DAILY LIVING (ADL): DEPENDENT_IADLS:: MEDICATION MANAGEMENT;MONEY MANAGEMENT;TRANSPORTATION

## 2023-03-30 NOTE — TELEPHONE ENCOUNTER
Per chart review, patient completed virtual visit with PCP today 3/30/23.    Signing encounter.    Tree Boss RN  [facility]

## 2023-03-30 NOTE — LETTER
[facility]  Patient Centered Plan of Care  About Me:        Patient Name:  Timmy Strange    YOB: 1940  Age:         82 year old   Kiana MRN:    [id number] Telephone Information:  Home Phone [phone]   Mobile [phone]       Address:  [place] [address] Email address:  [email or web address]      Emergency Contact(s)    Name Relationship Lgl Grd Work Phone Home Phone Mobile Phone   1. CAMELIA. GWENDOLYN Spouse No   [phone]   2. THADDEUS MANNING Stepchild No  [phone]    3. BETTY DOLL Stepchild No  [phone]            Primary language:  English     needed? No   [facility]:  [phone] op. 1  Other communication barriers:Cognitive impairment    Preferred Method of Communication:  Mail  Current living arrangement: I live in a private home with spouse    Mobility Status/ Medical Equipment: No data recorded      Health Maintenance  Health Maintenance Reviewed: Due/Overdue   Health Maintenance Due   Topic Date Due    DEPRESSION ACTION PLAN  Never done    ZOSTER IMMUNIZATION (1 of 2) Never done    MEDICARE ANNUAL WELLNESS VISIT  12/23/2022    DIABETIC FOOT EXAM  12/23/2022           My Access Plan  Medical Emergency 911   Primary Clinic Line [facility] - [phone]   24 Hour Appointment Line [phone] or  [phone]) (toll-free)   24 Hour Nurse Line [phone] (toll-free)   Preferred Urgent Care No data recorded   Preferred Hospital [facility]  [phone]     Preferred Pharmacy [facility] - [place], [place] - [address] [facility]     Behavioral Health Crisis Line The National Suicide Prevention Lifeline at 1-801.506.2944 or Text/Call 878             My Care Team Members  Patient Care Team         Relationship Specialty Notifications Start End    Michaela Sharma MD PCP - General Internal Medicine  8/11/21     Phone:  [phone] Fax: [phone]         [address] [place] [address]    Chandrakant Panchal MD Assigned Cancer Care Provider   6/20/21     Phone: [phone] Fax: [phone]         [facility] [address] [address] [place] [address]    Michaela Sharma MD Assigned PCP   8/29/21     Phone: [phone] Fax: [phone]         [address] [place] [address]    Pino Meier MD MD Urology  1/27/22     Phone: [phone] Fax: [phone]         [address] [address]    Pino Meier MD Assigned Surgical Provider   6/18/22     Phone: [phone] Fax: [phone]         [address] [place] [address]    Alistair Osuna LSW Lead Care Coordinator Primary Care - CC Admissions 3/29/23     Phone: [phone]                   My Care Plans  Self Management and Treatment Plan  Care Plan  Care Plan: Social Support       Problem: Inadequate social support       Goal: Improve socialization/Supportive services       Start Date: 3/30/2023 Expected End Date: 7/1/2023    Note:     Goal Statement: I will continue to take steps over the next 6 months to identify alternate more supportive living which will allow me to maintain my current level of independence.  Barriers: Changes in cognition  Strengths: Strong support from family  Patient expressed understanding of goal: yes    Action steps to achieve this goal:  I will continue to participate in Adult Day Care as desired for socialization.   I will consider options for respite care or alternate living settings.   I will continue to lean on informal supports of my: friends and family  My family will review resources and supports sent to me via E-mail- [facility] and In home Respite options.   My family will contact my care team with questions, concerns, support needs.   My family will use the clinic as a resource and I understand I can contact my clinic with 24/7 after hours services available.    My family will continue to outreach to care coordination as needed for additional resources or supports.                                 Advance Care Plans/Directives Type:   Advanced Directive - On File      My Medical and Care Information  Problem List   Patient Active Problem List   Diagnosis    Essential hypertension    Hypothyroidism    Type II diabetes mellitus with peripheral circulatory disorder (H)    BPH (benign prostatic hyperplasia)    ED (erectile dysfunction)    Mixed hyperlipidemia    Special screening for malignant neoplasm of prostate    Localized edema    Seasonal allergic rhinitis    Screening for prostate cancer    Residual hemorrhoidal skin tags    Excess skin of eyelid, unspecified laterality    Glaucoma of both eyes, unspecified glaucoma type    Flatulence, eructation, and gas pain    Weakness of voice    Anemia, unspecified type    Gastroesophageal reflux disease without esophagitis    Dependent edema    Mild cognitive impairment    Pain due to onychomycosis of nail    Oropharyngeal dysphagia    Rectal hemorrhage    Change in bowel habits    Internal hemorrhoids with Hx of banding    Anemia, iron deficiency    Malabsorption of iron    Mild episode of recurrent major depressive disorder (H)      Current Medications and Allergies:     Allergies   Allergen Reactions    Shellfish Allergy      Current Outpatient Medications   Medication    acetaminophen (TYLENOL) 500 MG tablet    acetaminophen (TYLENOL) 500 MG tablet    albuterol (PROAIR HFA/PROVENTIL HFA/VENTOLIN HFA) 108 (90 Base) MCG/ACT inhaler    BINAXNOW COVID-19 AG HOME TEST KIT    blood glucose monitoring (ONE TOUCH ULTRA 2) meter device kit    Blood Pressure Monitoring (BLOOD PRESSURE KIT) KELLY    carvedilol (COREG) 3.125 MG tablet    escitalopram (LEXAPRO) 10 MG tablet    Ferrous Sulfate 324 (65 Fe) MG TBEC    Fexofenadine HCl (ALLEGRA PO)    finasteride (PROSCAR) 5 MG tablet    insulin glargine (LANTUS SOLOSTAR) 100 UNIT/ML pen     latanoprost (XALATAN) 0.005 % ophthalmic solution    levothyroxine (SYNTHROID/LEVOTHROID) 88 MCG tablet    losartan (COZAAR) 100 MG tablet    lovastatin (MEVACOR) 40 MG tablet    metFORMIN (GLUCOPHAGE XR) 500 MG 24 hr tablet    Multiple Vitamins-Minerals (CENTRUM SILVER) per tablet    ONETOUCH ULTRA test strip    tamsulosin (FLOMAX) 0.4 MG capsule     No current facility-administered medications for this visit.         Care Coordination Start Date: 3/29/2023   Frequency of Care Coordination: monthly     Form Last Updated: 04/04/2023

## 2023-03-30 NOTE — PROGRESS NOTES
Clinic Care Coordination Contact    Clinic Care Coordination Contact  OUTREACH    Referral Information:  Referral Source: PCP    Primary Diagnosis: Psychosocial    Chief Complaint   Patient presents with     Clinic Care Coordination - Initial     Resources for Support: Alternate Living         Universal Utilization:   Clinic Utilization  Difficulty keeping appointments:: No  Compliance Concerns: No  No-Show Concerns: No  No PCP office visit in Past Year: No  Utilization    Hospital Admissions  0             ED Visits  1             No Show Count (past year)  0                Current as of: 3/30/2023  1:27 PM              Clinical Concerns:  Current Medical Concerns:    Patient Active Problem List   Diagnosis     Essential hypertension     Hypothyroidism     Type II diabetes mellitus with peripheral circulatory disorder (H)     BPH (benign prostatic hyperplasia)     ED (erectile dysfunction)     Mixed hyperlipidemia     Special screening for malignant neoplasm of prostate     Localized edema     Seasonal allergic rhinitis     Screening for prostate cancer     Residual hemorrhoidal skin tags     Excess skin of eyelid, unspecified laterality     Glaucoma of both eyes, unspecified glaucoma type     Flatulence, eructation, and gas pain     Weakness of voice     Anemia, unspecified type     Gastroesophageal reflux disease without esophagitis     Dependent edema     Mild cognitive impairment     Pain due to onychomycosis of nail     Oropharyngeal dysphagia     Rectal hemorrhage     Change in bowel habits     Internal hemorrhoids with Hx of banding     Anemia, iron deficiency     Malabsorption of iron     Mild episode of recurrent major depressive disorder (H)       [facility] outreached to spouse on this date to review concerns and assess for needs.     Ladan shares she is the primary caregiver for pt who's cognition has declined over the last few years. Pt currently participates in Adult Day Care with [facility] however, they are  closing soon as they have lost funding. Spouse states she will be considering [facility]- which is a bigger program with a bit of a wait. Pt socialization currently is his family.     Ladan is working with an /elder law  as they begin to navigate long term planning. Pt is scheduled for neurophsych- Middle of april 11th/15th.     [facility] reviewed resources to assist in reviewing options for Alternate Living:     -[facility]  -Senior Housing Guide  -in home respite options.     Writer communicated the risks of unencrypted electronic communication and the patient and/or patient representative has agreed to accept the risks and receive unencrypted communication related to the information or resources we have discussed. We reviewed that no PHI will be included.  Email address verified with the patient.  Will include electronic communication form for patient/caregiver to complete.    Ladan shares planning and 24 hour care and supervision has been a lot for her to take on and has struggled with the stress. [facility] reviewed caregiver supports. Ladan shares she has started Caregiver support groups through [facility] monthly. Additionally she will starting telehealth therapy soon. Ladan denied any other needs for her at this time.        Current Behavioral Concerns: Cognitive decline.     Education Provided to patient: Resources above.      Health Maintenance Reviewed: Due/Overdue   Health Maintenance Due   Topic Date Due     DEPRESSION ACTION PLAN  Never done     ZOSTER IMMUNIZATION (1 of 2) Never done     MEDICARE ANNUAL WELLNESS VISIT  12/23/2022     DIABETIC FOOT EXAM  12/23/2022       Clinical Pathway: None    Medication Management:  Medication review status: Medications reviewed and no changes reported per patient.          Functional Status:  Dependent IADLs:: Medication Management, Money Management, Transportation  Fallen 2 or more times in the past year?: No    Living  Situation:  Current living arrangement:: I live in a private home with spouse  Type of residence:: Private home - stairs    Lifestyle & Psychosocial Needs:    Social Determinants of Health     Tobacco Use: Medium Risk     Smoking Tobacco Use: Former     Smokeless Tobacco Use: Never     Passive Exposure: Not on file   Alcohol Use: Not At Risk     Frequency of Alcohol Consumption: Never     Average Number of Drinks: Patient does not drink     Frequency of Binge Drinking: Never   Financial Resource Strain: Low Risk      Difficulty of Paying Living Expenses: Not very hard   Food Insecurity: No Food Insecurity     Worried About Running Out of Food in the Last Year: Never true     Ran Out of Food in the Last Year: Never true   Transportation Needs: No Transportation Needs     Lack of Transportation (Medical): No     Lack of Transportation (Non-Medical): No   Physical Activity: Inactive     Days of Exercise per Week: 0 days     Minutes of Exercise per Session: 40 min   Stress: Stress Concern Present     Feeling of Stress : To some extent   Social Connections: Moderately Isolated     Frequency of Communication with Friends and Family: Once a week     Frequency of Social Gatherings with Friends and Family: Twice a week     Attends Temple Services: Never     Active Member of Clubs or Organizations: No     Attends Club or Organization Meetings: Not on file     Marital Status:    Intimate Partner Violence: Not At Risk     Fear of Current or Ex-Partner: No     Emotionally Abused: No     Physically Abused: No     Sexually Abused: No   Depression: Not at risk     PHQ-2 Score: 0   Housing Stability: Low Risk      Unable to Pay for Housing in the Last Year: No     Number of Places Lived in the Last Year: 1     Unstable Housing in the Last Year: No        Transportation means:: Regular car, Family, Other     Temple or spiritual beliefs that impact treatment:: No  Mental health DX:: Yes  Mental health DX how managed::  Medication  Mental health management concern (GOAL):: No  Chemical Dependency Status: No Current Concerns  Informal Support system:: Children, Spouse     Care Coordinator has reviewed patient's Social Determinants of Health (SDoH) on this date. Upon review, changes were not  made.        Resources and Interventions:  Current Resources:      Community Resources: None  Supplies Currently Used at Home: Diabetic Supplies, Hearing Aid Batteries  Equipment Currently Used at Home: none  Employment Status: retired     Advance Care Plan/Directive  Advanced Care Plans/Directives on file:: Yes  Type Advanced Care Plans/Directives: Advanced Directive - On File  Advanced Care Plan/Directive Status: Not Applicable    Referrals Placed: Community Resources    The patient consented via Verbal consent to have contact information and resources sent via email in an unencrypted manner.     Care Plan:  Care Plan: Social Support     Problem: Inadequate social support     Goal: Improve socialization/Supportive services     Start Date: 3/30/2023 Expected End Date: 7/1/2023    Note:     Goal Statement: I will continue to take steps over the next 6 months to identify alternate more supportive living which will allow me to maintain my current level of independence.  Barriers: Changes in cognition  Strengths: Strong support from family  Patient expressed understanding of goal: yes    Action steps to achieve this goal:  1. I will continue to participate in Adult Day Care as desired for socialization.   2. I will consider options for respite care or alternate living settings.   3. I will continue to lean on informal supports of my: friends and family  4. My family will review resources and supports sent to me via E-mail- [facility] and In home Respite options.   5. My family will contact my care team with questions, concerns, support needs.   6. My family will use the clinic as a resource and I understand I can contact my clinic with  24/7 after hours services available.   7. My family will continue to outreach to care coordination as needed for additional resources or supports.                          Patient/Caregiver understanding: Pt reports understanding and denies any additional questions or concerns at this times. SW CC engaged in AIDET communication during encounter.    Outreach Frequency: monthly  Future Appointments              In 2 months EC LAB [facility], EC    In 2 months Chandrakant Panchal MD M [facility] [place], [place] YAEL    In 2 months Michaela Sharma MD [facility], EC    In 4 months Pino Meier MD [facility] [place], [facility] PHY [place]    In 5 months EC LAB M [facility], EC          Plan: Email sent to spouse to review. Spouse to continue to work with Elder law attorneys and connect with community resources to assist with establishing beneficial supports.     Email sent: Celestine Pickering,     I m so glad we could chat today. I wanted to share with you some of the resources we talked about today:     [facility]:   [email or web address]  [phone]    At [facility], we understand the importance of receiving the right information to make the best possible decisions, and we re committed to helping seniors and their families make these tough choices. Our senior living consultants in [place] are here to guide you and offer the resources your family needs. We take the time to get to know your unique situation so that we can offer tailored advice and recommendations that will suit you. Our valuable insight and experience are yours--completely free and without any obligations. We simply want to help you and your loved ones find the perfect housing solutions and senior living resources, no matter what your requirements  are.      Senior Housing Guide:   https://seniorhousingguide.us/    The  Senior Housing Directory  is a comprehensive listing of all senior housing in the 11-county metro area surrounding [facility]. It is divided into two types, ownership and rentals. Ownership options include condominiums, townhomes, and cooperatives all of which typically offer few, if any, services or personal care, but have many building amenities. Rental units are apartments or town homes and can be market rate or subsidized. In each type renters pay a specified amount each month in order to reside in the unit. Market rate simply means whatever the unit will bring in the local market. Subsidized housing is an affordable rental option for those with limited means; residents pay a percent of their income for rent, usually 30%.      In Home Respite Services:  [facility] [place]     [facility]     Home Instead    [facility]    [facility]        Caregiver Supports:   Morning Out & Afternoon Adventures  Group Respite Care   Group Respite Care  [facility] ([email or web address])  [facility] group respite care provides a much-needed break for family caregivers and an enjoyable outing for older adults age 60 years and older in a social group setting. For general inquiries about [facility] Caregiver Support & Respite Services, call [phone].  A Break for Caregivers   Caring for a loved-one, whether it be a parent, spouse or sibling, can sometimes be challenging physically, mentally and emotionally. Morning Out Group provides safety, security and socialization for seniors while their caregiver takes a break.   Group respite provides caregivers:     Peace of mind knowing your loved-one is in a safe, enjoyable environment.     An opportunity to shop, take care of personal needs or run errands.     Time to rest from the full time job of caring for a loved one.   Even just a few hours alone can make big difference.      Socialization for Older Adults   Group respite provides a dynamic social setting?that older adults requiring special care rarely encounter.?They have the opportunity to socialize while trained volunteers facilitate activities.?   Activities may include:     Coffee and conversation     Crafts, table games and cards     Guided reminiscing and discussion     Gentle physical exercise     Movies and radio shows     Musical entertainment     Various other activities   To ensure a pleasant experience for the group, we ask that Morning Out Group participants be:?     Capable of walking with minimal assistance     Continent or have managed incontinence     Able to feed themselves     Non-violent toward self or others     Responsive and able to participate in group settings     Independent in taking medication     60 years of age or older   In-home respite care may be more appropriate for individuals who do not meet all of the above attributes.     Other Supports:  [place] Family Caregivers, [place] Board on Aging  http://www.mnaging.net/Advisor/Caregiver.aspx  Government website provides information on family caregiving and links to websites for caregiver resources in the [facility].    Care Recipient Eligibility  Anyone    Caregiver Eligibility  Anyone    Contact Information  Senior Linkage Line: [phone] [place] Board on Aging: [phone]    [place] Family Caregiver Support Program (FCSP)  http://www.[place]aging.org/Advisor/Caregiver.aspx  FCSP services for family caregivers are provided through local area agencies on aging (AAAs) and include:  - Information  - Assistance  - Individual counseling, support groups and caregiver training  - Respite care  - Limited supplemental services, such as transportation and home modifications    Care Recipient Eligibility  - Adults age 60 and older Exception: - Adults with dementia can be any age    Caregiver Eligibility  - Anyone age 18 and older  caring for someone who meets care  eligibility Special Populations: - Adults age 55 and older who are caring for anyone (that is not their child) over 18 years old with developmental disabilities - Grandparents or other relatives age 55 and older caring for children (age 18 and under)    Contact Information  Senior Linkage Line [phone]    Alzheimer s Association - [place]  https://www.alz.org/mnnd  The [facility] Chapter provides educational programs and support groups for people living with Alzheimer s disease and other forms of dementia, caregivers, and health care professionals.     Care Recipient Eligibility  Anyone    Caregiver Eligibility  Anyone    Contact Information  [phone]      [place] Cancer [place]  http://mncanceralliance.org/  Website provides information about cancer and resources for individuals affected by cancer and their families. Resource information can be browsed by county or topic. Topics include:  - Food and nutrition  - Financial assistance  - Genetic counseling  - Legal assistance  - In-home care  - Caregiver resources    (This list is not exhaustive, please visit website for information on more topics)    Care Recipient Eligibility  Anyone    Caregiver Eligibility  Anyone    Contact Information  [phone]      GUY Hawley  Social Work Care Coordinator         Care Coordination will outreach to pt/spouse monthly.     GUY Hawley  Clinic Care Coordinator  [facility]  [phone]  [email or web address]

## 2023-03-30 NOTE — TELEPHONE ENCOUNTER
CC: Patient calling stating he is returning a call regarding upcoming appointment this morning:    3/30/2023 10:30 AM (Arrive by 10:10 AM) Michaela Sharma MD [facility] [place]      Routing to team to reach out to patient to check in for appointment, thank you!    Callback [phone] - patient states he will keep his phone near     [place] RN  [facility]

## 2023-03-30 NOTE — TELEPHONE ENCOUNTER
LM for patient.     Needs EKG with MA for a day Zachary is here.   Labs ASAP.    Faxed 4 wheel walker to ODILIA Morris/Al-  [facility]

## 2023-03-30 NOTE — PATIENT INSTRUCTIONS
As discussed , given your symptoms happening since past 1 week will make sure no cardiac or metabolic causes before planning possible need of MRI brain.     Please make MA appointment for EKG ordered and also baseline labs which does show ongoing worsening anemia. I do understand you had iron infusion 1 week back.

## 2023-03-30 NOTE — TELEPHONE ENCOUNTER
Pt returned call.  Will call back with spouse since spouse is driving patient    Khushboo Morris/Al-  [facility]

## 2023-03-30 NOTE — PROGRESS NOTES
Timmy is a 82 year old who is being evaluated via a billable video visit.      How would you like to obtain your AVS? MyChart  If the video visit is dropped, the invitation should be resent by: Text to cell phone: [phone]  Will anyone else be joining your video visit? No    Assessment and Plan  1. Lightheadedness  2. Pre-syncope  New problem, which patient is stating that since he Started Prozac since 8-9 months which he states has been noticing Dizziness which has been worsening since past 1 week. Prozac has been titrated down with this concern and has been on Lexapro from today onwards as a weaning down process for Prozac has almost ended.  -Given the patient's symptoms and limitation of video visit exam emphasized on making sure no concerns of cardiac causes which could be causing this before planning on possible need of MRI given patient's cognitive decline and now current ongoing dizziness, last MRI for cognitive decline was done in September 2021 by neurology which was showing age-related changes.  But given this new lightheadedness and presyncope since past 1 week we will have to reconsider to recheck at this time.  -Patient and wife Toshia understood and agreed with plan.  - EKG 12-lead complete w/read - Clinics  - Walker Order for DME - ONLY FOR DME    3. Balance problems  Ongoing problem, uncontrolled.  Patient states that he is almost holding the ball when he is walking, does not have any assistive ambulatory devices at home.  Have placed DME walker to be provided to him.  We will check for electrolytes and also given his worsening anemia as mentioned below I am suspecting that he will need to be meticulously following his hematology which I do not see his next follow-up visit till June 2023.  - Basic metabolic panel  (Ca, Cl, CO2, Creat, Gluc, K, Na, BUN); Future  - Walker Order for DME - ONLY FOR DME    4. Anemia, unspecified type  Chronic problem, ongoing. Pt having his Iron infusions x 2  completed last week.   - Hemoglobin; Future      Discussed with the patient on most part of the appointment regarding keeping up his EKG appointment with us and also lab only appointment on the labs noted above.  Explained the risk factors/red flag signs when he should be in the emergency room.  Discussed on fall risk prevention, DME Walker orders placed.      Over 40 minutes spent on reviewing patient chart,  face to face encounter, greater than 50% time spent with plan/cordination of care and documentation as above in my A/P.        DME (Durable Medical Equipment) Orders and Documentation  Orders Placed This Encounter   Procedures     Walker Order for DME - ONLY FOR DME      The patient was assessed and it was determined the patient is in need of the following listed DME Supplies/Equipment. Please complete supporting documentation below to demonstrate medical necessity.      DME All Other Item(s) Documentation    List reason for need and supporting documentation for medical necessity below for each DME item.     3. Balance problems             Patient Instructions   As discussed , given your symptoms happening since past 1 week will make sure no cardiac or metabolic causes before planning possible need of MRI brain.     Please make MA appointment for EKG ordered and also baseline labs which does show ongoing worsening anemia. I do understand you had iron infusion 1 week back.         Return in 10 days (on 4/9/2023), or if symptoms worsen or fail to improve.    Michaela Sharma MD  [facility] [place]      Jefe   Timmy is a 82 year old, presenting for the following health issues:  Vertigo    History of Present Illness       Reason for visit:  Decide if meds need to be changed  Symptom onset:  3-4 weeks ago  Symptoms include:  Dizziness when going up stairs. Drowsiness once a day  Symptom intensity:  Moderate  Symptom progression:  Worsening  Had these symptoms before:  No  What makes it  worse:  No  What makes it better:  Yes food in some cases    He eats 0-1 servings of fruits and vegetables daily.He consumes 1 sweetened beverage(s) daily.He exercises with enough effort to increase his heart rate 30 to 60 minutes per day.  He exercises with enough effort to increase his heart rate 3 or less days per week.   He is taking medications regularly.     Last seen patient in March 2023 for recurrent depression.       Allergies   Allergen Reactions     Shellfish Allergy         Past Medical History:   Diagnosis Date     Arthritis 1970    Dx'd with RA when in the      BPH (benign prostatic hyperplasia) 12/16/2013     Cancer (H)     basal cell cancer behind ear     Diabetes mellitus      ED (erectile dysfunction) 1/6/2015     HTN (hypertension)      Hyperlipidemia LDL goal <100      Hypothyroidism      Mumps      Obesity        Past Surgical History:   Procedure Laterality Date     BIOPSY       COLONOSCOPY N/A 11/21/2014    Procedure: COMBINED COLONOSCOPY, SINGLE OR MULTIPLE BIOPSY/POLYPECTOMY BY BIOPSY;  Surgeon: Rolanda Bey MD;  Location:  GI     COLONOSCOPY N/A 1/20/2020    Procedure: COLONOSCOPY, WITH POLYPECTOMY AND BIOPSY;  Surgeon: Christina Vieira MD;  Location: [facility]     COLONOSCOPY N/A 2/1/2021    Procedure: Colonoscopy, With Polypectomy And Biopsy;  Surgeon: Christina Vieira MD;  Location: [facility]     ESOPHAGOSCOPY, GASTROSCOPY, DUODENOSCOPY (EGD), COMBINED N/A 3/16/2021    Procedure: ESOPHAGOGASTRODUODENOSCOPY, WITH BIOPSY;  Surgeon: Jose Schrader MD;  Location:  GI     EYE SURGERY       TESTICLE SURGERY       TONSILLECTOMY, ADENOIDECTOMY, COMBINED       VASECTOMY         Family History   Problem Relation Age of Onset     Diabetes Maternal Grandmother      Diabetes Maternal Grandfather      Arthritis Maternal Grandfather      Arthritis Father      Thyroid Disease Father      Glaucoma Mother      Alcohol/Drug Mother 49        cirrhosis     Diabetes Daughter 44         type 2     Obesity Daughter      Glaucoma Maternal Aunt        Social History     Tobacco Use     Smoking status: Former     Packs/day: 0.00     Years: 0.00     Pack years: 0.00     Types: Pipe, Cigarettes     Start date: 1958     Quit date: 11/15/2011     Years since quittin.3     Smokeless tobacco: Never     Tobacco comments:     Smoked pipes 10 minutes a day started 22 yrs old , quit 10 yrs back.   Substance Use Topics     Alcohol use: No     Alcohol/week: 0.0 standard drinks        Current Outpatient Medications   Medication     acetaminophen (TYLENOL) 500 MG tablet     acetaminophen (TYLENOL) 500 MG tablet     albuterol (PROAIR HFA/PROVENTIL HFA/VENTOLIN HFA) 108 (90 Base) MCG/ACT inhaler     BINAXNOW COVID-19 AG HOME TEST KIT     blood glucose monitoring (ONE TOUCH ULTRA 2) meter device kit     Blood Pressure Monitoring (BLOOD PRESSURE KIT) KELLY     carvedilol (COREG) 3.125 MG tablet     escitalopram (LEXAPRO) 10 MG tablet     Ferrous Sulfate 324 (65 Fe) MG TBEC     Fexofenadine HCl (ALLEGRA PO)     finasteride (PROSCAR) 5 MG tablet     insulin glargine (LANTUS SOLOSTAR) 100 UNIT/ML pen     latanoprost (XALATAN) 0.005 % ophthalmic solution     levothyroxine (SYNTHROID/LEVOTHROID) 88 MCG tablet     losartan (COZAAR) 100 MG tablet     lovastatin (MEVACOR) 40 MG tablet     metFORMIN (GLUCOPHAGE XR) 500 MG 24 hr tablet     Multiple Vitamins-Minerals (CENTRUM SILVER) per tablet     ONETOUCH ULTRA test strip     tamsulosin (FLOMAX) 0.4 MG capsule     No current facility-administered medications for this visit.        Review of Systems   Constitutional, HEENT, cardiovascular, pulmonary, GI, , musculoskeletal, neuro, skin, endocrine and psych systems are negative, except as otherwise noted.      Objective           Vitals:  No vitals were obtained today due to virtual visit.    Physical Exam   GENERAL: Healthy, alert and no distress  EYES: Eyes grossly normal to inspection.  No discharge or erythema, or  obvious scleral/conjunctival abnormalities.  RESP: No audible wheeze, cough, or visible cyanosis.  No visible retractions or increased work of breathing.    SKIN: Visible skin clear. No significant rash, abnormal pigmentation or lesions.  NEURO: Cranial nerves grossly intact.  Mentation and speech appropriate for age.  PSYCH: Mentation appears normal, affect normal/bright, judgement and insight intact, normal speech and appearance well-groomed.    Labs and imaging reviewed and discussed with the patient.    Video-Visit Details    Type of service:  Video Visit   Video Start Time: Joined the call at 3/30/2023, 10:55:17 am.  Video End Time:Left the call at 3/30/2023, 11:15:08 am.    Originating Location (pt. Location): Home  Distant Location (provider location):  On-site  Platform used for Video Visit: Darrin

## 2023-03-30 NOTE — LETTER
[facility] CARE COORDINATION  [address]  [place] [address]    April 4, 2023    Timmy Strange  [address] [place] [address]      Dear Timmy,        I am a clinic care coordinator who works with Michaela Sharma MD with the [facility]. I wanted to thank you for spending the time to talk with me.  Below is a description of clinic care coordination and how I can further assist you.       The clinic care coordination team is made up of a registered nurse, , financial resource worker and community health worker who understand the health care system. The goal of clinic care coordination is to help you manage your health and improve access to the health care system. Our team works alongside your provider to assist you in determining your health and social needs. We can help you obtain health care and community resources, providing you with necessary information and education. We can work with you through any barriers and develop a care plan that helps coordinate and strengthen the communication between you and your care team.    Please feel free to contact me with any questions or concerns regarding care coordination and what we can offer.      We are focused on providing you with the highest-quality healthcare experience possible.    Sincerely,     Alistair Osuna [facility]  Clinic Care Coordinator  [facility]  [phone]  [email or web address]    Enclosed: I have enclosed a copy of the Patient Centered Plan of Care. This has helpful information and goals that we have talked about. Please keep this in an easy to access place to use as needed.

## 2023-03-31 ENCOUNTER — LAB (OUTPATIENT)
Dept: LAB | Facility: CLINIC | Age: 83
End: 2023-03-31
Payer: COMMERCIAL

## 2023-03-31 ENCOUNTER — ALLIED HEALTH/NURSE VISIT (OUTPATIENT)
Dept: FAMILY MEDICINE | Facility: CLINIC | Age: 83
End: 2023-03-31
Payer: COMMERCIAL

## 2023-03-31 DIAGNOSIS — D64.9 ANEMIA, UNSPECIFIED TYPE: ICD-10-CM

## 2023-03-31 DIAGNOSIS — R26.89 BALANCE PROBLEMS: ICD-10-CM

## 2023-03-31 DIAGNOSIS — R94.31 ABNORMAL ELECTROCARDIOGRAM: ICD-10-CM

## 2023-03-31 DIAGNOSIS — R42 LIGHTHEADEDNESS: Primary | ICD-10-CM

## 2023-03-31 LAB
ANION GAP SERPL CALCULATED.3IONS-SCNC: 11 MMOL/L (ref 7–15)
BUN SERPL-MCNC: 19.9 MG/DL (ref 8–23)
CALCIUM SERPL-MCNC: 9 MG/DL (ref 8.8–10.2)
CHLORIDE SERPL-SCNC: 101 MMOL/L (ref 98–107)
CREAT SERPL-MCNC: 1.01 MG/DL (ref 0.67–1.17)
DEPRECATED HCO3 PLAS-SCNC: 26 MMOL/L (ref 22–29)
GFR SERPL CREATININE-BSD FRML MDRD: 75 ML/MIN/1.73M2
GLUCOSE SERPL-MCNC: 99 MG/DL (ref 70–99)
HGB BLD-MCNC: 10 G/DL (ref 13.3–17.7)
POTASSIUM SERPL-SCNC: 4.7 MMOL/L (ref 3.4–5.3)
SODIUM SERPL-SCNC: 138 MMOL/L (ref 136–145)

## 2023-03-31 PROCEDURE — 99207 PR NO CHARGE NURSE ONLY: CPT

## 2023-03-31 PROCEDURE — 36415 COLL VENOUS BLD VENIPUNCTURE: CPT

## 2023-03-31 PROCEDURE — 80048 BASIC METABOLIC PNL TOTAL CA: CPT

## 2023-03-31 PROCEDURE — 85018 HEMOGLOBIN: CPT

## 2023-04-01 ENCOUNTER — HEALTH MAINTENANCE LETTER (OUTPATIENT)
Age: 83
End: 2023-04-01

## 2023-04-01 NOTE — PROGRESS NOTES
Discussed EKG which was showing low voltage waves and given patient ongoing dizziness will check for Echocardiogram to make sure no concerns .     Done brief exam -   ENT : Positive for bilateral ear cerumen. Will need Ear wash for improvement - Pt declined  Ear wash today and requesting another day.     NEURO : CN 2-12 normal, NO motor or Sensory deficits.     ======================    Please schedule Echocardiogram and also MA visit for ear wash of this patient.     Thank you,  Michaela Sharma MD on 3/31/2023

## 2023-04-03 NOTE — TELEPHONE ENCOUNTER
Spoke to pt, relayed phone number for Cardiology at [address] [place] [address].    Tawana JOVEL    [facility]

## 2023-04-03 NOTE — TELEPHONE ENCOUNTER
Spoke to pt and pt wife wanting to do Echo at [place] [address] or [place] in [place].  Requesting to have orders faxed and find number for scheduling.  Going to wait to schedule ear wash appointment until after neurology appointments.    Tawana JOVEL    [facility]

## 2023-04-03 NOTE — TELEPHONE ENCOUNTER
[address], provided number for Cardiology [phone], and fax number [phone].    Tawana JOVEL    [facility]

## 2023-04-04 NOTE — TELEPHONE ENCOUNTER
Pt spouse wants to proceed with this appointment with [facility].  Pt is scheduled 4/26 and needs this sent today or they will miss out on the appointment    Khushboo Morris/Al-  [facility]

## 2023-04-04 NOTE — TELEPHONE ENCOUNTER
Order is for [facility].    Upon talking to spouse- Ladan, pt is still experiencing dizziness and spouse wanted to see if [facility] could get him in sooner than 4/26.  Instructed spouse to call Cardiology  and see their first opening, if it's after 4/26 call us to have us ask PCP to write an order for outside Echo.      Will wait for Ladan to return call.     Khushboo Morris/Al-  [facility]

## 2023-04-04 NOTE — TELEPHONE ENCOUNTER
Received a call from the patient's wife, Ladan, stating she is trying to schedule the patient's ECHO but they have not received the order yet and are unable to schedule the patient.    Will route HP message to TC's to fax ECHO order.     Roselia Miles RN

## 2023-04-04 NOTE — TELEPHONE ENCOUNTER
Dr Sharma,     Can you write the referral for Echo For [facility] [place].  Pt has found a sooner appointment outside of [place] for his Echo.      TC- Please fax ASAP , this referral needs to be sent today or patient's appointment will be cancelled.     Khushboo Morris/Al-  [facility]

## 2023-04-05 NOTE — PROGRESS NOTES
Pt spouse is aware of the ear washing appt.  Will call us to schedule after pt has his specialty appointments.     Khushboo Morris/Al-  [facility]

## 2023-04-05 NOTE — TELEPHONE ENCOUNTER
Called [facility] for fax number, fax number is [phone].  Faxed referral to [facility].    Tawana JOVEL    [facility]

## 2023-04-06 ENCOUNTER — TELEPHONE (OUTPATIENT)
Dept: ONCOLOGY | Facility: CLINIC | Age: 83
End: 2023-04-06
Payer: COMMERCIAL

## 2023-04-06 NOTE — TELEPHONE ENCOUNTER
Spoke to Ladan Strange, clarified that [facility] [place] is at [address].  Requested that referral be resent.  Refaxed referral to [phone].    Tawana JOVEL    [facility]

## 2023-04-07 NOTE — TELEPHONE ENCOUNTER
Writer received a call from patient and his hgb is 10.0.  This is post iron infusions and patient is wondering if his follow up changes.    Writer advise that a message will be sent to Dr. Panchal but did not believe that a change in the plan of care is indicated.     Herminia Lopez RN

## 2023-04-20 ENCOUNTER — PATIENT OUTREACH (OUTPATIENT)
Dept: ONCOLOGY | Facility: CLINIC | Age: 83
End: 2023-04-20
Payer: COMMERCIAL

## 2023-04-20 DIAGNOSIS — D50.9 IRON DEFICIENCY ANEMIA, UNSPECIFIED IRON DEFICIENCY ANEMIA TYPE: Primary | ICD-10-CM

## 2023-04-20 NOTE — TELEPHONE ENCOUNTER
Writer received a VM from patient's wife, Ladan, requesting a return call. Writer returned call and patient is concerned about his dementia being affected by his low hemoglobin and want to have it rechecked begining of May.     Order has been placed and patient will have drawn at [facility] lab .    Herminia Lopez RN

## 2023-04-24 ENCOUNTER — TELEPHONE (OUTPATIENT)
Dept: FAMILY MEDICINE | Facility: CLINIC | Age: 83
End: 2023-04-24

## 2023-04-24 NOTE — TELEPHONE ENCOUNTER
Called [phone], [facility].  Sent fax again to number below, and then sent to email address.  Confirmed receipt of email.  Called Ladan back to let her know that, [facility] received orders.    Tawana JOVEL    [facility]

## 2023-04-24 NOTE — TELEPHONE ENCOUNTER
Spoke to Ladan again, resent referral to [facility] [place].  [phone].  Will call [facility] to follow up, and follow up with Ladna.    Tawana JOVEL    [facility]

## 2023-04-26 ENCOUNTER — TRANSFERRED RECORDS (OUTPATIENT)
Dept: HEALTH INFORMATION MANAGEMENT | Facility: CLINIC | Age: 83
End: 2023-04-26

## 2023-04-26 ENCOUNTER — MEDICAL CORRESPONDENCE (OUTPATIENT)
Dept: HEALTH INFORMATION MANAGEMENT | Facility: CLINIC | Age: 83
End: 2023-04-26

## 2023-04-26 DIAGNOSIS — F33.0 MILD EPISODE OF RECURRENT MAJOR DEPRESSIVE DISORDER (H): ICD-10-CM

## 2023-04-26 LAB — EJECTION FRACTION: 66 %

## 2023-04-27 RX ORDER — ESCITALOPRAM OXALATE 10 MG/1
10 TABLET ORAL DAILY
Qty: 90 TABLET | Refills: 0 | Status: SHIPPED | OUTPATIENT
Start: 2023-04-27 | End: 2023-05-19

## 2023-05-04 ENCOUNTER — LAB (OUTPATIENT)
Dept: LAB | Facility: CLINIC | Age: 83
End: 2023-05-04
Payer: COMMERCIAL

## 2023-05-04 DIAGNOSIS — E11.51 TYPE II DIABETES MELLITUS WITH PERIPHERAL CIRCULATORY DISORDER (H): Primary | ICD-10-CM

## 2023-05-04 DIAGNOSIS — D50.9 IRON DEFICIENCY ANEMIA, UNSPECIFIED IRON DEFICIENCY ANEMIA TYPE: ICD-10-CM

## 2023-05-04 LAB
HBA1C MFR BLD: 5.7 % (ref 0–5.6)
HGB BLD-MCNC: 11.3 G/DL (ref 13.3–17.7)

## 2023-05-04 PROCEDURE — 85018 HEMOGLOBIN: CPT

## 2023-05-04 PROCEDURE — 83036 HEMOGLOBIN GLYCOSYLATED A1C: CPT

## 2023-05-04 PROCEDURE — 36415 COLL VENOUS BLD VENIPUNCTURE: CPT

## 2023-05-05 ENCOUNTER — HOSPITAL ENCOUNTER (OUTPATIENT)
Facility: CLINIC | Age: 83
End: 2023-05-05
Payer: COMMERCIAL

## 2023-05-06 NOTE — RESULT ENCOUNTER NOTE
Your A1C at goal , continue the current medications.    Please let me know if you have any questions.    Thank you,  Michaela Sharma MD on 5/6/2023 at 6:47 PM

## 2023-05-08 ENCOUNTER — TELEPHONE (OUTPATIENT)
Dept: MEDSURG UNIT | Facility: CLINIC | Age: 83
End: 2023-05-08
Payer: COMMERCIAL

## 2023-05-08 ENCOUNTER — PATIENT OUTREACH (OUTPATIENT)
Dept: CARE COORDINATION | Facility: CLINIC | Age: 83
End: 2023-05-08
Payer: COMMERCIAL

## 2023-05-08 ASSESSMENT — ACTIVITIES OF DAILY LIVING (ADL): DEPENDENT_IADLS:: MEDICATION MANAGEMENT;MONEY MANAGEMENT;TRANSPORTATION

## 2023-05-08 NOTE — PROGRESS NOTES
Clinic Care Coordination Contact  Follow Up Progress Note     Enrollment Date: 3/29/2023     Assessment: [facility] outreached to spouse on this date. Reviewed updates since last outreach. Ladan ruelas pt has followed up with a number of tests and has been found to have increased MCI progression as well as probable diagnosis of Dementia and subsequent progression.      Reviewed Insurance and coverage of Home Care. Pt currently still drives in the Spring/Summer months and is able to care for self independently. Explained Homebound status and Medicare guidelines.     Care Gaps:    Health Maintenance Due   Topic Date Due     DEPRESSION ACTION PLAN  Never done     ZOSTER IMMUNIZATION (1 of 2) Never done     MEDICARE ANNUAL WELLNESS VISIT  12/23/2022     DIABETIC FOOT EXAM  12/23/2022     EYE EXAM  05/12/2023       Care Gaps Last addressed on last OV     Care Plan: Social Support     Problem: Inadequate social support     Goal: Improve socialization/Supportive services     Start Date: 3/30/2023 Expected End Date: 7/1/2023    This Visit's Progress: 10%    Note:     Goal Statement: I will continue to take steps over the next 6 months to identify alternate more supportive living which will allow me to maintain my current level of independence.  Barriers: Changes in cognition  Strengths: Strong support from family  Patient expressed understanding of goal: yes    Action steps to achieve this goal:  1. I will continue to participate in Adult Day Care as desired for socialization.   2. I will consider options for respite care or alternate living settings.   3. I will continue to lean on informal supports of my: friends and family  4. My family will review resources and supports sent to me via E-mail- [employer] and In home Respite options.   5. My family will contact my care team with questions, concerns, support needs.   6. My family will use the clinic as a resource and I understand I can contact my clinic with 24/7  Left message for patient asking him to call the clinic back to discuss below.  Clinic number left.   after hours services available.   7. My family will continue to outreach to care coordination as needed for additional resources or supports.                          Patient is actively working to accomplish goal and patient's goal remains appropriate and relevant at this time.     Intervention/Education provided during outreach: Medicare Home Care requirements.      Outreach Frequency: monthly    Complex Care Plan:  Patient is not due for Complex Care Plan to be updated.  Care Plan updated and mailed to patient: No    Annual Assessment due before next Outreach: No  Scheduled: Not Applicable    Plan: Spouse to call [facility] back after additional follow up with specialists if there are any needs.  Care Coordinator will follow up in 4 weeks.     Alistair Osuna [facility]  Clinic Care Coordinator  [facility]  [phone]  [email or web address]

## 2023-05-10 ENCOUNTER — OFFICE VISIT (OUTPATIENT)
Dept: FAMILY MEDICINE | Facility: CLINIC | Age: 83
End: 2023-05-10
Payer: COMMERCIAL

## 2023-05-10 VITALS
WEIGHT: 189.4 LBS | TEMPERATURE: 98.3 F | DIASTOLIC BLOOD PRESSURE: 60 MMHG | SYSTOLIC BLOOD PRESSURE: 138 MMHG | HEIGHT: 66 IN | HEART RATE: 64 BPM | OXYGEN SATURATION: 99 % | RESPIRATION RATE: 16 BRPM | BODY MASS INDEX: 30.44 KG/M2

## 2023-05-10 DIAGNOSIS — I51.7 LEFT ATRIAL ENLARGEMENT: Primary | ICD-10-CM

## 2023-05-10 DIAGNOSIS — R55 PRE-SYNCOPE: ICD-10-CM

## 2023-05-10 DIAGNOSIS — R26.89 BALANCE PROBLEMS: ICD-10-CM

## 2023-05-10 DIAGNOSIS — R60.0 PERIPHERAL EDEMA: ICD-10-CM

## 2023-05-10 DIAGNOSIS — G31.84 MILD COGNITIVE IMPAIRMENT: ICD-10-CM

## 2023-05-10 DIAGNOSIS — D64.9 ANEMIA, UNSPECIFIED TYPE: ICD-10-CM

## 2023-05-10 DIAGNOSIS — I10 ESSENTIAL HYPERTENSION: ICD-10-CM

## 2023-05-10 DIAGNOSIS — E11.51 TYPE II DIABETES MELLITUS WITH PERIPHERAL CIRCULATORY DISORDER (H): ICD-10-CM

## 2023-05-10 PROCEDURE — 99214 OFFICE O/P EST MOD 30 MIN: CPT | Performed by: INTERNAL MEDICINE

## 2023-05-10 ASSESSMENT — PAIN SCALES - GENERAL: PAINLEVEL: NO PAIN (0)

## 2023-05-10 NOTE — PROGRESS NOTES
Assessment and Plan  1. Left atrial enlargement  2. Peripheral edema  3. Pre-syncope  New findings of left atrial enlargement which is seen on echocardiogram and mention it as severe.  Given the patient's ongoing symptoms of presyncope recently along with mild peripheral edema I have placed referral to cardiology, given compression stockings at this time which patient understood and agreed with the plan.  - Adult Cardiology Eval  Referral; Future  - Community Paramedic Referral; Future  - Compression Sleeve/Stocking Order for DME - ONLY FOR DME    4. Type II diabetes mellitus with peripheral circulatory disorder (H)  5. Anemia, unspecified type  6. Mild cognitive impairment  7. Essential hypertension  8. Balance problems  Patient wife Toshia in the room today has been expressing regarding her not going to be accompanying the patient in upcoming appointments due to her work and timings, requesting for help on this.  I went ahead and placed community paramedic referral mentioning the same which we will have to look forward and see if this can be helpful to the patient.  Patient understood and agreed with the plan.  - Community Paramedic Referral; Future       Patient Instructions   As discussed , given your new Echo finding of Left atrial enlargement will place referral cardiology     Placed referral to Community paramedic services >> who can help in F2F attendance which you are looking for on appointments.     ==========================================    Return in about 5 weeks (around 6/14/2023), or if symptoms worsen or fail to improve, for Annual Wellness Exam.    Michaela Sharma MD  [facility] LEN[place] Warner is a 82 year old, presenting for the following health issues:  Follow Up (Imagining ), Hypertension, and Diabetes        5/10/2023     2:03 PM   Additional Questions   Roomed by Maren   Accompanied by wife- karmen     History of Present Illness        Diabetes:   He presents for follow up of diabetes.  He is checking home blood glucose one time daily. He checks blood glucose before meals.  Blood glucose is never over 200 and never under 70. When his blood glucose is low, the patient is asymptomatic for confusion, blurred vision, lethargy and reports not feeling dizzy, shaky, or weak.  He has no concerns regarding his diabetes at this time.  He is not experiencing numbness or burning in feet, excessive thirst, blurry vision, weight changes or redness, sores or blisters on feet. The patient has had a diabetic eye exam in the last 12 months. Eye exam performed on August. 2022. Location of last eye exam [facility].        Hypertension: He presents for follow up of hypertension.  He does not check blood pressure  regularly outside of the clinic. Outside blood pressures have been over 140/90. He does not follow a low salt diet.     He eats 2-3 servings of fruits and vegetables daily.He consumes 1 sweetened beverage(s) daily.He exercises with enough effort to increase his heart rate 30 to 60 minutes per day.  He exercises with enough effort to increase his heart rate 4 days per week.   He is taking medications regularly.      Last seen patient on March 30, 2023 for virtual visit of lightheadedness and presyncope at that time.  Reviewed the recent neurology visit on 5/2/2023 at [facility]  He is being evaluated for mild cognitive impairment, does have upcoming lumbar puncture scheduled on May 9, 2023.     Reviewed the recent MRI done on April 24 2023 which does show positive for   Age-related cerebral volume loss and chronic white matter microangiopathy, not significantly changed.     Reviewed the EEG done at that time which show mild slowing of the background. Although nonspecific, this finding may be related to neurodegenerative condition or other disorder associated with generalized encephalopathy.  No epileptic  abnormalities identified.          Allergies   Allergen Reactions     Shellfish Allergy      Shellfish-Derived Products         Past Medical History:   Diagnosis Date     Arthritis 1970    Dx'd with RA when in the      BPH (benign prostatic hyperplasia) 2013     Cancer (H)     basal cell cancer behind ear     Diabetes mellitus      ED (erectile dysfunction) 2015     HTN (hypertension)      Hyperlipidemia LDL goal <100      Hypothyroidism      Mumps      Obesity        Past Surgical History:   Procedure Laterality Date     BIOPSY       COLONOSCOPY N/A 2014    Procedure: COMBINED COLONOSCOPY, SINGLE OR MULTIPLE BIOPSY/POLYPECTOMY BY BIOPSY;  Surgeon: Rolanda Bey MD;  Location:  GI     COLONOSCOPY N/A 2020    Procedure: COLONOSCOPY, WITH POLYPECTOMY AND BIOPSY;  Surgeon: Christina Vieira MD;  Location:  GI     COLONOSCOPY N/A 2021    Procedure: Colonoscopy, With Polypectomy And Biopsy;  Surgeon: Christina Vieira MD;  Location: [facility]     ESOPHAGOSCOPY, GASTROSCOPY, DUODENOSCOPY (EGD), COMBINED N/A 3/16/2021    Procedure: ESOPHAGOGASTRODUODENOSCOPY, WITH BIOPSY;  Surgeon: Jose Schrader MD;  Location:  GI     EYE SURGERY       TESTICLE SURGERY       TONSILLECTOMY, ADENOIDECTOMY, COMBINED       VASECTOMY         Family History   Problem Relation Age of Onset     Diabetes Maternal Grandmother      Diabetes Maternal Grandfather      Arthritis Maternal Grandfather      Arthritis Father      Thyroid Disease Father      Glaucoma Mother      Alcohol/Drug Mother 49        cirrhosis     Diabetes Daughter 44        type 2     Obesity Daughter      Glaucoma Maternal Aunt        Social History     Tobacco Use     Smoking status: Former     Packs/day: 0.00     Years: 0.00     Pack years: 0.00     Types: Pipe, Cigarettes     Start date: 1960     Quit date: 11/15/2011     Years since quittin.4     Smokeless tobacco: Never     Tobacco comments:     Smoked pipes 10  "minutes a day started 22 yrs old , quit 10 yrs back.   Vaping Use     Vaping status: Never Used   Substance Use Topics     Alcohol use: No     Alcohol/week: 0.0 standard drinks of alcohol        Current Outpatient Medications   Medication     blood glucose monitoring (ONE TOUCH ULTRA 2) meter device kit     carvedilol (COREG) 3.125 MG tablet     escitalopram (LEXAPRO) 10 MG tablet     Ferrous Sulfate 324 (65 Fe) MG TBEC     Fexofenadine HCl (ALLEGRA PO)     insulin glargine (LANTUS SOLOSTAR) 100 UNIT/ML pen     latanoprost (XALATAN) 0.005 % ophthalmic solution     levothyroxine (SYNTHROID/LEVOTHROID) 88 MCG tablet     losartan (COZAAR) 100 MG tablet     lovastatin (MEVACOR) 40 MG tablet     metFORMIN (GLUCOPHAGE XR) 500 MG 24 hr tablet     Multiple Vitamins-Minerals (CENTRUM SILVER) per tablet     ONETOUCH ULTRA test strip     tamsulosin (FLOMAX) 0.4 MG capsule     acetaminophen (TYLENOL) 500 MG tablet     acetaminophen (TYLENOL) 500 MG tablet     albuterol (PROAIR HFA/PROVENTIL HFA/VENTOLIN HFA) 108 (90 Base) MCG/ACT inhaler     BINAXNOW COVID-19 AG HOME TEST KIT     Blood Pressure Monitoring (BLOOD PRESSURE KIT) KELLY     finasteride (PROSCAR) 5 MG tablet     No current facility-administered medications for this visit.        Review of Systems   Constitutional, HEENT, cardiovascular, pulmonary, GI, , musculoskeletal, neuro, skin, endocrine and psych systems are negative, except as otherwise noted.      Objective    /60 (BP Location: Left arm, Patient Position: Sitting, Cuff Size: Adult Regular)   Pulse 64   Temp 98.3  F (36.8  C) (Tympanic)   Resp 16   Ht 1.676 m (5' 6\")   Wt 85.9 kg (189 lb 6.4 oz)   SpO2 99%   BMI 30.57 kg/m    Body mass index is 30.57 kg/m .  Physical Exam   GENERAL: healthy, alert and no distress  NECK: no adenopathy, no asymmetry, masses, or scars and thyroid normal to palpation  RESP: lungs clear to auscultation - no rales, rhonchi or wheezes  CV: regular rate and rhythm, " normal S1 S2, no S3 or S4, no murmur, click or rub, 1+  peripheral edema and peripheral pulses strong  ABDOMEN: soft, nontender, no hepatosplenomegaly, no masses and bowel sounds normal  MS: no gross musculoskeletal defects noted, no edema    Labs and imaging reviewed and discussed with the patient.

## 2023-05-10 NOTE — PATIENT INSTRUCTIONS
As discussed , given your new Echo finding of Left atrial enlargement will place referral cardiology     Placed referral to Community paramedic services >> who can help in F2F attendance which you are looking for on appointments.     ==========================================

## 2023-05-17 ENCOUNTER — PATIENT OUTREACH (OUTPATIENT)
Dept: CARE COORDINATION | Facility: CLINIC | Age: 83
End: 2023-05-17
Payer: COMMERCIAL

## 2023-05-17 ENCOUNTER — TELEPHONE (OUTPATIENT)
Dept: CARDIOLOGY | Facility: CLINIC | Age: 83
End: 2023-05-17
Payer: COMMERCIAL

## 2023-05-17 DIAGNOSIS — F33.0 MILD EPISODE OF RECURRENT MAJOR DEPRESSIVE DISORDER (H): ICD-10-CM

## 2023-05-17 NOTE — TELEPHONE ENCOUNTER
[facility] Call Center    Phone Message    May a detailed message be left on voicemail: yes     Reason for Call: Other: Pt is requesting a call from the nurse regarding Dr. Arrington experiance with patients with Angina. Ok to leave a detailed voice mail.      Action Taken: Other: Cardiology    Travel Screening: Not Applicable     Thank you!  [facility]

## 2023-05-17 NOTE — TELEPHONE ENCOUNTER
Called Patient who already has a new Patient OV with Dr. Doran 5-24-23 to re-establish care.  Was a Patient of Dr. Triana.  Date Location and time of Dr. Doran OV reviewed.

## 2023-05-17 NOTE — PROGRESS NOTES
Community Paramedic Program  Community Health Worker Outreach    UTC/Voicemail    Outreach attempted x 1.      Left message on pt's wife Ladan's voicemail with call back information and requested return call.    CHW Follow-up Plan: will try to reach patient again in 1-2 business days.    Note: Will clarify CP's role with pt's wife & offer home visit for appointment f/u, med review, home safety. Will also ask when they'd like visit, as pt has multiple upcoming medical appointments in June.     Referral source: Pt's PCP   Reason(s) for visit: Home/Safety Assessment    Med Check/Setup   Goals for visit(s): Medication Compliance    Clinic Appointment (face to face) Attendance    Decrease ER/Clinic/Ambulance Utilization   How often should patient be seen: Weekly   Preference on when patient should be seen: 3-7 Days

## 2023-05-18 DIAGNOSIS — F33.0 MILD EPISODE OF RECURRENT MAJOR DEPRESSIVE DISORDER (H): ICD-10-CM

## 2023-05-18 RX ORDER — ESCITALOPRAM OXALATE 10 MG/1
10 TABLET ORAL DAILY
Qty: 90 TABLET | Refills: 0 | OUTPATIENT
Start: 2023-05-18

## 2023-05-18 NOTE — PROGRESS NOTES
"Community Paramedic Program  Community Health Worker Initial Outreach    Referral source: Pt's PCP  Reason(s) for visit: Home/Safety Assessment    Med Check/Setup   Goals for visit(s): Medication Compliance    Clinic Appointment (face to face) Attendance    Decrease ER/Clinic/Ambulance Utilization   How often should patient be seen: Weekly   Preference on when patient should be seen: 3-7 Days     Initial visit: pt's wife declined to schedule           Additional information:   Pt's wife Ladan returned my call. She said she listened to my message and asked to hear more about our program. I clarified that we aren't able to attend in person medical appointments with our patients and that we can't transport them, which is something she is interested in. I gave her examples of how a CP can support pts at home, and she said she appreciated to learn about us as a resource. She declined to schedule a visit and said that \"this isn't what we need right now.\" Ladan said she is attempting to balance working full time, assisting her , driving and attending appointments with him and other obligations in her life.     Confirmed that Ladan has my contact information. I asked her to reach out to me directly if things change in the future and they would like to schedule a visit. She agreed and expressed appreciation.    Routing to pt's PCP and CC SW for awareness. Closing out referral at this time.            "

## 2023-05-19 RX ORDER — ESCITALOPRAM OXALATE 10 MG/1
10 TABLET ORAL DAILY
Qty: 90 TABLET | Refills: 0 | Status: SHIPPED | OUTPATIENT
Start: 2023-05-19 | End: 2023-09-06

## 2023-05-22 ENCOUNTER — TELEPHONE (OUTPATIENT)
Dept: CARDIOLOGY | Facility: CLINIC | Age: 83
End: 2023-05-22
Payer: COMMERCIAL

## 2023-05-22 NOTE — TELEPHONE ENCOUNTER
[facility] Call Center    Phone Message    May a detailed message be left on voicemail: yes     Reason for Call: Other: Patient explained he has an appointment wiht  this Wednesday to go over results from his Echocardiogram, but he had the Echo at [facility] and they stated they faxed the results over. The patient is wondering whether or not the clinic had received those results so the appointment can be as scheduled. Please call patient back to discuss, thank you!     Action Taken: Message routed to:  Other: Cardiology    Travel Screening: Not Applicable

## 2023-05-22 NOTE — TELEPHONE ENCOUNTER
Spoke with patient to review that the echo from 4/26/2023 is visible in Care Everywhere. Confirmed OV on 5/24/2023 with Dr. Doran.

## 2023-05-24 ENCOUNTER — OFFICE VISIT (OUTPATIENT)
Dept: CARDIOLOGY | Facility: CLINIC | Age: 83
End: 2023-05-24
Payer: COMMERCIAL

## 2023-05-24 VITALS
HEIGHT: 66 IN | WEIGHT: 191.8 LBS | HEART RATE: 60 BPM | BODY MASS INDEX: 30.82 KG/M2 | DIASTOLIC BLOOD PRESSURE: 71 MMHG | SYSTOLIC BLOOD PRESSURE: 146 MMHG | OXYGEN SATURATION: 97 %

## 2023-05-24 DIAGNOSIS — E78.2 MIXED HYPERLIPIDEMIA: ICD-10-CM

## 2023-05-24 DIAGNOSIS — R60.9 DEPENDENT EDEMA: ICD-10-CM

## 2023-05-24 DIAGNOSIS — E11.51 TYPE II DIABETES MELLITUS WITH PERIPHERAL CIRCULATORY DISORDER (H): ICD-10-CM

## 2023-05-24 DIAGNOSIS — I10 ESSENTIAL HYPERTENSION: ICD-10-CM

## 2023-05-24 DIAGNOSIS — E03.9 ACQUIRED HYPOTHYROIDISM: ICD-10-CM

## 2023-05-24 DIAGNOSIS — I51.7 LEFT ATRIAL ENLARGEMENT: Primary | ICD-10-CM

## 2023-05-24 PROCEDURE — 99203 OFFICE O/P NEW LOW 30 MIN: CPT | Performed by: INTERNAL MEDICINE

## 2023-05-24 NOTE — LETTER
5/24/2023    Michaela Sharma MD  [address]  [place] [address]    RE: Timmy Strange       Dear Colleague,     I had the pleasure of seeing Timmy Strange in the [facility].  HPI and Plan:   Mr. Strange is a very nice 82-year-old gentleman who is referred because of severe left atrial enlargement noted on echocardiogram.    Timmy has no history of cardiac issues.  There is no family history of coronary disease, he quit smoking 13 years ago and only smoked a pipe.  He has treated hyperlipidemia, hypertension, diabetes mellitus and obesity.    Echocardiogram was ordered because he was having problems with vertigo that was occurring while they were adjusting meds and ultimately appeared to be due to his meds.  He has had no further episodes.    Patient has no chest, arm, neck, jaw or shoulder discomfort.  No dyspnea on exertion, orthopnea, or PND.  No palpitations, lightheadedness, dizziness, syncope, or near syncope.  He does have occasional mild peripheral edema.  He has never had a TIA or CVA.    Noncardiac issues include dementia.    EKG today demonstrates normal sinus rhythm and otherwise normal EKG.    Assessment and plan.  I reviewed records from [facility] describing his severely dilated left atrium.  I cannot review the study independently.  He does not appear to have any LV dysfunction, hypertrophy or valvular problem that may be contributing to his left atrial enlargement.  His main issue I think is that this would predispose him to develop atrial fibrillation of which we have no evidence that this has occurred.  I recommended consider getting an Apple Watch to look for it going forward.    He is asymptomatic from a cardiac standpoint and we discussed whether we wanted to consider stress testing or calcium scoring to better evaluate how high risk he is for coronary artery disease and whether we want to get more aggressive with risk factor modification.  At this time they do  not want an aggressive evaluation.    He is started with a  is going to start an exercise program.    His wife prepares very healthy meals although he chooses to eat otherwise.    He is planning on losing some weight.    Blood pressure is high today but review of the chart shows his last 4 visits all to be well controlled.  I will not intensify his antihypertensive regiment but told him the need to watch to make sure he does not have persistent hypertension.    At this time I do not think he needs close cardiac follow-up but would be glad to see him back at any time.  Insert salutation.      Today's clinic visit entailed:  Review of external notes as documented elsewhere in note  Review of the result(s) of each unique test - Lab work, echocardiogram  Ordering of each unique test  Prescription drug management  40 minutes spent by me on the date of the encounter doing chart review, history and exam, documentation and further activities per the note  Provider  Link to MDM Help Grid           No orders of the defined types were placed in this encounter.      No orders of the defined types were placed in this encounter.      There are no discontinued medications.      Encounter Diagnoses   Name Primary?    Left atrial enlargement Yes    Essential hypertension     Mixed hyperlipidemia     Dependent edema     Type II diabetes mellitus with peripheral circulatory disorder (H)     Acquired hypothyroidism        CURRENT MEDICATIONS:  Current Outpatient Medications   Medication Sig Dispense Refill    acetaminophen (TYLENOL) 500 MG tablet Take 1 tablet (500 mg) by mouth every 4 hours as needed for mild pain 30 tablet 0    blood glucose monitoring (ONE TOUCH ULTRA 2) meter device kit       Blood Pressure Monitoring (BLOOD PRESSURE KIT) KELLY 1 each 2 times daily 1 each 0    carvedilol (COREG) 3.125 MG tablet TAKE 1 TABLET BY MOUTH  TWICE DAILY 180 tablet 3    escitalopram (LEXAPRO) 10 MG tablet Take 1 tablet (10  mg) by mouth daily 90 tablet 0    Ferrous Sulfate 324 (65 Fe) MG TBEC Take 1 tablet (324 mg) by mouth daily      Fexofenadine HCl (ALLEGRA PO) Take 180 mg by mouth daily       finasteride (PROSCAR) 5 MG tablet Take 1 tablet (5 mg) by mouth daily 90 tablet 4    insulin glargine (LANTUS SOLOSTAR) 100 UNIT/ML pen       latanoprost (XALATAN) 0.005 % ophthalmic solution Place 1 drop into both eyes daily      levothyroxine (SYNTHROID/LEVOTHROID) 88 MCG tablet Take 88 mcg by mouth daily.      losartan (COZAAR) 100 MG tablet TAKE 1 TABLET BY MOUTH  DAILY 100 tablet 2    lovastatin (MEVACOR) 40 MG tablet TAKE 1 TABLET BY MOUTH  DAILY AT BEDTIME 90 tablet 3    metFORMIN (GLUCOPHAGE XR) 500 MG 24 hr tablet       Multiple Vitamins-Minerals (CENTRUM SILVER) per tablet Take 1 tablet by mouth daily      ONETOUCH ULTRA test strip       tamsulosin (FLOMAX) 0.4 MG capsule Take 2 capsules (0.8 mg) by mouth daily 180 capsule 3    acetaminophen (TYLENOL) 500 MG tablet Take 1-2 tablets (500-1,000 mg) by mouth every 4 hours as needed for mild pain (Patient not taking: Reported on 2/17/2023) 30 tablet 0    albuterol (PROAIR HFA/PROVENTIL HFA/VENTOLIN HFA) 108 (90 Base) MCG/ACT inhaler Inhale 2 puffs into the lungs every 6 hours as needed for shortness of breath, wheezing or cough (Patient not taking: Reported on 5/10/2023) 18 g 1    BINAXNOW COVID-19 AG HOME TEST KIT  (Patient not taking: Reported on 5/10/2023)         ALLERGIES     Allergies   Allergen Reactions    Shellfish Allergy     Shellfish-Derived Products        PAST MEDICAL HISTORY:  Past Medical History:   Diagnosis Date    Arthritis 1970    Dx'd with RA when in the     BPH (benign prostatic hyperplasia) 12/16/2013    Cancer (H)     basal cell cancer behind ear    Diabetes mellitus     ED (erectile dysfunction) 1/6/2015    HTN (hypertension)     Hyperlipidemia LDL goal <100     Hypothyroidism     Left atrial enlargement 5/24/2023    Mumps     Obesity        PAST SURGICAL  HISTORY:  Past Surgical History:   Procedure Laterality Date    BIOPSY      COLONOSCOPY N/A 2014    Procedure: COMBINED COLONOSCOPY, SINGLE OR MULTIPLE BIOPSY/POLYPECTOMY BY BIOPSY;  Surgeon: Rolanda Bey MD;  Location:  GI    COLONOSCOPY N/A 2020    Procedure: COLONOSCOPY, WITH POLYPECTOMY AND BIOPSY;  Surgeon: Christina Vieira MD;  Location:  GI    COLONOSCOPY N/A 2021    Procedure: Colonoscopy, With Polypectomy And Biopsy;  Surgeon: Christina Vieira MD;  Location: [facility]    ESOPHAGOSCOPY, GASTROSCOPY, DUODENOSCOPY (EGD), COMBINED N/A 3/16/2021    Procedure: ESOPHAGOGASTRODUODENOSCOPY, WITH BIOPSY;  Surgeon: Jose Schrader MD;  Location:  GI    EYE SURGERY      TESTICLE SURGERY      TONSILLECTOMY, ADENOIDECTOMY, COMBINED      VASECTOMY         FAMILY HISTORY:  Family History   Problem Relation Age of Onset    Diabetes Maternal Grandmother     Diabetes Maternal Grandfather     Arthritis Maternal Grandfather     Arthritis Father     Thyroid Disease Father     Glaucoma Mother     Alcohol/Drug Mother 49        cirrhosis    Diabetes Daughter 44        type 2    Obesity Daughter     Glaucoma Maternal Aunt        SOCIAL HISTORY:  Social History     Socioeconomic History    Marital status:      Spouse name: None    Number of children: None    Years of education: None    Highest education level: None   Tobacco Use    Smoking status: Former     Packs/day: 0.00     Years: 0.00     Pack years: 0.00     Types: Pipe, Cigarettes     Start date: 1960     Quit date: 11/15/2011     Years since quittin.5    Smokeless tobacco: Never    Tobacco comments:     Smoked pipes 10 minutes a day started 22 yrs old , quit 10 yrs back.   Vaping Use    Vaping status: Never Used   Substance and Sexual Activity    Alcohol use: No     Alcohol/week: 0.0 standard drinks of alcohol    Drug use: No    Sexual activity: Yes     Partners: Female     Birth control/protection: None   Other Topics Concern     Parent/sibling w/ CABG, MI or angioplasty before 65F 55M? No    Special Diet No    Exercise Yes     Comment: walking 3-4 days a week     Social Determinants of Health     Financial Resource Strain: Low Risk  (3/29/2023)    Overall Financial Resource Strain (CARDIA)     Difficulty of Paying Living Expenses: Not very hard   Food Insecurity: No Food Insecurity (3/29/2023)    Hunger Vital Sign     Worried About Running Out of Food in the Last Year: Never true     Ran Out of Food in the Last Year: Never true   Transportation Needs: No Transportation Needs (3/29/2023)    PRAPARE - Transportation     Lack of Transportation (Medical): No     Lack of Transportation (Non-Medical): No   Physical Activity: Inactive (3/29/2023)    Exercise Vital Sign     Days of Exercise per Week: 0 days     Minutes of Exercise per Session: 40 min   Stress: Stress Concern Present (3/29/2023)    [employer] - Occupational Stress Questionnaire     Feeling of Stress : To some extent   Social Connections: Moderately Isolated (3/29/2023)    Social Connection and Isolation Panel [NHANES]     Frequency of Communication with Friends and Family: Once a week     Frequency of Social Gatherings with Friends and Family: Twice a week     Attends L[employer] day Services: Never     Active Member of Clubs or Organizations: No     Marital Status:    Intimate Partner Violence: Not At Risk (9/8/2021)    Humiliation, Afraid, Rape, and Kick questionnaire     Fear of Current or Ex-Partner: No     Emotionally Abused: No     Physically Abused: No     Sexually Abused: No   Housing Stability: Low Risk  (3/29/2023)    Housing Stability Vital Sign     Unable to Pay for Housing in the Last Year: No     Number of Places Lived in the Last Year: 1     Unstable Housing in the Last Year: No       Review of Systems:  Skin:  Negative       Eyes:  Positive for glasses    ENT:  Negative      Respiratory:  Negative       Cardiovascular:  chest  "pain;Negative;palpitations;lightheadedness;dizziness;fatigue;syncope or near-syncope edema;Positive for edema: mild  Gastroenterology: Negative      Genitourinary:  Negative      Musculoskeletal:  Negative      Neurologic:  Negative      Psychiatric:  Negative      Heme/Lymph/Imm:  Positive for allergies    Endocrine:  Positive for thyroid disorder;diabetes      Physical Exam:  Vitals: BP (!) 146/71 (BP Location: Left arm, Patient Position: Sitting)   Pulse 60   Ht 1.676 m (5' 6\")   Wt 87 kg (191 lb 12.8 oz)   SpO2 97%   BMI 30.96 kg/m      Constitutional:  cooperative, alert and oriented, well developed, well nourished, in no acute distress overweight      Skin:  warm and dry to the touch, no apparent skin lesions or masses noted          Head:  normocephalic, no masses or lesions        Eyes:  pupils equal and round, conjunctivae and lids unremarkable, sclera white, no xanthalasma, EOMS intact, no nystagmus        Lymph:      ENT:  no pallor or cyanosis, dentition good        Neck:  no carotid bruit        Respiratory:  normal breath sounds, clear to auscultation, normal A-P diameter, normal symmetry, normal respiratory excursion, no use of accessory muscles         Cardiac: regular rhythm;no murmurs, gallops or rubs detected                pulses full and equal                                        GI:           Extremities and Muscular Skeletal:  no spinal abnormalities noted;normal muscle strength and tone   bilateral LE edema;trace          Neurological:  no gross motor deficits        Psych:  affect appropriate, oriented to time, person and place          Michaela Sharma MD  [address] [place] [address]    Thank you for allowing me to participate in the care of your patient.      Sincerely,   Michel Doran MD   [facility]  "

## 2023-05-24 NOTE — PROGRESS NOTES
HPI and Plan:   Mr. Strange is a very nice 82-year-old gentleman who is referred because of severe left atrial enlargement noted on echocardiogram.    Timmy has no history of cardiac issues.  There is no family history of coronary disease, he quit smoking 13 years ago and only smoked a pipe.  He has treated hyperlipidemia, hypertension, diabetes mellitus and obesity.    Echocardiogram was ordered because he was having problems with vertigo that was occurring while they were adjusting meds and ultimately appeared to be due to his meds.  He has had no further episodes.    Patient has no chest, arm, neck, jaw or shoulder discomfort.  No dyspnea on exertion, orthopnea, or PND.  No palpitations, lightheadedness, dizziness, syncope, or near syncope.  He does have occasional mild peripheral edema.  He has never had a TIA or CVA.    Noncardiac issues include dementia.    EKG today demonstrates normal sinus rhythm and otherwise normal EKG.    Assessment and plan.  I reviewed records from [facility] describing his severely dilated left atrium.  I cannot review the study independently.  He does not appear to have any LV dysfunction, hypertrophy or valvular problem that may be contributing to his left atrial enlargement.  His main issue I think is that this would predispose him to develop atrial fibrillation of which we have no evidence that this has occurred.  I recommended consider getting an Apple Watch to look for it going forward.    He is asymptomatic from a cardiac standpoint and we discussed whether we wanted to consider stress testing or calcium scoring to better evaluate how high risk he is for coronary artery disease and whether we want to get more aggressive with risk factor modification.  At this time they do not want an aggressive evaluation.    He is started with a  is going to start an exercise program.    His wife prepares very healthy meals although he chooses to eat otherwise.    He is planning on  losing some weight.    Blood pressure is high today but review of the chart shows his last 4 visits all to be well controlled.  I will not intensify his antihypertensive regiment but told him the need to watch to make sure he does not have persistent hypertension.    At this time I do not think he needs close cardiac follow-up but would be glad to see him back at any time.  Insert salutation.      Today's clinic visit entailed:  Review of external notes as documented elsewhere in note  Review of the result(s) of each unique test - Lab work, echocardiogram  Ordering of each unique test  Prescription drug management  40 minutes spent by me on the date of the encounter doing chart review, history and exam, documentation and further activities per the note  Provider  Link to [employer] Help Grid           No orders of the defined types were placed in this encounter.      No orders of the defined types were placed in this encounter.      There are no discontinued medications.      Encounter Diagnoses   Name Primary?     Left atrial enlargement Yes     Essential hypertension      Mixed hyperlipidemia      Dependent edema      Type II diabetes mellitus with peripheral circulatory disorder (H)      Acquired hypothyroidism        CURRENT MEDICATIONS:  Current Outpatient Medications   Medication Sig Dispense Refill     acetaminophen (TYLENOL) 500 MG tablet Take 1 tablet (500 mg) by mouth every 4 hours as needed for mild pain 30 tablet 0     blood glucose monitoring (ONE TOUCH ULTRA 2) meter device kit        Blood Pressure Monitoring (BLOOD PRESSURE KIT) KELLY 1 each 2 times daily 1 each 0     carvedilol (COREG) 3.125 MG tablet TAKE 1 TABLET BY MOUTH  TWICE DAILY 180 tablet 3     escitalopram (LEXAPRO) 10 MG tablet Take 1 tablet (10 mg) by mouth daily 90 tablet 0     Ferrous Sulfate 324 (65 Fe) MG TBEC Take 1 tablet (324 mg) by mouth daily       Fexofenadine HCl (ALLEGRA PO) Take 180 mg by mouth daily        finasteride (PROSCAR) 5  MG tablet Take 1 tablet (5 mg) by mouth daily 90 tablet 4     insulin glargine (LANTUS SOLOSTAR) 100 UNIT/ML pen        latanoprost (XALATAN) 0.005 % ophthalmic solution Place 1 drop into both eyes daily       levothyroxine (SYNTHROID/LEVOTHROID) 88 MCG tablet Take 88 mcg by mouth daily.       losartan (COZAAR) 100 MG tablet TAKE 1 TABLET BY MOUTH  DAILY 100 tablet 2     lovastatin (MEVACOR) 40 MG tablet TAKE 1 TABLET BY MOUTH  DAILY AT BEDTIME 90 tablet 3     metFORMIN (GLUCOPHAGE XR) 500 MG 24 hr tablet        Multiple Vitamins-Minerals (CENTRUM SILVER) per tablet Take 1 tablet by mouth daily       ONETOUCH ULTRA test strip        tamsulosin (FLOMAX) 0.4 MG capsule Take 2 capsules (0.8 mg) by mouth daily 180 capsule 3     acetaminophen (TYLENOL) 500 MG tablet Take 1-2 tablets (500-1,000 mg) by mouth every 4 hours as needed for mild pain (Patient not taking: Reported on 2/17/2023) 30 tablet 0     albuterol (PROAIR HFA/PROVENTIL HFA/VENTOLIN HFA) 108 (90 Base) MCG/ACT inhaler Inhale 2 puffs into the lungs every 6 hours as needed for shortness of breath, wheezing or cough (Patient not taking: Reported on 5/10/2023) 18 g 1     BINAXNOW COVID-19 AG HOME TEST KIT  (Patient not taking: Reported on 5/10/2023)         ALLERGIES     Allergies   Allergen Reactions     Shellfish Allergy      Shellfish-Derived Products        PAST MEDICAL HISTORY:  Past Medical History:   Diagnosis Date     Arthritis 1970    Dx'd with RA when in the      BPH (benign prostatic hyperplasia) 12/16/2013     Cancer (H)     basal cell cancer behind ear     Diabetes mellitus      ED (erectile dysfunction) 1/6/2015     HTN (hypertension)      Hyperlipidemia LDL goal <100      Hypothyroidism      Left atrial enlargement 5/24/2023     Mumps      Obesity        PAST SURGICAL HISTORY:  Past Surgical History:   Procedure Laterality Date     BIOPSY       COLONOSCOPY N/A 11/21/2014    Procedure: COMBINED COLONOSCOPY, SINGLE OR MULTIPLE  BIOPSY/POLYPECTOMY BY BIOPSY;  Surgeon: Rolanda Bey MD;  Location:  GI     COLONOSCOPY N/A 2020    Procedure: COLONOSCOPY, WITH POLYPECTOMY AND BIOPSY;  Surgeon: Christina Vieira MD;  Location:  GI     COLONOSCOPY N/A 2021    Procedure: Colonoscopy, With Polypectomy And Biopsy;  Surgeon: Christina Vieira MD;  Location:  GI     ESOPHAGOSCOPY, GASTROSCOPY, DUODENOSCOPY (EGD), COMBINED N/A 3/16/2021    Procedure: ESOPHAGOGASTRODUODENOSCOPY, WITH BIOPSY;  Surgeon: Jose Schrader MD;  Location:  GI     EYE SURGERY       TESTICLE SURGERY       TONSILLECTOMY, ADENOIDECTOMY, COMBINED       VASECTOMY         FAMILY HISTORY:  Family History   Problem Relation Age of Onset     Diabetes Maternal Grandmother      Diabetes Maternal Grandfather      Arthritis Maternal Grandfather      Arthritis Father      Thyroid Disease Father      Glaucoma Mother      Alcohol/Drug Mother 49        cirrhosis     Diabetes Daughter 44        type 2     Obesity Daughter      Glaucoma Maternal Aunt        SOCIAL HISTORY:  Social History     Socioeconomic History     Marital status:      Spouse name: None     Number of children: None     Years of education: None     Highest education level: None   Tobacco Use     Smoking status: Former     Packs/day: 0.00     Years: 0.00     Pack years: 0.00     Types: Pipe, Cigarettes     Start date: 1960     Quit date: 11/15/2011     Years since quittin.5     Smokeless tobacco: Never     Tobacco comments:     Smoked pipes 10 minutes a day started 22 yrs old , quit 10 yrs back.   Vaping Use     Vaping status: Never Used   Substance and Sexual Activity     Alcohol use: No     Alcohol/week: 0.0 standard drinks of alcohol     Drug use: No     Sexual activity: Yes     Partners: Female     Birth control/protection: None   Other Topics Concern     Parent/sibling w/ CABG, MI or angioplasty before 65F 55M? No     Special Diet No     Exercise Yes     Comment: walking 3-4  days a week     Social Determinants of Health     Financial Resource Strain: Low Risk  (3/29/2023)    Overall Financial Resource Strain (CARDIA)      Difficulty of Paying Living Expenses: Not very hard   Food Insecurity: No Food Insecurity (3/29/2023)    Hunger Vital Sign      Worried About Running Out of Food in the Last Year: Never true      Ran Out of Food in the Last Year: Never true   Transportation Needs: No Transportation Needs (3/29/2023)    PRAPARE - Transportation      Lack of Transportation (Medical): No      Lack of Transportation (Non-Medical): No   Physical Activity: Inactive (3/29/2023)    Exercise Vital Sign      Days of Exercise per Week: 0 days      Minutes of Exercise per Session: 40 min   Stress: Stress Concern Present (3/29/2023)    [place] citizen [employer] - Occupational Stress Questionnaire      Feeling of Stress : To some extent   Social Connections: Moderately Isolated (3/29/2023)    Social Connection and Isolation Panel [NHANES]      Frequency of Communication with Friends and Family: Once a week      Frequency of Social Gatherings with Friends and Family: Twice a week      Attends Rastafari Services: Never      Active Member of Clubs or Organizations: No      Marital Status:    Intimate Partner Violence: Not At Risk (9/8/2021)    Humiliation, Afraid, Rape, and Kick questionnaire      Fear of Current or Ex-Partner: No      Emotionally Abused: No      Physically Abused: No      Sexually Abused: No   Housing Stability: Low Risk  (3/29/2023)    Housing Stability Vital Sign      Unable to Pay for Housing in the Last Year: No      Number of Places Lived in the Last Year: 1      Unstable Housing in the Last Year: No       Review of Systems:  Skin:  Negative       Eyes:  Positive for glasses    ENT:  Negative      Respiratory:  Negative       Cardiovascular:  chest pain;Negative;palpitations;lightheadedness;dizziness;fatigue;syncope or near-syncope edema;Positive for edema:  "mild  Gastroenterology: Negative      Genitourinary:  Negative      Musculoskeletal:  Negative      Neurologic:  Negative      Psychiatric:  Negative      Heme/Lymph/Imm:  Positive for allergies    Endocrine:  Positive for thyroid disorder;diabetes      Physical Exam:  Vitals: BP (!) 146/71 (BP Location: Left arm, Patient Position: Sitting)   Pulse 60   Ht 1.676 m (5' 6\")   Wt 87 kg (191 lb 12.8 oz)   SpO2 97%   BMI 30.96 kg/m      Constitutional:  cooperative, alert and oriented, well developed, well nourished, in no acute distress overweight      Skin:  warm and dry to the touch, no apparent skin lesions or masses noted          Head:  normocephalic, no masses or lesions        Eyes:  pupils equal and round, conjunctivae and lids unremarkable, sclera white, no xanthalasma, EOMS intact, no nystagmus        Lymph:      ENT:  no pallor or cyanosis, dentition good        Neck:  no carotid bruit        Respiratory:  normal breath sounds, clear to auscultation, normal A-P diameter, normal symmetry, normal respiratory excursion, no use of accessory muscles         Cardiac: regular rhythm;no murmurs, gallops or rubs detected                pulses full and equal                                        GI:           Extremities and Muscular Skeletal:  no spinal abnormalities noted;normal muscle strength and tone   bilateral LE edema;trace          Neurological:  no gross motor deficits        Psych:  affect appropriate, oriented to time, person and place          Michaela Sharma MD  [address] [place] [address]              "

## 2023-05-31 ENCOUNTER — TELEPHONE (OUTPATIENT)
Dept: FAMILY MEDICINE | Facility: CLINIC | Age: 83
End: 2023-05-31
Payer: COMMERCIAL

## 2023-05-31 NOTE — TELEPHONE ENCOUNTER
General Call    Contacts       Type Contact Phone/Fax    05/31/2023 11:17 AM CDT Phone (Incoming) StrangeTimmy MAICOL (Self) [phone] (M)        Reason for Call:  Pt spoke to his doctor about getting an ear wash and pt would like to have that done on Friday, 6/2 since he'll be at the  Clinic for a 2:30 lab appt. I'm not seeing any available appts except Thursday (6/1) or next Monday and he has a transportation issue. Can you call pt and let him know if he can be seen for this on 6/2? Thank you!    Could we send this information to you in e[employer]ureau or would you prefer to receive a phone call?:   No preference   Okay to leave a detailed message?: Yes at [phone]

## 2023-06-01 NOTE — TELEPHONE ENCOUNTER
OK to get him in at 9.30 AM same day opening on 6/2/23 ad he can do the lab soon after .( No need of lab appointment at 2.30 again )     Thank you,  Michaela Sharma MD on 5/31/2023

## 2023-06-01 NOTE — TELEPHONE ENCOUNTER
Patient scheduled for 930 am in person appt on 06/2/2023, and lab appt canceled.    Tawana JOVEL    [facility]

## 2023-06-02 ENCOUNTER — OFFICE VISIT (OUTPATIENT)
Dept: FAMILY MEDICINE | Facility: CLINIC | Age: 83
End: 2023-06-02
Payer: COMMERCIAL

## 2023-06-02 VITALS
HEIGHT: 66 IN | DIASTOLIC BLOOD PRESSURE: 71 MMHG | RESPIRATION RATE: 14 BRPM | TEMPERATURE: 97 F | BODY MASS INDEX: 30.7 KG/M2 | SYSTOLIC BLOOD PRESSURE: 138 MMHG | OXYGEN SATURATION: 98 % | WEIGHT: 191 LBS | HEART RATE: 63 BPM

## 2023-06-02 DIAGNOSIS — H61.23 EXCESSIVE CERUMEN IN BOTH EAR CANALS: Primary | ICD-10-CM

## 2023-06-02 DIAGNOSIS — I51.7 LEFT ATRIAL ENLARGEMENT: ICD-10-CM

## 2023-06-02 DIAGNOSIS — D64.9 ANEMIA, UNSPECIFIED TYPE: ICD-10-CM

## 2023-06-02 DIAGNOSIS — E11.51 TYPE II DIABETES MELLITUS WITH PERIPHERAL CIRCULATORY DISORDER (H): ICD-10-CM

## 2023-06-02 LAB
ERYTHROCYTE [DISTWIDTH] IN BLOOD BY AUTOMATED COUNT: 15.8 % (ref 10–15)
FERRITIN SERPL-MCNC: 52 NG/ML (ref 31–409)
HCT VFR BLD AUTO: 37.4 % (ref 40–53)
HGB BLD-MCNC: 11.8 G/DL (ref 13.3–17.7)
IRON BINDING CAPACITY (ROCHE): 250 UG/DL (ref 240–430)
IRON SATN MFR SERPL: 23 % (ref 15–46)
IRON SERPL-MCNC: 58 UG/DL (ref 61–157)
MCH RBC QN AUTO: 29 PG (ref 26.5–33)
MCHC RBC AUTO-ENTMCNC: 31.6 G/DL (ref 31.5–36.5)
MCV RBC AUTO: 92 FL (ref 78–100)
PLATELET # BLD AUTO: 197 10E3/UL (ref 150–450)
RBC # BLD AUTO: 4.07 10E6/UL (ref 4.4–5.9)
WBC # BLD AUTO: 4.6 10E3/UL (ref 4–11)

## 2023-06-02 PROCEDURE — 83540 ASSAY OF IRON: CPT | Performed by: INTERNAL MEDICINE

## 2023-06-02 PROCEDURE — 69209 REMOVE IMPACTED EAR WAX UNI: CPT | Mod: 50 | Performed by: INTERNAL MEDICINE

## 2023-06-02 PROCEDURE — 36415 COLL VENOUS BLD VENIPUNCTURE: CPT | Performed by: INTERNAL MEDICINE

## 2023-06-02 PROCEDURE — 82728 ASSAY OF FERRITIN: CPT | Performed by: INTERNAL MEDICINE

## 2023-06-02 PROCEDURE — 99214 OFFICE O/P EST MOD 30 MIN: CPT | Mod: 25 | Performed by: INTERNAL MEDICINE

## 2023-06-02 PROCEDURE — 85027 COMPLETE CBC AUTOMATED: CPT | Performed by: INTERNAL MEDICINE

## 2023-06-02 PROCEDURE — 83550 IRON BINDING TEST: CPT | Performed by: INTERNAL MEDICINE

## 2023-06-02 ASSESSMENT — PAIN SCALES - GENERAL: PAINLEVEL: NO PAIN (0)

## 2023-06-02 NOTE — PROGRESS NOTES
Assessment and Plan  1. Excessive cerumen in both ear canals  New problem, causing discomfort on ears. As per physical exam positive for Cerumen shared decision for ear wash which was done in the office today. Please see procedure note separately.   - [place] REMOVAL IMPACTED CERUMEN IRRIGATION/LVG UNILAT    2. Left atrial enlargement  New finding on Echocardiogram recently which patient is unaware of the plan and I have discussed again after reviewing Cardiology recommendations. Please see AVS below for further details on plan and pt instructions.     3. Type II diabetes mellitus with peripheral circulatory disorder (H)  Well controlled, continue current Metformin. Recent A1C on 5/4/23 within normal limits.     4. Anemia, unspecified type  Orders as managed by Hematology given chronic anemia  - CBC with platelets  - Ferritin  - Iron and iron binding capacity         Please note that this note consists of symbols derived from keyboarding, dictation and/or voice recognition software. As a result, there may be errors in the script that have gone undetected. Please consider this when interpreting information found in this chart.    Patient Instructions   As discussed ear wash done on both sides today.     Please let Ladan know that the cardiology wants you to wear the IWATCH for making sure your heart rate is monitored and catch any atrial fibrillation given the left atrial enlargement.     ======================================================    Return in about 12 days (around 6/14/2023), or if symptoms worsen or fail to improve, for Annual Wellness Exam.    Michaela Sharma MD  [facility] 82 [facility], presenting for the following health issues:  Ear Problem (Ear wash)        6/2/2023     9:06 AM   Additional Questions   Roomed by Tennille MILIAN     History of Present Illness       Vascular Disease:  He presents for follow up of vascular disease.  He never takes  nitroglycerin. He is not taking daily aspirin.    He eats 2-3 servings of fruits and vegetables daily.He consumes 0 sweetened beverage(s) daily.He exercises with enough effort to increase his heart rate 30 to 60 minutes per day.  He exercises with enough effort to increase his heart rate 4 days per week.   He is taking medications regularly.    Last seen pt on 5/10/2023 for new findings on the left atrial enlargeent on the echo patient was referred to cardiology. Pt is alone in the Office today.        Allergies   Allergen Reactions     Shellfish Allergy      Shellfish-Derived Products         Past Medical History:   Diagnosis Date     Arthritis 1970    Dx'd with RA when in the      BPH (benign prostatic hyperplasia) 12/16/2013     Cancer (H)     basal cell cancer behind ear     Diabetes mellitus      ED (erectile dysfunction) 1/6/2015     HTN (hypertension)      Hyperlipidemia LDL goal <100      Hypothyroidism      Left atrial enlargement 5/24/2023     Mumps      Obesity        Past Surgical History:   Procedure Laterality Date     BIOPSY       COLONOSCOPY N/A 11/21/2014    Procedure: COMBINED COLONOSCOPY, SINGLE OR MULTIPLE BIOPSY/POLYPECTOMY BY BIOPSY;  Surgeon: Rolanda Bey MD;  Location:  GI     COLONOSCOPY N/A 1/20/2020    Procedure: COLONOSCOPY, WITH POLYPECTOMY AND BIOPSY;  Surgeon: Christina Vieira MD;  Location:  GI     COLONOSCOPY N/A 2/1/2021    Procedure: Colonoscopy, With Polypectomy And Biopsy;  Surgeon: Christina Vieira MD;  [facility]     ESOPHAGOSCOPY, GASTROSCOPY, DUODENOSCOPY (EGD), COMBINED N/A 3/16/2021    Procedure: ESOPHAGOGASTRODUODENOSCOPY, WITH BIOPSY;  Surgeon: Jose Schrader MD;  Location:  GI     EYE SURGERY       TESTICLE SURGERY       TONSILLECTOMY, ADENOIDECTOMY, COMBINED       VASECTOMY         Family History   Problem Relation Age of Onset     Diabetes Maternal Grandmother      Diabetes Maternal Grandfather      Arthritis Maternal Grandfather   "    Arthritis Father      Thyroid Disease Father      Glaucoma Mother      Alcohol/Drug Mother 49        cirrhosis     Diabetes Daughter 44        type 2     Obesity Daughter      Glaucoma Maternal Aunt        Social History     Tobacco Use     Smoking status: Former     Packs/day: 0.00     Years: 0.00     Pack years: 0.00     Types: Pipe, Cigarettes     Start date: 1960     Quit date: 11/15/2011     Years since quittin.5     Smokeless tobacco: Never     Tobacco comments:     Smoked pipes 10 minutes a day started 22 yrs old , quit 10 yrs back.   Vaping Use     Vaping status: Never Used   Substance Use Topics     Alcohol use: No     Alcohol/week: 0.0 standard drinks of alcohol        Current Outpatient Medications   Medication     acetaminophen (TYLENOL) 500 MG tablet     albuterol (PROAIR HFA/PROVENTIL HFA/VENTOLIN HFA) 108 (90 Base) MCG/ACT inhaler     carvedilol (COREG) 3.125 MG tablet     escitalopram (LEXAPRO) 10 MG tablet     Ferrous Sulfate 324 (65 Fe) MG TBEC     Fexofenadine HCl (ALLEGRA PO)     finasteride (PROSCAR) 5 MG tablet     insulin glargine (LANTUS SOLOSTAR) 100 UNIT/ML pen     latanoprost (XALATAN) 0.005 % ophthalmic solution     levothyroxine (SYNTHROID/LEVOTHROID) 88 MCG tablet     losartan (COZAAR) 100 MG tablet     lovastatin (MEVACOR) 40 MG tablet     metFORMIN (GLUCOPHAGE XR) 500 MG 24 hr tablet     Multiple Vitamins-Minerals (CENTRUM SILVER) per tablet     nitroFURantoin macrocrystal-monohydrate (MACROBID) 100 MG capsule     ONETOUCH ULTRA test strip     tamsulosin (FLOMAX) 0.4 MG capsule     No current facility-administered medications for this visit.      Review of Systems   Constitutional, HEENT, cardiovascular, pulmonary, GI, , musculoskeletal, neuro, skin, endocrine and psych systems are negative, except as otherwise noted.      Objective    /71   Pulse 63   Temp 97  F (36.1  C) (Tympanic)   Resp 14   Ht 1.676 m (5' 6\")   Wt 86.6 kg (191 lb)   SpO2 98%   BMI " 30.83 kg/m    Body mass index is 30.83 kg/m .  Physical Exam   GENERAL: healthy, alert and no distress  ENT Exam : Ear canals and TM's POSITIVE for bilateral Cerumen , nose and mouth without ulcers or lesions, NO pharyngeal erythema, Cervical lymphadenopathy or Sinus tenderness on palpation.  NECK: no adenopathy, no asymmetry, masses, or scars and thyroid normal to palpation  RESP: lungs clear to auscultation - no rales, rhonchi or wheezes  CV: regular rate and rhythm, normal S1 S2, no S3 or S4, no murmur, click or rub, no peripheral edema and peripheral pulses strong  ABDOMEN: soft, nontender, no hepatosplenomegaly, no masses and bowel sounds normal  MS: no gross musculoskeletal defects noted, no edema

## 2023-06-02 NOTE — PATIENT INSTRUCTIONS
As discussed ear wash done on both sides today.     Please let Ladan know that the cardiology wants you to wear the IWATCH for making sure your heart rate is monitored and catch any atrial fibrillation given the left atrial enlargement.     ======================================================

## 2023-06-03 NOTE — RESULT ENCOUNTER NOTE
Dear Mr. Strange,    Blood tests are better. Hemoglobin has improved to 11.8. Iron is better at 58. Ferritin is normal.    Please, call me with any questions.    Chandrakant Panchal MD

## 2023-06-05 ENCOUNTER — VIRTUAL VISIT (OUTPATIENT)
Dept: ONCOLOGY | Facility: CLINIC | Age: 83
End: 2023-06-05
Attending: INTERNAL MEDICINE
Payer: COMMERCIAL

## 2023-06-05 DIAGNOSIS — D64.9 ANEMIA, UNSPECIFIED TYPE: Primary | ICD-10-CM

## 2023-06-05 DIAGNOSIS — D50.9 IRON DEFICIENCY ANEMIA, UNSPECIFIED IRON DEFICIENCY ANEMIA TYPE: ICD-10-CM

## 2023-06-05 PROCEDURE — 99213 OFFICE O/P EST LOW 20 MIN: CPT | Mod: VID | Performed by: INTERNAL MEDICINE

## 2023-06-05 NOTE — LETTER
6/5/2023         RE: Timmy Strange  [address] [address]        Dear Colleague,    Thank you for referring your patient, Timmy Strange, to the [facility]. Please see a copy of my visit note below.    Virtual Visit Details    Type of service:  Video Visit     Originating Location (pt. Location): Home    Distant Location (provider location):  On-site  Platform used for Video Visit: [employer]        HEMATOLOGY HISTORY: Mr. Strange is a gentleman with chronic normocytic anemia due to anemia of chronic disease and iron deficiency.    1.  On 11/07/2017, hemoglobin of 13.3.  -On 10/30/2018, hemoglobin of 12.5.  -On 02/22/2021, WBC of 4.3, hemoglobin of 10.4, platelet of 226 and MCV of 90.  2.  On 02/01/2021, colonoscopy revealed colon polyps (tubular adenoma), hemorrhoids and diverticulosis.   -On 03/16/2021, EGD was essentially normal.    3. On 05/11/2021:  -Iron of 13, saturation of 6% and ferritin of 69.  -Normal folate.  -Normal vitamin B12.  -Normal TSH.  6. Patient received 1 dose of IV Venofer on 05/11/2021.     SUBJECTIVE:  Mr. Strange is a 82-year-old gentleman with chronic normocytic anemia due to chronic disease and iron deficiency.  He is on oral ferrous sulfate once a day. He is tolerating well.    His hemoglobin has improved to 11.8.  Iron and ferritin also improved.    Patient has generalized weakness.  There has been some improvement in his fatigue. He is forgetful.  He is following up with neurologist.    Patient has hemorrhoid and gets intermittent bleeding.  He follows up with colorectal surgery and had banding done.      No headache.  Occasional dizziness.  No chest pain or shortness of breath.  No abdominal pain.  No nausea or vomiting.  Appetite is fairly good. No bleeding. Stools are dark because of oral ferrous sulfate.  All other review of systems is negative.     PHYSICAL EXAMINATION:    He is alert, oriented x 3.   Rest of the systems not examined  as this is a video visit.     LABORATORY:  CBC, iron and ferritin reviewed.     ASSESSMENT:    1.  A 82-year-old gentleman with normocytic anemia. Anemia is seconadary to iron deficiency and anemia of chronic disease. Anemia is improving.  Given his age, he may also have primary bone marrow pathology like MDS.  2.  Iron deficiency from hemorrhoidal bleed, improving.  3.  Generalized weakness.  4.  Forgetfulness.  5.  Hemorrhoidal bleed.     PLAN:  1.  Patient's overall condition is stable.  Labs were reviewed with him.  His hemoglobin, iron and ferritin have all improved.    He is on oral ferrous sulfate once a day.  He will continue on it.  He is tolerating it well.    2.  He will continue to follow-up with colorectal surgeon regarding hemorrhoids.    3.  We will see him back in 3 months time with labs.  Advised him to call us with any questions or concerns.      Total video visit time of 20 minutes. Time is spent in today's visit, review of chart/investigations today and documentation.      Again, thank you for allowing me to participate in the care of your patient.        Sincerely,        Chandrakant Panchal MD

## 2023-06-05 NOTE — NURSING NOTE
Is the patient currently in the state of [place]? YES    Visit mode:VIDEO    If the visit is dropped, the patient can be reconnected by: VIDEO VISIT: Text to cell phone: [phone]    Will anyone else be joining the visit? NO      How would you like to obtain your AVS? MyChart    Are changes needed to the allergy or medication list? NO    Reason for visit: RECHECK

## 2023-06-05 NOTE — PROGRESS NOTES
Virtual Visit Details    Type of service:  Video Visit     Originating Location (pt. Location): Home    Distant Location (provider location):  On-site  Platform used for Video Visit: [employer]

## 2023-06-05 NOTE — LETTER
6/5/2023         RE: Timmy Strange  [address] [facility] [address]        Dear Colleague,    Thank you for referring your patient, Timmy Strange, to the [facility] [place]. Please see a copy of my visit note below.    Virtual Visit Details    Type of service:  Video Visit     Originating Location (pt. Location): Home    Distant Location (provider location):  On-site  Platform used for Video Visit: [email or web address]        HEMATOLOGY HISTORY: Mr. Strange is a gentleman with chronic normocytic anemia due to anemia of chronic disease and iron deficiency.    1.  On 11/07/2017, hemoglobin of 13.3.  -On 10/30/2018, hemoglobin of 12.5.  -On 02/22/2021, WBC of 4.3, hemoglobin of 10.4, platelet of 226 and MCV of 90.  2.  On 02/01/2021, colonoscopy revealed colon polyps (tubular adenoma), hemorrhoids and diverticulosis.   -On 03/16/2021, EGD was essentially normal.    3. On 05/11/2021:  -Iron of 13, saturation of 6% and ferritin of 69.  -Normal folate.  -Normal vitamin B12.  -Normal TSH.  6. Patient received 1 dose of IV Venofer on 05/11/2021.     SUBJECTIVE:  Mr. Strange is a 82-year-old gentleman with chronic normocytic anemia due to chronic disease and iron deficiency.  He is on oral ferrous sulfate once a day. He is tolerating well.    His hemoglobin has improved to 11.8.  Iron and ferritin also improved.    Patient has generalized weakness.  There has been some improvement in his fatigue. He is forgetful.  He is following up with neurologist.    Patient has hemorrhoid and gets intermittent bleeding.  He follows up with colorectal surgery and had banding done.      No headache.  Occasional dizziness.  No chest pain or shortness of breath.  No abdominal pain.  No nausea or vomiting.  Appetite is fairly good. No bleeding. Stools are dark because of oral ferrous sulfate.  All other review of systems is negative.     PHYSICAL EXAMINATION:    He is alert, oriented x 3.   Rest of the systems not examined  as this is a video visit.     LABORATORY:  CBC, iron and ferritin reviewed.     ASSESSMENT:    1.  A 82-year-old gentleman with normocytic anemia. Anemia is seconadary to iron deficiency and anemia of chronic disease. Anemia is improving.  Given his age, he may also have primary bone marrow pathology like MDS.  2.  Iron deficiency from hemorrhoidal bleed, improving.  3.  Generalized weakness.  4.  Forgetfulness.  5.  Hemorrhoidal bleed.     PLAN:  1.  Patient's overall condition is stable.  Labs were reviewed with him.  His hemoglobin, iron and ferritin have all improved.    He is on oral ferrous sulfate once a day.  He will continue on it.  He is tolerating it well.    2.  He will continue to follow-up with colorectal surgeon regarding hemorrhoids.    3.  We will see him back in 3 months time with labs.  Advised him to call us with any questions or concerns.      Total video visit time of 20 minutes. Time is spent in today's visit, review of chart/investigations today and documentation.      Again, thank you for allowing me to participate in the care of your patient.        Sincerely,        Chandrakant Panchal MD

## 2023-06-06 ENCOUNTER — NURSE TRIAGE (OUTPATIENT)
Dept: FAMILY MEDICINE | Facility: CLINIC | Age: 83
End: 2023-06-06

## 2023-06-06 NOTE — TELEPHONE ENCOUNTER
"Patient already had a E-Visit with urgent care today. Urgent care provider sent a message to patient stating \"Based on the responses you provided, it is recommended that you be seen in-person in urgent care so we can better evaluate your symptoms.\" Patient is refusing to go to urgent care to be seen in-person. Is it okay to use a Same day slot? Or is it okay for patient to have a virtual visit?    Radha HUGHES RN  [facility] Triage Team    "

## 2023-06-06 NOTE — TELEPHONE ENCOUNTER
Called patient and relayed provider's message. Patient was scheduled with available provider tomorrow. Patient agreed with this plan and had no further questions.    Radha HUGHES RN  [facility] Triage Team

## 2023-06-06 NOTE — TELEPHONE ENCOUNTER
"To PCP    S-(situation): Patient calling stating painful urination, cloudy, frequent urination    B-(background): Had UTI once years ago.    A-(assessment): Painful Urination, Cloudy urine, frequent urination, afebrile, no flank pain, no other symptoms.     R-(recommendations): Please advise. Pharmacy and UA/UC pended.    Jessica Rothman RN on 6/6/2023 at 4:28 PM          Reason for Disposition    All other males with painful urination, or patient wants to be seen    Additional Information    Negative: Shock suspected (e.g., cold/pale/clammy skin, too weak to stand, low BP, rapid pulse)    Negative: Sounds like a life-threatening emergency to the triager    Negative: Unable to urinate (or only a few drops) and bladder feels very full    Negative: Swollen scrotum    Negative: Pain in scrotum or testicle that persists > 1 hour    Negative: Fever > 100.4 F (38.0 C)    Negative: Vomiting    Negative: Patient sounds very sick or weak to the triager    Negative: SEVERE pain with urination    Negative: Side (flank) or lower back pain present    Negative: Taking treatment > 3 days for STI (e.g., penile discharge from gonorrhea, chlamydia) and painful urination not improved    Negative: Taking antibiotic > 3 days for UTI and painful urination not improved    Negative: Taking antibiotic > 24 hours for UTI and fever persists    Answer Assessment - Initial Assessment Questions  1. SEVERITY: \"How bad is the pain?\"  (e.g., Scale 1-10; mild, moderate, or severe)    - MILD (1-3): complains slightly about urination hurting    - MODERATE (4-7): interferes with normal activities      - SEVERE (8-10): excruciating, unwilling or unable to urinate because of the pain       5/10 pain with urination  2. FREQUENCY: \"How many times have you had painful urination today?\"       About four times per hour  3. PATTERN: \"Is pain present every time you urinate or just sometimes?\"       Yes  4. ONSET: \"When did the painful urination start?\"       " "Two days ago  5. FEVER: \"Do you have a fever?\" If Yes, ask: \"What is your temperature, how was it measured, and when did it start?\"      No  6. PAST UTI: \"Have you had a urine infection before?\" If Yes, ask: \"When was the last time?\" and \"What happened that time?\"       Yes, years ago  7. CAUSE: \"What do you think is causing the painful urination?\"       UTI  8. OTHER SYMPTOMS: \"Do you have any other symptoms?\" (e.g., flank pain, penile discharge, scrotal pain, blood in urine)      Frequency, color is cloudy    Protocols used: URINATION PAIN - MALE-A-OH      "

## 2023-06-07 ENCOUNTER — OFFICE VISIT (OUTPATIENT)
Dept: FAMILY MEDICINE | Facility: CLINIC | Age: 83
End: 2023-06-07
Payer: COMMERCIAL

## 2023-06-07 VITALS
HEIGHT: 66 IN | BODY MASS INDEX: 30.7 KG/M2 | TEMPERATURE: 98.3 F | SYSTOLIC BLOOD PRESSURE: 128 MMHG | OXYGEN SATURATION: 98 % | HEART RATE: 63 BPM | WEIGHT: 191 LBS | DIASTOLIC BLOOD PRESSURE: 58 MMHG | RESPIRATION RATE: 18 BRPM

## 2023-06-07 DIAGNOSIS — R30.0 DYSURIA: Primary | ICD-10-CM

## 2023-06-07 DIAGNOSIS — N40.0 BENIGN PROSTATIC HYPERPLASIA, UNSPECIFIED WHETHER LOWER URINARY TRACT SYMPTOMS PRESENT: ICD-10-CM

## 2023-06-07 DIAGNOSIS — R35.0 URINE FREQUENCY: ICD-10-CM

## 2023-06-07 DIAGNOSIS — E11.51 TYPE II DIABETES MELLITUS WITH PERIPHERAL CIRCULATORY DISORDER (H): ICD-10-CM

## 2023-06-07 DIAGNOSIS — N39.0 URINARY TRACT INFECTION WITHOUT HEMATURIA, SITE UNSPECIFIED: ICD-10-CM

## 2023-06-07 LAB
ALBUMIN UR-MCNC: NEGATIVE MG/DL
APPEARANCE UR: ABNORMAL
BILIRUB UR QL STRIP: NEGATIVE
COLOR UR AUTO: YELLOW
GLUCOSE UR STRIP-MCNC: NEGATIVE MG/DL
HGB UR QL STRIP: ABNORMAL
KETONES UR STRIP-MCNC: NEGATIVE MG/DL
LEUKOCYTE ESTERASE UR QL STRIP: ABNORMAL
NITRATE UR QL: NEGATIVE
PH UR STRIP: 5.5 [PH] (ref 5–7)
RBC #/AREA URNS AUTO: ABNORMAL /HPF
SP GR UR STRIP: 1.02 (ref 1–1.03)
UROBILINOGEN UR STRIP-ACNC: 0.2 E.U./DL
WBC #/AREA URNS AUTO: ABNORMAL /HPF
WBC CLUMPS #/AREA URNS HPF: PRESENT /HPF

## 2023-06-07 PROCEDURE — 87186 SC STD MICRODIL/AGAR DIL: CPT | Performed by: FAMILY MEDICINE

## 2023-06-07 PROCEDURE — 99213 OFFICE O/P EST LOW 20 MIN: CPT | Performed by: FAMILY MEDICINE

## 2023-06-07 PROCEDURE — 87088 URINE BACTERIA CULTURE: CPT | Performed by: FAMILY MEDICINE

## 2023-06-07 PROCEDURE — 81001 URINALYSIS AUTO W/SCOPE: CPT | Performed by: FAMILY MEDICINE

## 2023-06-07 PROCEDURE — 87086 URINE CULTURE/COLONY COUNT: CPT | Performed by: FAMILY MEDICINE

## 2023-06-07 RX ORDER — CIPROFLOXACIN 500 MG/1
500 TABLET, FILM COATED ORAL 2 TIMES DAILY
Qty: 14 TABLET | Refills: 0 | Status: SHIPPED | OUTPATIENT
Start: 2023-06-07 | End: 2023-06-09

## 2023-06-07 ASSESSMENT — PAIN SCALES - GENERAL: PAINLEVEL: MODERATE PAIN (4)

## 2023-06-07 NOTE — PROGRESS NOTES
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 UTI vs prostatitis       UA suggest urinary tract infection. Urine culture pending. In the meantime start treatment with Cipro. Talked about pushing fluids , hydration etc. he will follow up if no improvement or problem.    Check labs.script sent.Cares and  treatment discussed.  follow up if problem   Patient expressed understanding and agreement with treatment plan. All patient's questions were answered, will let me know if has more later.  Medications: Rx's: Reviewed the potential side effects/complications of medications prescribed.          BMI:   Estimated body mass index is 30.44 kg/m  as calculated from the following:    Height as of this encounter: 1.687 m (5' 6.42\").    Weight as of this encounter: 86.6 kg (191 lb).         Coretta Montenegro MD  [facility]   Timmy is a 82 year old, presenting for the following health issues:  UTI        6/7/2023     7:04 AM   Additional Questions   Roomed by Tennille MILIAN     UTI         Genitourinary - Male  Onset/Duration:  2 days ago  Description:   Dysuria (painful " "urination): YES   Hematuria (blood in urine): No  Frequency: YES  Waking at night to urinate: YES  Hesitancy (delay in urine): No  Retention (unable to empty): No  Decrease in urinary flow: No  Incontinence: No  Progression of Symptoms:  same  Accompanying Signs & Symptoms:  Fever: No  Back/Flank pain: No  Urethral discharge: No  Testicle lumps/masses/pain: No  Nausea and/or vomiting: No  Abdominal pain: No  History:   History of frequent UTI s: YES, but last time was many years ago . also has hx of BPH seeing urology on med's and does not think it has changed   History of kidney stones: No  History of hernias: No  Precipitating or alleviating factors: None  Therapies tried and outcome: none    Diabetes/  BPH  Follow-up    His DM has been well controlled and recent A1C looks good     Also has hx of BPH, on med's, but he thinks sx feel a bit different last couple of days, so concerned         BP Readings from Last 2 Encounters:   06/07/23 128/58   06/02/23 138/71     Hemoglobin A1C (%)   Date Value   05/04/2023 5.7 (H)   11/01/2022 5.8 (H)   05/08/2021 5.6   11/09/2020 5.5     LDL Cholesterol Calculated (mg/dL)   Date Value   09/06/2022 70   01/03/2022 69   11/09/2020 67   06/16/2020 55             Review of Systems   Constitutional, HEENT, cardiovascular, pulmonary, GI, , musculoskeletal, neuro, skin, endocrine and psych systems are negative, except as otherwise noted.      Objective    /58   Pulse 63   Temp 98.3  F (36.8  C) (Temporal)   Resp 18   Ht 1.687 m (5' 6.42\")   Wt 86.6 kg (191 lb)   SpO2 98%   BMI 30.44 kg/m    Body mass index is 30.44 kg/m .  Physical Exam   GENERAL: healthy, alert and no distress  EYES: Eyes grossly normal to inspection,   HENT: mouth- oral mucous  moist   RESP: lungs clear to auscultation - no rales, rhonchi or wheezes  CV: regular rate and rhythm, normal S1 S2, no S3 or S4, no leg edema   ABDOMEN: soft, nontender, no hepatosplenomegaly, no masses and bowel sounds " normal   (male): declined   NEURO: Normal strength and tone, mentation intact and speech normal  PSYCH: mentation appears normal, affect normal/bright

## 2023-06-08 ENCOUNTER — TRANSFERRED RECORDS (OUTPATIENT)
Dept: HEALTH INFORMATION MANAGEMENT | Facility: CLINIC | Age: 83
End: 2023-06-08
Payer: COMMERCIAL

## 2023-06-09 ENCOUNTER — TELEPHONE (OUTPATIENT)
Dept: FAMILY MEDICINE | Facility: CLINIC | Age: 83
End: 2023-06-09
Payer: COMMERCIAL

## 2023-06-09 DIAGNOSIS — N30.00 ACUTE CYSTITIS WITHOUT HEMATURIA: Primary | ICD-10-CM

## 2023-06-09 LAB — BACTERIA UR CULT: ABNORMAL

## 2023-06-09 RX ORDER — NITROFURANTOIN 25; 75 MG/1; MG/1
100 CAPSULE ORAL 2 TIMES DAILY
Qty: 10 CAPSULE | Refills: 0 | Status: SHIPPED | OUTPATIENT
Start: 2023-06-09 | End: 2023-06-14

## 2023-06-09 NOTE — TELEPHONE ENCOUNTER
UCx reviewed, prescribed antibx doesn't cover for organism seen. Stop Cipro and switch to Macrobid BID x5 days. Rx sent to patient's pharmacy.

## 2023-06-09 NOTE — TELEPHONE ENCOUNTER
Called patient and relayed provider's message. Patient stated understanding and had no further questions.    Radha HUGHES RN  [facility] Triage Team

## 2023-06-09 NOTE — TELEPHONE ENCOUNTER
Patient called wondering if he is on the correct antibiotic for his UTI based on his urine culture. Routing high priority to EC provider's per patient's request.    Radha HUGHES RN  [facility] Triage Team

## 2023-06-10 NOTE — PROGRESS NOTES
HEMATOLOGY HISTORY: Mr. Strange is a gentleman with chronic normocytic anemia due to anemia of chronic disease and iron deficiency.    1.  On 11/07/2017, hemoglobin of 13.3.  -On 10/30/2018, hemoglobin of 12.5.  -On 02/22/2021, WBC of 4.3, hemoglobin of 10.4, platelet of 226 and MCV of 90.  2.  On 02/01/2021, colonoscopy revealed colon polyps (tubular adenoma), hemorrhoids and diverticulosis.   -On 03/16/2021, EGD was essentially normal.    3. On 05/11/2021:  -Iron of 13, saturation of 6% and ferritin of 69.  -Normal folate.  -Normal vitamin B12.  -Normal TSH.  6. Patient received 1 dose of IV Venofer on 05/11/2021.     SUBJECTIVE:  Mr. Strange is a 82-year-old gentleman with chronic normocytic anemia due to chronic disease and iron deficiency.  He is on oral ferrous sulfate once a day. He is tolerating well.    His hemoglobin has improved to 11.8.  Iron and ferritin also improved.    Patient has generalized weakness.  There has been some improvement in his fatigue. He is forgetful.  He is following up with neurologist.    Patient has hemorrhoid and gets intermittent bleeding.  He follows up with colorectal surgery and had banding done.      No headache.  Occasional dizziness.  No chest pain or shortness of breath.  No abdominal pain.  No nausea or vomiting.  Appetite is fairly good. No bleeding. Stools are dark because of oral ferrous sulfate.  All other review of systems is negative.     PHYSICAL EXAMINATION:    He is alert, oriented x 3.   Rest of the systems not examined as this is a video visit.     LABORATORY:  CBC, iron and ferritin reviewed.     ASSESSMENT:    1.  A 82-year-old gentleman with normocytic anemia. Anemia is seconadary to iron deficiency and anemia of chronic disease. Anemia is improving.  Given his age, he may also have primary bone marrow pathology like MDS.  2.  Iron deficiency from hemorrhoidal bleed, improving.  3.  Generalized weakness.  4.  Forgetfulness.  5.  Hemorrhoidal  bleed.     PLAN:  1.  Patient's overall condition is stable.  Labs were reviewed with him.  His hemoglobin, iron and ferritin have all improved.    He is on oral ferrous sulfate once a day.  He will continue on it.  He is tolerating it well.    2.  He will continue to follow-up with colorectal surgeon regarding hemorrhoids.    3.  We will see him back in 3 months time with labs.  Advised him to call us with any questions or concerns.      Total video visit time of 20 minutes. Time is spent in today's visit, review of chart/investigations today and documentation.

## 2023-06-11 NOTE — PROCEDURES
Ear wash performed by marleny JOHNSON On BOTH  Ears :     Instruments which are utilized to remove the impacted earwax are - an otoscope and Lavage apparatus with suction. Pt tolerated the procedure well with no complications.

## 2023-06-12 ASSESSMENT — ENCOUNTER SYMPTOMS
NERVOUS/ANXIOUS: 0
COUGH: 0
ABDOMINAL PAIN: 0
HEMATURIA: 0
SORE THROAT: 0
HEARTBURN: 0
NAUSEA: 0
FEVER: 0
DIZZINESS: 1
FREQUENCY: 0
HEADACHES: 0
CHILLS: 0
DIARRHEA: 0
MYALGIAS: 0
ARTHRALGIAS: 0
CONSTIPATION: 0
HEMATOCHEZIA: 0
DYSURIA: 0
SHORTNESS OF BREATH: 0
WEAKNESS: 0
PALPITATIONS: 0
JOINT SWELLING: 0
PARESTHESIAS: 0
EYE PAIN: 0

## 2023-06-12 ASSESSMENT — ACTIVITIES OF DAILY LIVING (ADL): CURRENT_FUNCTION: NO ASSISTANCE NEEDED

## 2023-06-14 ENCOUNTER — OFFICE VISIT (OUTPATIENT)
Dept: FAMILY MEDICINE | Facility: CLINIC | Age: 83
End: 2023-06-14
Payer: COMMERCIAL

## 2023-06-14 VITALS
WEIGHT: 190 LBS | HEART RATE: 65 BPM | SYSTOLIC BLOOD PRESSURE: 126 MMHG | OXYGEN SATURATION: 97 % | BODY MASS INDEX: 30.53 KG/M2 | RESPIRATION RATE: 18 BRPM | HEIGHT: 66 IN | DIASTOLIC BLOOD PRESSURE: 72 MMHG | TEMPERATURE: 98.1 F

## 2023-06-14 DIAGNOSIS — Z13.6 ENCOUNTER FOR LIPID SCREENING FOR CARDIOVASCULAR DISEASE: ICD-10-CM

## 2023-06-14 DIAGNOSIS — I10 ESSENTIAL HYPERTENSION: ICD-10-CM

## 2023-06-14 DIAGNOSIS — D50.9 IRON DEFICIENCY ANEMIA, UNSPECIFIED IRON DEFICIENCY ANEMIA TYPE: ICD-10-CM

## 2023-06-14 DIAGNOSIS — Z00.00 ENCOUNTER FOR MEDICARE ANNUAL WELLNESS EXAM: Primary | ICD-10-CM

## 2023-06-14 DIAGNOSIS — F33.0 MILD EPISODE OF RECURRENT MAJOR DEPRESSIVE DISORDER (H): ICD-10-CM

## 2023-06-14 DIAGNOSIS — E11.51 TYPE II DIABETES MELLITUS WITH PERIPHERAL CIRCULATORY DISORDER (H): ICD-10-CM

## 2023-06-14 DIAGNOSIS — N40.0 BENIGN PROSTATIC HYPERPLASIA, UNSPECIFIED WHETHER LOWER URINARY TRACT SYMPTOMS PRESENT: ICD-10-CM

## 2023-06-14 DIAGNOSIS — I51.7 LEFT ATRIAL ENLARGEMENT: ICD-10-CM

## 2023-06-14 DIAGNOSIS — E03.9 HYPOTHYROIDISM, UNSPECIFIED TYPE: ICD-10-CM

## 2023-06-14 DIAGNOSIS — Z13.220 ENCOUNTER FOR LIPID SCREENING FOR CARDIOVASCULAR DISEASE: ICD-10-CM

## 2023-06-14 DIAGNOSIS — Z12.5 ENCOUNTER FOR PROSTATE CANCER SCREENING: ICD-10-CM

## 2023-06-14 DIAGNOSIS — N39.0 URINARY TRACT INFECTION WITHOUT HEMATURIA, SITE UNSPECIFIED: ICD-10-CM

## 2023-06-14 LAB
ALBUMIN SERPL BCG-MCNC: 4.1 G/DL (ref 3.5–5.2)
ALP SERPL-CCNC: 52 U/L (ref 40–129)
ALT SERPL W P-5'-P-CCNC: 25 U/L (ref 0–70)
ANION GAP SERPL CALCULATED.3IONS-SCNC: 11 MMOL/L (ref 7–15)
AST SERPL W P-5'-P-CCNC: 22 U/L (ref 0–45)
BILIRUB SERPL-MCNC: 0.7 MG/DL
BUN SERPL-MCNC: 27.1 MG/DL (ref 8–23)
CALCIUM SERPL-MCNC: 9.3 MG/DL (ref 8.8–10.2)
CHLORIDE SERPL-SCNC: 106 MMOL/L (ref 98–107)
CHOLEST SERPL-MCNC: 127 MG/DL
CREAT SERPL-MCNC: 0.85 MG/DL (ref 0.67–1.17)
DEPRECATED HCO3 PLAS-SCNC: 25 MMOL/L (ref 22–29)
GFR SERPL CREATININE-BSD FRML MDRD: 88 ML/MIN/1.73M2
GLUCOSE SERPL-MCNC: 168 MG/DL (ref 70–99)
HDLC SERPL-MCNC: 47 MG/DL
LDLC SERPL CALC-MCNC: 57 MG/DL
NONHDLC SERPL-MCNC: 80 MG/DL
POTASSIUM SERPL-SCNC: 4.8 MMOL/L (ref 3.4–5.3)
PROT SERPL-MCNC: 6.2 G/DL (ref 6.4–8.3)
PSA SERPL DL<=0.01 NG/ML-MCNC: 0.38 NG/ML
SODIUM SERPL-SCNC: 142 MMOL/L (ref 136–145)
TRIGL SERPL-MCNC: 116 MG/DL
TSH SERPL DL<=0.005 MIU/L-ACNC: 0.99 UIU/ML (ref 0.3–4.2)

## 2023-06-14 PROCEDURE — 36415 COLL VENOUS BLD VENIPUNCTURE: CPT | Performed by: INTERNAL MEDICINE

## 2023-06-14 PROCEDURE — 84443 ASSAY THYROID STIM HORMONE: CPT | Performed by: INTERNAL MEDICINE

## 2023-06-14 PROCEDURE — 80061 LIPID PANEL: CPT | Performed by: INTERNAL MEDICINE

## 2023-06-14 PROCEDURE — 99214 OFFICE O/P EST MOD 30 MIN: CPT | Mod: 25 | Performed by: INTERNAL MEDICINE

## 2023-06-14 PROCEDURE — G0103 PSA SCREENING: HCPCS | Performed by: INTERNAL MEDICINE

## 2023-06-14 PROCEDURE — 80053 COMPREHEN METABOLIC PANEL: CPT | Performed by: INTERNAL MEDICINE

## 2023-06-14 PROCEDURE — 87086 URINE CULTURE/COLONY COUNT: CPT | Performed by: INTERNAL MEDICINE

## 2023-06-14 PROCEDURE — G0439 PPPS, SUBSEQ VISIT: HCPCS | Performed by: INTERNAL MEDICINE

## 2023-06-14 ASSESSMENT — ENCOUNTER SYMPTOMS
HEMATOCHEZIA: 0
ARTHRALGIAS: 0
PARESTHESIAS: 0
SORE THROAT: 0
HEARTBURN: 0
DIARRHEA: 0
DIZZINESS: 1
DYSURIA: 0
PALPITATIONS: 0
MYALGIAS: 0
HEADACHES: 0
ABDOMINAL PAIN: 0
JOINT SWELLING: 0
EYE PAIN: 0
NAUSEA: 0
COUGH: 0
FREQUENCY: 0
CONSTIPATION: 0
SHORTNESS OF BREATH: 0
HEMATURIA: 0
FEVER: 0
CHILLS: 0
NERVOUS/ANXIOUS: 0
WEAKNESS: 0

## 2023-06-14 ASSESSMENT — PATIENT HEALTH QUESTIONNAIRE - PHQ9
10. IF YOU CHECKED OFF ANY PROBLEMS, HOW DIFFICULT HAVE THESE PROBLEMS MADE IT FOR YOU TO DO YOUR WORK, TAKE CARE OF THINGS AT HOME, OR GET ALONG WITH OTHER PEOPLE: NOT DIFFICULT AT ALL
SUM OF ALL RESPONSES TO PHQ QUESTIONS 1-9: 0
SUM OF ALL RESPONSES TO PHQ QUESTIONS 1-9: 0

## 2023-06-14 ASSESSMENT — PAIN SCALES - GENERAL: PAINLEVEL: NO PAIN (0)

## 2023-06-14 ASSESSMENT — ACTIVITIES OF DAILY LIVING (ADL): CURRENT_FUNCTION: NO ASSISTANCE NEEDED

## 2023-06-14 NOTE — PROGRESS NOTES
"SUBJECTIVE:   Timmy is a 82 year old who presents for Preventive Visit.               Are you in the first 12 months of your Medicare coverage?  No    Healthy Habits:     In general, how would you rate your overall health?  Good    Frequency of exercise:  4-5 days/week    Duration of exercise:  30-45 minutes    Do you usually eat at least 4 servings of fruit and vegetables a day, include whole grains    & fiber and avoid regularly eating high fat or \"junk\" foods?  No    Taking medications regularly:  Yes    Medication side effects:  Not applicable    Ability to successfully perform activities of daily living:  No assistance needed    Home Safety:  No safety concerns identified    Hearing Impairment:  Difficulty following a conversation in a noisy restaurant or crowded room, need to ask people to speak up or repeat themselves and difficulty understanding soft or whispered speech    In the past 6 months, have you been bothered by leaking of urine?  No    In general, how would you rate your overall mental or emotional health?  Good      PHQ-2 Total Score: 0    Additional concerns today:  No    Have you ever done Advance Care Planning? (For example, a Health Directive, POLST, or a discussion with a medical provider or your loved ones about your wishes): Yes, advance care planning is on file.    Hearing Acuity: Able to converse without any difficulty    Fall risk  Fallen 2 or more times in the past year?: No  Any fall with injury in the past year?: No    Cognitive Screening   1) Repeat 3 items (Leader, Season, Table)    2) Clock draw: NORMAL  3) 3 item recall: Recalls 3 objects  Results: 3 items recalled: COGNITIVE IMPAIRMENT LESS LIKELY    Mini-CogTM Copyright MEY Aleman. Licensed by the author for use in [facility]; reprinted with permission (ash@.[facility]). All rights reserved.      Do you have sleep apnea, excessive snoring or daytime drowsiness?: no    Reviewed and updated as needed this visit by clinical " staff   Tobacco  Allergies  Meds  Problems  Med Hx  Surg Hx  Fam Hx          Reviewed and updated as needed this visit by Provider   Tobacco  Allergies  Meds  Problems  Med Hx  Surg Hx  Fam Hx         Social History     Tobacco Use     Smoking status: Former     Packs/day: 0.00     Years: 0.00     Pack years: 0.00     Types: Cigarettes, Pipe     Start date: 1960     Quit date: 11/15/2011     Years since quittin.5     Smokeless tobacco: Never     Tobacco comments:     Smoked pipes 10 minutes a day started 22 yrs old , quit 10 yrs back.   Vaping Use     Vaping status: Never Used   Substance Use Topics     Alcohol use: No             2023     4:09 PM   Alcohol Use   Prescreen: >3 drinks/day or >7 drinks/week? No          View : No data to display.              Do you have a current opioid prescription? No  Do you use any other controlled substances or medications that are not prescribed by a provider? None    Current providers sharing in care for this patient include:   Patient Care Team:  Michaela Sharma MD as PCP - General (Internal Medicine)  Chandrakant Panchal MD as Assigned Cancer Care Provider  Michaela Sharma MD as Assigned PCP  Pino Meier MD as MD (Urology)  Pino Meier MD as Assigned Surgical Provider  Alistair Osuna LSW as Lead Care Coordinator (Primary Care - CC)    The following health maintenance items are reviewed in Epic and correct as of today:  Health Maintenance   Topic Date Due     DEPRESSION ACTION PLAN  Never done     ZOSTER IMMUNIZATION (1 of 2) Never done     MEDICARE ANNUAL WELLNESS VISIT  2022     DIABETIC FOOT EXAM  2022     COVID-19 Vaccine (6 - Pfizer series) 2023     EYE EXAM  2023     LIPID  2023     TSH W/FREE T4 REFLEX  2023     ANNUAL REVIEW OF HM ORDERS  2023     A1C  2023     MICROALBUMIN  2023     PHQ-9  2023     COLORECTAL CANCER SCREENING  2024     BMP  2024     FALL  "RISK ASSESSMENT  06/14/2024     ADVANCE CARE PLANNING  06/14/2028     DTAP/TDAP/TD IMMUNIZATION (3 - Td or Tdap) 10/24/2030     INFLUENZA VACCINE  Completed     Pneumococcal Vaccine: 65+ Years  Completed     IPV IMMUNIZATION  Aged Out     MENINGITIS IMMUNIZATION  Aged Out     Lab work is in process  Labs reviewed in EPIC    Review of Systems   Constitutional: Negative for chills and fever.   HENT: Negative for congestion, ear pain, hearing loss and sore throat.    Eyes: Negative for pain and visual disturbance.   Respiratory: Negative for cough and shortness of breath.    Cardiovascular: Negative for chest pain, palpitations and peripheral edema.   Gastrointestinal: Negative for abdominal pain, constipation, diarrhea, heartburn, hematochezia and nausea.   Genitourinary: Positive for impotence. Negative for dysuria, frequency, genital sores, hematuria, penile discharge and urgency.   Musculoskeletal: Negative for arthralgias, joint swelling and myalgias.   Skin: Negative for rash.   Neurological: Positive for dizziness. Negative for weakness, headaches and paresthesias.   Psychiatric/Behavioral: Negative for mood changes. The patient is not nervous/anxious.      Constitutional, HEENT, cardiovascular, pulmonary, GI, , musculoskeletal, neuro, skin, endocrine and psych systems are negative, except as otherwise noted.    OBJECTIVE:   /72   Pulse 65   Temp 98.1  F (36.7  C) (Temporal)   Resp 18   Ht 1.676 m (5' 6\")   Wt 86.2 kg (190 lb)   SpO2 97%   BMI 30.67 kg/m   Estimated body mass index is 30.67 kg/m  as calculated from the following:    Height as of this encounter: 1.676 m (5' 6\").    Weight as of this encounter: 86.2 kg (190 lb).  Physical Exam  GENERAL: healthy, alert and no distress  EYES: Eyes grossly normal to inspection, PERRL and conjunctivae and sclerae normal  HENT: ear canals and TM's normal, nose and mouth without ulcers or lesions  NECK: no adenopathy, no asymmetry, masses, or scars and " thyroid normal to palpation  RESP: lungs clear to auscultation - no rales, rhonchi or wheezes  CV: regular rate and rhythm, normal S1 S2, no S3 or S4, no murmur, click or rub, no peripheral edema and peripheral pulses strong  ABDOMEN: soft, nontender, no hepatosplenomegaly, no masses and bowel sounds normal  MS: no gross musculoskeletal defects noted, no edema  SKIN: no suspicious lesions or rashes  NEURO: Normal strength and tone, mentation intact and speech normal  PSYCH: mentation appears normal, affect normal/bright    Diagnostic Test Results:  Labs reviewed in Epic    ASSESSMENT / PLAN:     Assessment and Plan  1. Encounter for Medicare annual wellness exam  Recently seen pt on 6/2023 for Ear wax. He is here for annual physical. In the interim had dysuria with UTI positive and was given Cipro >> Macrobid later by another providers.  - Last labs in 3/2023 normal BMP ,  predianbetes , Iron def anemia as managed by hematology   - Last lipid pnael in 9/2022. . Can recheck at this time.   - PRIMARY CARE FOLLOW-UP SCHEDULING; Future  - Comprehensive metabolic panel (BMP + Alb, Alk Phos, ALT, AST, Total. Bili, TP); Future  - PSA, screen; Future  - Comprehensive metabolic panel (BMP + Alb, Alk Phos, ALT, AST, Total. Bili, TP)  - PSA, screen    2. Mild episode of recurrent major depressive disorder (H)  Well controlled on current Lexapro. Not on Prozac anymore which was weaned off.     3. Type II diabetes mellitus with peripheral circulatory disorder (H)  Stable, continue to follow recommendations of Endocrinology. Recent A1C check already done , will need to get the records and Insulin dosage was decreased to 10 units as his BG in AM were low. .   - Adult Eye  Referral; Future    4. Hypothyroidism, unspecified type  Stable , on LT 88 mcg daily. Continue to follow Endocrinology. Pt requesting to check TSH as he states it was not done in his last endocrinology visit along with A1C at that time.   - TSH with free  T4 reflex; Future  - TSH with free T4 reflex    5. Urinary tract infection without hematuria, site unspecified  Recent UTI with completion of antibiotic course. Will recheck Urine culture to make sure its resolved.   - Urine Culture Aerobic Bacterial - lab collect; Future  - Urine Culture Aerobic Bacterial - lab collect    6. Essential hypertension  - Comprehensive metabolic panel (BMP + Alb, Alk Phos, ALT, AST, Total. Bili, TP); Future  - Comprehensive metabolic panel (BMP + Alb, Alk Phos, ALT, AST, Total. Bili, TP)    7. Left atrial enlargement  Follow up with recommendations of cardiology on Iwatch tracking of HR and inform on arrhythmias if he notices any.     8. Iron deficiency anemia, unspecified iron deficiency anemia type  Stable, follow up with recommendations of Hematology.     9. Benign prostatic hyperplasia, unspecified whether lower urinary tract symptoms present  Stable, continue to follow Urology recommendations.     10. Encounter for prostate cancer screening  - PSA, screen; Future  - PSA, screen    11. Encounter for lipid screening for cardiovascular disease  - Lipid panel reflex to direct LDL Fasting; Future  - Lipid panel reflex to direct LDL Fasting         Please note that this note consists of symbols derived from keyboarding, dictation and/or voice recognition software. As a result, there may be errors in the script that have gone undetected. Please consider this when interpreting information found in this chart.    Patient Instructions     Please do non fasting labs as ordered.   Follow up with Cardiology , Endocrinology and Neurology.     ================================================    Patient Education  Personalized Prevention Plan  You are due for the preventive services outlined below.  Your care team is available to assist you in scheduling these services.  If you have already completed any of these items, please share that information with your care team to update in your medical  record.  Health Maintenance Due   Topic Date Due     Depression Action Plan  Never done     Zoster (Shingles) Vaccine (1 of 2) Never done     Annual Wellness Visit  12/23/2022     Diabetic Foot Exam  12/23/2022     COVID-19 Vaccine (6 - Pfizer series) 03/22/2023     Eye Exam  05/12/2023       Understanding USDA MyPlate  The USDA has guidelines to help you make healthy food choices. These are called MyPlate. MyPlate shows the food groups that make up healthy meals using the image of a place setting. Before you eat, think about the healthiest choices for what to put on your plate or in your cup or bowl. To learn more about building a healthy plate, visit www.choosemyplate.gov.     The food groups    Fruits. Any fruit or 100% fruit juice counts as part of the Fruit Group. Fruits may be fresh, canned, frozen, or dried, and may be whole, cut-up, or pureed. Make 1/2 of your plate fruits and vegetables.    Vegetables. Any vegetable or 100% vegetable juice counts as a member of the Vegetable Group. Vegetables may be fresh, frozen, canned, or dried. They can be served raw or cooked and may be whole, cut-up, or mashed. Make 1/2 of your plate fruits and vegetables.    Grains. All foods made from grains are part of the Grains Group. These include wheat, rice, oats, cornmeal, and barley. Grains are often used to make foods such as bread, pasta, oatmeal, cereal, tortillas, and grits. Grains should be no more than 1/4 of your plate. At least half of your grains should be whole grains.    Protein. This group includes meat, poultry, seafood, beans and peas, eggs, processed soy products (such as tofu), nuts (including nut butters), and seeds. Make protein choices no more than 1/4 of your plate. Meat and poultry choices should be lean or low fat.    Dairy. The Dairy Group includes all fluid milk products and foods made from milk that contain calcium, such as yogurt and cheese. (Foods that have little calcium, such as cream, butter,  and cream cheese, are not part of this group.) Most dairy choices should be low-fat or fat-free.    Oils. Oils aren't a food group, but they do contain essential nutrients. However it's important to watch your intake of oils. These are fats that are liquid at room temperature. They include canola, corn, olive, soybean, vegetable, and sunflower oil. Foods that are mainly oil include mayonnaise, certain salad dressings, and soft margarines. You likely already get your daily oil allowance from the foods you eat.  Things to limit  Eating healthy also means limiting these things in your diet:    Salt (sodium). Many processed foods have a lot of sodium. To keep sodium intake down, eat fresh vegetables, meats, poultry, and seafood when possible. Purchase low-sodium, reduced-sodium, or no-salt-added food products at the store. And don't add salt to your meals at home. Instead, season them with herbs and spices such as dill, oregano, cumin, and paprika. Or try adding flavor with lemon or lime zest and juice.    Saturated fat. Saturated fats are most often found in animal products such as beef, pork, and chicken. They are often solid at room temperature, such as butter. To reduce your saturated fat intake, choose leaner cuts of meat and poultry. And try healthier cooking methods such as grilling, broiling, roasting, or baking. For a simple lower-fat swap, use plain nonfat yogurt instead of mayonnaise when making potato salad or macaroni salad.    Added sugars. These are sugars added to foods. They are in foods such as ice cream, candy, soda, fruit drinks, sports drinks, energy drinks, cookies, pastries, jams, and syrups. Cut down on added sugars by sharing sweet treats with a family member or friend. You can also choose fruit for dessert, and drink water or other unsweetened beverages.  [email or web address] last reviewed this educational content on 6/1/2020 2000-2022 [employer]. All rights reserved. This information is  not intended as a substitute for professional medical care. Always follow your healthcare professional's instructions.          Signs of Hearing Loss  Hearing loss is a problem shared by many people. In fact, it's one of the most common health problems, particularly as people age. Most people aged 65 and older have some hearing loss. By age 80, almost everyone does. Hearing loss often occurs slowly over the years. So, you may not realize your hearing has gotten worse.   When sudden hearing loss occurs, it's important to contact your healthcare provider right away. Your provider will do a medical exam and a hearing exam as soon as possible. This is to help find the cause and type of your sudden hearing loss. Based on your diagnosis, your healthcare provider will discuss possible treatments.      Hearing much better with one ear can be a sign of hearing loss.     Have your hearing checked  Call your healthcare provider if you:     Have to strain to hear normal conversation    Have to watch other people s faces very carefully to follow what they re saying    Need to ask people to repeat what they ve said    Often misunderstand what people are saying    Turn the volume of the television or radio up so high that others complain    Feel that people are mumbling when they re talking to you    Find that the effort to hear leaves you feeling tired and irritated    Notice, when using the phone, that you hear better with one ear than the other  [employer] last reviewed this educational content on 6/1/2022 2000-2022 [employer]. All rights reserved. This information is not intended as a substitute for professional medical care. Always follow your healthcare professional's instructions.             Return in about 6 months (around 12/14/2023), or if symptoms worsen or fail to improve, for Follow up of last visit, If symptoms persist, video visit.    Michaela Sharma MD  [facility]  "PEPE      Patient has been advised of split billing requirements and indicates understanding: Yes      COUNSELING:  Reviewed preventive health counseling, as reflected in patient instructions  Special attention given to:       Regular exercise       Healthy diet/nutrition       Vision screening       Hearing screening       Dental care       Bladder control       Fall risk prevention       Immunizations    Declined: Covid-19 due to Concerns about side effects/safety               Prostate cancer screening      BMI:   Estimated body mass index is 30.67 kg/m  as calculated from the following:    Height as of this encounter: 1.676 m (5' 6\").    Weight as of this encounter: 86.2 kg (190 lb).   Weight management plan: Discussed healthy diet and exercise guidelines      He reports that he quit smoking about 11 years ago. His smoking use included cigarettes and pipe. He started smoking about 63 years ago. He has never used smokeless tobacco.      Appropriate preventive services were discussed with this patient, including applicable screening as appropriate for cardiovascular disease, diabetes, osteopenia/osteoporosis, and glaucoma.  As appropriate for age/gender, discussed screening for colorectal cancer, prostate cancer, breast cancer, and cervical cancer. Checklist reviewing preventive services available has been given to the patient.    Reviewed patients plan of care and provided an AVS. The Basic Care Plan (routine screening as documented in Health Maintenance) for Timmy meets the Care Plan requirement. This Care Plan has been established and reviewed with the Patient.      Michaela Sharma MD  [facility] [place]    Identified Health Risks:    I have reviewed Opioid Use Disorder and Substance Use Disorder risk factors and made any needed referrals.     Answers for HPI/ROS submitted by the patient on 6/14/2023  If you checked off any problems, how difficult have these problems made it for you to " do your work, take care of things at home, or get along with other people?: Not difficult at all  PHQ9 TOTAL SCORE: 0

## 2023-06-14 NOTE — PROGRESS NOTES
"    The patient was counseled and encouraged to consider modifying their diet and eating habits. He was provided with information on recommended healthy diet options.  The patient was provided with written information regarding signs of hearing loss.      Answers for HPI/ROS submitted by the patient on 6/14/2023  If you checked off any problems, how difficult have these problems made it for you to do your work, take care of things at home, or get along with other people?: Not difficult at all  PHQ9 TOTAL SCORE: 0  In general, how would you rate your overall physical health?: good  Frequency of exercise:: 4-5 days/week  Do you usually eat at least 4 servings of fruit and vegetables a day, include whole grains & fiber, and avoid regularly eating high fat or \"junk\" foods? : No  Taking medications regularly:: Yes  Medication side effects:: Not applicable  Activities of Daily Living: no assistance needed  Home safety: no safety concerns identified  Hearing Impairment:: difficulty following a conversation in a noisy restaurant or crowded room, need to ask people to speak up or repeat themselves, difficulty understanding soft or whispered speech  In the past 6 months, have you been bothered by leaking of urine?: No  abdominal pain: No  Blood in stool: No  Blood in urine: No  chest pain: No  chills: No  congestion: No  constipation: No  cough: No  diarrhea: No  dizziness: Yes  ear pain: No  eye pain: No  nervous/anxious: No  fever: No  frequency: No  genital sores: No  headaches: No  hearing loss: No  heartburn: No  arthralgias: No  joint swelling: No  peripheral edema: No  mood changes: No  myalgias: No  nausea: No  dysuria: No  palpitations: No  Skin sensation changes: No  sore throat: No  urgency: No  rash: No  shortness of breath: No  visual disturbance: No  weakness: No  impotence: Yes  penile discharge: No  In general, how would you rate your overall mental or emotional health?: good  Additional concerns today:: " No  Duration of exercise:: 30-45 minutes

## 2023-06-14 NOTE — PATIENT INSTRUCTIONS
Please do non fasting labs as ordered.   Follow up with Cardiology , Endocrinology and Neurology.     ================================================    Patient Education   Personalized Prevention Plan  You are due for the preventive services outlined below.  Your care team is available to assist you in scheduling these services.  If you have already completed any of these items, please share that information with your care team to update in your medical record.  Health Maintenance Due   Topic Date Due    Depression Action Plan  Never done    Zoster (Shingles) Vaccine (1 of 2) Never done    Annual Wellness Visit  12/23/2022    Diabetic Foot Exam  12/23/2022    COVID-19 Vaccine (6 - Pfizer series) 03/22/2023    Eye Exam  05/12/2023       Understanding USDA MyPlate  The USDA has guidelines to help you make healthy food choices. These are called MyPlate. MyPlate shows the food groups that make up healthy meals using the image of a place setting. Before you eat, think about the healthiest choices for what to put on your plate or in your cup or bowl. To learn more about building a healthy plate, visit www.choosemyplate.gov.     The food groups  Fruits. Any fruit or 100% fruit juice counts as part of the Fruit Group. Fruits may be fresh, canned, frozen, or dried, and may be whole, cut-up, or pureed. Make 1/2 of your plate fruits and vegetables.  Vegetables. Any vegetable or 100% vegetable juice counts as a member of the Vegetable Group. Vegetables may be fresh, frozen, canned, or dried. They can be served raw or cooked and may be whole, cut-up, or mashed. Make 1/2 of your plate fruits and vegetables.  Grains. All foods made from grains are part of the Grains Group. These include wheat, rice, oats, cornmeal, and barley. Grains are often used to make foods such as bread, pasta, oatmeal, cereal, tortillas, and grits. Grains should be no more than 1/4 of your plate. At least half of your grains should be whole  grains.  Protein. This group includes meat, poultry, seafood, beans and peas, eggs, processed soy products (such as tofu), nuts (including nut butters), and seeds. Make protein choices no more than 1/4 of your plate. Meat and poultry choices should be lean or low fat.  Dairy. The Dairy Group includes all fluid milk products and foods made from milk that contain calcium, such as yogurt and cheese. (Foods that have little calcium, such as cream, butter, and cream cheese, are not part of this group.) Most dairy choices should be low-fat or fat-free.  Oils. Oils aren't a food group, but they do contain essential nutrients. However it's important to watch your intake of oils. These are fats that are liquid at room temperature. They include canola, corn, olive, soybean, vegetable, and sunflower oil. Foods that are mainly oil include mayonnaise, certain salad dressings, and soft margarines. You likely already get your daily oil allowance from the foods you eat.  Things to limit  Eating healthy also means limiting these things in your diet:  Salt (sodium). Many processed foods have a lot of sodium. To keep sodium intake down, eat fresh vegetables, meats, poultry, and seafood when possible. Purchase low-sodium, reduced-sodium, or no-salt-added food products at the store. And don't add salt to your meals at home. Instead, season them with herbs and spices such as dill, oregano, cumin, and paprika. Or try adding flavor with lemon or lime zest and juice.  Saturated fat. Saturated fats are most often found in animal products such as beef, pork, and chicken. They are often solid at room temperature, such as butter. To reduce your saturated fat intake, choose leaner cuts of meat and poultry. And try healthier cooking methods such as grilling, broiling, roasting, or baking. For a simple lower-fat swap, use plain nonfat yogurt instead of mayonnaise when making potato salad or macaroni salad.  Added sugars. These are sugars added to  foods. They are in foods such as ice cream, candy, soda, fruit drinks, sports drinks, energy drinks, cookies, pastries, jams, and syrups. Cut down on added sugars by sharing sweet treats with a family member or friend. You can also choose fruit for dessert, and drink water or other unsweetened beverages.  StayWell last reviewed this educational content on 6/1/2020 2000-2022 [employer] StayWell [employer]. All rights reserved. This information is not intended as a substitute for professional medical care. Always follow your healthcare professional's instructions.          Signs of Hearing Loss  Hearing loss is a problem shared by many people. In fact, it's one of the most common health problems, particularly as people age. Most people aged 65 and older have some hearing loss. By age 80, almost everyone does. Hearing loss often occurs slowly over the years. So, you may not realize your hearing has gotten worse.   When sudden hearing loss occurs, it's important to contact your healthcare provider right away. Your provider will do a medical exam and a hearing exam as soon as possible. This is to help find the cause and type of your sudden hearing loss. Based on your diagnosis, your healthcare provider will discuss possible treatments.      Hearing much better with one ear can be a sign of hearing loss.     Have your hearing checked  Call your healthcare provider if you:   Have to strain to hear normal conversation  Have to watch other people s faces very carefully to follow what they re saying  Need to ask people to repeat what they ve said  Often misunderstand what people are saying  Turn the volume of the television or radio up so high that others complain  Feel that people are mumbling when they re talking to you  Find that the effort to hear leaves you feeling tired and irritated  Notice, when using the phone, that you hear better with one ear than the other  Nanalysis last reviewed this educational content on  6/1/2022 2000-2022 The StayWell [employer]. All rights reserved. This information is not intended as a substitute for professional medical care. Always follow your healthcare professional's instructions.

## 2023-06-16 LAB — BACTERIA UR CULT: NO GROWTH

## 2023-07-03 ENCOUNTER — LAB (OUTPATIENT)
Dept: LAB | Facility: CLINIC | Age: 83
End: 2023-07-03
Payer: COMMERCIAL

## 2023-07-03 DIAGNOSIS — D50.9 IRON DEFICIENCY ANEMIA, UNSPECIFIED IRON DEFICIENCY ANEMIA TYPE: ICD-10-CM

## 2023-07-03 DIAGNOSIS — D64.9 ANEMIA, UNSPECIFIED TYPE: ICD-10-CM

## 2023-07-03 LAB
ERYTHROCYTE [DISTWIDTH] IN BLOOD BY AUTOMATED COUNT: 14.2 % (ref 10–15)
FERRITIN SERPL-MCNC: 38 NG/ML (ref 31–409)
HCT VFR BLD AUTO: 36.8 % (ref 40–53)
HGB BLD-MCNC: 12.1 G/DL (ref 13.3–17.7)
IRON BINDING CAPACITY (ROCHE): 275 UG/DL (ref 240–430)
IRON SATN MFR SERPL: 24 % (ref 15–46)
IRON SERPL-MCNC: 67 UG/DL (ref 61–157)
MCH RBC QN AUTO: 29.7 PG (ref 26.5–33)
MCHC RBC AUTO-ENTMCNC: 32.9 G/DL (ref 31.5–36.5)
MCV RBC AUTO: 90 FL (ref 78–100)
PLATELET # BLD AUTO: 199 10E3/UL (ref 150–450)
RBC # BLD AUTO: 4.07 10E6/UL (ref 4.4–5.9)
WBC # BLD AUTO: 5 10E3/UL (ref 4–11)

## 2023-07-03 PROCEDURE — 83550 IRON BINDING TEST: CPT

## 2023-07-03 PROCEDURE — 83540 ASSAY OF IRON: CPT

## 2023-07-03 PROCEDURE — 85027 COMPLETE CBC AUTOMATED: CPT

## 2023-07-03 PROCEDURE — 82728 ASSAY OF FERRITIN: CPT

## 2023-07-03 PROCEDURE — 36415 COLL VENOUS BLD VENIPUNCTURE: CPT

## 2023-07-05 NOTE — RESULT ENCOUNTER NOTE
Dear Mr. Strange,    Blood tests are better. Hemoglobin is better. Iron is normal.    Please, call me with any questions.    Chandrakant Panchal MD

## 2023-07-13 ENCOUNTER — TRANSFERRED RECORDS (OUTPATIENT)
Dept: FAMILY MEDICINE | Facility: CLINIC | Age: 83
End: 2023-07-13
Payer: COMMERCIAL

## 2023-07-13 LAB — RETINOPATHY: NEGATIVE

## 2023-07-14 ENCOUNTER — PATIENT OUTREACH (OUTPATIENT)
Dept: CARE COORDINATION | Facility: CLINIC | Age: 83
End: 2023-07-14
Payer: COMMERCIAL

## 2023-07-14 NOTE — LETTER
[facility] CARE COORDINATION  [address] [place] [address] [address]  [place] [address]    July 14, 2023        Timmy Strange  [address] [address]          Dear Timmy,     Attached is an updated Patient Centered Plan of Care for your continued enrollment in Care Coordination. Please let us know if you have additional questions, concerns, or goals that we can assist with.    Sincerely,    Alistair Osuna, [facility]  Clinic Care Coordinator  [facility]  [phone]  [email or web address]  Patient Centered Plan of Care  About Me:        Patient Name:  Timmy Castroming    YOB: 1940  Age:         82 year old   Kiana MRN:    [id number] Telephone Information:  Home Phone [phone]   Mobile [phone]       Address:  [address]  [facility] [address] Email address:  [email or web address]      Emergency Contact(s)    Name Relationship Lgl Grd Work Phone Home Phone Mobile Phone   1. CAMELIA. GWENDOLYN Spouse No   [phone]   2. THADDEUS MANNING Stepalannah No  [phone]    3. BETTY DOLL Celso No  [phone]            Primary language:  English     needed? No   [employer]:  [phone] op. 1  Other communication barriers:Cognitive impairment    Preferred Method of Communication:  Mail  Current living arrangement: I live in a private home with spouse    Mobility Status/ Medical Equipment: No data recorded      Health Maintenance  Health Maintenance Reviewed: Due/Overdue   Health Maintenance Due   Topic Date Due    DEPRESSION ACTION PLAN  Never done    ZOSTER IMMUNIZATION (1 of 2) Never done    DIABETIC FOOT EXAM  12/23/2022    COVID-19 Vaccine (6 - Pfizer series) 03/22/2023    EYE EXAM  05/12/2023           My Access Plan  Medical Emergency 911   Primary  Clinic Line [facility] [place] - [phone]   24 Hour Appointment Line [phone] or  [phone]) (toll-free)   24 Hour Nurse Line [phone] (toll-free)   Preferred Urgent Care No data recorded   Preferred [facility]  [phone]     Preferred Pharmacy [facility] - [place], [place] - [facility] [facility]     Behavioral Health Crisis Line The National Suicide Prevention Lifeline at 1-136.889.8952 or Text/Call 258             My Care Team Members  Patient Care Team         Relationship Specialty Notifications Start End    Michaela Sharma MD PCP - General Internal Medicine  8/11/21     Phone: [phone] Fax: [phone]          [facility] [address] [place] [address]    Chandrakant Panchal MD Assigned Cancer Care Provider   6/20/21     Phone: [phone] Fax: [phone]         [facility] [address] [place] [address]    Michaela Sharma MD Assigned PCP   8/29/21     Phone: [phone] Fax: [phone]         [address] [facility] [address] [place] [address]    Pino Meier MD MD Urology  1/27/22     Phone: [phone] Fax: [phone]         [address] [facility] [address]    Pino Meier MD Assigned Surgical Provider   6/18/22     Phone: [phone] Fax: [phone] [address] [place] [address]    Alistair Osuna LSW Lead Care Coordinator Primary Care - CC Admissions 3/29/23     Phone: [phone]         Michel Doran MD Assigned Heart and Vascular Provider   6/3/23     Phone: [phone] Fax: [phone] [address] [place] [address]              My Care Plans  Self Management and Treatment Plan  Care Plan  Care Plan: Social Support       Problem: Inadequate social support       Goal: Improve socialization/Supportive services       Start Date: 3/30/2023 Expected End Date: 10/2/2023    Recent Progress: 10%    Note:      Goal Statement: I will continue to take steps over the next 6 months to identify alternate more supportive living which will allow me to maintain my current level of independence.  Barriers: Changes in cognition  Strengths: Strong support from family  Patient expressed understanding of goal: yes    Action steps to achieve this goal:  I will continue to participate in Adult Day Care as desired for socialization.   I will consider options for respite care or alternate living settings.   I will continue to lean on informal supports of my: friends and family  My family will review resources and supports sent to me via E-mail- [facility] and In home Respite options.   My family will contact my care team with questions, concerns, support needs.   My family will use the clinic as a resource and I understand I can contact my clinic with 24/7 after hours services available.   My family will continue to outreach to care coordination as needed for additional resources or supports.                                 Advance Care Plans/Directives Type:   Advanced Directive - On File      My Medical and Care Information  Problem List   Patient Active Problem List   Diagnosis    Essential hypertension    Hypothyroidism    Type II diabetes mellitus with peripheral circulatory disorder (H)    BPH (benign prostatic hyperplasia)    ED (erectile dysfunction)    Mixed hyperlipidemia    Special screening for malignant neoplasm of prostate    Localized edema    Seasonal allergic rhinitis    Screening for prostate cancer    Residual hemorrhoidal skin tags    Excess skin of eyelid, unspecified laterality    Glaucoma of both eyes, unspecified glaucoma type    Flatulence, eructation, and gas pain    Weakness of voice    Anemia, unspecified type    Gastroesophageal reflux disease without esophagitis    Dependent edema    Mild cognitive impairment    Pain due to onychomycosis of nail    Oropharyngeal dysphagia    Rectal hemorrhage     Change in bowel habits    Internal hemorrhoids with Hx of banding    Anemia, iron deficiency    Malabsorption of iron    Mild episode of recurrent major depressive disorder (H)    Left atrial enlargement      Current Medications and Allergies:     Allergies   Allergen Reactions    Shellfish Allergy     Shellfish-Derived Products      Current Outpatient Medications   Medication    acetaminophen (TYLENOL) 500 MG tablet    albuterol (PROAIR HFA/PROVENTIL HFA/VENTOLIN HFA) 108 (90 Base) MCG/ACT inhaler    carvedilol (COREG) 3.125 MG tablet    escitalopram (LEXAPRO) 10 MG tablet    Ferrous Sulfate 324 (65 Fe) MG TBEC    Fexofenadine HCl (ALLEGRA PO)    finasteride (PROSCAR) 5 MG tablet    insulin glargine (LANTUS SOLOSTAR) 100 UNIT/ML pen    latanoprost (XALATAN) 0.005 % ophthalmic solution    levothyroxine (SYNTHROID/LEVOTHROID) 88 MCG tablet    losartan (COZAAR) 100 MG tablet    lovastatin (MEVACOR) 40 MG tablet    metFORMIN (GLUCOPHAGE XR) 500 MG 24 hr tablet    Multiple Vitamins-Minerals (CENTRUM SILVER) per tablet    ONETOUCH ULTRA test strip    tamsulosin (FLOMAX) 0.4 MG capsule     No current facility-administered medications for this visit.         Care Coordination Start Date: 3/29/2023   Frequency of Care Coordination: monthly     Form Last Updated: 07/14/2023

## 2023-07-15 DIAGNOSIS — R35.0 URINARY FREQUENCY: ICD-10-CM

## 2023-07-18 RX ORDER — TAMSULOSIN HYDROCHLORIDE 0.4 MG/1
CAPSULE ORAL
Qty: 200 CAPSULE | Refills: 0 | Status: SHIPPED | OUTPATIENT
Start: 2023-07-18 | End: 2023-08-07

## 2023-07-18 RX ORDER — FINASTERIDE 5 MG/1
TABLET, FILM COATED ORAL
Qty: 100 TABLET | Refills: 0 | Status: SHIPPED | OUTPATIENT
Start: 2023-07-18 | End: 2023-08-07

## 2023-08-07 ENCOUNTER — LAB (OUTPATIENT)
Dept: LAB | Facility: CLINIC | Age: 83
End: 2023-08-07
Payer: COMMERCIAL

## 2023-08-07 ENCOUNTER — OFFICE VISIT (OUTPATIENT)
Dept: UROLOGY | Facility: CLINIC | Age: 83
End: 2023-08-07
Payer: COMMERCIAL

## 2023-08-07 VITALS
WEIGHT: 191 LBS | HEIGHT: 66 IN | OXYGEN SATURATION: 94 % | BODY MASS INDEX: 30.7 KG/M2 | SYSTOLIC BLOOD PRESSURE: 145 MMHG | DIASTOLIC BLOOD PRESSURE: 70 MMHG | HEART RATE: 61 BPM

## 2023-08-07 DIAGNOSIS — D64.9 ANEMIA, UNSPECIFIED TYPE: ICD-10-CM

## 2023-08-07 DIAGNOSIS — R35.0 URINARY FREQUENCY: ICD-10-CM

## 2023-08-07 LAB
ERYTHROCYTE [DISTWIDTH] IN BLOOD BY AUTOMATED COUNT: 13.9 % (ref 10–15)
HCT VFR BLD AUTO: 37.1 % (ref 40–53)
HGB BLD-MCNC: 11.7 G/DL (ref 13.3–17.7)
MCH RBC QN AUTO: 29.6 PG (ref 26.5–33)
MCHC RBC AUTO-ENTMCNC: 31.5 G/DL (ref 31.5–36.5)
MCV RBC AUTO: 94 FL (ref 78–100)
PLATELET # BLD AUTO: 212 10E3/UL (ref 150–450)
RBC # BLD AUTO: 3.95 10E6/UL (ref 4.4–5.9)
WBC # BLD AUTO: 5.5 10E3/UL (ref 4–11)

## 2023-08-07 PROCEDURE — 36415 COLL VENOUS BLD VENIPUNCTURE: CPT

## 2023-08-07 PROCEDURE — 99214 OFFICE O/P EST MOD 30 MIN: CPT | Performed by: UROLOGY

## 2023-08-07 PROCEDURE — 85027 COMPLETE CBC AUTOMATED: CPT

## 2023-08-07 RX ORDER — TAMSULOSIN HYDROCHLORIDE 0.4 MG/1
0.8 CAPSULE ORAL DAILY
Qty: 180 CAPSULE | Refills: 3 | Status: SHIPPED | OUTPATIENT
Start: 2023-08-07 | End: 2024-06-17

## 2023-08-07 RX ORDER — FINASTERIDE 5 MG/1
1 TABLET, FILM COATED ORAL DAILY
Qty: 90 TABLET | Refills: 3 | Status: SHIPPED | OUTPATIENT
Start: 2023-08-07 | End: 2024-05-29

## 2023-08-07 ASSESSMENT — PAIN SCALES - GENERAL: PAINLEVEL: NO PAIN (0)

## 2023-08-07 NOTE — PROGRESS NOTES
"[facility]  CHIEF COMPLAINT   It was my pleasure to see Timmy Strange who is a 82 year old male for follow-up of LUTS.      HPI   Timmy Strange is a very pleasant 82 year old male     Prior Dr. Vazquez patient - last seen 6/21/21:  History: It is a great pleasure to see this very pleasant 80-year-old gentleman in follow-up consultation today.  He is a patient with type 2 diabetes who is being concerned more recently about frequency of micturition but without significant nocturia; when I initially saw him he was going 12-13 times during the day with initially reduction in caffeinated beverages did reduce this to 9-10 times a day.  He had not been drinking sodas and only drinking simple fluids and drinking when thirsty.  When I saw him initially he is on both tamsulosin and finasteride the tamsulosin dose of 0.8 mg daily and finasteride 5 mg daily.  Renal function was normal creatinine of 0.82  We had considered further investigations including cystoscopy and even the possibility of urodynamics but decided that we should try anticholinergic medication with tolterodine 4 mg daily.  Subsequently that the patient decided not to continue this treatment and is finding that his symptoms are somewhat improved without having to do that.    6/15/2022:  He has been doing well on tamsulosin 0.8mg (Flomax) and finasteride 5mg (Proscar)     He is using sildenafil 75mg (Viagra)   He notes that he has tried to 100 mg in the past, however had better results with 75 mg    TODAY 8/7/2023:  Follow-up today for annual check and medication refill  He has been doing well with urination with tamsulosin 0.8 mg and finasteride 5 mg daily  He is no longer using sildenafil  He does note that he is having some increasing cognitive impairment which will fluctuate    PHYSICAL EXAM  Patient is a 82 year old  male   Vitals: Blood pressure (!) 145/70, pulse 61, height 1.676 m (5' 6\"), weight 86.6 kg (191 lb), SpO2 94 %.  Body mass index is 30.83 " kg/m .  General Appearance Adult:   Alert, no acute distress, oriented  HENT: throat/mouth:normal, good dentition  Lungs: no respiratory distress, or pursed lip breathing  Heart: No obvious jugular venous distension present  Abdomen: soft, nontender, no organomegaly or masses  Musculoskeltal: extremities normal, no peripheral edema  Skin: no suspicious lesions or rashes  Neuro: Alert, oriented, speech and mentation normal  Psych: affect and mood normal  Gait: Normal    Component PSA   Latest Ref Rng & Units 0.00 - 4.00 ug/L   11/1/2011 3.34   12/17/2013 1.29   4/20/2015 0.83   7/15/2016 0.86   11/7/2017 0.79   6/16/2020 0.50   11/9/2020 0.78   1/3/2022 0.46     Creatinine   Date Value Ref Range Status   06/14/2023 0.85 0.67 - 1.17 mg/dL Final   05/15/2021 0.80 0.66 - 1.25 mg/dL Final     UA RESULTS:  Recent Labs   Lab Test 06/21/21  1237 06/07/21  1100 05/09/21  1600   COLOR Yellow   < > Light Yellow   APPEARANCE Clear   < > Clear   URINEGLC Negative   < > Negative   URINEBILI Negative   < > Negative   URINEKETONE Negative   < > Negative   SG 1.025   < > 1.033   UBLD Negative   < > Trace*   URINEPH 6.0   < > 5.5   PROTEIN Negative   < > 20*   UROBILINOGEN 0.2  --   --    NITRITE Negative   < > Negative   LEUKEST Negative   < > Negative   RBCU  --   --  1   WBCU  --   --  1    < > = values in this interval not displayed.     Lab Results   Component Value Date    A1C 5.8 12/06/2021    A1C 5.6 05/08/2021    A1C 5.5 11/09/2020    A1C 5.9 06/16/2020    A1C 6.5 06/10/2019    A1C 6.6 10/30/2018         ASSESSMENT and PLAN  82-year-old man with history of BPH and LUTS with erectile dysfunction    BPH and LUTS  - We discussed the pathophysiology of the bladder and the prostate and the normal changes associated with development of BPH and LUTS  - He has been doing very well with stable symptoms on tamsulosin 0.8 mg daily and finasteride 5 mg daily  - He has tolerated both of these medications well with no bothersome side  effects  - Refill prescription sent to the pharmacy  - Follow-up in 1 year for symptom check and medication refill    Erectile dysfunction  -He has stopped using the sildenafil and does not desire any additional refills    Follow-up in 1 year for symptom check    Time spent: 20 minutes spent on the date of the encounter doing chart review, history and exam, documentation and further activities as noted above.    Pino Meier MD   Urology  [facility]  [facility] Phone: [phone]  [facility] Phone: [phone]

## 2023-08-07 NOTE — LETTER
8/7/2023       RE: Timmy Strange  [address] [place] [address]     Dear Colleague,    Thank you for referring your patient, Timmy Strange, to the [facility]. Please see a copy of my visit note below.    [facility]  CHIEF COMPLAINT   It was my pleasure to see Timmy Strange who is a 82 year old male for follow-up of LUTS.      HPI   Timmy Strange is a very pleasant 82 year old male     Prior Dr. Vazquez patient - last seen 6/21/21:  History: It is a great pleasure to see this very pleasant 80-year-old gentleman in follow-up consultation today.  He is a patient with type 2 diabetes who is being concerned more recently about frequency of micturition but without significant nocturia; when I initially saw him he was going 12-13 times during the day with initially reduction in caffeinated beverages did reduce this to 9-10 times a day.  He had not been drinking sodas and only drinking simple fluids and drinking when thirsty.  When I saw him initially he is on both tamsulosin and finasteride the tamsulosin dose of 0.8 mg daily and finasteride 5 mg daily.  Renal function was normal creatinine of 0.82  We had considered further investigations including cystoscopy and even the possibility of urodynamics but decided that we should try anticholinergic medication with tolterodine 4 mg daily.  Subsequently that the patient decided not to continue this treatment and is finding that his symptoms are somewhat improved without having to do that.    6/15/2022:  He has been doing well on tamsulosin 0.8mg (Flomax) and finasteride 5mg (Proscar)     He is using sildenafil 75mg (Viagra)   He notes that he has tried to 100 mg in the past, however had better results with 75 mg    TODAY 8/7/2023:  Follow-up today for annual check and medication refill  He has been doing well with urination with tamsulosin 0.8 mg and finasteride 5 mg  "daily  He is no longer using sildenafil  He does note that he is having some increasing cognitive impairment which will fluctuate    PHYSICAL EXAM  Patient is a 82 year old  male   Vitals: Blood pressure (!) 145/70, pulse 61, height 1.676 m (5' 6\"), weight 86.6 kg (191 lb), SpO2 94 %.  Body mass index is 30.83 kg/m .  General Appearance Adult:   Alert, no acute distress, oriented  HENT: throat/mouth:normal, good dentition  Lungs: no respiratory distress, or pursed lip breathing  Heart: No obvious jugular venous distension present  Abdomen: soft, nontender, no organomegaly or masses  Musculoskeltal: extremities normal, no peripheral edema  Skin: no suspicious lesions or rashes  Neuro: Alert, oriented, speech and mentation normal  Psych: affect and mood normal  Gait: Normal    Component PSA   Latest Ref Rng & Units 0.00 - 4.00 ug/L   11/1/2011 3.34   12/17/2013 1.29   4/20/2015 0.83   7/15/2016 0.86   11/7/2017 0.79   6/16/2020 0.50   11/9/2020 0.78   1/3/2022 0.46     Creatinine   Date Value Ref Range Status   06/14/2023 0.85 0.67 - 1.17 mg/dL Final   05/15/2021 0.80 0.66 - 1.25 mg/dL Final     UA RESULTS:  Recent Labs   Lab Test 06/21/21  1237 06/07/21  1100 05/09/21  1600   COLOR Yellow   < > Light Yellow   APPEARANCE Clear   < > Clear   URINEGLC Negative   < > Negative   URINEBILI Negative   < > Negative   URINEKETONE Negative   < > Negative   SG 1.025   < > 1.033   UBLD Negative   < > Trace*   URINEPH 6.0   < > 5.5   PROTEIN Negative   < > 20*   UROBILINOGEN 0.2  --   --    NITRITE Negative   < > Negative   LEUKEST Negative   < > Negative   RBCU  --   --  1   WBCU  --   --  1    < > = values in this interval not displayed.     Lab Results   Component Value Date    A1C 5.8 12/06/2021    A1C 5.6 05/08/2021    A1C 5.5 11/09/2020    A1C 5.9 06/16/2020    A1C 6.5 06/10/2019    A1C 6.6 10/30/2018         ASSESSMENT and PLAN  82-year-old man with history of BPH and LUTS with erectile dysfunction    BPH and LUTS  - We " discussed the pathophysiology of the bladder and the prostate and the normal changes associated with development of BPH and LUTS  - He has been doing very well with stable symptoms on tamsulosin 0.8 mg daily and finasteride 5 mg daily  - He has tolerated both of these medications well with no bothersome side effects  - Refill prescription sent to the pharmacy  - Follow-up in 1 year for symptom check and medication refill    Erectile dysfunction  -He has stopped using the sildenafil and does not desire any additional refills    Follow-up in 1 year for symptom check    Time spent: 20 minutes spent on the date of the encounter doing chart review, history and exam, documentation and further activities as noted above.    Pino Meier MD   Urology  [facility]  [facility] Phone: [phone]  [facility] Phone: [phone]

## 2023-08-09 NOTE — RESULT ENCOUNTER NOTE
Dear Mr. Strange,    Blood test is stable. Hemoglobin is 11.7.    Please, call me with any questions.    Chandrakant Panchal MD

## 2023-08-10 ASSESSMENT — ACTIVITIES OF DAILY LIVING (ADL): DEPENDENT_IADLS:: MEDICATION MANAGEMENT;MONEY MANAGEMENT;TRANSPORTATION

## 2023-08-10 NOTE — PROGRESS NOTES
Clinic Care Coordination Contact  [facility]/Voicemail       Clinical Data: Care Coordinator Outreach  Outreach attempted x 2.  Left message on  Ladan's  voicemail with call back information and requested return call.  Plan: Care Coordinator will try to reach patient again in 10 business days.    Alistair Osuna [facility]  Clinic Care Coordinator  [facility]  [phone]  [email or web address]

## 2023-08-31 ASSESSMENT — ACTIVITIES OF DAILY LIVING (ADL): DEPENDENT_IADLS:: MEDICATION MANAGEMENT;MONEY MANAGEMENT;TRANSPORTATION

## 2023-08-31 NOTE — PROGRESS NOTES
Clinic Care Coordination Contact  Follow Up Progress Note     Enrollment Date: 3/29/2023     Assessment: [facility] outreached to spouse on this date.Ladan shares she has outreached to William at [facility] to start the process of making a plan for longer term living for pt. Ladan says cognition is changing and not improving and they are currently working with estate and financial planners to have a strong plan for whenever needed.     No new needs identified. Welcomed ongoing outreaches.      Care Gaps:    Health Maintenance Due   Topic Date Due    DEPRESSION ACTION PLAN  Never done    ZOSTER IMMUNIZATION (1 of 2) Never done    DIABETIC FOOT EXAM  12/23/2022    COVID-19 Vaccine (6 - Pfizer series) 03/22/2023    EYE EXAM  05/12/2023         Care Plan: Social Support       Problem: Inadequate social support       Goal: Improve socialization/Supportive services       Start Date: 3/30/2023 Expected End Date: 10/2/2023    This Visit's Progress: 40% Recent Progress: 10%    Note:     Goal Statement: I will continue to take steps over the next 6 months to identify alternate more supportive living which will allow me to maintain my current level of independence.  Barriers: Changes in cognition  Strengths: Strong support from family  Patient expressed understanding of goal: yes    Action steps to achieve this goal:  I will continue to participate in Adult Day Care as desired for socialization.   I will consider options for respite care or alternate living settings.   I will continue to lean on informal supports of my: friends and family  My family will review resources and supports sent to me via E-mail- [facility] and In home Respite options.   My family will contact my care team with questions, concerns, support needs.   My family will use the clinic as a resource and I understand I can contact my clinic with 24/7 after hours services available.   My family will continue to outreach to care coordination as  needed for additional resources or supports.                                  Patient is actively working to accomplish goal and patient's goal remains appropriate and relevant at this time.     Intervention/Education provided during outreach: Care Coordination availability.      Outreach Frequency: monthly    Complex Care Plan:  Patient is not due for Complex Care Plan to be updated.  Care Plan updated and mailed to patient: No    Annual Assessment due before next Outreach: No  Scheduled: Not Applicable    Plan: Care Coordinator will follow up in 4 weeks.     Alistair Osuna [facility]  Clinic Care Coordinator  [facility]  [phone]  [email or web address]

## 2023-09-05 ENCOUNTER — LAB (OUTPATIENT)
Dept: LAB | Facility: CLINIC | Age: 83
End: 2023-09-05
Payer: COMMERCIAL

## 2023-09-05 DIAGNOSIS — D64.9 ANEMIA, UNSPECIFIED TYPE: ICD-10-CM

## 2023-09-05 DIAGNOSIS — D50.9 IRON DEFICIENCY ANEMIA, UNSPECIFIED IRON DEFICIENCY ANEMIA TYPE: ICD-10-CM

## 2023-09-05 DIAGNOSIS — F33.0 MILD EPISODE OF RECURRENT MAJOR DEPRESSIVE DISORDER (H): ICD-10-CM

## 2023-09-05 LAB
ERYTHROCYTE [DISTWIDTH] IN BLOOD BY AUTOMATED COUNT: 13.6 % (ref 10–15)
FERRITIN SERPL-MCNC: 38 NG/ML (ref 31–409)
HCT VFR BLD AUTO: 37.4 % (ref 40–53)
HGB BLD-MCNC: 12 G/DL (ref 13.3–17.7)
IRON BINDING CAPACITY (ROCHE): 261 UG/DL (ref 240–430)
IRON SATN MFR SERPL: 31 % (ref 15–46)
IRON SERPL-MCNC: 81 UG/DL (ref 61–157)
MCH RBC QN AUTO: 29.8 PG (ref 26.5–33)
MCHC RBC AUTO-ENTMCNC: 32.1 G/DL (ref 31.5–36.5)
MCV RBC AUTO: 93 FL (ref 78–100)
PLATELET # BLD AUTO: 214 10E3/UL (ref 150–450)
RBC # BLD AUTO: 4.03 10E6/UL (ref 4.4–5.9)
WBC # BLD AUTO: 4.9 10E3/UL (ref 4–11)

## 2023-09-05 PROCEDURE — 83540 ASSAY OF IRON: CPT

## 2023-09-05 PROCEDURE — 82728 ASSAY OF FERRITIN: CPT

## 2023-09-05 PROCEDURE — 83550 IRON BINDING TEST: CPT

## 2023-09-05 PROCEDURE — 36415 COLL VENOUS BLD VENIPUNCTURE: CPT

## 2023-09-05 PROCEDURE — 85027 COMPLETE CBC AUTOMATED: CPT

## 2023-09-06 RX ORDER — ESCITALOPRAM OXALATE 10 MG/1
10 TABLET ORAL DAILY
Qty: 90 TABLET | Refills: 0 | Status: SHIPPED | OUTPATIENT
Start: 2023-09-06 | End: 2024-01-11

## 2023-09-06 NOTE — RESULT ENCOUNTER NOTE
Dear Mr. Strange,    Blood tests are overall good. Anemia has improved. Iron level is normal.    Please, call me with any questions.    Chandrakant Panchal MD

## 2023-09-06 NOTE — PROGRESS NOTES
Clinic Care Coordination Contact  [facility]/Voicemail       Clinical Data: Care Coordinator Outreach  Outreach attempted x 1.  Left message on spouse's voicemail with call back information and requested return call.  Plan: Care Coordinator will try to reach patient again in 10 business days.    Alistair Osuna [facility]  Clinic Care Coordinator  [facility]  [phone]  [email or web address]           Bill As A Line Item Or As Units: Line Item

## 2023-09-11 ENCOUNTER — VIRTUAL VISIT (OUTPATIENT)
Dept: ONCOLOGY | Facility: CLINIC | Age: 83
End: 2023-09-11
Attending: INTERNAL MEDICINE
Payer: COMMERCIAL

## 2023-09-11 VITALS — HEIGHT: 66 IN | WEIGHT: 191 LBS | BODY MASS INDEX: 30.7 KG/M2

## 2023-09-11 DIAGNOSIS — D64.9 ANEMIA, UNSPECIFIED TYPE: Primary | ICD-10-CM

## 2023-09-11 PROCEDURE — 99213 OFFICE O/P EST LOW 20 MIN: CPT | Mod: VID | Performed by: INTERNAL MEDICINE

## 2023-09-11 ASSESSMENT — PAIN SCALES - GENERAL: PAINLEVEL: NO PAIN (0)

## 2023-09-11 NOTE — PROGRESS NOTES
Virtual Visit Details    Type of service:  Video Visit     Originating Location (pt. Location): Home    Distant Location (provider location):  On-site  Platform used for Video Visit: Plixi    HEMATOLOGY HISTORY: Mr. Strange is a gentleman with chronic normocytic anemia due to anemia of chronic disease and iron deficiency.    1.  On 11/07/2017, hemoglobin of 13.3.  -On 10/30/2018, hemoglobin of 12.5.  -On 02/22/2021, WBC of 4.3, hemoglobin of 10.4, platelet of 226 and MCV of 90.  2.  On 02/01/2021, colonoscopy revealed colon polyps (tubular adenoma), hemorrhoids and diverticulosis.   -On 03/16/2021, EGD was essentially normal.    3. On 05/11/2021:  -Iron of 13, saturation of 6% and ferritin of 69.  -Normal folate.  -Normal vitamin B12.  -Normal TSH.  6. Patient received 1 dose of IV Venofer on 05/11/2021.     SUBJECTIVE:  Mr. Strange is a 82-year-old gentleman with chronic normocytic anemia due to chronic disease and iron deficiency.  He is on oral ferrous sulfate once a day. He is tolerating well.     His hemoglobin has improved to 12.0.  Iron and ferritin are normal now.    Patient overall is doing well.  He has mild generalized weakness.  He is able to do activities of daily living.  No headache.  Occasional dizziness.  No chest pain.  No shortness of breath at rest.  No nausea or vomiting.  Occasionally he gets hemorrhoidal bleed.  No worsening of it.  No urinary complaints.  At times he is little forgetful.  All other review of system is negative.     PHYSICAL EXAMINATION:    He is alert, oriented x 3.   Rest of the systems not examined as this is a video visit.     LABORATORY:  CBC, iron and ferritin reviewed.     ASSESSMENT:    1.  An 82-year-old gentleman with normocytic anemia secondary to iron deficiency and anemia of chronic disease. Anemia is improving.  Given his age, he may also have primary bone marrow pathology like MDS.  2.  Iron deficiency from hemorrhoidal bleed.   3.  Generalized weakness.  4.   Hemorrhoidal bleed.     PLAN:  1.  Labs were reviewed with the patient.  Explained to him that hemoglobin is mildly better at 12.  His iron, iron saturation index and ferritin are all normal.    Patient has been anemic for 8 years.  Explained to him that his hemoglobin will never go back to normal as he has anemia from chronic disease also.  His hemoglobin likely to his stay between 11-12.  Explained to the patient that he should do well with hemoglobin around 11.    2.  He is on oral ferrous sulfate.  He is tolerating it well. He will continue on it.    3.  He wants to have his hemoglobin monitored frequently.  I told him to have CBC checked every 1 to 2 months.  We will recheck iron and ferritin in 6 months.    4.  He had few questions all answered.  I will see him in 6 months with labs.  Advised him to call us with any questions or concerns.     Total video visit time of 20 minutes. Time is spent in today's visit, review of chart/investigations today and documentation today.

## 2023-09-11 NOTE — LETTER
9/11/2023         RE: Timmy Strange  [address] [address]        Dear Colleague,    Thank you for referring your patient, Timmy Strange, to the [facility]. Please see a copy of my visit note below.    Virtual Visit Details    Type of service:  Video Visit     Originating Location (pt. Location): Home    Distant Location (provider location):  On-site  Platform used for Video Visit: Via    HEMATOLOGY HISTORY: Mr. Strange is a gentleman with chronic normocytic anemia due to anemia of chronic disease and iron deficiency.    1.  On 11/07/2017, hemoglobin of 13.3.  -On 10/30/2018, hemoglobin of 12.5.  -On 02/22/2021, WBC of 4.3, hemoglobin of 10.4, platelet of 226 and MCV of 90.  2.  On 02/01/2021, colonoscopy revealed colon polyps (tubular adenoma), hemorrhoids and diverticulosis.   -On 03/16/2021, EGD was essentially normal.    3. On 05/11/2021:  -Iron of 13, saturation of 6% and ferritin of 69.  -Normal folate.  -Normal vitamin B12.  -Normal TSH.  6. Patient received 1 dose of IV Venofer on 05/11/2021.     SUBJECTIVE:  Mr. Strange is a 82-year-old gentleman with chronic normocytic anemia due to chronic disease and iron deficiency.  He is on oral ferrous sulfate once a day. He is tolerating well.     His hemoglobin has improved to 12.0.  Iron and ferritin are normal now.    Patient overall is doing well.  He has mild generalized weakness.  He is able to do activities of daily living.  No headache.  Occasional dizziness.  No chest pain.  No shortness of breath at rest.  No nausea or vomiting.  Occasionally he gets hemorrhoidal bleed.  No worsening of it.  No urinary complaints.  At times he is little forgetful.  All other review of system is negative.     PHYSICAL EXAMINATION:    He is alert, oriented x 3.   Rest of the systems not examined as this is a video visit.     LABORATORY:  CBC, iron and ferritin reviewed.     ASSESSMENT:    1.  An 82-year-old gentleman  with normocytic anemia secondary to iron deficiency and anemia of chronic disease. Anemia is improving.  Given his age, he may also have primary bone marrow pathology like MDS.  2.  Iron deficiency from hemorrhoidal bleed.   3.  Generalized weakness.  4.  Hemorrhoidal bleed.     PLAN:  1.  Labs were reviewed with the patient.  Explained to him that hemoglobin is mildly better at 12.  His iron, iron saturation index and ferritin are all normal.    Patient has been anemic for 8 years.  Explained to him that his hemoglobin will never go back to normal as he has anemia from chronic disease also.  His hemoglobin likely to his stay between 11-12.  Explained to the patient that he should do well with hemoglobin around 11.    2.  He is on oral ferrous sulfate.  He is tolerating it well. He will continue on it.    3.  He wants to have his hemoglobin monitored frequently.  I told him to have CBC checked every 1 to 2 months.  We will recheck iron and ferritin in 6 months.    4.  He had few questions all answered.  I will see him in 6 months with labs.  Advised him to call us with any questions or concerns.     Total video visit time of 20 minutes. Time is spent in today's visit, review of chart/investigations today and documentation today.      Again, thank you for allowing me to participate in the care of your patient.        Sincerely,        Chandrakant Panchal MD

## 2023-09-11 NOTE — LETTER
9/11/2023         RE: Timmy Strange  [address] [facility] [facility] [address]        Dear Colleague,    Thank you for referring your patient, Timmy Strange, to the [facility]. Please see a copy of my visit note below.    Virtual Visit Details    Type of service:  Video Visit     Originating Location (pt. Location): Home    Distant Location (provider location):  On-site  Platform used for Video Visit: Alitalia    HEMATOLOGY HISTORY: Mr. Strange is a gentleman with chronic normocytic anemia due to anemia of chronic disease and iron deficiency.    1.  On 11/07/2017, hemoglobin of 13.3.  -On 10/30/2018, hemoglobin of 12.5.  -On 02/22/2021, WBC of 4.3, hemoglobin of 10.4, platelet of 226 and MCV of 90.  2.  On 02/01/2021, colonoscopy revealed colon polyps (tubular adenoma), hemorrhoids and diverticulosis.   -On 03/16/2021, EGD was essentially normal.    3. On 05/11/2021:  -Iron of 13, saturation of 6% and ferritin of 69.  -Normal folate.  -Normal vitamin B12.  -Normal TSH.  6. Patient received 1 dose of IV Venofer on 05/11/2021.     SUBJECTIVE:  Mr. Strange is a 82-year-old gentleman with chronic normocytic anemia due to chronic disease and iron deficiency.  He is on oral ferrous sulfate once a day. He is tolerating well.     His hemoglobin has improved to 12.0.  Iron and ferritin are normal now.    Patient overall is doing well.  He has mild generalized weakness.  He is able to do activities of daily living.  No headache.  Occasional dizziness.  No chest pain.  No shortness of breath at rest.  No nausea or vomiting.  Occasionally he gets hemorrhoidal bleed.  No worsening of it.  No urinary complaints.  At times he is little forgetful.  All other review of system is negative.     PHYSICAL EXAMINATION:    He is alert, oriented x 3.   Rest of the systems not examined as this is a video visit.     LABORATORY:  CBC, iron and ferritin reviewed.     ASSESSMENT:    1.  An 82-year-old gentleman  with normocytic anemia secondary to iron deficiency and anemia of chronic disease. Anemia is improving.  Given his age, he may also have primary bone marrow pathology like MDS.  2.  Iron deficiency from hemorrhoidal bleed.   3.  Generalized weakness.  4.  Hemorrhoidal bleed.     PLAN:  1.  Labs were reviewed with the patient.  Explained to him that hemoglobin is mildly better at 12.  His iron, iron saturation index and ferritin are all normal.    Patient has been anemic for 8 years.  Explained to him that his hemoglobin will never go back to normal as he has anemia from chronic disease also.  His hemoglobin likely to his stay between 11-12.  Explained to the patient that he should do well with hemoglobin around 11.    2.  He is on oral ferrous sulfate.  He is tolerating it well. He will continue on it.    3.  He wants to have his hemoglobin monitored frequently.  I told him to have CBC checked every 1 to 2 months.  We will recheck iron and ferritin in 6 months.    4.  He had few questions all answered.  I will see him in 6 months with labs.  Advised him to call us with any questions or concerns.     Total video visit time of 20 minutes. Time is spent in today's visit, review of chart/investigations today and documentation today.      Again, thank you for allowing me to participate in the care of your patient.        Sincerely,        Chandrakant Panchal MD

## 2023-09-11 NOTE — NURSING NOTE
Is the patient currently in the state of [place]? YES    Visit mode:VIDEO    If the visit is dropped, the patient can be reconnected by: VIDEO VISIT: Send to e-mail at: [email or web address]    Will anyone else be joining the visit? NO    How would you like to obtain your AVS? MyChart    Are changes needed to the allergy or medication list? Pt stated no changes to allergies and Pt stated no med changes    Reason for visit: PAT Elliott LPN

## 2023-09-22 DIAGNOSIS — E78.5 HYPERLIPIDEMIA LDL GOAL <70: ICD-10-CM

## 2023-09-22 DIAGNOSIS — I10 ESSENTIAL HYPERTENSION: ICD-10-CM

## 2023-09-22 RX ORDER — LOVASTATIN 40 MG
TABLET ORAL
Qty: 90 TABLET | Refills: 1 | Status: SHIPPED | OUTPATIENT
Start: 2023-09-22 | End: 2024-02-14

## 2023-09-23 RX ORDER — CARVEDILOL 3.12 MG/1
TABLET ORAL
Qty: 200 TABLET | Refills: 1 | Status: SHIPPED | OUTPATIENT
Start: 2023-09-23 | End: 2024-05-06

## 2023-09-23 NOTE — TELEPHONE ENCOUNTER
Refill done. Pt due for Virtual follow up in 12/2023.   Michaela Sharma MD on 9/23/2023 at 4:05 PM

## 2023-09-28 NOTE — TELEPHONE ENCOUNTER
Pt has read [facility] message- no appointment has been made.     Maren Lai RN on 9/28/2023 at 7:15 AM

## 2023-10-03 ENCOUNTER — PATIENT OUTREACH (OUTPATIENT)
Dept: CARE COORDINATION | Facility: CLINIC | Age: 83
End: 2023-10-03
Payer: COMMERCIAL

## 2023-10-03 ASSESSMENT — ACTIVITIES OF DAILY LIVING (ADL): DEPENDENT_IADLS:: MEDICATION MANAGEMENT;MONEY MANAGEMENT;TRANSPORTATION

## 2023-10-03 NOTE — LETTER
[facility] CARE COORDINATION  [address] [place] [address] [address]  [place] [address]    October 3, 2023        Timmy Strange  [address]  [facility] [address]          Dear Timmy,     Attached is an updated Patient Centered Plan of Care for your continued enrollment in Care Coordination. Please let us know if you have additional questions, concerns, or goals that we can assist with.    Sincerely,    Alistair Osuna, [facility]  Clinic Care Coordinator  [facility]  [phone]  [email or web address]  Patient Centered Plan of Care  About Me:        Patient Name:  Timmy MAICOL Strange    YOB: 1940  Age:         82 year old   [place] MRN:    [id number] Telephone Information:  Home Phone [phone]   Mobile [phone]       Address:  [address] [facility] [address] Email address:  [email or web address]      Emergency Contact(s)    Name Relationship Lgl Grd Work Phone Home Phone Mobile Phone   1. CAMELIA. GWENDOLYN Spouse No   [phone]   2. THADDEUS MANNING Stepalannah No  [phone]    3. BETTY DOLL Stepchild No  [phone]            Primary language:  English     needed? No   [employer]:  [phone] op. 1  Other communication barriers:Cognitive impairment    Preferred Method of Communication:  Mail  Current living arrangement: I live in a private home with spouse    Mobility Status/ Medical Equipment: No data recorded      Health Maintenance  Health Maintenance Reviewed: Due/Overdue   Health Maintenance Due   Topic Date Due    DEPRESSION ACTION PLAN  Never done    ZOSTER IMMUNIZATION (1 of 2) Never done    DIABETIC FOOT EXAM  12/23/2022    EYE EXAM  05/12/2023    INFLUENZA VACCINE (1) 09/01/2023    COVID-19 Vaccine (6 - 2023-24 season) 09/01/2023           My Access Plan  Medical Emergency 911   Primary Clinic Line [facility]  [place] - [phone]   24 Hour Appointment Line [phone] or  [phone]) (toll-free)   24 Hour Nurse Line [phone] (toll-free)   Preferred Urgent Care No data recorded   Preferred Hospital [facility]  [phone]     Preferred Pharmacy [facility] - [place], [place] - [facility] [facility]     Behavioral Health Crisis Line The National Suicide Prevention Lifeline at 1-767.457.8907 or Text/Call 198             My Care Team Members  Patient Care Team         Relationship Specialty Notifications Start End    Michaela Sharma MD PCP - General Internal Medicine  8/11/21     Phone: [phone] Fax: [phone]         [address] [place] [address]    Chandrakant Panchal MD Assigned Cancer Care Provider   6/20/21     Phone: [phone] Fax: [phone]         [facility] [address] [address] [place] [address]    Michaela Sharma MD Assigned PCP   8/29/21     Phone: [phone] Fax: [phone]          [address] [place] [address]    Pino Meier MD MD Urology  1/27/22     Phone: [phone] Fax: [phone]         [address] [facility] [address]    Pino Meier MD Assigned Surgical Provider   6/18/22     Phone: [phone] Fax: [phone] [address] [place] [address]    Alistair Osuna LSW Lead Care Coordinator Primary Care - CC Admissions 3/29/23     Phone: [phone]         Michel Doran MD Assigned Heart and Vascular Provider   6/3/23     Phone: [phone] Fax: [phone] [address] [place] [address]              My Care Plans  Self Management and Treatment Plan  Care Plan  Care Plan: Social Support       Problem: Inadequate social support       Goal: Improve socialization/Supportive services       Start Date: 3/30/2023 Expected End Date: 12/8/2023    This Visit's Progress: 40% Recent Progress: 40%    Note:     Goal  Statement: I will continue to take steps over the next 6 months to identify alternate more supportive living which will allow me to maintain my current level of independence.  Barriers: Changes in cognition  Strengths: Strong support from family  Patient expressed understanding of goal: yes    Action steps to achieve this goal:  I will continue to participate in Adult Day Care as desired for socialization.   I will consider options for respite care or alternate living settings.   I will continue to lean on informal supports of my: friends and family  My family will review resources and supports sent to me via E-mail- [facility] and In home Respite options.   My family will contact my care team with questions, concerns, support needs.   My family will use the clinic as a resource and I understand I can contact my clinic with 24/7 after hours services available.   My family will continue to outreach to care coordination as needed for additional resources or supports.                                 Advance Care Plans/Directives Type:   Advanced Directive - On File      My Medical and Care Information  Problem List   Patient Active Problem List   Diagnosis    Essential hypertension    Hypothyroidism    Type II diabetes mellitus with peripheral circulatory disorder (H)    BPH (benign prostatic hyperplasia)    ED (erectile dysfunction)    Mixed hyperlipidemia    Special screening for malignant neoplasm of prostate    Localized edema    Seasonal allergic rhinitis    Screening for prostate cancer    Residual hemorrhoidal skin tags    Excess skin of eyelid, unspecified laterality    Glaucoma of both eyes, unspecified glaucoma type    Flatulence, eructation, and gas pain    Weakness of voice    Anemia, unspecified type    Gastroesophageal reflux disease without esophagitis    Dependent edema    Mild cognitive impairment    Pain due to onychomycosis of nail    Oropharyngeal dysphagia    Rectal hemorrhage    Change  in bowel habits    Internal hemorrhoids with Hx of banding    Anemia, iron deficiency    Malabsorption of iron    Mild episode of recurrent major depressive disorder (H24)    Left atrial enlargement      Current Medications and Allergies:     Allergies   Allergen Reactions    Shellfish Allergy     Shellfish-Derived Products        Current Outpatient Medications   Medication    acetaminophen (TYLENOL) 500 MG tablet    albuterol (PROAIR HFA/PROVENTIL HFA/VENTOLIN HFA) 108 (90 Base) MCG/ACT inhaler    carvedilol (COREG) 3.125 MG tablet    escitalopram (LEXAPRO) 10 MG tablet    Ferrous Sulfate 324 (65 Fe) MG TBEC    Fexofenadine HCl (ALLEGRA PO)    finasteride (PROSCAR) 5 MG tablet    insulin glargine (LANTUS SOLOSTAR) 100 UNIT/ML pen    latanoprost (XALATAN) 0.005 % ophthalmic solution    levothyroxine (SYNTHROID/LEVOTHROID) 88 MCG tablet    losartan (COZAAR) 100 MG tablet    lovastatin (MEVACOR) 40 MG tablet    metFORMIN (GLUCOPHAGE XR) 500 MG 24 hr tablet    Multiple Vitamins-Minerals (CENTRUM SILVER) per tablet    ONETOUCH ULTRA test strip    tamsulosin (FLOMAX) 0.4 MG capsule     No current facility-administered medications for this visit.         Care Coordination Start Date: 3/29/2023   Frequency of Care Coordination: monthly     Form Last Updated: 10/03/2023

## 2023-10-03 NOTE — PROGRESS NOTES
Clinic Care Coordination Contact  Follow Up Progress Note     Enrollment Date: 3/29/2023     Assessment: [facility] outreached to Ladan, pt spouse. Ladan states pt is doing fine at this time. They have decided to put on hold any planning with [facility] as they are first trying to make some arrangements and plans with their CPA.     Ladan did add that she and pt have a meeting with [facility] [employer] tomorrow to review options for social engagement and  services. Ladan feels this would be helpful for pt, but they have a minimum of 6 hrs/week. Ladan feels this volume of support may be better useful during the winter months when pt is not able to drive as often and socialization is naturally decreased.     Ladan welcomes a call again in another month to review outcome of [facility] meeting and decisions.     Care Gaps:    Health Maintenance Due   Topic Date Due    DEPRESSION ACTION PLAN  Never done    ZOSTER IMMUNIZATION (1 of 2) Never done    DIABETIC FOOT EXAM  12/23/2022    EYE EXAM  05/12/2023    INFLUENZA VACCINE (1) 09/01/2023    COVID-19 Vaccine (6 - 2023-24 season) 09/01/2023         Care Plan: Social Support       Problem: Inadequate social support       Goal: Improve socialization/Supportive services       Start Date: 3/30/2023 Expected End Date: 12/8/2023    This Visit's Progress: 40% Recent Progress: 40%    Note:     Goal Statement: I will continue to take steps over the next 6 months to identify alternate more supportive living which will allow me to maintain my current level of independence.  Barriers: Changes in cognition  Strengths: Strong support from family  Patient expressed understanding of goal: yes    Action steps to achieve this goal:  I will continue to participate in Adult Day Care as desired for socialization.   I will consider options for respite care or alternate living settings.   I will continue to lean on informal supports of my: friends and family  My family will  review resources and supports sent to me via E-mail- [facility] and In home Respite options.   My family will contact my care team with questions, concerns, support needs.   My family will use the clinic as a resource and I understand I can contact my clinic with 24/7 after hours services available.   My family will continue to outreach to care coordination as needed for additional resources or supports.                                  Patient is actively working to accomplish goal and patient's goal remains appropriate and relevant at this time.     Intervention/Education provided during outreach: Care Coordination availability.      Outreach Frequency: monthly    Complex Care Plan:  Patient is due for Complex Care Plan to be updated.  Care Plan updated and mailed to patient: Yes, via [employer]    Annual Assessment due before next Outreach: No  Scheduled: Not Applicable    Plan: Pt/spouse to continue to navigate financials to determine best long term plan for pt/spouse for supportive safe living.  Care Coordinator will follow up in 4 weeks.     Alistair Osuna [facility]  Clinic Care Coordinator  [facility]  [phone]  [email or web address]

## 2023-10-09 ENCOUNTER — LAB (OUTPATIENT)
Dept: LAB | Facility: CLINIC | Age: 83
End: 2023-10-09
Payer: COMMERCIAL

## 2023-10-09 DIAGNOSIS — D64.9 ANEMIA, UNSPECIFIED TYPE: ICD-10-CM

## 2023-10-09 LAB
ERYTHROCYTE [DISTWIDTH] IN BLOOD BY AUTOMATED COUNT: 13.4 % (ref 10–15)
HCT VFR BLD AUTO: 37.3 % (ref 40–53)
HGB BLD-MCNC: 12.1 G/DL (ref 13.3–17.7)
MCH RBC QN AUTO: 30.5 PG (ref 26.5–33)
MCHC RBC AUTO-ENTMCNC: 32.4 G/DL (ref 31.5–36.5)
MCV RBC AUTO: 94 FL (ref 78–100)
PLATELET # BLD AUTO: 183 10E3/UL (ref 150–450)
RBC # BLD AUTO: 3.97 10E6/UL (ref 4.4–5.9)
WBC # BLD AUTO: 4.8 10E3/UL (ref 4–11)

## 2023-10-09 PROCEDURE — 36415 COLL VENOUS BLD VENIPUNCTURE: CPT

## 2023-10-09 PROCEDURE — 85027 COMPLETE CBC AUTOMATED: CPT

## 2023-10-10 NOTE — RESULT ENCOUNTER NOTE
Dear Mr. Strange,    Blood test is better. Hemoglobin is 12.1.    Please, call me with any questions.    Chandrakant Panchal MD

## 2023-11-03 ENCOUNTER — PATIENT OUTREACH (OUTPATIENT)
Dept: CARE COORDINATION | Facility: CLINIC | Age: 83
End: 2023-11-03
Payer: COMMERCIAL

## 2023-11-03 ASSESSMENT — ACTIVITIES OF DAILY LIVING (ADL): DEPENDENT_IADLS:: MEDICATION MANAGEMENT;MONEY MANAGEMENT;TRANSPORTATION

## 2023-11-03 NOTE — PROGRESS NOTES
Clinic Care Coordination Contact  [facility]/Voicemail    Clinical Data: Care Coordinator Outreach    Outreach Documentation Number of Outreach Attempt   11/3/2023   2:07 PM 1       Left message on  Ladan's  voicemail with call back information and requested return call.    Plan: Care Coordinator will try to reach patient again in 10 business days.    Alistair Osuna [facility]  Clinic Care Coordinator  [facility]  [phone]  [email or web address]

## 2023-11-05 ENCOUNTER — HEALTH MAINTENANCE LETTER (OUTPATIENT)
Age: 83
End: 2023-11-05

## 2023-11-16 ENCOUNTER — PATIENT OUTREACH (OUTPATIENT)
Dept: CARE COORDINATION | Facility: CLINIC | Age: 83
End: 2023-11-16
Payer: COMMERCIAL

## 2023-11-16 NOTE — PROGRESS NOTES
"Clinic Care Coordination Contact  Follow Up Progress Note      Assessment: Follow up call with patient's wife, Ladan.  Pt. was outside. She shared information and update on his memory changes.    He continues to be independent with self cares and things around the house, but she does see a decline.  He does have a  from [facility], spend 3 hours with him .  This will have to be increased to 6 hours over 2 days a week. Requirement by agency.  The  does take him out to run errands.  They do have ,what wife refers to as,   \"Open communication with Timmy \" about increasing needs and eventual need for Assisted Living. They have met with an Elder Care  and  .  Ladan did have a conversation with William Mejia from [facility] .  She was not sure he was a good fit,  But will get back to him .  Wife works mostly from the home . She feels he is safe when she goes into the office. She continues to work full time. Her children are supportive.     Care Gaps:    Health Maintenance Due   Topic Date Due    DEPRESSION ACTION PLAN  Never done    ZOSTER IMMUNIZATION (1 of 2) Never done    RSV VACCINE (Pregnancy & 60+) (1 - 1-dose 60+ series) Never done    DIABETIC FOOT EXAM  12/23/2022    A1C  11/04/2023    MICROALBUMIN  11/08/2023    PHQ-9  12/14/2023           Care Plans  Care Plan: Social Support       Problem: Inadequate social support       Goal: Improve socialization/Supportive services       Start Date: 3/30/2023 Expected End Date: 1/31/2024    Recent Progress: 40%    Note:     Goal Statement: I will continue to take steps over the next 6 months to identify alternate more supportive living which will allow me to maintain my current level of independence.  Barriers: Changes in cognition  Strengths: Strong support from family  Patient expressed understanding of goal: yes    Action steps to achieve this goal:  I will continue to participate in Adult Day Care as desired for " socialization. 11/23  Has a visitor from [facility]  [employer].  I will consider options for respite care or alternate living settings.   I will continue to lean on informal supports of my: friends and family  My family will review resources and supports sent to me via E-mail- [facility] and In home Respite options. 11/23 Met with [facility]   My family will contact my care team with questions, concerns, support needs.   My family will use the clinic as a resource and I understand I can contact my clinic with 24/7 after hours services available.   My family will continue to outreach to care coordination as needed for additional resources or supports.                                Intervention/Education provided during outreach: Introduction, Review of goals and community resources.      Outreach Frequency: monthly, more frequently as needed      Plan: Patient and wife with continue to work with [facility], and explore AL options.  Will continue to work with [facility]ARIT[facility] DONOH[facility] .  Care Coordinator will follow up in 1 month.       GUY Schultz / GUY Alex  [facility]   Care Coordination  [facility]  11/16/2023 10:01 AM

## 2023-12-01 DIAGNOSIS — E78.5 HYPERLIPIDEMIA LDL GOAL <70: ICD-10-CM

## 2023-12-01 RX ORDER — LOVASTATIN 40 MG
TABLET ORAL
Qty: 100 TABLET | Refills: 2 | OUTPATIENT
Start: 2023-12-01

## 2023-12-04 ENCOUNTER — LAB (OUTPATIENT)
Dept: LAB | Facility: CLINIC | Age: 83
End: 2023-12-04
Payer: COMMERCIAL

## 2023-12-04 DIAGNOSIS — D64.9 ANEMIA, UNSPECIFIED TYPE: ICD-10-CM

## 2023-12-04 LAB
ERYTHROCYTE [DISTWIDTH] IN BLOOD BY AUTOMATED COUNT: 13.5 % (ref 10–15)
HCT VFR BLD AUTO: 36.7 % (ref 40–53)
HGB BLD-MCNC: 11.9 G/DL (ref 13.3–17.7)
MCH RBC QN AUTO: 30.4 PG (ref 26.5–33)
MCHC RBC AUTO-ENTMCNC: 32.4 G/DL (ref 31.5–36.5)
MCV RBC AUTO: 94 FL (ref 78–100)
PLATELET # BLD AUTO: 199 10E3/UL (ref 150–450)
RBC # BLD AUTO: 3.92 10E6/UL (ref 4.4–5.9)
WBC # BLD AUTO: 5.6 10E3/UL (ref 4–11)

## 2023-12-04 PROCEDURE — 85027 COMPLETE CBC AUTOMATED: CPT

## 2023-12-04 PROCEDURE — 36415 COLL VENOUS BLD VENIPUNCTURE: CPT

## 2023-12-11 ENCOUNTER — TRANSFERRED RECORDS (OUTPATIENT)
Dept: FAMILY MEDICINE | Facility: CLINIC | Age: 83
End: 2023-12-11
Payer: COMMERCIAL

## 2023-12-11 LAB
ALBUMIN (URINE) MG/SPEC: 4.9 UG/ML
ALBUMIN/CREATININE RATIO: 6 MG/G CREAT (ref 0–29)
CHOLESTEROL (EXTERNAL): 140 MG/DL (ref 50–199)
CREATININE (EXTERNAL): 0.99 MG/DL (ref 0.66–1.25)
CREATININE (URINE): 78.6 MG/DL
GFR ESTIMATED (EXTERNAL): >60 ML/MIN/1.7
GLUCOSE (EXTERNAL): 158 MG/DL (ref 70–99)
HBA1C MFR BLD: 6.5 %
HDLC SERPL-MCNC: 45 MG/DL
LDL CHOLESTEROL CALCULATED (EXTERNAL): 65 MG/DL
POTASSIUM (EXTERNAL): 4.9 MMOL/L (ref 3.5–5.1)
TRIGLYCERIDES (EXTERNAL): 149 MG/DL (ref 10–150)
TSH SERPL-ACNC: 0.57 UIU/ML (ref 0.47–4.68)

## 2023-12-13 ENCOUNTER — TRANSFERRED RECORDS (OUTPATIENT)
Dept: FAMILY MEDICINE | Facility: CLINIC | Age: 83
End: 2023-12-13
Payer: COMMERCIAL

## 2023-12-15 ENCOUNTER — PATIENT OUTREACH (OUTPATIENT)
Dept: CARE COORDINATION | Facility: CLINIC | Age: 83
End: 2023-12-15
Payer: COMMERCIAL

## 2023-12-15 ASSESSMENT — ACTIVITIES OF DAILY LIVING (ADL): DEPENDENT_IADLS:: MEDICATION MANAGEMENT;MONEY MANAGEMENT;TRANSPORTATION

## 2023-12-15 NOTE — PROGRESS NOTES
Clinic Care Coordination Contact  [facility]/Voicemail    Clinical Data: Care Coordinator Outreach    Outreach Documentation Number of Outreach Attempt   11/3/2023   2:07 PM 1   12/15/2023   2:23 PM 1       Left message on patient's voicemail with call back information and requested return call.    Plan:  Care Coordinator will try to reach patient again in 10 business days.    Alistair Osuna [facility]  Clinic Care Coordinator  [facility]  [phone]  [email or web address]

## 2023-12-27 ENCOUNTER — VIRTUAL VISIT (OUTPATIENT)
Dept: FAMILY MEDICINE | Facility: CLINIC | Age: 83
End: 2023-12-27
Attending: INTERNAL MEDICINE
Payer: COMMERCIAL

## 2023-12-27 DIAGNOSIS — E11.51 TYPE II DIABETES MELLITUS WITH PERIPHERAL CIRCULATORY DISORDER (H): ICD-10-CM

## 2023-12-27 DIAGNOSIS — D64.9 ANEMIA, UNSPECIFIED TYPE: ICD-10-CM

## 2023-12-27 DIAGNOSIS — I51.7 LEFT ATRIAL ENLARGEMENT: ICD-10-CM

## 2023-12-27 DIAGNOSIS — F33.0 MILD EPISODE OF RECURRENT MAJOR DEPRESSIVE DISORDER (H): ICD-10-CM

## 2023-12-27 DIAGNOSIS — I10 ESSENTIAL HYPERTENSION: Primary | ICD-10-CM

## 2023-12-27 DIAGNOSIS — R41.3 MEMORY CHANGES: ICD-10-CM

## 2023-12-27 PROCEDURE — 99214 OFFICE O/P EST MOD 30 MIN: CPT | Mod: VID | Performed by: INTERNAL MEDICINE

## 2023-12-27 ASSESSMENT — PATIENT HEALTH QUESTIONNAIRE - PHQ9
SUM OF ALL RESPONSES TO PHQ QUESTIONS 1-9: 2
SUM OF ALL RESPONSES TO PHQ QUESTIONS 1-9: 2
10. IF YOU CHECKED OFF ANY PROBLEMS, HOW DIFFICULT HAVE THESE PROBLEMS MADE IT FOR YOU TO DO YOUR WORK, TAKE CARE OF THINGS AT HOME, OR GET ALONG WITH OTHER PEOPLE: SOMEWHAT DIFFICULT

## 2023-12-27 NOTE — PATIENT INSTRUCTIONS
As discussed given your current 6 CIT  scoring which means cognitive impairment test we have done in the office today is completely normal, I do not think you need any medication at this time for memory deficits.  As already emphasized I would recommend you to follow neurology for any dementia workup which she already recommended to you in the past.      Please follow-up with cardiology, endocrinology and hematology as scheduled for your current medical conditions.

## 2023-12-27 NOTE — PROGRESS NOTES
Timmy is a 83 year old who is being evaluated via a billable video visit.      How would you like to obtain your AVS? MyChart  If the video visit is dropped, the invitation should be resent by: Text to cell phone: [phone]  Will anyone else be joining your video visit? No      Assessment and Plan  1. Essential hypertension  2. Left atrial enlargement  Chronic problem, borderline elevated blood pressures as per patient HPI in spite of current losartan and carvedilol.  Discussed on better control of blood pressure along with DASH diet if neede in addition to current medications which patient understood and agreed to the plan.  Discussed on the last echocardiogram done this year in April 2023 which was showing severe left atrial enlargement as per the report but patient completely asymptomatic, also followed cardiology with no further workup needed as per the recommendations at this time since patient is symptomatic.  Patient understood and agreed with the plan, answered all the questions.    3. Type II diabetes mellitus with peripheral circulatory disorder (H)  Well-controlled, recent A1c done on December 2023 by Dr. Arreola endocrinology reviewed.  Continue current management as managed by endocrinology.    4. Memory changes  5. Mild episode of recurrent major depressive disorder (H24)  Ongoing problem which patient endorses that he is having memory issues by remembering his brother's name and taking some time to remember them, as mentioned in AVS below the cognitive impairment as scoring is completely normal.  Discussed on not needing any medications at this time, okay to take B12 supplements with all the lab work done for the same problem in the past being normal.  Patient understood and agreed with the plan, okay to continue current Lexapro as recommended by neurology.  Also patient has been recommended by neurology for lumbar puncture for Alzhiemers workup which patient did not do yet, emphasized to follow-up  with neurology.  Patient understood and agreed with the plan, answered all the questions    6. Anemia, unspecified type  Chronic stable normocytic anemia as managed by hematology on IV Venofer infusions.  No acute concerns at this time.           Please note that this note consists of symbols derived from keyboarding, dictation and/or voice recognition software. As a result, there may be errors in the script that have gone undetected. Please consider this when interpreting information found in this chart.    Patient Instructions   As discussed given your current 6 CIT  scoring which means cognitive impairment test we have done in the office today is completely normal, I do not think you need any medication at this time for memory deficits.  As already emphasized I would recommend you to follow neurology for any dementia workup which she already recommended to you in the past.      Please follow-up with cardiology, endocrinology and hematology as scheduled for your current medical conditions.  Return in about 6 months (around 6/27/2024), or if symptoms worsen or fail to improve, for Annual Wellness Exam.    Michaela Sharma MD  [facility]        Jefe   Timmy is a 83 year old, presenting for the following health issues:  RECHECK (Blood pressure and vascular disease )        12/27/2023     4:25 PM   Additional Questions   Roomed by Loli       History of Present Illness       Hypertension: He presents for follow up of hypertension.  He does not check blood pressure  regularly outside of the clinic. Outside blood pressures have been over 140/90. He does not follow a low salt diet.     Vascular Disease:  He presents for follow up of vascular disease.     He never takes nitroglycerin. He is not taking daily aspirin.    He eats 2-3 servings of fruits and vegetables daily.He consumes 2 sweetened beverage(s) daily.He exercises with enough effort to increase his heart rate 10 to 19 minutes  per day.  He exercises with enough effort to increase his heart rate 3 or less days per week.   He is taking medications regularly.       Six Item Cognitive Impairment Test   (6CIT):    What year is it?                               Correct - 0 points  What month is it?                               Correct - 0 points    Give the patient an address to remember with five components:   Samir Valdes ( first and last name - 2 components)   [address]  (number and name of street - 2 components)   [place] ( city - 1 component)    About what time is it (within the hour)? Correct - 0 points  Count backwards from 20 to 1:   Correct - 0 points  Say the months of the year in reverse: Correct - 0 points  Repeat the address phrase:   Correct - 0 points    Total 6CIT Score:      0/28    Interpretation: The 6CIT uses an inverse score and questions are weighted to produce a total out of 28. Scores of 0-7 are considered normal and 8 or more significant.    Advantages The test has high sensitivity without compromising specificity even in mild dementia. It is easy to translate linguistically and culturally.  Disadvantages The main disadvantage is in the scoring and weighting of the test, which is initially confusing, however computer models have simplified this greatly.    Probability Statistics: At the 7/8 cut off: Overall figures sensitivity 90% specificity 100%, in mild dementia sensitivity = 78% , specificity = 100%    Copyright 2000 [facility]. Courtesy of Dr. Franklin Phillip       Last seen patient in June 2023 for annual wellness visit at that time, patient is here for 6 months follow-up.  Patient does follow endocrinology, but due for diabetic maintenance including A1c check which she prefers to get this with PCP.  Will do as requested.  Last A1c in May 2023 showing 5.7%.  Patient does follow hematology for chronic normocytic anemia and iron deficiency with IV Venofer management          Allergies   Allergen Reactions    Shellfish Allergy     Shellfish-Derived Products         Past Medical History:   Diagnosis Date    Arthritis 1970    Dx'd with RA when in the     BPH (benign prostatic hyperplasia) 2013    Cancer (H)     basal cell cancer behind ear    Diabetes mellitus     ED (erectile dysfunction) 2015    HTN (hypertension)     Hyperlipidemia LDL goal <100     Hypothyroidism     Left atrial enlargement 2023    Mumps     Obesity        Past Surgical History:   Procedure Laterality Date    BIOPSY      COLONOSCOPY N/A 2014    Procedure: COMBINED COLONOSCOPY, SINGLE OR MULTIPLE BIOPSY/POLYPECTOMY BY BIOPSY;  Surgeon: Rolanda Bey MD;  Location:  GI    COLONOSCOPY N/A 2020    Procedure: COLONOSCOPY, WITH POLYPECTOMY AND BIOPSY;  Surgeon: Christina Vieira MD;  Location:  GI    COLONOSCOPY N/A 2021    Procedure: Colonoscopy, With Polypectomy And Biopsy;  Surgeon: Christina Vieira MD;  Location: [facility]    ESOPHAGOSCOPY, GASTROSCOPY, DUODENOSCOPY (EGD), COMBINED N/A 3/16/2021    Procedure: ESOPHAGOGASTRODUODENOSCOPY, WITH BIOPSY;  Surgeon: Jose Schrader MD;  Location:  GI    EYE SURGERY      TESTICLE SURGERY      TONSILLECTOMY, ADENOIDECTOMY, COMBINED      VASECTOMY         Family History   Problem Relation Age of Onset    Diabetes Maternal Grandmother     Diabetes Maternal Grandfather     Arthritis Maternal Grandfather     Arthritis Father     Thyroid Disease Father     Glaucoma Mother     Alcohol/Drug Mother 49        cirrhosis    Diabetes Daughter 44        type 2    Obesity Daughter     Glaucoma Maternal Aunt        Social History     Tobacco Use    Smoking status: Former     Packs/day: 0.00     Years: 0.00     Additional pack years: 0.00     Total pack years: 0.00     Types: Cigarettes, Pipe     Start date: 1960     Quit date: 11/15/2011     Years since quittin.1    Smokeless tobacco: Never    Tobacco comments:     Smoked pipes  10 minutes a day started 22 yrs old , quit 10 yrs back.   Substance Use Topics    Alcohol use: No        Current Outpatient Medications   Medication    acetaminophen (TYLENOL) 500 MG tablet    albuterol (PROAIR HFA/PROVENTIL HFA/VENTOLIN HFA) 108 (90 Base) MCG/ACT inhaler    carvedilol (COREG) 3.125 MG tablet    escitalopram (LEXAPRO) 10 MG tablet    Ferrous Sulfate 324 (65 Fe) MG TBEC    Fexofenadine HCl (ALLEGRA PO)    finasteride (PROSCAR) 5 MG tablet    insulin glargine (LANTUS SOLOSTAR) 100 UNIT/ML pen    latanoprost (XALATAN) 0.005 % ophthalmic solution    levothyroxine (SYNTHROID/LEVOTHROID) 88 MCG tablet    losartan (COZAAR) 100 MG tablet    lovastatin (MEVACOR) 40 MG tablet    metFORMIN (GLUCOPHAGE XR) 500 MG 24 hr tablet    Multiple Vitamins-Minerals (CENTRUM SILVER) per tablet    ONETOUCH ULTRA test strip    tamsulosin (FLOMAX) 0.4 MG capsule     No current facility-administered medications for this visit.        Review of Systems   Constitutional, HEENT, cardiovascular, pulmonary, GI, , musculoskeletal, neuro, skin, endocrine and psych systems are negative, except as otherwise noted.      Objective           Vitals:  No vitals were obtained today due to virtual visit.    Physical Exam   GENERAL: Healthy, alert and no distress  EYES: Eyes grossly normal to inspection.  No discharge or erythema, or obvious scleral/conjunctival abnormalities.  RESP: No audible wheeze, cough, or visible cyanosis.  No visible retractions or increased work of breathing.    SKIN: Visible skin clear. No significant rash, abnormal pigmentation or lesions.  NEURO: Cranial nerves grossly intact.  Mentation and speech appropriate for age.  PSYCH: Mentation appears normal, affect normal/bright, judgement and insight intact, normal speech and appearance well-groomed.      Video-Visit Details    Type of service:  Video Visit   Originating Location (pt. Location): Home    Distant Location (provider location):  On-site  Platform used  for Video Visit: Darr[employer]

## 2023-12-29 ENCOUNTER — PATIENT OUTREACH (OUTPATIENT)
Dept: CARE COORDINATION | Facility: CLINIC | Age: 83
End: 2023-12-29
Payer: COMMERCIAL

## 2023-12-29 ASSESSMENT — ACTIVITIES OF DAILY LIVING (ADL): DEPENDENT_IADLS:: MEDICATION MANAGEMENT;MONEY MANAGEMENT;TRANSPORTATION

## 2023-12-29 NOTE — PROGRESS NOTES
Clinic Care Coordination Contact  Follow Up Progress Note      Assessment: [facility] outreached to Ladan on this date to follow up on status. Ladan shares she did not continue with [facility] for support. [facility] and Ladan reviewed Senior Housing guide.     Pt has continued with [facility] 3hrs/2x a week. Pt also does activities through [facility].     Ladan has been working with  through [facility] as well. Denied any other needs at this time. Feels goals are complete. Agreed to maintenance.     Care Gaps:    Health Maintenance Due   Topic Date Due    DEPRESSION ACTION PLAN  Never done    ZOSTER IMMUNIZATION (1 of 2) Never done    RSV VACCINE (Pregnancy & 60+) (1 - 1-dose 60+ series) Never done    DIABETIC FOOT EXAM  12/23/2022         Care Plans  Care Plan: Social Support Completed 12/29/2023      Problem: Inadequate social support  Resolved 12/29/2023      Goal: Improve socialization/Supportive services  Completed 12/29/2023      Start Date: 3/30/2023 Expected End Date: 1/31/2024    This Visit's Progress: 100% Recent Progress: 40%    Note:     Goal Statement: I will continue to take steps over the next 6 months to identify alternate more supportive living which will allow me to maintain my current level of independence.  Barriers: Changes in cognition  Strengths: Strong support from family  Patient expressed understanding of goal: yes    Action steps to achieve this goal:  I will continue to participate in Adult Day Care as desired for socialization. 11/23  Has a visitor from [facility].  I will consider options for respite care or alternate living settings.   I will continue to lean on informal supports of my: friends and family  My family will review resources and supports sent to me via E-mail- [facility] and In home Respite options. 11/23 Met with [facility]   My family will contact my care team with questions, concerns, support needs.   My family  will use the clinic as a resource and I understand I can contact my clinic with 24/7 after hours services available.   My family will continue to outreach to care coordination as needed for additional resources or supports.                                Intervention/Education provided during outreach: Reviewed coverage for home care again as Ladan valentine Quested a refresher. Medicare episode Home care, private pay, long term care insurance, and Waiver.      Outreach Frequency: monthly, more frequently as needed      Plan:Care Coordinator will follow up in 2 months.     GUY Hawley  Clinic Care Coordinator  [facility]  [phone]  [email or web address]

## 2024-01-02 ENCOUNTER — LAB (OUTPATIENT)
Dept: LAB | Facility: CLINIC | Age: 84
End: 2024-01-02
Payer: COMMERCIAL

## 2024-01-02 DIAGNOSIS — D64.9 ANEMIA, UNSPECIFIED TYPE: ICD-10-CM

## 2024-01-02 LAB
ERYTHROCYTE [DISTWIDTH] IN BLOOD BY AUTOMATED COUNT: 13.3 % (ref 10–15)
HCT VFR BLD AUTO: 38.6 % (ref 40–53)
HGB BLD-MCNC: 12.4 G/DL (ref 13.3–17.7)
MCH RBC QN AUTO: 30 PG (ref 26.5–33)
MCHC RBC AUTO-ENTMCNC: 32.1 G/DL (ref 31.5–36.5)
MCV RBC AUTO: 94 FL (ref 78–100)
PLATELET # BLD AUTO: 197 10E3/UL (ref 150–450)
RBC # BLD AUTO: 4.13 10E6/UL (ref 4.4–5.9)
WBC # BLD AUTO: 6.3 10E3/UL (ref 4–11)

## 2024-01-02 PROCEDURE — 36415 COLL VENOUS BLD VENIPUNCTURE: CPT

## 2024-01-02 PROCEDURE — 85027 COMPLETE CBC AUTOMATED: CPT

## 2024-01-05 NOTE — RESULT ENCOUNTER NOTE
Dear Mr. Strange,    Blood test reveals hemoglobin to be stable at 12.4. Anemia is stable.     Please, call me with any questions.    Chandrakant Panchal MD

## 2024-01-10 DIAGNOSIS — F33.0 MILD EPISODE OF RECURRENT MAJOR DEPRESSIVE DISORDER (H): ICD-10-CM

## 2024-01-11 RX ORDER — ESCITALOPRAM OXALATE 10 MG/1
10 TABLET ORAL DAILY
Qty: 90 TABLET | Refills: 0 | Status: SHIPPED | OUTPATIENT
Start: 2024-01-11 | End: 2024-04-16

## 2024-01-11 NOTE — TELEPHONE ENCOUNTER
Prescription approved per [facility] Refill Protocol.  Roselia Miles, RN  [facility] Triage Nurse

## 2024-01-29 ENCOUNTER — LAB (OUTPATIENT)
Dept: LAB | Facility: CLINIC | Age: 84
End: 2024-01-29
Payer: COMMERCIAL

## 2024-01-29 DIAGNOSIS — D64.9 ANEMIA, UNSPECIFIED TYPE: ICD-10-CM

## 2024-01-29 LAB
ERYTHROCYTE [DISTWIDTH] IN BLOOD BY AUTOMATED COUNT: 13.5 % (ref 10–15)
HCT VFR BLD AUTO: 36.9 % (ref 40–53)
HGB BLD-MCNC: 12 G/DL (ref 13.3–17.7)
MCH RBC QN AUTO: 30.2 PG (ref 26.5–33)
MCHC RBC AUTO-ENTMCNC: 32.5 G/DL (ref 31.5–36.5)
MCV RBC AUTO: 93 FL (ref 78–100)
PLATELET # BLD AUTO: 203 10E3/UL (ref 150–450)
RBC # BLD AUTO: 3.97 10E6/UL (ref 4.4–5.9)
WBC # BLD AUTO: 6.4 10E3/UL (ref 4–11)

## 2024-01-29 PROCEDURE — 85027 COMPLETE CBC AUTOMATED: CPT

## 2024-01-29 PROCEDURE — 36415 COLL VENOUS BLD VENIPUNCTURE: CPT

## 2024-01-30 ENCOUNTER — TELEPHONE (OUTPATIENT)
Dept: UROLOGY | Facility: CLINIC | Age: 84
End: 2024-01-30
Payer: COMMERCIAL

## 2024-01-30 NOTE — TELEPHONE ENCOUNTER
Left message on VM , I don't know if we will need labs but if you need a PSA we can draw it day of.  Opal Bah, LILIANN

## 2024-01-30 NOTE — TELEPHONE ENCOUNTER
[facility] Call Center    Phone Message    May a detailed message be left on voicemail: yes     Reason for Call: Order(s): Other:   Reason for requested: Pt is scheduled for follow up on 8/14/24  Date needed: 8/24/24  Provider name: Renea Diaz is giving a heads up in case labs are needed for this appt.    Action Taken: Message routed to:  Other: [facility]    Travel Screening: Not Applicable

## 2024-01-30 NOTE — RESULT ENCOUNTER NOTE
Dear Mr. Strange,    Blood test is stable. Hemoglobin is 12.0.    Please, call me with any questions.    Chandrakant Panchal MD

## 2024-01-31 ENCOUNTER — TELEPHONE (OUTPATIENT)
Dept: FAMILY MEDICINE | Facility: CLINIC | Age: 84
End: 2024-01-31
Payer: COMMERCIAL

## 2024-01-31 NOTE — TELEPHONE ENCOUNTER
General Call      Reason for Call:  Colonoscopy    What are your questions or concerns:  Patient is wondering with his history of colon issues if he should have a colonoscopy this year or if he can wait another year. He is not currently having any symptoms.    Date of last appointment with provider: 12/27/23    Could we send this information to you in [employer] or would you prefer to receive a phone call?:   Patient would like to be contacted via [employer]

## 2024-02-03 NOTE — TELEPHONE ENCOUNTER
Well, that's a difficult question to answer.   As per GI , pt is recommended to recheck in 3 years for his Colonic polyps and its due at this time.   Unless there is any strong reason not to do , I wouldn't post pone.     Thank you  Michaela Sharma MD on 2/2/2024

## 2024-02-14 DIAGNOSIS — E78.5 HYPERLIPIDEMIA LDL GOAL <70: ICD-10-CM

## 2024-02-14 RX ORDER — LOVASTATIN 40 MG
TABLET ORAL
Qty: 90 TABLET | Refills: 0 | Status: SHIPPED | OUTPATIENT
Start: 2024-02-14 | End: 2024-04-16

## 2024-02-14 NOTE — TELEPHONE ENCOUNTER
Prescription approved per [facility] Refill Protocol.  Roselia Miles, RN  [facility] Triage Nurse

## 2024-02-26 ENCOUNTER — PATIENT OUTREACH (OUTPATIENT)
Dept: CARE COORDINATION | Facility: CLINIC | Age: 84
End: 2024-02-26

## 2024-02-26 ENCOUNTER — LAB (OUTPATIENT)
Dept: LAB | Facility: CLINIC | Age: 84
End: 2024-02-26
Payer: COMMERCIAL

## 2024-02-26 DIAGNOSIS — D64.9 ANEMIA, UNSPECIFIED TYPE: ICD-10-CM

## 2024-02-26 LAB
ERYTHROCYTE [DISTWIDTH] IN BLOOD BY AUTOMATED COUNT: 13.5 % (ref 10–15)
HCT VFR BLD AUTO: 35.6 % (ref 40–53)
HGB BLD-MCNC: 11.4 G/DL (ref 13.3–17.7)
MCH RBC QN AUTO: 29.8 PG (ref 26.5–33)
MCHC RBC AUTO-ENTMCNC: 32 G/DL (ref 31.5–36.5)
MCV RBC AUTO: 93 FL (ref 78–100)
PLATELET # BLD AUTO: 207 10E3/UL (ref 150–450)
RBC # BLD AUTO: 3.82 10E6/UL (ref 4.4–5.9)
WBC # BLD AUTO: 5.3 10E3/UL (ref 4–11)

## 2024-02-26 PROCEDURE — 85027 COMPLETE CBC AUTOMATED: CPT

## 2024-02-26 PROCEDURE — 36415 COLL VENOUS BLD VENIPUNCTURE: CPT

## 2024-02-26 ASSESSMENT — ACTIVITIES OF DAILY LIVING (ADL): DEPENDENT_IADLS:: MEDICATION MANAGEMENT;MONEY MANAGEMENT;TRANSPORTATION

## 2024-02-26 NOTE — LETTER
[facility] COORDINATION  [address]  [place] [address]    March 25, 2024    Timmy Strange  [address] [place] [address]    Dear Timmy,  Your Care Team congratulates you on your journey to maintain wellness. This document will help guide you on your journey to maintain a healthy lifestyle.  You can use this to help you overcome any barriers you may encounter.  If you should have any questions or concerns, you can contact the members of your Care Team or contact your Primary Care Clinic for assistance.     Health Maintenance  Health Maintenance Reviewed:   Health Maintenance Due   Topic Date Due    DEPRESSION ACTION PLAN  Never done    ZOSTER IMMUNIZATION (1 of 2) Never done    RSV VACCINE (Pregnancy & 60+) (1 - 1-dose 60+ series) Never done    DIABETIC FOOT EXAM  12/23/2022    COLORECTAL CANCER SCREENING  02/01/2024     My Access Plan  Medical Emergency 911   Primary [facility] Line [facility] - [phone]   24 Hour Appointment Line [phone] or  [phone]) (toll-free)   24 Hour Nurse Line [phone] (toll-free)   Preferred Urgent Care     Preferred Hospital [facility]  [phone]   Preferred Pharmacy [facility] - [place] [id number] [facility]     Behavioral Health Crisis Line The National Suicide Prevention Lifeline at 1-170.887.2800 or 911     My Care Team Members  Patient Care Team         Relationship Specialty Notifications Start End    Michaela Sharma MD PCP - General Internal Medicine  8/11/21     Phone: [phone] Fax: [phone]         [address] [place] [address]    Chandrakant Panchal MD Assigned Cancer Care Provider   6/20/21     Phone: [phone] Fax: [phone]         [facility] [address] [place] [address] [place] [address]    Michaela Sharma MD Assigned PCP   8/29/21     Phone: [phone] Fax: [phone] [place] [address]    Pino Meier MD MD Urology  1/27/22     Phone: [phone] Fax: [phone]         [address] [address]    Pino Meier MD Assigned Surgical Provider   6/18/22     Phone: [phone] Fax: [phone] [address] [place] [address]    Alistair Osuna LSW Lead Care Coordinator Primary Care - CC Admissions 3/29/23 3/25/24    Phone: [phone]         Michel Doran MD Assigned Heart and Vascular Provider   6/3/23     Phone: [phone] Fax: [phone] [address] [place] [address]                Advance Care Plans/Directives Type:   Type Advanced Care Plans/Directives: Advanced Directive - On File  Thank you for providing us with your Advance Directive. We will keep this on file and recommend that it be updated every 2 years, if your health situation changes, if there is a death of one of your health care agents, and/or if there are changes in your marital status.    It has been your Clinic Care Team's pleasure to work with you on your goals.    Regards,  Your Clinic Care Team

## 2024-02-26 NOTE — PROGRESS NOTES
Clinic Care Coordination Contact  [facility]/Voicemail    Clinical Data: Care Coordinator Outreach    Outreach Documentation Number of Outreach Attempt   2/26/2024  12:54 PM 1       Left message on  spouse's  voicemail with call back information and requested return call.    Plan:  Care Coordinator will try to reach patient again in 10 business days.    Alistair Osuna ROSA  Clinic Care Coordinator  [facility]  [phone]  [email or web address]

## 2024-02-28 NOTE — RESULT ENCOUNTER NOTE
Dear Mr. Strange,    Hemoglobin is mildly lower at 11.4.    Please, call me with any questions.    Chandrakant Panchal MD

## 2024-03-11 ASSESSMENT — ACTIVITIES OF DAILY LIVING (ADL): DEPENDENT_IADLS:: MEDICATION MANAGEMENT;MONEY MANAGEMENT;TRANSPORTATION

## 2024-03-11 NOTE — PROGRESS NOTES
Clinic Care Coordination Contact  [facility]/Voicemail    Clinical Data: Care Coordinator Outreach    Outreach Documentation Number of Outreach Attempt   2/26/2024  12:54 PM 1   3/11/2024  10:39 AM 2       Left message on  spouse's  voicemail with call back information and requested return call.    Plan:  Care Coordinator will try to reach patient again in 10 business days.    Alistair Osuna \[facility] [place] [facility]\""  Clinic Care Coordinator  [facility]  [phone]  [email or web address]

## 2024-03-25 NOTE — PROGRESS NOTES
Clinic Care Coordination Contact    Situation: Patient chart reviewed by care coordinator.    Background: Pt is enrolled in care coordination and followed to assist with goal(s) progression.     Assessment: Per Chart Review pt has been unreachable for follow up x2 with no further engagement or return calls.     Plan/Recommendations: Per Care Coordination standard work pt will be disenrolled from Care Coordination. Care Coordinator will send graduation letter with care coordinator contact information via [employer]. Care Coordinator will remain available, however, will do no further outreaches at this time unless a new referral is made or a change it pt status occurs. Pt has been provided with this writer's contact information and has been encouraged to call with any questions.     Alistair Osuna ROSA  Clinic Care Coordinator  [facility]  [facility]  [phone]  [email or web address]

## 2024-03-28 ENCOUNTER — LAB (OUTPATIENT)
Dept: LAB | Facility: CLINIC | Age: 84
End: 2024-03-28
Payer: COMMERCIAL

## 2024-03-28 DIAGNOSIS — D64.9 ANEMIA, UNSPECIFIED TYPE: ICD-10-CM

## 2024-03-28 LAB
ERYTHROCYTE [DISTWIDTH] IN BLOOD BY AUTOMATED COUNT: 13.3 % (ref 10–15)
HCT VFR BLD AUTO: 36.5 % (ref 40–53)
HGB BLD-MCNC: 11.8 G/DL (ref 13.3–17.7)
MCH RBC QN AUTO: 30 PG (ref 26.5–33)
MCHC RBC AUTO-ENTMCNC: 32.3 G/DL (ref 31.5–36.5)
MCV RBC AUTO: 93 FL (ref 78–100)
PLATELET # BLD AUTO: 201 10E3/UL (ref 150–450)
RBC # BLD AUTO: 3.93 10E6/UL (ref 4.4–5.9)
WBC # BLD AUTO: 5.3 10E3/UL (ref 4–11)

## 2024-03-28 PROCEDURE — 83550 IRON BINDING TEST: CPT

## 2024-03-28 PROCEDURE — 82728 ASSAY OF FERRITIN: CPT

## 2024-03-28 PROCEDURE — 36415 COLL VENOUS BLD VENIPUNCTURE: CPT

## 2024-03-28 PROCEDURE — 85027 COMPLETE CBC AUTOMATED: CPT

## 2024-03-28 PROCEDURE — 83540 ASSAY OF IRON: CPT

## 2024-03-28 NOTE — RESULT ENCOUNTER NOTE
Dear Mr. Strange,    Blood test is stable.    Please, call me with any questions.    Chandrakant Panchal MD

## 2024-03-29 LAB
FERRITIN SERPL-MCNC: 23 NG/ML (ref 31–409)
IRON BINDING CAPACITY (ROCHE): 300 UG/DL (ref 240–430)
IRON SATN MFR SERPL: 16 % (ref 15–46)
IRON SERPL-MCNC: 47 UG/DL (ref 61–157)

## 2024-03-30 DIAGNOSIS — I10 ESSENTIAL HYPERTENSION, BENIGN: ICD-10-CM

## 2024-04-01 RX ORDER — LOSARTAN POTASSIUM 100 MG/1
TABLET ORAL
Qty: 100 TABLET | Refills: 2 | Status: SHIPPED | OUTPATIENT
Start: 2024-04-01

## 2024-04-02 ENCOUNTER — VIRTUAL VISIT (OUTPATIENT)
Dept: ONCOLOGY | Facility: CLINIC | Age: 84
End: 2024-04-02
Attending: INTERNAL MEDICINE
Payer: COMMERCIAL

## 2024-04-02 ENCOUNTER — TELEPHONE (OUTPATIENT)
Dept: ONCOLOGY | Facility: CLINIC | Age: 84
End: 2024-04-02

## 2024-04-02 DIAGNOSIS — D64.9 ANEMIA, UNSPECIFIED TYPE: Primary | ICD-10-CM

## 2024-04-02 PROCEDURE — 99441 PR PHYSICIAN TELEPHONE EVALUATION 5-10 MIN: CPT | Mod: 93 | Performed by: INTERNAL MEDICINE

## 2024-04-02 NOTE — LETTER
4/2/2024         RE: Timmy Strange  [address] [address]        Dear Colleague,    Thank you for referring your patient, Timmy Strange, to the [facility]CER [facility]. Please see a copy of my visit note below.    Patient had car trouble. Please call him when possible.     HEMATOLOGY HISTORY: Mr. Strange is a gentleman with chronic normocytic anemia due to anemia of chronic disease and iron deficiency.    1.  On 11/07/2017, hemoglobin of 13.3.  -On 10/30/2018, hemoglobin of 12.5.  -On 02/22/2021, WBC of 4.3, hemoglobin of 10.4, platelet of 226 and MCV of 90.  2.  On 02/01/2021, colonoscopy revealed colon polyps (tubular adenoma), hemorrhoids and diverticulosis.   -On 03/16/2021, EGD was essentially normal.    3. On 05/11/2021:  -Iron of 13, saturation of 6% and ferritin of 69.  -Normal folate.  -Normal vitamin B12.  -Normal TSH.  6. Patient received 1 dose of IV Venofer on 05/11/2021.     SUBJECTIVE:  Mr. Strange is a 83-year-old gentleman with chronic normocytic anemia due to chronic disease and iron deficiency.  He is on oral ferrous sulfate once a day. He is tolerating well.     Overall he is doing well.  He has mild generalized weakness.  He can do all his activities. No headache.  No dizziness.  No chest pain.  No shortness of breath at rest.  No nausea or vomiting.  No bowel problem.  No urinary complaints.  He gets intermittent hemorrhoidal bleed.  All other review of system is negative.     PHYSICAL EXAMINATION:    He is alert and oriented x 3.   Rest of the systems not examined as this is a telephone visit.     LABORATORY:  CBC, iron and ferritin reviewed.     ASSESSMENT:    1.  An 83-year-old gentleman with normocytic anemia secondary to iron deficiency and anemia of chronic disease. Anemia is stable.  Given his age, he may also have primary bone marrow pathology like MDS.  2.  Iron deficiency from hemorrhoidal bleed.      PLAN:  Take ferrous sulfate twice a day.  See  me in 3 months with labs.    DISCUSSION:  1.  Labs were reviewed with the patient.  Explained to him that his anemia is stable with hemoglobin of 11.8.  Normal WBC, platelet and MCV.  His ferritin and iron is low.    2.  Discussed regarding iron deficiency.  He is on oral ferrous sulfate once a day.  He is tolerating it well.  Explained to him that he continues to be iron deficient.  Advised him to take oral ferrous sulfate twice a day.  If his iron deficiency does not improve we will consider IV iron.    3.  I will see him in 3 months time with labs.  Advised him to call us with any questions or concerns.     Total telephone visit time of 5 minutes.       Again, thank you for allowing me to participate in the care of your patient.        Sincerely,        Chandrakant Panchal MD

## 2024-04-02 NOTE — TELEPHONE ENCOUNTER
Patient needs to be rescheduled for their virtual visit due to Reason for Reschedule: No-Show     Unable to reach patient on both numbers listed.  LVM x3    Appointment mode: Video  Provider: Dr. Abdulkadir Elliott, VF/LPN

## 2024-04-02 NOTE — LETTER
4/2/2024         RE: Timmy Strange  [address] [address]        Dear Colleague,    Thank you for referring your patient, Timmy Strange, to the [facility] CANCER [facility]. Please see a copy of my visit note below.    Patient had car trouble. Please call him when possible.     HEMATOLOGY HISTORY: Mr. Strange is a gentleman with chronic normocytic anemia due to anemia of chronic disease and iron deficiency.    1.  On 11/07/2017, hemoglobin of 13.3.  -On 10/30/2018, hemoglobin of 12.5.  -On 02/22/2021, WBC of 4.3, hemoglobin of 10.4, platelet of 226 and MCV of 90.  2.  On 02/01/2021, colonoscopy revealed colon polyps (tubular adenoma), hemorrhoids and diverticulosis.   -On 03/16/2021, EGD was essentially normal.    3. On 05/11/2021:  -Iron of 13, saturation of 6% and ferritin of 69.  -Normal folate.  -Normal vitamin B12.  -Normal TSH.  6. Patient received 1 dose of IV Venofer on 05/11/2021.     SUBJECTIVE:  Mr. Strange is a 83-year-old gentleman with chronic normocytic anemia due to chronic disease and iron deficiency.  He is on oral ferrous sulfate once a day. He is tolerating well.     Overall he is doing well.  He has mild generalized weakness.  He can do all his activities. No headache.  No dizziness.  No chest pain.  No shortness of breath at rest.  No nausea or vomiting.  No bowel problem.  No urinary complaints.  He gets intermittent hemorrhoidal bleed.  All other review of system is negative.     PHYSICAL EXAMINATION:    He is alert and oriented x 3.   Rest of the systems not examined as this is a telephone visit.     LABORATORY:  CBC, iron and ferritin reviewed.     ASSESSMENT:    1.  An 83-year-old gentleman with normocytic anemia secondary to iron deficiency and anemia of chronic disease. Anemia is stable.  Given his age, he may also have primary bone marrow pathology like MDS.  2.  Iron deficiency from hemorrhoidal bleed.      PLAN:  Take ferrous sulfate twice a day.  See  me in 3 months with labs.    DISCUSSION:  1.  Labs were reviewed with the patient.  Explained to him that his anemia is stable with hemoglobin of 11.8.  Normal WBC, platelet and MCV.  His ferritin and iron is low.    2.  Discussed regarding iron deficiency.  He is on oral ferrous sulfate once a day.  He is tolerating it well.  Explained to him that he continues to be iron deficient.  Advised him to take oral ferrous sulfate twice a day.  If his iron deficiency does not improve we will consider IV iron.    3.  I will see him in 3 months time with labs.  Advised him to call us with any questions or concerns.     Total telephone visit time of 5 minutes.       Again, thank you for allowing me to participate in the care of your patient.        Sincerely,        Chandrakant Panchal MD

## 2024-04-02 NOTE — PROGRESS NOTES
HEMATOLOGY HISTORY: Mr. Strange is a gentleman with chronic normocytic anemia due to anemia of chronic disease and iron deficiency.    1.  On 11/07/2017, hemoglobin of 13.3.  -On 10/30/2018, hemoglobin of 12.5.  -On 02/22/2021, WBC of 4.3, hemoglobin of 10.4, platelet of 226 and MCV of 90.  2.  On 02/01/2021, colonoscopy revealed colon polyps (tubular adenoma), hemorrhoids and diverticulosis.   -On 03/16/2021, EGD was essentially normal.    3. On 05/11/2021:  -Iron of 13, saturation of 6% and ferritin of 69.  -Normal folate.  -Normal vitamin B12.  -Normal TSH.  6. Patient received 1 dose of IV Venofer on 05/11/2021.     SUBJECTIVE:  Mr. Strange is a 83-year-old gentleman with chronic normocytic anemia due to chronic disease and iron deficiency.  He is on oral ferrous sulfate once a day. He is tolerating well.     Overall he is doing well.  He has mild generalized weakness.  He can do all his activities. No headache.  No dizziness.  No chest pain.  No shortness of breath at rest.  No nausea or vomiting.  No bowel problem.  No urinary complaints.  He gets intermittent hemorrhoidal bleed.  All other review of system is negative.     PHYSICAL EXAMINATION:    He is alert and oriented x 3.   Rest of the systems not examined as this is a telephone visit.     LABORATORY:  CBC, iron and ferritin reviewed.     ASSESSMENT:    1.  An 83-year-old gentleman with normocytic anemia secondary to iron deficiency and anemia of chronic disease. Anemia is stable.  Given his age, he may also have primary bone marrow pathology like MDS.  2.  Iron deficiency from hemorrhoidal bleed.      PLAN:  Take ferrous sulfate twice a day.  See me in 3 months with labs.    DISCUSSION:  1.  Labs were reviewed with the patient.  Explained to him that his anemia is stable with hemoglobin of 11.8.  Normal WBC, platelet and MCV.  His ferritin and iron is low.    2.  Discussed regarding iron deficiency.  He is on oral ferrous sulfate once a day.  He is  tolerating it well.  Explained to him that he continues to be iron deficient.  Advised him to take oral ferrous sulfate twice a day.  If his iron deficiency does not improve we will consider IV iron.    3.  I will see him in 3 months time with labs.  Advised him to call us with any questions or concerns.     Total telephone visit time of 5 minutes.

## 2024-04-11 ENCOUNTER — OFFICE VISIT (OUTPATIENT)
Dept: FAMILY MEDICINE | Facility: CLINIC | Age: 84
End: 2024-04-11
Payer: COMMERCIAL

## 2024-04-11 ENCOUNTER — ANCILLARY PROCEDURE (OUTPATIENT)
Dept: GENERAL RADIOLOGY | Facility: CLINIC | Age: 84
End: 2024-04-11
Attending: INTERNAL MEDICINE
Payer: COMMERCIAL

## 2024-04-11 ENCOUNTER — TELEPHONE (OUTPATIENT)
Dept: FAMILY MEDICINE | Facility: CLINIC | Age: 84
End: 2024-04-11

## 2024-04-11 VITALS
SYSTOLIC BLOOD PRESSURE: 129 MMHG | RESPIRATION RATE: 17 BRPM | WEIGHT: 191.3 LBS | HEIGHT: 64 IN | BODY MASS INDEX: 32.66 KG/M2 | HEART RATE: 67 BPM | DIASTOLIC BLOOD PRESSURE: 70 MMHG | OXYGEN SATURATION: 95 % | TEMPERATURE: 97.8 F

## 2024-04-11 DIAGNOSIS — D50.9 IRON DEFICIENCY ANEMIA, UNSPECIFIED IRON DEFICIENCY ANEMIA TYPE: ICD-10-CM

## 2024-04-11 DIAGNOSIS — I11.0 HYPERTENSIVE HEART DISEASE WITH HEART FAILURE (H): ICD-10-CM

## 2024-04-11 DIAGNOSIS — F33.0 MILD EPISODE OF RECURRENT MAJOR DEPRESSIVE DISORDER (H): ICD-10-CM

## 2024-04-11 DIAGNOSIS — E11.51 TYPE II DIABETES MELLITUS WITH PERIPHERAL CIRCULATORY DISORDER (H): ICD-10-CM

## 2024-04-11 DIAGNOSIS — Z01.818 PREOPERATIVE CLEARANCE: Primary | ICD-10-CM

## 2024-04-11 DIAGNOSIS — Z01.818 PREOPERATIVE CLEARANCE: ICD-10-CM

## 2024-04-11 DIAGNOSIS — I10 ESSENTIAL HYPERTENSION, BENIGN: ICD-10-CM

## 2024-04-11 DIAGNOSIS — Z12.11 SCREEN FOR COLON CANCER: ICD-10-CM

## 2024-04-11 LAB
HBA1C MFR BLD: 6.7 % (ref 0–5.6)
HGB BLD-MCNC: 12.1 G/DL (ref 13.3–17.7)

## 2024-04-11 PROCEDURE — 85018 HEMOGLOBIN: CPT | Performed by: INTERNAL MEDICINE

## 2024-04-11 PROCEDURE — 99215 OFFICE O/P EST HI 40 MIN: CPT | Mod: 25 | Performed by: INTERNAL MEDICINE

## 2024-04-11 PROCEDURE — 71046 X-RAY EXAM CHEST 2 VIEWS: CPT | Mod: TC | Performed by: RADIOLOGY

## 2024-04-11 PROCEDURE — 93000 ELECTROCARDIOGRAM COMPLETE: CPT | Performed by: INTERNAL MEDICINE

## 2024-04-11 PROCEDURE — 36415 COLL VENOUS BLD VENIPUNCTURE: CPT | Performed by: INTERNAL MEDICINE

## 2024-04-11 PROCEDURE — 80053 COMPREHEN METABOLIC PANEL: CPT | Performed by: INTERNAL MEDICINE

## 2024-04-11 PROCEDURE — 83036 HEMOGLOBIN GLYCOSYLATED A1C: CPT | Performed by: INTERNAL MEDICINE

## 2024-04-11 ASSESSMENT — PAIN SCALES - GENERAL: PAINLEVEL: NO PAIN (0)

## 2024-04-11 NOTE — PROGRESS NOTES
Preoperative Evaluation  [address] [address]  Phone: [phone]  Primary Provider: Michaela Cuevas  Pre-op Performing Provider: MICHAELA CUEVAS  Apr 11, 2024       Timmy is a 83 year old, presenting for the following:  Pre-Op Exam (Colonoscopy.)        4/11/2024    11:23 AM   Additional Questions   Roomed by Julia LAYTON   Accompanied by Spouse     Surgical Information  Surgery/Procedure: Colonoscopy   Surgery Location: [facility]   Surgeon: Dr. Mima Vieira  Surgery Date: 04/29/2024  Time of Surgery: 8:15AM  Where patient plans to recover: At home with family  Fax number for surgical facility: [phone]    Assessment & Plan     The proposed surgical procedure is considered INTERMEDIATE risk.    Assessment and Plan  1. Preoperative clearance  2. Screen for colon cancer    - Comprehensive metabolic panel (BMP + Alb, Alk Phos, ALT, AST, Total. Bili, TP); Future  - EKG 12-lead complete w/read - Clinics  - XR Chest 2 Views; Future  - Hemoglobin; Future  - Comprehensive metabolic panel (BMP + Alb, Alk Phos, ALT, AST, Total. Bili, TP)  - Hemoglobin      3. Type II diabetes mellitus with peripheral circulatory disorder (H)  Chronic stable, will recheck A1c.  Patient follows endocrinology.  On insulin as mentioned in the medication list.  - Hemoglobin A1c; Future  - Hemoglobin A1c    4. Mild episode of recurrent major depressive disorder (H24)  Chronic stable, continue current antidepressants.    5. Iron deficiency anemia, unspecified iron deficiency anemia type  Chronic stable, continue to follow iron infusions as managed by hematology.    6. Hypertensive heart disease with heart failure (H)  7. Essential hypertension, benign  Chronic stable, holding parameters given for losartan.      Wife Ladan in the office today answered all the questions    Please note that this note consists of symbols derived from  keyboarding, dictation and/or voice recognition software. As a result, there may be errors in the script that have gone undetected. Please consider this when interpreting information found in this chart.    Patient Instructions   Please do pertinent work up for this clearance    Please hold Metformin the day before   Hold Losartan on the morning of the procedure   OK to continue Carvedilol at the same dose.   Will need to cut down Lantus to 4 units on the day of procedure in morning.     ==========================  Preparing for Your Surgery  Getting started  A nurse will call you to review your health history and instructions. They will give you an arrival time based on your scheduled surgery time. Please be ready to share:  Your doctor's clinic name and phone number  Your medical, surgical, and anesthesia history  A list of allergies and sensitivities  A list of medicines, including herbal treatments and over-the-counter drugs  Whether the patient has a legal guardian (ask how to send us the papers in advance)  Please tell us if you're pregnant--or if there's any chance you might be pregnant. Some surgeries may injure a fetus (unborn baby), so they require a pregnancy test. Surgeries that are safe for a fetus don't always need a test, and you can choose whether to have one.   If you have a child who's having surgery, please ask for a copy of Preparing for Your Child's Surgery.    Preparing for surgery  Within 10 to 30 days of surgery: Have a pre-op exam (sometimes called an H&P, or History and Physical). This can be done at a clinic or pre-operative center.  If you're having a , you may not need this exam. Talk to your care team.  At your pre-op exam, talk to your care team about all medicines you take. If you need to stop any medicines before surgery, ask when to start taking them again.  We do this for your safety. Many medicines can make you bleed too much during surgery. Some change how well surgery  (anesthesia) drugs work.  Call your insurance company to let them know you're having surgery. (If you don't have insurance, call [phone].)  Call your clinic if there's any change in your health. This includes signs of a cold or flu (sore throat, runny nose, cough, rash, fever). It also includes a scrape or scratch near the surgery site.  If you have questions on the day of surgery, call your hospital or surgery center.  Eating and drinking guidelines  For your safety: Unless your surgeon tells you otherwise, follow the guidelines below.  Eat and drink as usual until 8 hours before you arrive for surgery. After that, no food or milk.  Drink clear liquids until 2 hours before you arrive. These are liquids you can see through, like water, Gatorade, and Propel Water. They also include plain black coffee and tea (no cream or milk), candy, and breath mints. You can spit out gum when you arrive.  If you drink alcohol: Stop drinking it the night before surgery.  If your care team tells you to take medicine on the morning of surgery, it's okay to take it with a sip of water.  Preventing infection  Shower or bathe the night before and morning of your surgery. Follow the instructions your clinic gave you. (If no instructions, use regular soap.)  Don't shave or clip hair near your surgery site. We'll remove the hair if needed.  Don't smoke or vape the morning of surgery. You may chew nicotine gum up to 2 hours before surgery. A nicotine patch is okay.  Note: Some surgeries require you to completely quit smoking and nicotine. Check with your surgeon.  Your care team will make every effort to keep you safe from infection. We will:  Clean our hands often with soap and water (or an alcohol-based hand rub).  Clean the skin at your surgery site with a special soap that kills germs.  Give you a special gown to keep you warm. (Cold raises the risk of infection.)  Wear special hair covers, masks, gowns and gloves during  surgery.  Give antibiotic medicine, if prescribed. Not all surgeries need antibiotics.  What to bring on the day of surgery  Photo ID and insurance card  Copy of your health care directive, if you have one  Glasses and hearing aids (bring cases)  You can't wear contacts during surgery  Inhaler and eye drops, if you use them (tell us about these when you arrive)  CPAP machine or breathing device, if you use them  A few personal items, if spending the night  If you have . . .  A pacemaker, ICD (cardiac defibrillator) or other implant: Bring the ID card.  An implanted stimulator: Bring the remote control.  A legal guardian: Bring a copy of the certified (court-stamped) guardianship papers.  Please remove any jewelry, including body piercings. Leave jewelry and other valuables at home.  If you're going home the day of surgery  You must have a responsible adult drive you home. They should stay with you overnight as well.  If you don't have someone to stay with you, and you aren't safe to go home alone, we may keep you overnight. Insurance often won't pay for this.  After surgery  If it's hard to control your pain or you need more pain medicine, please call your surgeon's office.  Questions?   If you have any questions for your care team, list them here: _________________________________________________________________________________________________________________________________________________________________________ ____________________________________ ____________________________________ ____________________________________  For informational purposes only. Not to replace the advice of your health care provider. Copyright   2003, 2019 [facility]. All rights reserved. Clinically reviewed by Nancy Holt MD. [place] [id number] - REV 12/22.    Return in about 2 months (around 6/14/2024), or if symptoms worsen or fail to improve, for Annual Wellness Exam.    Michaela Sharma MD  [facility]  LENARD CANTU           Risks and Recommendations  The patient has the following additional risks and recommendations for perioperative complications:   - Consult Hospitalist / IM to assist with post-op medical management  Cardiovascular:   - Left atrial enlargement on the echo done last time, no acute concerns of heart failure at this time.  Diabetes:  - Patient is on insulin therapy; diabetic NPO guidelines provided and discussed.  Pulmonary:    - Incentive spirometry post-op   - Consider Respiratory Therapy (Respiratory Care IP Consult) post-op  Anemia/Bleeding/Clotting:    - Anemia and requires treatment prior to surgery.  Patient following heme oncology with iron infusions as managed by them.   - Old age of 83 years, cognitive issues following neurology will need more medical scare for him to understand any commands.    Antiplatelet or Anticoagulation Medication Instructions   - Patient is on no antiplatelet or anticoagulation medications.    Additional Medication Instructions  Patient is to take all scheduled medications on the day of surgery EXCEPT for modifications listed below:   - ACE/ARB: HOLD on day of surgery (minimum 11 hours for general anesthesia).   - Beta Blockers: Continue taking on the day of surgery.   - Long acting insulin (e.g. glargine, detemir): Take 50% of the usual evening or morning dose before surgery.    - metformin: HOLD day of surgery.   - SSRIs, SNRIs, TCAs, Antipsychotics: Continue without modification.     Recommendation  APPROVAL GIVEN to proceed with proposed procedure, without further diagnostic evaluation.    Review of external notes as documented elsewhere in note  40 minutes spent by me on the date of the encounter doing chart review, review of outside records, review of test results, interpretation of tests, patient visit, documentation, and discussion with family     Subjective       HPI related to upcoming procedure:     Last seen patient in December 2023 for virtual visit,  he is here for preoperative clearance.        4/4/2024     8:59 AM   Preop Questions   1. Have you ever had a heart attack or stroke? No   2. Have you ever had surgery on your heart or blood vessels, such as a stent placement, a coronary artery bypass, or surgery on an artery in your head, neck, heart, or legs? No   3. Do you have chest pain with activity? No   4. Do you have a history of  heart failure? No   5. Do you currently have a cold, bronchitis or symptoms of other infection? No   6. Do you have a cough, shortness of breath, or wheezing? No   7. Do you or anyone in your family have previous history of blood clots? No   8. Do you or does anyone in your family have a serious bleeding problem such as prolonged bleeding following surgeries or cuts? No   9. Have you ever had problems with anemia or been told to take iron pills? YES    10. Have you had any abnormal blood loss such as black, tarry or bloody stools? No   11. Have you ever had a blood transfusion? No   12. Are you willing to have a blood transfusion if it is medically needed before, during, or after your surgery? Yes   13. Have you or any of your relatives ever had problems with anesthesia? No   14. Do you have sleep apnea, excessive snoring or daytime drowsiness? No   15. Do you have any artifical heart valves or other implanted medical devices like a pacemaker, defibrillator, or continuous glucose monitor? No   16. Do you have artificial joints? No   17. Are you allergic to latex? No       Health Care Directive  Patient has a Health Care Directive on file      Preoperative Review of    reviewed - no record of controlled substances prescribed.      Status of Chronic Conditions:  See problem list for active medical problems.  Problems all longstanding and stable, except as noted/documented.  See ROS for pertinent symptoms related to these conditions.    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     Priority: Medium    Internal hemorrhoids with Hx of banding 11/01/2022     Priority: Medium    Rectal hemorrhage 02/21/2022     Priority: Medium    Change in bowel habits 02/21/2022     Priority: Medium    Oropharyngeal dysphagia 12/23/2021     Priority: Medium    Pain due to onychomycosis of nail 08/02/2021     Priority: Medium    Memory changes 07/07/2021     Priority: Medium    Gastroesophageal reflux disease without esophagitis 03/02/2021     Priority: Medium    Anemia, unspecified type 12/22/2020     Priority: Medium    Weakness of voice 06/09/2020     Priority: Medium    Flatulence, eructation, and gas pain 10/17/2019     Priority: Medium    Excess skin of eyelid, unspecified laterality 10/03/2018     Priority: Medium     Bilateral      Glaucoma of both eyes, unspecified glaucoma type 10/03/2018     Priority: Medium    Screening for prostate cancer 11/07/2017     Priority: Medium    Residual hemorrhoidal skin tags 07/03/2017     Priority: Medium    Seasonal allergic rhinitis 01/02/2017     Priority: Medium    Mixed hyperlipidemia 07/15/2016     Priority: Medium    Special screening for malignant neoplasm of prostate 07/15/2016     Priority: Medium    Localized edema 07/15/2016     Priority: Medium    Dependent edema 07/15/2016     Priority: Medium    ED (erectile dysfunction) 01/06/2015     Priority: Medium    BPH (benign prostatic hyperplasia) 12/16/2013     Priority: Medium    Type II diabetes mellitus with peripheral circulatory disorder (H) 01/03/2012     Priority: Medium    Essential hypertension 12/26/2011     Priority: Medium    Hypothyroidism 12/26/2011     Priority: Medium      Past Medical History:   Diagnosis  Date    Arthritis 1970    Dx'd with RA when in the     BPH (benign prostatic hyperplasia) 12/16/2013    Cancer (H)     basal cell cancer behind ear    Diabetes mellitus     ED (erectile dysfunction) 1/6/2015    HTN (hypertension)     Hyperlipidemia LDL goal <100     Hypothyroidism     Left atrial enlargement 5/24/2023    Mumps     Obesity      Past Surgical History:   Procedure Laterality Date    BIOPSY      COLONOSCOPY N/A 11/21/2014    Procedure: COMBINED COLONOSCOPY, SINGLE OR MULTIPLE BIOPSY/POLYPECTOMY BY BIOPSY;  Surgeon: Rolanda Bey MD;  Location:  GI    COLONOSCOPY N/A 1/20/2020    Procedure: COLONOSCOPY, WITH POLYPECTOMY AND BIOPSY;  Surgeon: Christina Vieiar MD;  Location:  GI    COLONOSCOPY N/A 2/1/2021    Procedure: Colonoscopy, With Polypectomy And Biopsy;  Surgeon: Christina Vieira MD;  [facility]    ESOPHAGOSCOPY, GASTROSCOPY, DUODENOSCOPY (EGD), COMBINED N/A 3/16/2021    Procedure: ESOPHAGOGASTRODUODENOSCOPY, WITH BIOPSY;  Surgeon: Jose Schrader MD;  Location:  GI    EYE SURGERY      TESTICLE SURGERY      TONSILLECTOMY, ADENOIDECTOMY, COMBINED      VASECTOMY       Current Outpatient Medications   Medication Sig Dispense Refill    acetaminophen (TYLENOL) 500 MG tablet Take 1 tablet (500 mg) by mouth every 4 hours as needed for mild pain 30 tablet 0    carvedilol (COREG) 3.125 MG tablet TAKE 1 TABLET BY MOUTH TWICE  DAILY 200 tablet 1    escitalopram (LEXAPRO) 10 MG tablet TAKE 1 TABLET BY MOUTH DAILY 90 tablet 0    Ferrous Sulfate 324 (65 Fe) MG TBEC Take 324 mg by mouth 2 times daily      Fexofenadine HCl (ALLEGRA PO) Take 180 mg by mouth daily       finasteride (PROSCAR) 5 MG tablet Take 1 tablet (5 mg) by mouth daily 90 tablet 3    insulin glargine (LANTUS SOLOSTAR) 100 UNIT/ML pen       latanoprost (XALATAN) 0.005 % ophthalmic solution Place 1 drop into both eyes daily      levothyroxine (SYNTHROID/LEVOTHROID) 88 MCG tablet Take 88 mcg by mouth daily.       "losartan (COZAAR) 100 MG tablet TAKE 1 TABLET BY MOUTH DAILY 100 tablet 2    lovastatin (MEVACOR) 40 MG tablet TAKE 1 TABLET BY MOUTH DAILY AT  BEDTIME 90 tablet 0    metFORMIN (GLUCOPHAGE XR) 500 MG 24 hr tablet       Multiple Vitamins-Minerals (CENTRUM SILVER) per tablet Take 1 tablet by mouth daily      ONETOUCH ULTRA test strip Pt uses one touch Verio system and strips      tamsulosin (FLOMAX) 0.4 MG capsule Take 2 capsules (0.8 mg) by mouth daily 180 capsule 3    albuterol (PROAIR HFA/PROVENTIL HFA/VENTOLIN HFA) 108 (90 Base) MCG/ACT inhaler Inhale 2 puffs into the lungs every 6 hours as needed for shortness of breath, wheezing or cough 18 g 1       Allergies   Allergen Reactions    Shellfish Allergy     Shellfish-Derived Products         Social History     Tobacco Use    Smoking status: Former     Current packs/day: 0.00     Average packs/day: 0.5 packs/day for 51.0 years (25.5 ttl pk-yrs)     Types: Cigarettes, Pipe     Start date: 1960     Quit date: 11/15/2011     Years since quittin.4    Smokeless tobacco: Never    Tobacco comments:     Smoked pipes 10 minutes a day started 22 yrs old , quit 10 yrs back.   Substance Use Topics    Alcohol use: No     Family History   Problem Relation Age of Onset    Diabetes Maternal Grandmother     Diabetes Maternal Grandfather     Arthritis Maternal Grandfather     Arthritis Father     Thyroid Disease Father     Glaucoma Mother     Alcohol/Drug Mother 49        cirrhosis    Diabetes Daughter 44        type 2    Obesity Daughter     Glaucoma Maternal Aunt      History   Drug Use No         Review of Systems    Review of Systems  Constitutional, HEENT, cardiovascular, pulmonary, GI, , musculoskeletal, neuro, skin, endocrine and psych systems are negative, except as otherwise noted.    Objective    /70   Pulse 67   Temp 97.8  F (36.6  C) (Temporal)   Resp 17   Ht 1.633 m (5' 4.29\")   Wt 86.8 kg (191 lb 4.8 oz)   SpO2 95%   BMI 32.54 kg/m     Estimated " "body mass index is 32.54 kg/m  as calculated from the following:    Height as of this encounter: 1.633 m (5' 4.29\").    Weight as of this encounter: 86.8 kg (191 lb 4.8 oz).  Physical Exam  GENERAL: alert and no distress  EYES: Eyes grossly normal to inspection, PERRL and conjunctivae and sclerae normal  HENT: ear canals and TM's normal, nose and mouth without ulcers or lesions  NECK: no adenopathy, no asymmetry, masses, or scars  RESP: lungs clear to auscultation - no rales, rhonchi or wheezes  CV: regular rate and rhythm, normal S1 S2, no S3 or S4, no murmur, click or rub, no peripheral edema  ABDOMEN: soft, nontender, no hepatosplenomegaly, no masses and bowel sounds normal  MS: no gross musculoskeletal defects noted, no edema  SKIN: no suspicious lesions or rashes  NEURO: Normal strength and tone, mentation intact and speech normal  PSYCH: mentation appears normal, affect normal/bright    Recent Labs   Lab Test 03/28/24  1251 02/26/24  1235 07/03/23  1300 06/14/23  1335 06/02/23  1014 05/04/23  1312 03/31/23  1430 01/10/23  1538 11/01/22  1401   HGB 11.8* 11.4*   < >  --    < > 11.3* 10.0*   < > 11.0*    207   < >  --    < >  --   --    < >  --    NA  --   --   --  142  --   --  138   < >  --    POTASSIUM  --   --   --  4.8  --   --  4.7   < >  --    CR  --   --   --  0.85  --   --  1.01   < >  --    A1C  --   --   --   --   --  5.7*  --   --  5.8*    < > = values in this interval not displayed.        Diagnostics  Labs pending at this time.  Results will be reviewed when available.  Recent Results (from the past 720 hour(s))   CBC with platelets    Collection Time: 03/28/24 12:51 PM   Result Value Ref Range    WBC Count 5.3 4.0 - 11.0 10e3/uL    RBC Count 3.93 (L) 4.40 - 5.90 10e6/uL    Hemoglobin 11.8 (L) 13.3 - 17.7 g/dL    Hematocrit 36.5 (L) 40.0 - 53.0 %    MCV 93 78 - 100 fL    MCH 30.0 26.5 - 33.0 pg    MCHC 32.3 31.5 - 36.5 g/dL    RDW 13.3 10.0 - 15.0 %    Platelet Count 201 150 - 450 10e3/uL "   Iron and iron binding capacity    Collection Time: 03/28/24 12:51 PM   Result Value Ref Range    Iron 47 (L) 61 - 157 ug/dL    Iron Binding Capacity 300 240 - 430 ug/dL    Iron Sat Index 16 15 - 46 %   Ferritin    Collection Time: 03/28/24 12:51 PM   Result Value Ref Range    Ferritin 23 (L) 31 - 409 ng/mL   Hemoglobin A1c    Collection Time: 04/11/24 12:25 PM   Result Value Ref Range    Hemoglobin A1C 6.7 (H) 0.0 - 5.6 %   Hemoglobin    Collection Time: 04/11/24 12:25 PM   Result Value Ref Range    Hemoglobin 12.1 (L) 13.3 - 17.7 g/dL      EKG required for abnormal echocardiogram with left atrial enlargement, age of 83 years, general anesthesia, intermediate risk surgery. and not completed in the last 90 days.   EKG: appears normal, NSR, normal axis, normal intervals, no acute ST/T changes c/w ischemia, no LVH by voltage criteria, nonspecific ST-T changes, unchanged from previous tracings    Revised Cardiac Risk Index (RCRI)  The patient has the following serious cardiovascular risks for perioperative complications:   - Diabetes Mellitus (on Insulin) = 1 point     RCRI Interpretation: 1 point: Class II (low risk - 0.9% complication rate)         Signed Electronically by: Michaela Sharma MD  Copy of this evaluation report is provided to requesting physician.

## 2024-04-11 NOTE — PATIENT INSTRUCTIONS
Please do pertinent work up for this clearance    Please hold Metformin the day before   Hold Losartan on the morning of the procedure   OK to continue Carvedilol at the same dose.   Will need to cut down Lantus to 4 units on the day of procedure in morning.     ==========================  Preparing for Your Surgery  Getting started  A nurse will call you to review your health history and instructions. They will give you an arrival time based on your scheduled surgery time. Please be ready to share:  Your doctor's clinic name and phone number  Your medical, surgical, and anesthesia history  A list of allergies and sensitivities  A list of medicines, including herbal treatments and over-the-counter drugs  Whether the patient has a legal guardian (ask how to send us the papers in advance)  Please tell us if you're pregnant--or if there's any chance you might be pregnant. Some surgeries may injure a fetus (unborn baby), so they require a pregnancy test. Surgeries that are safe for a fetus don't always need a test, and you can choose whether to have one.   If you have a child who's having surgery, please ask for a copy of Preparing for Your Child's Surgery.    Preparing for surgery  Within 10 to 30 days of surgery: Have a pre-op exam (sometimes called an H&P, or History and Physical). This can be done at a clinic or pre-operative center.  If you're having a , you may not need this exam. Talk to your care team.  At your pre-op exam, talk to your care team about all medicines you take. If you need to stop any medicines before surgery, ask when to start taking them again.  We do this for your safety. Many medicines can make you bleed too much during surgery. Some change how well surgery (anesthesia) drugs work.  Call your insurance company to let them know you're having surgery. (If you don't have insurance, call [phone].)  Call your clinic if there's any change in your health. This includes signs of a cold  or flu (sore throat, runny nose, cough, rash, fever). It also includes a scrape or scratch near the surgery site.  If you have questions on the day of surgery, call your hospital or surgery center.  Eating and drinking guidelines  For your safety: Unless your surgeon tells you otherwise, follow the guidelines below.  Eat and drink as usual until 8 hours before you arrive for surgery. After that, no food or milk.  Drink clear liquids until 2 hours before you arrive. These are liquids you can see through, like water, Gatorade, and Propel Water. They also include plain black coffee and tea (no cream or milk), candy, and breath mints. You can spit out gum when you arrive.  If you drink alcohol: Stop drinking it the night before surgery.  If your care team tells you to take medicine on the morning of surgery, it's okay to take it with a sip of water.  Preventing infection  Shower or bathe the night before and morning of your surgery. Follow the instructions your clinic gave you. (If no instructions, use regular soap.)  Don't shave or clip hair near your surgery site. We'll remove the hair if needed.  Don't smoke or vape the morning of surgery. You may chew nicotine gum up to 2 hours before surgery. A nicotine patch is okay.  Note: Some surgeries require you to completely quit smoking and nicotine. Check with your surgeon.  Your care team will make every effort to keep you safe from infection. We will:  Clean our hands often with soap and water (or an alcohol-based hand rub).  Clean the skin at your surgery site with a special soap that kills germs.  Give you a special gown to keep you warm. (Cold raises the risk of infection.)  Wear special hair covers, masks, gowns and gloves during surgery.  Give antibiotic medicine, if prescribed. Not all surgeries need antibiotics.  What to bring on the day of surgery  Photo ID and insurance card  Copy of your health care directive, if you have one  Glasses and hearing aids (bring  cases)  You can't wear contacts during surgery  Inhaler and eye drops, if you use them (tell us about these when you arrive)  CPAP machine or breathing device, if you use them  A few personal items, if spending the night  If you have . . .  A pacemaker, ICD (cardiac defibrillator) or other implant: Bring the ID card.  An implanted stimulator: Bring the remote control.  A legal guardian: Bring a copy of the certified (court-stamped) guardianship papers.  Please remove any jewelry, including body piercings. Leave jewelry and other valuables at home.  If you're going home the day of surgery  You must have a responsible adult drive you home. They should stay with you overnight as well.  If you don't have someone to stay with you, and you aren't safe to go home alone, we may keep you overnight. Insurance often won't pay for this.  After surgery  If it's hard to control your pain or you need more pain medicine, please call your surgeon's office.  Questions?   If you have any questions for your care team, list them here: _________________________________________________________________________________________________________________________________________________________________________ ____________________________________ ____________________________________ ____________________________________  For informational purposes only. Not to replace the advice of your health care provider. Copyright   2003, 2019 [facility]. All rights reserved. Clinically reviewed by Nancy Holt MD. [employer] [id number] - REV 12/22.

## 2024-04-11 NOTE — TELEPHONE ENCOUNTER
Patient Returning Call    Reason for call:  PT was seen today and had a EKG done today     Information relayed from  patient:    Ladna (PT wife) would like DR. Sharma to call her back about the results from the EKG. Ladan would like to make sure that any follow upp appt that may need to be done get set up and she understands the results.     Patient has additional questions:  No      Could we send this information to you in [employer] or would you prefer to receive a phone call?:   Patient would prefer a phone call   Okay to leave a detailed message?: Yes at Cell number on file:    Telephone Information:   Mobile [phone]

## 2024-04-12 LAB
ALBUMIN SERPL BCG-MCNC: 4.3 G/DL (ref 3.5–5.2)
ALP SERPL-CCNC: 55 U/L (ref 40–150)
ALT SERPL W P-5'-P-CCNC: 22 U/L (ref 0–70)
ANION GAP SERPL CALCULATED.3IONS-SCNC: 11 MMOL/L (ref 7–15)
AST SERPL W P-5'-P-CCNC: 19 U/L (ref 0–45)
BILIRUB SERPL-MCNC: 0.9 MG/DL
BUN SERPL-MCNC: 23.7 MG/DL (ref 8–23)
CALCIUM SERPL-MCNC: 9.4 MG/DL (ref 8.8–10.2)
CHLORIDE SERPL-SCNC: 103 MMOL/L (ref 98–107)
CREAT SERPL-MCNC: 0.93 MG/DL (ref 0.67–1.17)
DEPRECATED HCO3 PLAS-SCNC: 25 MMOL/L (ref 22–29)
EGFRCR SERPLBLD CKD-EPI 2021: 82 ML/MIN/1.73M2
GLUCOSE SERPL-MCNC: 142 MG/DL (ref 70–99)
POTASSIUM SERPL-SCNC: 4.6 MMOL/L (ref 3.4–5.3)
PROT SERPL-MCNC: 6.7 G/DL (ref 6.4–8.3)
SODIUM SERPL-SCNC: 139 MMOL/L (ref 135–145)

## 2024-04-12 NOTE — RESULT ENCOUNTER NOTE
Your A1C at goal given your age, continue the current medications.    Your hemoglobin is mildly improved compared to the previous lab though remaining at chronic anemia.    Please let me know if you have any questions.    Thank you,  Michaela Sharma MD on 4/12/2024

## 2024-04-12 NOTE — RESULT ENCOUNTER NOTE
Your X ray is showing baseline past change called granulomatous change which is benign, no concerns per radiology review.    Please let me know if you have any questions.  Michaela Sharma MD on 4/12/2024

## 2024-04-16 ENCOUNTER — TELEPHONE (OUTPATIENT)
Dept: FAMILY MEDICINE | Facility: CLINIC | Age: 84
End: 2024-04-16
Payer: COMMERCIAL

## 2024-04-16 ENCOUNTER — MYC MEDICAL ADVICE (OUTPATIENT)
Dept: FAMILY MEDICINE | Facility: CLINIC | Age: 84
End: 2024-04-16
Payer: COMMERCIAL

## 2024-04-16 DIAGNOSIS — E78.5 HYPERLIPIDEMIA LDL GOAL <70: ICD-10-CM

## 2024-04-16 DIAGNOSIS — F33.0 MILD EPISODE OF RECURRENT MAJOR DEPRESSIVE DISORDER (H): ICD-10-CM

## 2024-04-16 RX ORDER — LOVASTATIN 40 MG
TABLET ORAL
Qty: 90 TABLET | Refills: 2 | Status: SHIPPED | OUTPATIENT
Start: 2024-04-16

## 2024-04-16 RX ORDER — ESCITALOPRAM OXALATE 10 MG/1
10 TABLET ORAL DAILY
Qty: 90 TABLET | Refills: 0 | Status: SHIPPED | OUTPATIENT
Start: 2024-04-16 | End: 2024-07-02

## 2024-04-16 NOTE — TELEPHONE ENCOUNTER
Reason for Call:  Appointment Request    Patient requesting this type of appt: patient has anodule on danielle and wants dr Sharma to look at asap    Requested provider: N/A    Reason patient unable to be scheduled: Needs to be scheduled by clinic    When does patient want to be seen/preferred time:  patient wants asap    Comments:  wouls like appt asap    Could we send this information to you in [facility] or would you prefer to receive a phone call?:   Patient would prefer a phone call   Okay to leave a detailed message?: No at Home number on file [phone] (home)    Call taken on 4/16/2024 at 4:51 PM by TRIXIE ROMERO

## 2024-04-17 NOTE — TELEPHONE ENCOUNTER
E-visit link is not working for the patient, schedule n person visit with Dr. Sharma due to cellulitis history.       Patricia Palomo RN  -[facility]

## 2024-04-17 NOTE — TELEPHONE ENCOUNTER
Provider Response to 2nd Level Triage Request    I have reviewed the RN documentation. My recommendation is:  E-Visit with picture of this attached to the documentation.     Thank you  Michaela Sharma MD on 4/17/2024

## 2024-04-17 NOTE — TELEPHONE ENCOUNTER
Pre- op faxed to [phone] - Dr. Mima Vieira.     Sent Cannaehart message to pt letting him know pre-op was faxed.     Maren Lai RN on 4/17/2024 at 11:28 AM

## 2024-04-18 ENCOUNTER — OFFICE VISIT (OUTPATIENT)
Dept: FAMILY MEDICINE | Facility: CLINIC | Age: 84
End: 2024-04-18
Payer: COMMERCIAL

## 2024-04-18 VITALS
SYSTOLIC BLOOD PRESSURE: 118 MMHG | RESPIRATION RATE: 13 BRPM | HEART RATE: 67 BPM | DIASTOLIC BLOOD PRESSURE: 58 MMHG | TEMPERATURE: 97.4 F | OXYGEN SATURATION: 95 %

## 2024-04-18 DIAGNOSIS — L98.9 SKIN LESION OF RIGHT LEG: Primary | ICD-10-CM

## 2024-04-18 DIAGNOSIS — Z85.828 HISTORY OF BASAL CELL CARCINOMA: ICD-10-CM

## 2024-04-18 DIAGNOSIS — C44.91 SKIN CANCER, BASAL CELL: ICD-10-CM

## 2024-04-18 PROCEDURE — 99213 OFFICE O/P EST LOW 20 MIN: CPT | Mod: 25 | Performed by: INTERNAL MEDICINE

## 2024-04-18 PROCEDURE — 88305 TISSUE EXAM BY PATHOLOGIST: CPT | Performed by: PATHOLOGY

## 2024-04-18 PROCEDURE — 11102 TANGNTL BX SKIN SINGLE LES: CPT | Performed by: INTERNAL MEDICINE

## 2024-04-18 ASSESSMENT — PAIN SCALES - GENERAL: PAINLEVEL: NO PAIN (0)

## 2024-04-18 NOTE — PROCEDURES
SHAVEBIOPSY  PROCEDURE NOTE, SHAVE EXCISION    Indication: Atypical skin lesion on the right leg.      History of allergy to iodine: NO     The risks (including bleeding and infection) and benefits of the procedure and written informed consent obtained.    Local anesthesia was performed with Lidocaine 1% without epinephrine, prepped with povidone iodine, and draped in the usual sterile fashion.  Using a dermablade i shaved beneath the lesion to adipose tissue and removed.     Final length of Excision--1.5 cm    The wound stopped bleeding and a sterile dressing applied.  The specimen was  sent for pathologic examination.    The patient tolerated the procedure well and without apparent complications.    The patient was instructed to keep the wound dry and covered for 24-48h and clean thereafter, and warning signs of infection were reviewed.    Recommended that the patient use OTC analgesics as needed for pain.  Followup sooner for any concerns.    [email or web address]

## 2024-04-18 NOTE — PROGRESS NOTES
Assessment and Plan  1. Skin lesion of right leg  2. History of basal cell carcinoma  New problem, patient endorses that he has noticed this lesion on his right leg this week, unsure on when it has been there for some time which I called the patient's spouse Toshia on telephone call as well and confirmed.  Shared decision for shave biopsy to make sure it is not having any atypical or malignant cells given the history of basal cell cancer.  -Consent taken from the patient as well as verbal consent from the wife on phone call, procedure performed as mentioned below.  - TANGENTIAL BIOPSY OF SKIN, FIRST/SINGLE LESION  - Surgical Pathology Exam    UPDATE -     1. Skin lesion of right leg  2. History of basal cell carcinoma  3. Skin cancer, basal cell  Pt requesting for a new referral for dermatology. Went ahead and placed given the NEW diagnosis of recurrence of BCC on the above skin biopsy done.   - Adult Dermatology  Referral; Future          Please note that this note consists of symbols derived from keyboarding, dictation and/or voice recognition software. As a result, there may be errors in the script that have gone undetected. Please consider this when interpreting information found in this chart.    Patient Instructions       SHAVEBIOPSYAFTERCARE  WOUND CARE FOLLOWING A SHAVE BIOPSY -   Please remove the dressing or bandage 24-48 hours following the surgery. Once the bandage has been removed, you may begin taking showers. Begin twice-a-day (morning and evening) cleansing of the wound, using mild soap and water. Do this using a Q-tipTM or clean gauze. Gently cleanse to remove any dried blood or excess crusting; rinse well with tap water. Do this until there is no fresh or dried blood. Continue cleansing with a mild soap or water until the wound is completely healed.   Adequate cleansing will prevent a thick scab from forming. This is important since a thick scab is destructive to good wound healing and  may lead to a more noticeable scar. After cleansing, apply a coating of Vaseline  or Neosporin . Keep ointment on the wound at all times until the wound is healed.   Apply a bandage over the wound for the first five to seven days. Most wounds may be left uncovered after five to seven days. However, wounds that are on an area of the body that becomes easily irritated should be covered with a bandage during the daytime for an additional five to seven days. This bandage can be removed at night.   A shave excision may take four to six weeks to heal completely, depending on the size of the wound and its location. Continue cleansing the wound with soap and water and applying Vaseline  or Neosporin  until your wound is healed. Allow any scab formation to fall out on it own. Do not pick at it. Avoid hot tubs, swimming, or taking a bath for two weeks following surgery.   When to call the Doctor Call - if you notice any of the following:    Severe inflammation (spreading redness, tenderness, swelling, or warmth that last more than 24 hours.  Red streaks near the wound.  Increased bloody drainage  Green or yellow drainage, or a foul smell coming from the wound  A fever that continues for more than 24 hours.    Return in about 8 weeks (around 6/14/2024), or if symptoms worsen or fail to improve, for Annual Wellness Exam, video visit.    Michaela Sharma MD  [facility] LENARD PRASHAYAN Warner is a 83 year old, presenting for the following health issues:  Mass (Bump on right leg)        4/18/2024    10:21 AM   Additional Questions   Roomed by Loli     History of Present Illness       Reason for visit:  To be treated  Symptom onset:  3-7 days ago  Symptoms include:  Nodule with possible fluid  Symptom intensity:  Moderate  Symptom progression:  Staying the same  Had these symptoms before:  No  What makes it worse:  Yes continued worsing  What makes it better:  Rapid improvement    He eats 2-3 servings of  fruits and vegetables daily.He consumes 0 sweetened beverage(s) daily.He exercises with enough effort to increase his heart rate 20 to 29 minutes per day.  He exercises with enough effort to increase his heart rate 3 or less days per week.   He is taking medications regularly.       Recently seen patient on 4/11/2024 for preoperative clearance of colonoscopy, he is here for checking on the right leg skin lesion/bump       Allergies   Allergen Reactions    Shellfish Allergy     Shellfish-Derived Products         Past Medical History:   Diagnosis Date    Arthritis 1970    Dx'd with RA when in the     BPH (benign prostatic hyperplasia) 12/16/2013    Cancer (H)     basal cell cancer behind ear    Diabetes mellitus     ED (erectile dysfunction) 1/6/2015    HTN (hypertension)     Hyperlipidemia LDL goal <100     Hypothyroidism     Left atrial enlargement 5/24/2023    Mumps     Obesity        Past Surgical History:   Procedure Laterality Date    BIOPSY      COLONOSCOPY N/A 11/21/2014    Procedure: COMBINED COLONOSCOPY, SINGLE OR MULTIPLE BIOPSY/POLYPECTOMY BY BIOPSY;  Surgeon: Rolanda Bey MD;  Location:  GI    COLONOSCOPY N/A 1/20/2020    Procedure: COLONOSCOPY, WITH POLYPECTOMY AND BIOPSY;  Surgeon: Christina Vieira MD;  Location:  GI    COLONOSCOPY N/A 2/1/2021    Procedure: Colonoscopy, With Polypectomy And Biopsy;  Surgeon: Christina Vieira MD;  [facility]    ESOPHAGOSCOPY, GASTROSCOPY, DUODENOSCOPY (EGD), COMBINED N/A 3/16/2021    Procedure: ESOPHAGOGASTRODUODENOSCOPY, WITH BIOPSY;  Surgeon: Jose Schrader MD;  Location:  GI    EYE SURGERY      TESTICLE SURGERY      TONSILLECTOMY, ADENOIDECTOMY, COMBINED      VASECTOMY         Family History   Problem Relation Age of Onset    Diabetes Maternal Grandmother     Diabetes Maternal Grandfather     Arthritis Maternal Grandfather     Arthritis Father     Thyroid Disease Father     Glaucoma Mother     Alcohol/Drug Mother 49         cirrhosis    Diabetes Daughter 44        type 2    Obesity Daughter     Glaucoma Maternal Aunt        Social History     Tobacco Use    Smoking status: Former     Current packs/day: 0.00     Average packs/day: 0.5 packs/day for 51.0 years (25.5 ttl pk-yrs)     Types: Cigarettes, Pipe     Start date: 1958     Quit date: 11/15/2011     Years since quittin.4    Smokeless tobacco: Never    Tobacco comments:     Smoked pipes 10 minutes a day started 22 yrs old , quit 10 yrs back.   Substance Use Topics    Alcohol use: No        Current Outpatient Medications   Medication Sig Dispense Refill    acetaminophen (TYLENOL) 500 MG tablet Take 1 tablet (500 mg) by mouth every 4 hours as needed for mild pain 30 tablet 0    albuterol (PROAIR HFA/PROVENTIL HFA/VENTOLIN HFA) 108 (90 Base) MCG/ACT inhaler Inhale 2 puffs into the lungs every 6 hours as needed for shortness of breath, wheezing or cough 18 g 1    carvedilol (COREG) 3.125 MG tablet TAKE 1 TABLET BY MOUTH TWICE  DAILY 200 tablet 1    escitalopram (LEXAPRO) 10 MG tablet TAKE 1 TABLET BY MOUTH DAILY 90 tablet 0    Ferrous Sulfate 324 (65 Fe) MG TBEC Take 324 mg by mouth 2 times daily      Fexofenadine HCl (ALLEGRA PO) Take 180 mg by mouth daily       finasteride (PROSCAR) 5 MG tablet Take 1 tablet (5 mg) by mouth daily 90 tablet 3    insulin glargine (LANTUS SOLOSTAR) 100 UNIT/ML pen       latanoprost (XALATAN) 0.005 % ophthalmic solution Place 1 drop into both eyes daily      levothyroxine (SYNTHROID/LEVOTHROID) 88 MCG tablet Take 88 mcg by mouth daily.      losartan (COZAAR) 100 MG tablet TAKE 1 TABLET BY MOUTH DAILY 100 tablet 2    lovastatin (MEVACOR) 40 MG tablet TAKE 1 TABLET BY MOUTH DAILY AT  BEDTIME 90 tablet 2    metFORMIN (GLUCOPHAGE XR) 500 MG 24 hr tablet       Multiple Vitamins-Minerals (CENTRUM SILVER) per tablet Take 1 tablet by mouth daily      ONETOUCH ULTRA test strip Pt uses one touch Verio system and strips      tamsulosin (FLOMAX) 0.4 MG  capsule Take 2 capsules (0.8 mg) by mouth daily 180 capsule 3     No current facility-administered medications for this visit.        Review of Systems  Constitutional, HEENT, cardiovascular, pulmonary, GI, , musculoskeletal, neuro, skin, endocrine and psych systems are negative, except as otherwise noted.      Objective    /58   Pulse 67   Temp 97.4  F (36.3  C) (Temporal)   Resp 13   SpO2 95%   There is no height or weight on file to calculate BMI.  Physical Exam   GENERAL: alert and no distress  NECK: no adenopathy, no asymmetry, masses, or scars  RESP: lungs clear to auscultation - no rales, rhonchi or wheezes  CV: regular rate and rhythm, normal S1 S2, no S3 or S4, no murmur, click or rub, no peripheral edema  ABDOMEN: soft, nontender, no hepatosplenomegaly, no masses and bowel sounds normal  MS: no gross musculoskeletal defects noted, no edema  SKIN : POSITIVE for raised papular lesion with chronic inflammatory changes on the right leg measuring around 1 cm in diameter.  No tenderness on erythema on palpation.      Signed Electronically by: Michaela Sharma MD

## 2024-04-18 NOTE — PATIENT INSTRUCTIONS
SHAVEBIOPSYAFTERCARE  WOUND CARE FOLLOWING A SHAVE BIOPSY -   Please remove the dressing or bandage 24-48 hours following the surgery. Once the bandage has been removed, you may begin taking showers. Begin twice-a-day (morning and evening) cleansing of the wound, using mild soap and water. Do this using a Q-tipTM or clean gauze. Gently cleanse to remove any dried blood or excess crusting; rinse well with tap water. Do this until there is no fresh or dried blood. Continue cleansing with a mild soap or water until the wound is completely healed.   Adequate cleansing will prevent a thick scab from forming. This is important since a thick scab is destructive to good wound healing and may lead to a more noticeable scar. After cleansing, apply a coating of Vaseline  or Neosporin . Keep ointment on the wound at all times until the wound is healed.   Apply a bandage over the wound for the first five to seven days. Most wounds may be left uncovered after five to seven days. However, wounds that are on an area of the body that becomes easily irritated should be covered with a bandage during the daytime for an additional five to seven days. This bandage can be removed at night.   A shave excision may take four to six weeks to heal completely, depending on the size of the wound and its location. Continue cleansing the wound with soap and water and applying Vaseline  or Neosporin  until your wound is healed. Allow any scab formation to fall out on it own. Do not pick at it. Avoid hot tubs, swimming, or taking a bath for two weeks following surgery.   When to call the Doctor Call - if you notice any of the following:    Severe inflammation (spreading redness, tenderness, swelling, or warmth that last more than 24 hours.  Red streaks near the wound.  Increased bloody drainage  Green or yellow drainage, or a foul smell coming from the wound  A fever that continues for more than 24 hours.

## 2024-04-19 ENCOUNTER — TELEPHONE (OUTPATIENT)
Dept: CARE COORDINATION | Facility: CLINIC | Age: 84
End: 2024-04-19
Payer: COMMERCIAL

## 2024-04-19 DIAGNOSIS — Z71.89 COUNSELING AND COORDINATION OF CARE: Primary | ICD-10-CM

## 2024-04-19 DIAGNOSIS — R41.3 MEMORY CHANGES: ICD-10-CM

## 2024-04-19 NOTE — TELEPHONE ENCOUNTER
CC received call from spouse.     Writer has had previous contact with pt/spouse for previous episode of Care Coordination while covering [facility] Care Coordination.     Ladan shares she has been trying to reach William from [facility] and has been unsuccessful. Ladan shares she feels they are at the stage of needing to start making arrangements for alternate and more supportive living.     Clinical Data: Care Coordinator Outreach    Outreach Documentation Number of Outreach Attempt   3/11/2024  10:39 AM 2   4/19/2024  12:46 PM 1       Left message on  Ladan's  voicemail with call back information and requested return call.    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 [facility] explained William provided an alternative recommendation in lieu of [employer] closing. [facility] provided [facility] Care on Ladan's VM:    [facility]  [email or web address]    [place] Representative:  Gely Salguero   [phone]   [email or web address]      [place] [facility] is a professional Elder Care Referral Agency (ECRA). In simple terms, we help families find senior housing for their loved ones when it s no longer safe for them to live independently at home because of age or disability.    As is typical for ECRA agencies, our fees are paid by care providers so all our services are 100% FREE to seniors and their families.    What types of placements can we help with?  We specialize in all types of Long Term Care communities: Assisted Living, Memory Care, Skilled Nursing (Nursing Homes), Residential Care Homes and Care Suites, and we can also assist with Independent Living, Home Care, and Hospice, as well as additional resources such as financial or legal planning and moving services.    We excel in crisis situations and can find a suitable senior home in less than a week!      Plan: Care Coordination referral placed for navigating long term care and encouraged pt/spouse  connect with [facility] Care Coordination team for more support. Writer will do no further outreaches at this time and will defer to [facility] Care Coordination team.     Alistair Osuna, [facility]  Clinic Care Coordinator  [facility]  [phone]  [email or web address]

## 2024-04-22 ENCOUNTER — PATIENT OUTREACH (OUTPATIENT)
Dept: CARE COORDINATION | Facility: CLINIC | Age: 84
End: 2024-04-22
Payer: COMMERCIAL

## 2024-04-22 ENCOUNTER — TELEPHONE (OUTPATIENT)
Dept: FAMILY MEDICINE | Facility: CLINIC | Age: 84
End: 2024-04-22
Payer: COMMERCIAL

## 2024-04-22 LAB
PATH REPORT.COMMENTS IMP SPEC: NORMAL
PATH REPORT.COMMENTS IMP SPEC: NORMAL
PATH REPORT.FINAL DX SPEC: NORMAL
PATH REPORT.GROSS SPEC: NORMAL
PATH REPORT.MICROSCOPIC SPEC OTHER STN: NORMAL
PATH REPORT.RELEVANT HX SPEC: NORMAL
PHOTO IMAGE: NORMAL

## 2024-04-22 NOTE — TELEPHONE ENCOUNTER
Order/Referral Request    Who is requesting: Patient and provider     Orders being requested: Dermatology     Reason service is needed/diagnosis: basal cell carcinoma    When are orders needed by: as soon as available    Has this been discussed with Provider: Yes    Does patient have a preference on a Group/Provider/Facility? [facility] or wherever is close and in network that Dr Sharma would recommend    Does patient have an appointment scheduled?: No    Where to send orders: Place orders within Epic    Could we [facility] this information to you [facility] or would you prefer to receive a phone call?:   Patient would prefer a phone call   Okay to leave a detailed message?: Yes at Cell number on file:    Telephone Information:   Mobile [phone]

## 2024-04-22 NOTE — Clinical Note
Celestine Johnson, Phone Assessment is scheduled for 4/25/24 at 11:00am. Please call Ladan at [phone] Please see telephone encounter 4/19/24. Thank you. [place]

## 2024-04-22 NOTE — PROGRESS NOTES
Clinic Care Coordination Contact  [facility]/Voicemail    Clinical Data: Care Coordinator Outreach    Outreach Documentation Number of Outreach Attempt   3/11/2024  10:39 AM 2   4/19/2024  12:46 PM 1   4/22/2024   8:57 AM 1         Reason for Referral:  Patient/Caregiver Support  Navigation of Long Term Care/[facility] Services  Resources for Support    See Telephone Encounter from previous [facility] 4/19/2024. Needing to start conversation for more supportive living.          Left message on patient's spouse's voicemail with call back information and requested return call.    Plan: Care Coordinator will try to reach patient again in 1-2 business days.    CHANDLER Briscoe  Clinic Care Coordination  [facility] Clinics: [facility] [place], and [facility]  Phone: [phone]

## 2024-04-23 ENCOUNTER — MYC MEDICAL ADVICE (OUTPATIENT)
Dept: FAMILY MEDICINE | Facility: CLINIC | Age: 84
End: 2024-04-23
Payer: COMMERCIAL

## 2024-04-23 ENCOUNTER — TELEPHONE (OUTPATIENT)
Dept: DERMATOLOGY | Facility: CLINIC | Age: 84
End: 2024-04-23
Payer: COMMERCIAL

## 2024-04-23 DIAGNOSIS — C44.712 BASAL CELL CARCINOMA OF RIGHT LOWER LEG: Primary | ICD-10-CM

## 2024-04-23 NOTE — ADDENDUM NOTE
Addended by: KAYKAY CUEVAS on: 4/23/2024 08:18 AM     Modules accepted: Orders, Level of Service

## 2024-04-23 NOTE — LETTER
[address]  [address]  [phone]        4/24/2024       Timmy Strange  [facility] [facility] [address]      Dear Timmy:    You are scheduled for Mohs Surgery on: Thursday May 23rd at 9 am.    Please check in at 3rd Floor Dermatology Clinic, [address].     You don't need to arrive more than 5-10 minutes prior to your appointment time.     Be sure to eat a good breakfast and bathe and wash your hair prior to surgery.     If you are taking any anti-coagulants that are prescribed by your Doctor (such as Coumadin/Warfarin, Plavix, Aspirin, Ibuprofen), please continue taking them.     However, if you are taking anti-coagulants over the counter without a Doctor's order for a medical condition, please discontinue them 10 days prior to surgery.     Please wear loose comfortable clothing as it could possibly be 4-6 hours until your surgery is completed depending upon how many layers of tissue need to be removed.      Thank you,    Dayo Hand MD

## 2024-04-23 NOTE — TELEPHONE ENCOUNTER
Left patient a voicemail to schedule a consult & mohs for     below the knee, shave excision: -Morpheaform pattern basal cell carcinoma    with Dr. Lancaster or Dr. Esquivel. Provided my direct phone number.    Kerry Tatum on 4/23/2024 at 3:18 PM

## 2024-04-23 NOTE — TELEPHONE ENCOUNTER
Referral placed as Urgent as requested.     Thank you  Michaela Sharma MD on 4/23/2024 at 8:15 AM

## 2024-04-23 NOTE — PROGRESS NOTES
Clinic Care Coordination Contact  Community Health Worker Initial Outreach    Patient was previously enrolled in CC.    CHW introduced self and offered the CC program.  Spoke with patient's wife, Ladna.    Reason for Referral:  Patient/Caregiver Support  Navigation of Long Term Care/[facility] Services  Resources for Support     See Telephone Encounter from previous [facility] 4/19/2024. Needing to start conversation for more supportive living.       CHW Initial Information Gathering:  Referral Source: PCP  Preferred Hospital: [facility]  [phone]  Current living arrangement:: I live in a private home with spouse  Type of residence:: Private home - stairs  Community Resources: None  Supplies Currently Used at Home: Diabetic Supplies, Wound Care Supplies  Equipment Currently Used at Home: none  Informal Support system:: Spouse  No PCP office visit in Past Year: No  Transportation means:: Family  CHW Additional Questions  If ED/Hospital discharge, follow-up appointment scheduled as recommended?: N/A  Medication changes made following ED/Hospital discharge?: N/A  MyChart active?: Yes  Patient sent Social Determinants of Health questionnaire?: Yes    Patient accepts CC: Yes. Patient scheduled for assessment with JAYDE Johnson on 4/25/24 at 11:00am. Patient noted desire to discuss Assisted Living Facilities and CC Support. Please call Patient's Wife, Ladan at [phone].     CHANDLER Briscoe  Clinic Care Coordination  [facility] Clinics: [place], [place], [facility], and [facility]  Phone: [phone]

## 2024-04-24 NOTE — TELEPHONE ENCOUNTER
Left message on secure voicemail.  We see the patient does have a derm visit scheduled.     Patricia Palomo RN  [facility]

## 2024-04-24 NOTE — TELEPHONE ENCOUNTER
S/w wife Toshia and advised pt does not need a derm consult and can schedule for surgery.  Wife states the spot is on pt's lower right leg front of shin.  Explained and answered all questions about mohs procedure.  Scheduled with Dr. Hand at  on Thursday May 23rd at 9 am.    Mohs letter and information sent via my chart and mailed home.    Ashley ESCOBAR RN  [facility] [place]  [phone]

## 2024-04-25 ENCOUNTER — PATIENT OUTREACH (OUTPATIENT)
Dept: NURSING | Facility: CLINIC | Age: 84
End: 2024-04-25
Payer: COMMERCIAL

## 2024-04-25 ASSESSMENT — ACTIVITIES OF DAILY LIVING (ADL): DEPENDENT_IADLS:: MEDICATION MANAGEMENT;MONEY MANAGEMENT;TRANSPORTATION

## 2024-04-25 NOTE — PROGRESS NOTES
Clinic Care Coordination Contact  Clinic Care Coordination Contact  OUTREACH    Referral Information:  Referral Source: PCP    Primary Diagnosis: Other (include Comment box)    Chief Complaint   Patient presents with    Clinic Care Coordination - Initial     Higher level of care         Universal Utilization: 56%  Clinic Utilization  Difficulty keeping appointments:: No  Compliance Concerns: No  No-Show Concerns: No  No PCP office visit in Past Year: No  Utilization      No Show Count (past year)  2             ED Visits  0             Hospital Admissions  0                    Current as of: 4/24/2024 11:50 AM                Clinical Concerns:  Current Medical Concerns:    Patient Active Problem List   Diagnosis    Essential hypertension    Hypothyroidism    Type II diabetes mellitus with peripheral circulatory disorder (H)    BPH (benign prostatic hyperplasia)    ED (erectile dysfunction)    Mixed hyperlipidemia    Special screening for malignant neoplasm of prostate    Localized edema    Seasonal allergic rhinitis    Screening for prostate cancer    Residual hemorrhoidal skin tags    Excess skin of eyelid, unspecified laterality    Glaucoma of both eyes, unspecified glaucoma type    Flatulence, eructation, and gas pain    Weakness of voice    Anemia, unspecified type    Gastroesophageal reflux disease without esophagitis    Dependent edema    Memory changes    Pain due to onychomycosis of nail    Oropharyngeal dysphagia    Rectal hemorrhage    Change in bowel habits    Internal hemorrhoids with Hx of banding    Anemia, iron deficiency    Malabsorption of iron    Mild episode of recurrent major depressive disorder (H24)    Left atrial enlargement    Hypertensive heart disease with heart failure (H)    History of basal cell carcinoma      YARITZA DONOHUE spoke with spouse Ladan.    She will be speaking with someone today from [facility] to explore [facility]/higher level of care for Timmy.  Ladan states that she is struggling  being a caregiver especially now that Timmy doesn't;t drive and she needs to accompany him to appointments.   They had been using [employer] but needed to end this as could no longer afford it after Ladan lost her job.  She stated she is looking for a different job    They have an  they work with and he advised them that they will not qualify for [facility] assistance (waiver, etc) based on their income.   CC also suggested they look in to seeing if he qualifies for Veterans service.  She said that is also income based   and there is a  9 - 12 month wait for that,      Briefly discussed private pay options  and Ladan shared that she is aware of that as an option,. She stated that she already has all of the resources this  CC suggested and was hoping there were more options.  She will will wait to see what comes of meeting with [facility] today.      Current Behavioral Concerns: None noted    Education Provided to patient: CC role, Private pay home are  [facility] . Previous  CC had given her information about [facility] .      Health Maintenance Reviewed: Due/Overdue   Health Maintenance Due   Topic Date Due    HF ACTION PLAN  Never done    DEPRESSION ACTION PLAN  Never done    ZOSTER IMMUNIZATION (1 of 2) Never done    RSV VACCINE (Pregnancy & 60+) (1 - 1-dose 60+ series) Never done    DIABETIC FOOT EXAM  12/23/2022    COLORECTAL CANCER SCREENING  02/01/2024    COVID-19 Vaccine (7 - 2023-24 season) 03/10/2024      Clinical Pathway: None    Medication Management:  Medication review status: Medications reviewed and no changes reported per patient.             Functional Status:  Dependent ADLs:: Independent  Dependent IADLs:: Medication Management, Money Management, Transportation    Living Situation:  Current living arrangement:: I live in a private home with spouse  Type of residence:: Private home - stairs    Lifestyle & Psychosocial Needs:    Social Determinants of  Health     Food Insecurity: Low Risk  (4/23/2024)    Food Insecurity     Within the past 12 months, did you worry that your food would run out before you got money to buy more?: No     Within the past 12 months, did the food you bought just not last and you didn t have money to get more?: No   Depression: Not at risk (12/27/2023)    PHQ-2     PHQ-2 Score: 0   Housing Stability: Low Risk  (4/23/2024)    Housing Stability     Do you have housing? : Yes     Are you worried about losing your housing?: No   Tobacco Use: Medium Risk (4/18/2024)    Patient History     Smoking Tobacco Use: Former     Smokeless Tobacco Use: Never     Passive Exposure: Not on file   Financial Resource Strain: Low Risk  (4/23/2024)    Financial Resource Strain     Within the past 12 months, have you or your family members you live with been unable to get utilities (heat, electricity) when it was really needed?: No   Alcohol Use: Not At Risk (4/23/2024)    AUDIT-C     Frequency of Alcohol Consumption: Never     Average Number of Drinks: Patient does not drink     Frequency of Binge Drinking: Never   Transportation Needs: Low Risk  (4/23/2024)    Transportation Needs     Within the past 12 months, has lack of transportation kept you from medical appointments, getting your medicines, non-medical meetings or appointments, work, or from getting things that you need?: No   Physical Activity: Insufficiently Active (4/23/2024)    Exercise Vital Sign     Days of Exercise per Week: 1 day     Minutes of Exercise per Session: 30 min   Interpersonal Safety: Not At Risk (9/8/2021)    Humiliation, Afraid, Rape, and Kick questionnaire     Fear of Current or Ex-Partner: No     Emotionally Abused: No     Physically Abused: No     Sexually Abused: No   Stress: Stress Concern Present (4/23/2024)    [place] [employer] - Occupational Stress Questionnaire     Feeling of Stress : Rather much   Social Connections: Socially Isolated (4/23/2024)     Social Connection and Isolation Panel [NHANES]     Frequency of Communication with Friends and Family: Once a week     Frequency of Social Gatherings with Friends and Family: Once a week     Attends Mormonism Services: Never     Active Member of Clubs or Organizations: No     Attends Club or Organization Meetings: Never     Marital Status:    Health Literacy: Not on file     Inadequate nutrition (GOAL):: No  Tube Feeding: No  Inadequate activity/exercise (GOAL):: No  Significant changes in sleep pattern (GOAL): No  Transportation means:: Family     Mormonism or spiritual beliefs that impact treatment:: No  Mental health DX:: Yes  Mental health DX how managed:: Medication  Mental health management concern (GOAL):: No  Informal Support system:: Children, Spouse             Resources and Interventions:  Current Resources:      Community Resources: None  Supplies Currently Used at Home: Diabetic Supplies, Hearing Aid Batteries  Equipment Currently Used at Home: none  Employment Status: retired         Advance Care Plan/Directive  Advanced Care Plans/Directives on file:: Yes  Type Advanced Care Plans/Directives: Advanced Directive - On File    Referrals Placed: Community Resources         Care Plan:Declined enrolling in CC      Patient/Caregiver understanding:        Future Appointments                Today CR CCC [facility]     In 4 weeks Dayo Hand MD [facility],     In 1 month EC LAB [facility], EC    In 2 months Chandrakant Panchal MD [facility] YAEL    In 3 months Pino Meier MD [facility] [place]            Plan: Ladan plans to meet with [facility] regarding higher level of care for Timmy. She reported she already knew resources that this [facility] had to offer her. She has  CC contact information for future reference   No further ou teaches will be made at this time.      GUY Givens   Care Coordination Team  [phone]

## 2024-04-29 ENCOUNTER — TRANSFERRED RECORDS (OUTPATIENT)
Dept: HEALTH INFORMATION MANAGEMENT | Facility: CLINIC | Age: 84
End: 2024-04-29
Payer: COMMERCIAL

## 2024-05-05 DIAGNOSIS — I10 ESSENTIAL HYPERTENSION: ICD-10-CM

## 2024-05-06 RX ORDER — CARVEDILOL 3.12 MG/1
TABLET ORAL
Qty: 180 TABLET | Refills: 1 | Status: SHIPPED | OUTPATIENT
Start: 2024-05-06

## 2024-05-08 ENCOUNTER — TRANSFERRED RECORDS (OUTPATIENT)
Dept: HEALTH INFORMATION MANAGEMENT | Facility: CLINIC | Age: 84
End: 2024-05-08
Payer: COMMERCIAL

## 2024-05-13 ENCOUNTER — TELEPHONE (OUTPATIENT)
Dept: CARE COORDINATION | Facility: CLINIC | Age: 84
End: 2024-05-13
Payer: COMMERCIAL

## 2024-05-13 ENCOUNTER — PATIENT OUTREACH (OUTPATIENT)
Dept: CARE COORDINATION | Facility: CLINIC | Age: 84
End: 2024-05-13
Payer: COMMERCIAL

## 2024-05-13 NOTE — TELEPHONE ENCOUNTER
Clinic Care Coordination Contact    Patient's wife called requesting orders for a home care nurse to assist with wound care.  Patient's wife stated her  is have a procedure with dermatology on 5/23/24.        Please call patient's wife, Ladan at [phone].    Thank you.     Paris Gilbert, ROSA  Clinic Care Coordination  [facility] Clinics: [place], [place], [place], and [place] [facility]  Phone: [phone]

## 2024-05-13 NOTE — PROGRESS NOTES
Clinic Care Coordination Contact    Patient's wife called- LVM.    Patient's wife is requesting a home care wound nurse for her  when he has mole surgery on 5/23/24.    CHW will route a message to  Triage.    CHADNLER Briscoe  Clinic Care Coordination  [facility] Clinics: [place], [place], [place], and [place] [facility]  Phone: [phone]

## 2024-05-14 NOTE — TELEPHONE ENCOUNTER
Can I review the wound , It needs to be examined for me to place Home care orders.  OK to submit E-visit through [employer] with picture attachment as well as document symptoms if any fever  , pus or pain on the wound / Video visit if pt unable to do E-visit.     Thank you  Michaela Sharma MD on 5/13/2024

## 2024-05-14 NOTE — TELEPHONE ENCOUNTER
Consent to communicate on file for patient's wife Ladan.       Triage Patient Outreach    Attempt # 1    Was call answered?  No.  Left voicemail to return call to Triage at [facility].  Confirm that wife is requesting help with care of future wound.     Cate Ward RN

## 2024-05-15 ENCOUNTER — TELEPHONE (OUTPATIENT)
Dept: FAMILY MEDICINE | Facility: CLINIC | Age: 84
End: 2024-05-15
Payer: COMMERCIAL

## 2024-05-15 NOTE — TELEPHONE ENCOUNTER
High priority call, wife calling, CTC on file, informs:      ROUTED TO PCP AND [facility] TRIAGE, PLEASE REVIEW AND ADVISE APPOINTMENT TOMORROW FOR RIGHT LOWER LEG FOLLOW UP AND RIGHT FOOT, CALL WIFE TO INFORM     OK TO USE ANOTHER SAME DAY APPOINTMENT ?     ~not an emergency call, was trying to reach [facility] now to arrange appointment tomorrow  ~pt saw podiatry today, wife was not present at visit  ~pt informs podiatrist mentioned swelling on top of foot extending to lower leg  ~wife does not view skin or pt leg on a regular basis and has not viewed area  ~informs now that is looking wants appointment tomorrow to make sure cellulitis is not developing, had cellulitis issue in the past and wants to make sure nothing will develop  ~also informs redness still around lower right leg biopsy from 4/18/24, pt has follow up derm appointment scheduled 5/23/24  ~wife also wants this rechecked  ~informs that pt usually not very active and sits around all day, was more active and on feet today  ~fife frustrated that call did not go to clinic for appointment per her request            Naima Ribeiro, RN, BSN  [facility]

## 2024-05-15 NOTE — TELEPHONE ENCOUNTER
Patient's wife will call home care to set up dressing changes with out insurance.     The patient's wife was told we will not know the dressing change protocol until after the derm visit 58/23/2024.       Patricia Palomo RN  -[facility]

## 2024-05-15 NOTE — TELEPHONE ENCOUNTER
[place] .     Spoke to wife and she may have to drop off her  for the appointment tomorrow.     She wanted  his issues documented incase she cannot stay for his visit.     Biopsy on  RT shin.     He was seen by podiatrist today. Please see the notes from 5/15/2024 visit.     Wife concerned that rt shin is swelling. Maybe an issue with biopsy?     Patricia Palomo RN  [facility]

## 2024-05-16 ENCOUNTER — OFFICE VISIT (OUTPATIENT)
Dept: FAMILY MEDICINE | Facility: CLINIC | Age: 84
End: 2024-05-16
Payer: COMMERCIAL

## 2024-05-16 VITALS
SYSTOLIC BLOOD PRESSURE: 122 MMHG | HEART RATE: 67 BPM | DIASTOLIC BLOOD PRESSURE: 60 MMHG | BODY MASS INDEX: 32.32 KG/M2 | OXYGEN SATURATION: 95 % | RESPIRATION RATE: 19 BRPM | TEMPERATURE: 98.3 F | WEIGHT: 190 LBS

## 2024-05-16 DIAGNOSIS — L98.9 SKIN LESION OF RIGHT LEG: ICD-10-CM

## 2024-05-16 DIAGNOSIS — I11.0 HYPERTENSIVE HEART DISEASE WITH HEART FAILURE (H): ICD-10-CM

## 2024-05-16 DIAGNOSIS — C44.91 SKIN CANCER, BASAL CELL: ICD-10-CM

## 2024-05-16 DIAGNOSIS — L03.115 CELLULITIS OF RIGHT LEG: Primary | ICD-10-CM

## 2024-05-16 PROCEDURE — 99214 OFFICE O/P EST MOD 30 MIN: CPT | Performed by: INTERNAL MEDICINE

## 2024-05-16 RX ORDER — BACITRACIN ZINC AND POLYMYXIN B SULFATE 500; 1000 [USP'U]/G; [USP'U]/G
OINTMENT TOPICAL 2 TIMES DAILY
Qty: 30 G | Refills: 0 | Status: SHIPPED | OUTPATIENT
Start: 2024-05-16 | End: 2024-06-06

## 2024-05-16 RX ORDER — RESPIRATORY SYNCYTIAL VIRUS VACCINE 120MCG/0.5
0.5 KIT INTRAMUSCULAR ONCE
Qty: 1 EACH | Refills: 0 | Status: CANCELLED | OUTPATIENT
Start: 2024-05-16 | End: 2024-05-16

## 2024-05-16 RX ORDER — SULFAMETHOXAZOLE/TRIMETHOPRIM 800-160 MG
1 TABLET ORAL 2 TIMES DAILY
Qty: 20 TABLET | Refills: 0 | Status: SHIPPED | OUTPATIENT
Start: 2024-05-16 | End: 2024-06-07

## 2024-05-16 ASSESSMENT — PAIN SCALES - GENERAL: PAINLEVEL: MILD PAIN (3)

## 2024-05-16 NOTE — PATIENT INSTRUCTIONS
Given your right leg swelling and infection at this time , recommend to complete the antibiotic course and check with dermatology as scheduled if they want go ahead with Mohs procedure once the healing is done.     Take Tylenol 3 x / day for reducing the swelling and inflammation.  Sent in antibiotic cream to pharmacy     Do not take Bactrim and Metformin at same time to avoid drug interaction     Will consider getting you in on my same day slot for making sure I check on it and place referral to Home health referral as requested.     =======================

## 2024-05-16 NOTE — PROGRESS NOTES
Assessment and Plan  1. Cellulitis of right leg  New problem with swelling and erythema around the biopsy site of the shave biopsy done on 4/18/2024 with a skin lesion on the right leg.  Patient wife does endorses that there has been concerns on patient's presence of minor lability on the history as he tells regarding his current care.  Most likely cognitive concerns as well.  -Physical exam positive for erythema surrounding the biopsy site as well as swelling with mild edema on the leg most likely due to inflammation.  No tenderness on palpation, will go ahead and treat this as cellulitis of the right leg with antibiotic Bactrim and topical cream with red flag symptoms explained to the patient.  Patient and wife understood and agreed with the plan.  - sulfamethoxazole-trimethoprim (BACTRIM DS) 800-160 MG tablet; Take 1 tablet by mouth 2 times daily  Dispense: 20 tablet; Refill: 0  - bacitracin-polymyxin b (POLYSPORIN) 500-37313 UNIT/GM external ointment; Apply topically 2 times daily On the leg wound  Dispense: 30 g; Refill: 0    2. Skin cancer, basal cell  Recent diagnosis of the biopsy positive for basal cell cancer again which was also seen in the remote past on his history of skin cancers.  Patient is awaiting for his Mohs procedure scheduled with dermatology on 5/23.  Discussed most part of his appointment on the pros and cons of this infection and prognosis of the Mohs procedure with aftercare which is what the wife is concerned as she is unable to take care of the patient at home with the dressing changes if needed.  Please see AVS instructions below with postprocedure visit needed for me to place home health orders depending on how the needs are pelvic exam after the procedure.  Patient and wife understood and agreed with the plan.    3. Skin lesion of right leg  Nonhealing biopsy site most likely aftercare not done as instructed to the patient.  Will give antibiotic cream as well as oral antibiotic as  mentioned above.  - bacitracin-polymyxin b (POLYSPORIN) 500-04460 UNIT/GM external ointment; Apply topically 2 times daily On the leg wound  Dispense: 30 g; Refill: 0    4. Hypertensive heart disease with heart failure (H)  Chronic stable, no peripheral edema on the left leg . Hence do not suspect any CHF leading this RT leg peripheral edema as this is related to his Cellulitis.      Wife Ladan in the room, most part of the appointment and they were having questions on post Mohs procedure home health care which I answered all of them.    Please note that this note consists of symbols derived from keyboarding, dictation and/or voice recognition software. As a result, there may be errors in the script that have gone undetected. Please consider this when interpreting information found in this chart.    Patient Instructions   Given your right leg swelling and infection at this time , recommend to complete the antibiotic course and check with dermatology as scheduled if they want go ahead with Mohs procedure once the healing is done.     Take Tylenol 3 x / day for reducing the swelling and inflammation.  Sent in antibiotic cream to pharmacy     Do not take Bactrim and Metformin at same time to avoid drug interaction     Will consider getting you in on my same day slot for making sure I check on it and place referral to Home health referral as requested.     =======================  Return in about 1 week (around 5/23/2024), or if symptoms worsen or fail to improve, for If symptoms persist, Follow up of last visit. Post procedure MOHS , video visit.    Michaela Sharma MD  [facility] [place]   Timmy is a 83 year old, presenting for the following health issues:  Leg Swelling (Went to podiatrist yesterday, who said to get his leg looked beacuse of swelling, mild pain ) and Wound Check        5/16/2024    10:28 AM   Additional Questions   Roomed by Loli     History of Present Illness        Reason for visit:  Sonue consumes 0 sweetened beverage(s) daily.He exercises with enough effort to increase his heart rate 10 to 19 minutes per day.  He exercises with enough effort to increase his heart rate 3 or less days per week.   He is taking medications regularly.       Skin Lesion  Onset/Duration: swelling from biopsy  Description  Location: right lower leg   Color: red   Border description: red  Character: none  Itching: no  Bleeding:  No  Intensity:  mild  Progression of Symptoms:  worsening  Accompanying signs and symptoms:   Bleeding: No  Scaling: No  Excessive sun exposure/tanning: No  Sunscreen used: No  History:           Any previous history of skin cancer: No  Any family history of melanoma: No  Previous episodes of similar lesion: No         Allergies   Allergen Reactions    Shellfish Allergy     Shellfish-Derived Products         Past Medical History:   Diagnosis Date    Arthritis 1970    Dx'd with RA when in the     BPH (benign prostatic hyperplasia) 12/16/2013    Cancer (H)     basal cell cancer behind ear    Diabetes mellitus     ED (erectile dysfunction) 1/6/2015    HTN (hypertension)     Hyperlipidemia LDL goal <100     Hypothyroidism     Left atrial enlargement 5/24/2023    Mumps     Obesity        Past Surgical History:   Procedure Laterality Date    BIOPSY      COLONOSCOPY N/A 11/21/2014    Procedure: COMBINED COLONOSCOPY, SINGLE OR MULTIPLE BIOPSY/POLYPECTOMY BY BIOPSY;  Surgeon: Rolanda Bey MD;  Location: [facility]    COLONOSCOPY N/A 1/20/2020    Procedure: COLONOSCOPY, WITH POLYPECTOMY AND BIOPSY;  Surgeon: Christina Vieira MD;  Location: [facility]    COLONOSCOPY N/A 2/1/2021    Procedure: Colonoscopy, With Polypectomy And Biopsy;  Surgeon: Christina Vieira MD;  Location: [facility]    ESOPHAGOSCOPY, GASTROSCOPY, DUODENOSCOPY (EGD), COMBINED N/A 3/16/2021    Procedure: ESOPHAGOGASTRODUODENOSCOPY, WITH BIOPSY;  Surgeon: Jose Schrader MD;  Location: [facility]     EYE SURGERY      TESTICLE SURGERY      TONSILLECTOMY, ADENOIDECTOMY, COMBINED      VASECTOMY         Family History   Problem Relation Age of Onset    Diabetes Maternal Grandmother     Diabetes Maternal Grandfather     Arthritis Maternal Grandfather     Arthritis Father     Thyroid Disease Father     Glaucoma Mother     Alcohol/Drug Mother 49        cirrhosis    Diabetes Daughter 44        type 2    Obesity Daughter     Glaucoma Maternal Aunt        Social History     Tobacco Use    Smoking status: Former     Current packs/day: 0.00     Average packs/day: 0.5 packs/day for 51.0 years (25.5 ttl pk-yrs)     Types: Cigarettes, Pipe     Start date: 1960     Quit date: 11/15/2011     Years since quittin.5    Smokeless tobacco: Never    Tobacco comments:     Smoked pipes 10 minutes a day started 22 yrs old , quit 10 yrs back.   Substance Use Topics    Alcohol use: No        Current Outpatient Medications   Medication Sig Dispense Refill    acetaminophen (TYLENOL) 500 MG tablet Take 1 tablet (500 mg) by mouth every 4 hours as needed for mild pain 30 tablet 0    albuterol (PROAIR HFA/PROVENTIL HFA/VENTOLIN HFA) 108 (90 Base) MCG/ACT inhaler Inhale 2 puffs into the lungs every 6 hours as needed for shortness of breath, wheezing or cough 18 g 1    bacitracin-polymyxin b (POLYSPORIN) 500-17824 UNIT/GM external ointment Apply topically 2 times daily On the leg wound 30 g 0    carvedilol (COREG) 3.125 MG tablet TAKE 1 TABLET BY MOUTH TWICE  DAILY 180 tablet 1    escitalopram (LEXAPRO) 10 MG tablet TAKE 1 TABLET BY MOUTH DAILY 90 tablet 0    Ferrous Sulfate 324 (65 Fe) MG TBEC Take 324 mg by mouth 2 times daily      Fexofenadine HCl (ALLEGRA PO) Take 180 mg by mouth daily       finasteride (PROSCAR) 5 MG tablet Take 1 tablet (5 mg) by mouth daily 90 tablet 3    insulin glargine (LANTUS SOLOSTAR) 100 UNIT/ML pen       latanoprost (XALATAN) 0.005 % ophthalmic solution Place 1 drop into both eyes daily      levothyroxine  (SYNTHROID/LEVOTHROID) 88 MCG tablet Take 88 mcg by mouth daily.      losartan (COZAAR) 100 MG tablet TAKE 1 TABLET BY MOUTH DAILY 100 tablet 2    lovastatin (MEVACOR) 40 MG tablet TAKE 1 TABLET BY MOUTH DAILY AT  BEDTIME 90 tablet 2    metFORMIN (GLUCOPHAGE XR) 500 MG 24 hr tablet       Multiple Vitamins-Minerals (CENTRUM SILVER) per tablet Take 1 tablet by mouth daily      ONETOUCH ULTRA test strip Pt uses one touch Verio system and strips      sulfamethoxazole-trimethoprim (BACTRIM DS) 800-160 MG tablet Take 1 tablet by mouth 2 times daily 20 tablet 0    tamsulosin (FLOMAX) 0.4 MG capsule Take 2 capsules (0.8 mg) by mouth daily 180 capsule 3     No current facility-administered medications for this visit.          Review of Systems  Constitutional, HEENT, cardiovascular, pulmonary, GI, , musculoskeletal, neuro, skin, endocrine and psych systems are negative, except as otherwise noted.      Objective    /60   Pulse 67   Temp 98.3  F (36.8  C) (Tympanic)   Resp 19   Wt 86.2 kg (190 lb)   SpO2 95%   BMI 32.32 kg/m    Body mass index is 32.32 kg/m .  Physical Exam   GENERAL: alert and no distress  NECK: no adenopathy, no asymmetry, masses, or scars  RESP: lungs clear to auscultation - no rales, rhonchi or wheezes  CV: regular rate and rhythm, normal S1 S2, no S3 or S4, no murmur, click or rub, no peripheral edema  ABDOMEN: soft, nontender, no hepatosplenomegaly, no masses and bowel sounds normal  MS: no gross musculoskeletal defects noted, + Rt leg edema  Rt Leg exam : Positive for erythema surrounding the biopsy site as well as swelling with mild edema on the leg most likely due to inflammation.  No tenderness on palpation      Signed Electronically by: Michaela Sharma MD

## 2024-05-22 NOTE — PROGRESS NOTES
Surgical Office Location:  [facility]  [address] [place] [id number]

## 2024-05-23 ENCOUNTER — OFFICE VISIT (OUTPATIENT)
Dept: DERMATOLOGY | Facility: CLINIC | Age: 84
End: 2024-05-23
Payer: COMMERCIAL

## 2024-05-23 ENCOUNTER — PATIENT OUTREACH (OUTPATIENT)
Dept: CARE COORDINATION | Facility: CLINIC | Age: 84
End: 2024-05-23

## 2024-05-23 DIAGNOSIS — L81.4 LENTIGO: ICD-10-CM

## 2024-05-23 DIAGNOSIS — L82.1 SEBORRHEIC KERATOSES: ICD-10-CM

## 2024-05-23 DIAGNOSIS — D23.9 DERMAL NEVUS: Primary | ICD-10-CM

## 2024-05-23 DIAGNOSIS — C44.712 BASAL CELL CARCINOMA OF RIGHT LOWER LEG: ICD-10-CM

## 2024-05-23 DIAGNOSIS — D18.01 ANGIOMA OF SKIN: ICD-10-CM

## 2024-05-23 PROCEDURE — 99203 OFFICE O/P NEW LOW 30 MIN: CPT | Mod: 25 | Performed by: DERMATOLOGY

## 2024-05-23 PROCEDURE — 17313 MOHS 1 STAGE T/A/L: CPT | Performed by: DERMATOLOGY

## 2024-05-23 NOTE — LETTER
5/23/2024         RE: Timmy Strange  [address] [address]        Dear Colleague,    Thank you for referring your patient, Timmy Strange, to the [facility]. Please see a copy of my visit note below.    Surgical Office Location:  [facility]  [address] [place] [id number]      Timmy Strange , a 83 year old year old male patient, I was asked to see by DR. Sharma for morpheaform basal cell carcinoma on right shin.  Recently ha infection and was placed on bactrim May 16.  Patient has no other skin complaints today.  Remainder of the HPI, Meds, PMH, Allergies, FH, and SH was reviewed in chart.      Past Medical History:   Diagnosis Date     Arthritis 1970    Dx'd with RA when in the      Basal cell carcinoma      BPH (benign prostatic hyperplasia) 12/16/2013     Cancer (H)     basal cell cancer behind ear     Diabetes mellitus      ED (erectile dysfunction) 01/06/2015     HTN (hypertension)      Hyperlipidemia LDL goal <100      Hypothyroidism      Left atrial enlargement 05/24/2023     Mumps      Obesity        Past Surgical History:   Procedure Laterality Date     BIOPSY       COLONOSCOPY N/A 11/21/2014    Procedure: COMBINED COLONOSCOPY, SINGLE OR MULTIPLE BIOPSY/POLYPECTOMY BY BIOPSY;  Surgeon: Rolanda Bey MD;  [facility]:  [facility]     COLONOSCOPY N/A 1/20/2020    Procedure: COLONOSCOPY, WITH POLYPECTOMY AND BIOPSY;  Surgeon: Christina Vieira MD;  Location:  [facility]     COLONOSCOPY N/A 2/1/2021    Procedure: Colonoscopy, With Polypectomy And Biopsy;  Surgeon: Christina Vieira MD;  Location:  GI     ESOPHAGOSCOPY, GASTROSCOPY, DUODENOSCOPY (EGD), COMBINED N/A 3/16/2021    Procedure: ESOPHAGOGASTRODUODENOSCOPY, WITH BIOPSY;  Surgeon: Jose Schrader MD;  Location:  GI     EYE SURGERY       TESTICLE SURGERY       TONSILLECTOMY, ADENOIDECTOMY, COMBINED       VASECTOMY          Family History   Problem Relation  Age of Onset     Diabetes Maternal Grandmother      Diabetes Maternal Grandfather      Arthritis Maternal Grandfather      Arthritis Father      Thyroid Disease Father      Glaucoma Mother      Alcohol/Drug Mother 49        cirrhosis     Diabetes Daughter 44        type 2     Obesity Daughter      Glaucoma Maternal Aunt        Social History     Socioeconomic History     Marital status:      Spouse name: Not on file     Number of children: Not on file     Years of education: Not on file     Highest education level: Not on file   Occupational History     Not on file   Tobacco Use     Smoking status: Former     Current packs/day: 0.00     Average packs/day: 0.5 packs/day for 51.0 years (25.5 ttl pk-yrs)     Types: Cigarettes, Pipe     Start date: 1960     Quit date: 11/15/2011     Years since quittin.5     Smokeless tobacco: Never     Tobacco comments:     Smoked pipes 10 minutes a day started 22 yrs old , quit 10 yrs back.   Vaping Use     Vaping status: Never Used   Substance and Sexual Activity     Alcohol use: No     Drug use: No     Sexual activity: Not Currently     Partners: Female     Birth control/protection: Abstinence, None   Other Topics Concern     Parent/sibling w/ CABG, MI or angioplasty before 65F 55M? No      Service Not Asked     Blood Transfusions Not Asked     Caffeine Concern Not Asked     Occupational Exposure Not Asked     Hobby Hazards Not Asked     Sleep Concern Not Asked     Stress Concern Not Asked     Weight Concern Not Asked     Special Diet No     Back Care Not Asked     Exercise Yes     Comment: walking 3-4 days a week     Bike Helmet Not Asked     Seat Belt Not Asked     Self-Exams Not Asked   Social History Narrative     Not on file     Social Determinants of Health     Financial Resource Strain: Low Risk  (2024)    Financial Resource Strain      Within the past 12 months, have you or your family members you live with been unable to get utilities (heat,  electricity) when it was really needed?: No   Food Insecurity: Low Risk  (4/23/2024)    Food Insecurity      Within the past 12 months, did you worry that your food would run out before you got money to buy more?: No      Within the past 12 months, did the food you bought just not last and you didn t have money to get more?: No   Transportation Needs: Low Risk  (4/23/2024)    Transportation Needs      Within the past 12 months, has lack of transportation kept you from medical appointments, getting your medicines, non-medical meetings or appointments, work, or from getting things that you need?: No   Physical Activity: Insufficiently Active (4/23/2024)    Exercise Vital Sign      Days of Exercise per Week: 1 day      Minutes of Exercise per Session: 30 min   Stress: Stress Concern Present (4/23/2024)    [employer] - Occupational Stress Questionnaire      Feeling of Stress : Rather much   Social Connections: Socially Isolated (4/23/2024)    Social Connection and Isolation Panel [NHANES]      Frequency of Communication with Friends and Family: Once a week      Frequency of Social Gatherings with Friends and Family: Once a week      Attends Buddhism Services: Never      Active Member of Clubs or Organizations: No      Attends Club or Organization Meetings: Never      Marital Status:    Interpersonal Safety: Not At Risk (9/8/2021)    Humiliation, Afraid, Rape, and Kick questionnaire      Fear of Current or Ex-Partner: No      Emotionally Abused: No      Physically Abused: No      Sexually Abused: No   Housing Stability: Low Risk  (4/23/2024)    Housing Stability      Do you have housing? : Yes      Are you worried about losing your housing?: No       Outpatient Encounter Medications as of 5/23/2024   Medication Sig Dispense Refill     acetaminophen (TYLENOL) 500 MG tablet Take 1 tablet (500 mg) by mouth every 4 hours as needed for mild pain 30 tablet 0     albuterol (PROAIR  HFA/PROVENTIL HFA/VENTOLIN HFA) 108 (90 Base) MCG/ACT inhaler Inhale 2 puffs into the lungs every 6 hours as needed for shortness of breath, wheezing or cough 18 g 1     bacitracin-polymyxin b (POLYSPORIN) 500-28819 UNIT/GM external ointment Apply topically 2 times daily On the leg wound 30 g 0     carvedilol (COREG) 3.125 MG tablet TAKE 1 TABLET BY MOUTH TWICE  DAILY 180 tablet 1     escitalopram (LEXAPRO) 10 MG tablet TAKE 1 TABLET BY MOUTH DAILY 90 tablet 0     Ferrous Sulfate 324 (65 Fe) MG TBEC Take 324 mg by mouth 2 times daily       Fexofenadine HCl (ALLEGRA PO) Take 180 mg by mouth daily        finasteride (PROSCAR) 5 MG tablet Take 1 tablet (5 mg) by mouth daily 90 tablet 3     insulin glargine (LANTUS SOLOSTAR) 100 UNIT/ML pen        latanoprost (XALATAN) 0.005 % ophthalmic solution Place 1 drop into both eyes daily       levothyroxine (SYNTHROID/LEVOTHROID) 88 MCG tablet Take 88 mcg by mouth daily.       losartan (COZAAR) 100 MG tablet TAKE 1 TABLET BY MOUTH DAILY 100 tablet 2     lovastatin (MEVACOR) 40 MG tablet TAKE 1 TABLET BY MOUTH DAILY AT  BEDTIME 90 tablet 2     metFORMIN (GLUCOPHAGE XR) 500 MG 24 hr tablet        Multiple Vitamins-Minerals (CENTRUM SILVER) per tablet Take 1 tablet by mouth daily       ONETOUCH ULTRA test strip Pt uses one touch Verio system and strips       sulfamethoxazole-trimethoprim (BACTRIM DS) 800-160 MG tablet Take 1 tablet by mouth 2 times daily 20 tablet 0     tamsulosin (FLOMAX) 0.4 MG capsule Take 2 capsules (0.8 mg) by mouth daily 180 capsule 3     UNABLE TO FIND MEDICATION NAME: Tumeric 100 mg once daily       No facility-administered encounter medications on file as of 5/23/2024.             Review Of Systems  Skin: As above  Eyes: negative  Ears/Nose/Throat: negative  Respiratory: No shortness of breath, dyspnea on exertion, cough, or hemoptysis  Cardiovascular: negative  Gastrointestinal: negative  Genitourinary: negative  Musculoskeletal: negative  Neurologic:  negative  Psychiatric: negative  Hematologic/Lymphatic/Immunologic: negative  Endocrine: negative      O:   NAD, WDWN, Alert & Oriented, Mood & Affect wnl, Vitals stable   General appearance lewis ii   Vitals stable   Alert, oriented and in no acute distress   R shin 1.4x1.3cm red plaque   Stuck on papules and brown macules on trunk and ext   Red papules on trunk  Flesh colored papules on trunk          Eyes: Conjunctivae/lids:Normal     ENT: Lips, mucosa: normal    MSK:Normal    Cardiovascular: peripheral edema none    Pulm: Breathing Normal    Neuro/Psych: Orientation:Normal; Mood/Affect:Normal      A/P:  1. Seborrheic keratosis, lentigo, angioma, dermal nevus  2. R shin morpheaform basal cell carcinoma   It was a pleasure speaking to Timmy Strange today.  Previous clinic  notes and pertinent laboratory tests were reviewed prior to Timmy Strange's visit.  Signs and Symptoms of skin cancer discussed with patient.  Patient encouraged to perform monthly skin exams.  UV precautions reviewed with patient.  Risks of non-melanoma skin cancer discussed with patient   Return to clinic 1 month  PROCEDURE NOTE  R shin morpheaform basal cell carcinoma   MOHS:   Location    The rationale for Mohs surgery was discussed with the patient and consent was obtained.  The risks and benefits as well as alternatives to therapy were discussed, in detail.  Specifically, the risks of infection, scarring, bleeding, prolonged wound healing, incomplete removal, allergy to anesthesia, nerve injury and recurrence were addressed.  Indication for Mohs was Location. Prior to the procedure, the treatment site was clearly identified and, if available, confirmed with previous photos and confirmed by the patient   All components of the Universal Protocol/PAUSE rule were completed.  The Mohs surgeon operated in two distinct and integrated capacities as the surgeon and pathologist.      The area was prepped with Betasept.  A rim of normal appearing  skin was marked circumferentially around the lesion.  The area was infiltrated with local anesthesia.  The tumor was first debulked to remove all clinically apparent tumor.  An incision following the standard Mohs approach was done and the specimen was oriented,mapped and placed in 1 block(s).  Each specimen was then chromacoded and processed in the Mohs laboratory using standard Mohs technique and submitted for frozen section histology.  Frozen section analysis showed no residual tumor but CLEAR MARGINS.      The tumor was excised using standard Mohs technique in 1 stages(s).  CLEAR MARGINS OBTAINED and Final defect size was 1.7 x 1.6 cm.     We discussed the options for wound management in full with the patient including risks/benefits/ possible outcomes.      REPAIR SECOND INTENT: We discussed the options for wound management in full with the patient including risks/benefits/possible outcomes. Decision made to allow the wound to heal by second intention. Cautery was used for for hemostasis. EBL minimal; complications none; wound care routine.  The patient was discharged in good condition and will return in one month or prn for wound evaluation.        Again, thank you for allowing me to participate in the care of your patient.        Sincerely,        Dayo Hand MD

## 2024-05-23 NOTE — PROGRESS NOTES
Timmy Strange , a 83 year old year old male patient, I was asked to see by DR. Sharma for morpheaform basal cell carcinoma on right shin.  Recently ha infection and was placed on bactrim May 16.  Patient has no other skin complaints today.  Remainder of the HPI, Meds, PMH, Allergies, FH, and SH was reviewed in chart.      Past Medical History:   Diagnosis Date    Arthritis 1970    Dx'd with RA when in the     Basal cell carcinoma     BPH (benign prostatic hyperplasia) 12/16/2013    Cancer (H)     basal cell cancer behind ear    Diabetes mellitus     ED (erectile dysfunction) 01/06/2015    HTN (hypertension)     Hyperlipidemia LDL goal <100     Hypothyroidism     Left atrial enlargement 05/24/2023    Mumps     Obesity        Past Surgical History:   Procedure Laterality Date    BIOPSY      COLONOSCOPY N/A 11/21/2014    Procedure: COMBINED COLONOSCOPY, SINGLE OR MULTIPLE BIOPSY/POLYPECTOMY BY BIOPSY;  Surgeon: Rolanda Bey MD;  Location:  GI    COLONOSCOPY N/A 1/20/2020    Procedure: COLONOSCOPY, WITH POLYPECTOMY AND BIOPSY;  Surgeon: Christina Vieira MD;  Location:  GI    COLONOSCOPY N/A 2/1/2021    Procedure: Colonoscopy, With Polypectomy And Biopsy;  Surgeon: Christina Vieira MD;  Location:  GI    ESOPHAGOSCOPY, GASTROSCOPY, DUODENOSCOPY (EGD), COMBINED N/A 3/16/2021    Procedure: ESOPHAGOGASTRODUODENOSCOPY, WITH BIOPSY;  Surgeon: Jose Schrader MD;  Location:  GI    EYE SURGERY      TESTICLE SURGERY      TONSILLECTOMY, ADENOIDECTOMY, COMBINED      VASECTOMY          Family History   Problem Relation Age of Onset    Diabetes Maternal Grandmother     Diabetes Maternal Grandfather     Arthritis Maternal Grandfather     Arthritis Father     Thyroid Disease Father     Glaucoma Mother     Alcohol/Drug Mother 49        cirrhosis    Diabetes Daughter 44        type 2    Obesity Daughter     Glaucoma Maternal Aunt        Social History     Socioeconomic History    Marital status:       Spouse name: Not on file    Number of children: Not on file    Years of education: Not on file    Highest education level: Not on file   Occupational History    Not on file   Tobacco Use    Smoking status: Former     Current packs/day: 0.00     Average packs/day: 0.5 packs/day for 51.0 years (25.5 ttl pk-yrs)     Types: Cigarettes, Pipe     Start date: 1960     Quit date: 11/15/2011     Years since quittin.5    Smokeless tobacco: Never    Tobacco comments:     Smoked pipes 10 minutes a day started 22 yrs old , quit 10 yrs back.   Vaping Use    Vaping status: Never Used   Substance and Sexual Activity    Alcohol use: No    Drug use: No    Sexual activity: Not Currently     Partners: Female     Birth control/protection: Abstinence, None   Other Topics Concern    Parent/sibling w/ CABG, MI or angioplasty before 65F 55M? No     Service Not Asked    Blood Transfusions Not Asked    Caffeine Concern Not Asked    Occupational Exposure Not Asked    Hobby Hazards Not Asked    Sleep Concern Not Asked    Stress Concern Not Asked    Weight Concern Not Asked    Special Diet No    Back Care Not Asked    Exercise Yes     Comment: walking 3-4 days a week    Bike Helmet Not Asked    Seat Belt Not Asked    Self-Exams Not Asked   Social History Narrative    Not on file     Social Determinants of Health     Financial Resource Strain: Low Risk  (2024)    Financial Resource Strain     Within the past 12 months, have you or your family members you live with been unable to get utilities (heat, electricity) when it was really needed?: No   Food Insecurity: Low Risk  (2024)    Food Insecurity     Within the past 12 months, did you worry that your food would run out before you got money to buy more?: No     Within the past 12 months, did the food you bought just not last and you didn t have money to get more?: No   Transportation Needs: Low Risk  (2024)    Transportation Needs     Within the past 12  months, has lack of transportation kept you from medical appointments, getting your medicines, non-medical meetings or appointments, work, or from getting things that you need?: No   Physical Activity: Insufficiently Active (4/23/2024)    Exercise Vital Sign     Days of Exercise per Week: 1 day     Minutes of Exercise per Session: 30 min   Stress: Stress Concern Present (4/23/2024)    [employer] - Occupational Stress Questionnaire     Feeling of Stress : Rather much   Social Connections: Socially Isolated (4/23/2024)    Social Connection and Isolation Panel [NHANES]     Frequency of Communication with Friends and Family: Once a week     Frequency of Social Gatherings with Friends and Family: Once a week     Attends Sikhism Services: Never     Active Member of Clubs or Organizations: No     Attends Club or Organization Meetings: Never     Marital Status:    Interpersonal Safety: Not At Risk (9/8/2021)    Humiliation, Afraid, Rape, and Kick questionnaire     Fear of Current or Ex-Partner: No     Emotionally Abused: No     Physically Abused: No     Sexually Abused: No   Housing Stability: Low Risk  (4/23/2024)    Housing Stability     Do you have housing? : Yes     Are you worried about losing your housing?: No       Outpatient Encounter Medications as of 5/23/2024   Medication Sig Dispense Refill    acetaminophen (TYLENOL) 500 MG tablet Take 1 tablet (500 mg) by mouth every 4 hours as needed for mild pain 30 tablet 0    albuterol (PROAIR HFA/PROVENTIL HFA/VENTOLIN HFA) 108 (90 Base) MCG/ACT inhaler Inhale 2 puffs into the lungs every 6 hours as needed for shortness of breath, wheezing or cough 18 g 1    bacitracin-polymyxin b (POLYSPORIN) 500-57525 UNIT/GM external ointment Apply topically 2 times daily On the leg wound 30 g 0    carvedilol (COREG) 3.125 MG tablet TAKE 1 TABLET BY MOUTH TWICE  DAILY 180 tablet 1    escitalopram (LEXAPRO) 10 MG tablet TAKE 1 TABLET BY MOUTH DAILY  90 tablet 0    Ferrous Sulfate 324 (65 Fe) MG TBEC Take 324 mg by mouth 2 times daily      Fexofenadine HCl (ALLEGRA PO) Take 180 mg by mouth daily       finasteride (PROSCAR) 5 MG tablet Take 1 tablet (5 mg) by mouth daily 90 tablet 3    insulin glargine (LANTUS SOLOSTAR) 100 UNIT/ML pen       latanoprost (XALATAN) 0.005 % ophthalmic solution Place 1 drop into both eyes daily      levothyroxine (SYNTHROID/LEVOTHROID) 88 MCG tablet Take 88 mcg by mouth daily.      losartan (COZAAR) 100 MG tablet TAKE 1 TABLET BY MOUTH DAILY 100 tablet 2    lovastatin (MEVACOR) 40 MG tablet TAKE 1 TABLET BY MOUTH DAILY AT  BEDTIME 90 tablet 2    metFORMIN (GLUCOPHAGE XR) 500 MG 24 hr tablet       Multiple Vitamins-Minerals (CENTRUM SILVER) per tablet Take 1 tablet by mouth daily      ONETOUCH ULTRA test strip Pt uses one touch Verio system and strips      sulfamethoxazole-trimethoprim (BACTRIM DS) 800-160 MG tablet Take 1 tablet by mouth 2 times daily 20 tablet 0    tamsulosin (FLOMAX) 0.4 MG capsule Take 2 capsules (0.8 mg) by mouth daily 180 capsule 3    UNABLE TO FIND MEDICATION NAME: Tumeric 100 mg once daily       No facility-administered encounter medications on file as of 5/23/2024.             Review Of Systems  Skin: As above  Eyes: negative  Ears/Nose/Throat: negative  Respiratory: No shortness of breath, dyspnea on exertion, cough, or hemoptysis  Cardiovascular: negative  Gastrointestinal: negative  Genitourinary: negative  Musculoskeletal: negative  Neurologic: negative  Psychiatric: negative  Hematologic/Lymphatic/Immunologic: negative  Endocrine: negative      O:   NAD, WDWN, Alert & Oriented, Mood & Affect wnl, Vitals stable   General appearance lewis ii   Vitals stable   Alert, oriented and in no acute distress   R shin 1.4x1.3cm red plaque   Stuck on papules and brown macules on trunk and ext   Red papules on trunk  Flesh colored papules on trunk          Eyes: Conjunctivae/lids:Normal     ENT: Lips, mucosa:  normal    MSK:Normal    Cardiovascular: peripheral edema none    Pulm: Breathing Normal    Neuro/Psych: Orientation:Normal; Mood/Affect:Normal      A/P:  1. Seborrheic keratosis, lentigo, angioma, dermal nevus  2. R shin morpheaform basal cell carcinoma   It was a pleasure speaking to Timmy Strange today.  Previous clinic  notes and pertinent laboratory tests were reviewed prior to Timmy Strange's visit.  Signs and Symptoms of skin cancer discussed with patient.  Patient encouraged to perform monthly skin exams.  UV precautions reviewed with patient.  Risks of non-melanoma skin cancer discussed with patient   Return to clinic 1 month  PROCEDURE NOTE  R shin morpheaform basal cell carcinoma   MOHS:   Location    The rationale for Mohs surgery was discussed with the patient and consent was obtained.  The risks and benefits as well as alternatives to therapy were discussed, in detail.  Specifically, the risks of infection, scarring, bleeding, prolonged wound healing, incomplete removal, allergy to anesthesia, nerve injury and recurrence were addressed.  Indication for Mohs was Location. Prior to the procedure, the treatment site was clearly identified and, if available, confirmed with previous photos and confirmed by the patient   All components of the Universal Protocol/PAUSE rule were completed.  The Mohs surgeon operated in two distinct and integrated capacities as the surgeon and pathologist.      The area was prepped with Betasept.  A rim of normal appearing skin was marked circumferentially around the lesion.  The area was infiltrated with local anesthesia.  The tumor was first debulked to remove all clinically apparent tumor.  An incision following the standard Mohs approach was done and the specimen was oriented,mapped and placed in 1 block(s).  Each specimen was then chromacoded and processed in the Mohs laboratory using standard Mohs technique and submitted for frozen section histology.  Frozen section  analysis showed no residual tumor but CLEAR MARGINS.      The tumor was excised using standard Mohs technique in 1 stages(s).  CLEAR MARGINS OBTAINED and Final defect size was 1.7 x 1.6 cm.     We discussed the options for wound management in full with the patient including risks/benefits/ possible outcomes.      REPAIR SECOND INTENT: We discussed the options for wound management in full with the patient including risks/benefits/possible outcomes. Decision made to allow the wound to heal by second intention. Cautery was used for for hemostasis. EBL minimal; complications none; wound care routine.  The patient was discharged in good condition and will return in one month or prn for wound evaluation.

## 2024-05-23 NOTE — PATIENT INSTRUCTIONS
Open Wound Care     for ______________        No strenuous activity for 48 hours    Take Tylenol as needed for discomfort.                                                .         Do not drink alcoholic beverages for 48 hours.    Keep the pressure bandage in place for 24 hours. If the bandage becomes blood tinged or loose, reinforce it with gauze and tape.        (Refer to the reverse side of this page for management of bleeding).    Remove bandage in 24 hours and begin wound care as follows:     Clean area with tap water using a Q tip or gauze pad, (shower / bathe normally)  Dry wound with Q tip or gauze pad  Apply Aquaphor, Vaseline, Polysporin or Bacitracin Ointment with a Q tip  Do NOT use Neosporin Ointment *  Cover the wound with a band-aid or nonstick gauze pad and paper tape.  Repeat wound care once a day until wound is completely healed.    It is an old wives tale that a wound heals better when it is exposed to air and allowed to dry out. The wound will heal faster with a better cosmetic result if it is kept moist with ointment and covered with a bandage.  Do not let the wound dry out.      Supplies Needed:                Qtips or gauze pads                Polysporin or Bacitracin Ointment                Bandaids or nonstick gauze pads and paper tape    Wound care kits and brown paper tape are available for purchase at   the pharmacy.    BLEEDING:    Use tightly rolled up gauze or cloth to apply direct pressure over the bandage for 20   minutes.  Reapply pressure for an additional 20 minutes if necessary  Call the office or go to the nearest emergency room if pressure fails to stop the bleeding.  Use additional gauze and tape to maintain pressure once the bleeding has stopped.  Begin wound care 24 hours after surgery as directed.                  WOUND HEALING    One week after surgery a pink / red halo will form around the outside of the wound.   This is new skin.  The center of the wound will appear  yellowish white and produce some drainage.  The pink halo will slowly migrate in toward the center of the wound until the wound is covered with new shiny pink skin.  There will be no more drainage when the wound is completely healed.  It will take six months to one year for the redness to fade.  The scar may be itchy, tight and sensitive to extreme temperatures for a year after the surgery.  Massaging the area several times a day for several minutes after the wound is completely healed will help the scar soften and normalize faster. Begin massage only after healing is complete.      In case of emergency call: Dr Hand: [phone]    [facility]: [phone]    [facility]:[phone]

## 2024-05-29 DIAGNOSIS — R35.0 URINARY FREQUENCY: ICD-10-CM

## 2024-05-29 RX ORDER — FINASTERIDE 5 MG/1
1 TABLET, FILM COATED ORAL DAILY
Qty: 100 TABLET | Refills: 0 | Status: SHIPPED | OUTPATIENT
Start: 2024-05-29 | End: 2024-08-20

## 2024-06-06 ENCOUNTER — TELEPHONE (OUTPATIENT)
Dept: DERMATOLOGY | Facility: CLINIC | Age: 84
End: 2024-06-06
Payer: COMMERCIAL

## 2024-06-06 ENCOUNTER — PATIENT OUTREACH (OUTPATIENT)
Dept: CARE COORDINATION | Facility: CLINIC | Age: 84
End: 2024-06-06
Payer: COMMERCIAL

## 2024-06-06 DIAGNOSIS — L98.9 SKIN LESION OF RIGHT LEG: ICD-10-CM

## 2024-06-06 DIAGNOSIS — L03.115 CELLULITIS OF RIGHT LEG: ICD-10-CM

## 2024-06-06 DIAGNOSIS — L03.115 CELLULITIS OF RIGHT LEG: Primary | ICD-10-CM

## 2024-06-06 RX ORDER — SULFAMETHOXAZOLE/TRIMETHOPRIM 800-160 MG
1 TABLET ORAL 2 TIMES DAILY
Qty: 20 TABLET | Refills: 0 | Status: CANCELLED | OUTPATIENT
Start: 2024-06-06

## 2024-06-06 RX ORDER — MUPIROCIN 20 MG/G
OINTMENT TOPICAL
Qty: 22 G | Refills: 0 | Status: SHIPPED | OUTPATIENT
Start: 2024-06-06

## 2024-06-06 NOTE — TELEPHONE ENCOUNTER
Medication Refill    What medication are you calling about (include dose and sig)?:   sulfamethoxazole-trimethoprim (BACTRIM DS) 800-160 MG tablet     Preferred Pharmacy:  [id number] [address] [address] [place] [address]  [address] [address] [address]  Phone: [phone] Fax: [phone]    Controlled Substance Agreement on file:   CSA -- Patient Level:    CSA: None found at the patient level.     Who prescribed the medication?:   Michaela Sharma MD     Do you need a refill? Yes  Would like to start on Bactrim Rx. Pt's wife states they still have bacitracin-polymyxin b (POLYSPORIN) 500-64412 UNIT/GM external ointment.    Do you have any questions or concerns?  Yes,   Concerned about infection that has gotten worse over the last 2 days. Would like to be seen ASAP.  Requesting possibly being seen tomorrow (6/7/24).    Could we send this information to you in [facility] or would you prefer to receive a phone call?:   Patient would prefer a phone call   Okay to leave a detailed message?: Yes at Cell number on file:    Telephone Information:   Mobile [phone]     Patient's wife wants call from Dr. Sharma or BERTIN Callahan by 6pm with status update.     Sindy Bragg on 6/6/2024 at 4:44 PM

## 2024-06-06 NOTE — TELEPHONE ENCOUNTER
Per Sindy, Dr. Sharma requests that Dr. Hand take care of patient for symptoms listed below.    Spoke with the patient's wife and reviewed to go to UC if spreading redness and heat. If not worsening OK to wait for reply from Dr. Hand tomorrow.    Patient's wife verbalizes understanding and agrees with plan.  Cate Ward RN

## 2024-06-06 NOTE — TELEPHONE ENCOUNTER
Wife calling to say that wound is bleeding slightly/increasingheat/increasing redness, top of foot is swollen  Pre mohs he had been treated for cellulitis by Dr. Sharma with bactrim and polysporin    Patient is cleaning and changing bandage daily    Wife thinks they need more help at home to care for this- she wants an appointment with PCP- advised that I cannot schedule with PCP- she would need to call them and speak with their nursing staff.    Advised I would ask Dr. Hand for a refill on the previous antibiotic- he is out of town so this may wait until tomorrow. It is late in the day and our providers have left for the day      Advised to kendrick the redness- it may take 24 hours to see improvement after starting an oral abx- advised to start using the abx ointment instead of vaseline    If there is increasing redness and heat- he needs to go into Urgent care  or ER for evlauation    Wife would like to avoid that so she will wait to hear from us on the abx refill    She will call the triage line to see if she can get in with PCP tomorrow.      [facility]- med pended    Sent to both providers to see if one get the message. Bactrim pended- wife says there is enough polysporin to continue use    Thank you,    Mendy WYLIERN BSN  [facility]- [phone]

## 2024-06-06 NOTE — TELEPHONE ENCOUNTER
"[facility] Dermatology Brief Telephone Note    Received page: CALLERS NAME GWENDOLYN MO Callback # [phone] RE: DEVELOPING INFECTION HAVE QUESTIONS PMD:SUSANA PT NAME TIMMY MO  1940 CURRENT PT? Y/N [ Y ]    HPI  Timmy Strange is a 83 year old male with hx of morpheaform basal cell carcinoma on right shin s/p MMS 24 with Dr. Hand at which time decision was made to allow healing by second intention with followup in 1 month.    The patient's wife has called multiple times today, per chart review, with concern about the patient's wound. She thinks they may need more help at home and originally wanted appt with PCP. She has been advised that Dr. Hand will return tomorrow at which time abx can be discussed with him. PCP Dr. Sharma was also made aware of the conversation, who requested that Dr. Hand address this issue with the patient. The patient's wife has been advised multiple times to bring the patient to  if there is spreading redness or heat at the site.    Per our conversation, the wound was healing well until around 36 hrs ago, and now at this point it is \"bleeding some, there is a fair amount of redness around the area where the procedure was done, the top is slightly swollen and perhaps mildly warm to the touch.\" She reports it is slightly more tender than prior. She does report that she was told that if she is concerned about it getting worse they should go to urgent care. She does not feel that they need to go to urgent care today because that would be very stressful, but she very much desires an appointment for him to be seen tomorrow. She reports that he has not had fevers, chills, red streaks going up his leg, yellow or green or brown drainage from the wound. He is otherwise feeling just fine and in his normal state of health. He has been cleaning the wound in the shower daily and changing his bandages daily. He is not having any issues with walking.    She agreed to send " a photo, though was not familiar with sending photos via [employer]. She was willing to share a photo via email after discussion of the less secure status of that form of communication.        VITALS:   There were no vitals taken for this visit.     EXAM:     Skin: Lower leg as pictured. Allowing for technique and the limitations of digital images, wound appears to have clean edges and healthy granulation tissue within. No pus or yellowed crusting. Poorly defined erythema extends >1cm from wound edge.        A/P:     # Mild cellulitis of right shin s/p MMS 5/23/24   Redness extending > 1 cm from wound edge and increased pain ~2 wks post-surgery are consistent with a mild cellulitis. There are no signs that indicate oral antibiotics or presentation to urgent care are needed at this time. Advised that if there is new colored drainage, increasing warmth or size, increasing pain, fevers, chills, nausea that they should present to UC/ED for evaluation.  - Mupirocin ointment TID x 5 days  - Wash gently with soap and water daily  - Reviewed signs/symptoms of infection for which they should call Dr. Hand  - Follow up as planned with Dr. Hand    This patient encounter staffed in it's entirety with staff dermatologist Dr. Fofana, who agrees with assessment and plan.    Marco Murguia MD  Medicine/Dermatology PGY-3  Page via [employer]  Pager: [phone]

## 2024-06-07 RX ORDER — SULFAMETHOXAZOLE/TRIMETHOPRIM 800-160 MG
1 TABLET ORAL 2 TIMES DAILY
Qty: 20 TABLET | Refills: 0 | Status: SHIPPED | OUTPATIENT
Start: 2024-06-07

## 2024-06-07 RX ORDER — BACITRACIN ZINC AND POLYMYXIN B SULFATE 500; 1000 [USP'U]/G; [USP'U]/G
OINTMENT TOPICAL 2 TIMES DAILY
Qty: 30 G | Refills: 0 | Status: SHIPPED | OUTPATIENT
Start: 2024-06-07

## 2024-06-07 NOTE — TELEPHONE ENCOUNTER
Called and spoke with pt wife, advised on abx sent to pharmacy. Pt was prescribed Mupirocin last night and already picked that up so he will continue that and will add in oral Bactrim. Advised on vinegar washes and compression. Pt wife voiced understanding.    Asking for home care order to help with wound care management when pt wife is not home. Please advise.    Thank you,  Anna ORO RN  Dermatology   [phone]

## 2024-06-07 NOTE — TELEPHONE ENCOUNTER
Cate Ward, RN   Registered Nurse     Telephone Encounter  Signed     Encounter Date: 6/6/2024       Per SindyDr. Sharma requests that Dr. Hand take care of patient for symptoms listed below.     Spoke with the patient's wife and reviewed to go to UC if spreading redness and heat. If not worsening OK to wait for reply from Dr. Hand tomorrow.     Patient's wife verbalizes understanding and agrees with plan.  Cate Ward RN

## 2024-06-07 NOTE — TELEPHONE ENCOUNTER
I will call patient when I get into clinic today if our triage hasn't called yet:    6/6/24 8:22 pm Dr. Hand via telephone advised oral bactrim refill, vinegar soaks and compressions dressing to affected leg.    Thank you,    Mendy WYLIERN BSN  [facility]- [phone]       Mix one tablespoon of white vinegar with one pint (or 8 oz.) of water. Boil the water or use bottled water for safety. Store the solution in the refrigerator and discard after one week.  Soak a clean gauze pad with the vinegar solution and squeeze out the excess fluid.  Apply the gauze pad to the wound for 10-20 minutes, 2-3 times a day, before showering or cleaning the wound.  Rinse off the vinegar solution and pat the wound dry with another gauze pad.

## 2024-06-07 NOTE — TELEPHONE ENCOUNTER
Called pt's wife and relayed message from Dr. Sharma.      Transferred call to triage to reinforce message and to discuss steps to take if conditions worsen.     Sindy Bragg on 6/7/2024 at 10:46 AM

## 2024-06-10 ENCOUNTER — TRANSFERRED RECORDS (OUTPATIENT)
Dept: HEALTH INFORMATION MANAGEMENT | Facility: CLINIC | Age: 84
End: 2024-06-10
Payer: COMMERCIAL

## 2024-06-10 LAB — HBA1C MFR BLD: 6.8 %

## 2024-06-10 NOTE — TELEPHONE ENCOUNTER
Pt wife called in and stated the leg appears to be improving with the abx. They started the Mupirocin prescribed by the on call Derm the night before Dr. Hand prescribed the polysporin.     Please advise if they should continue with the Mupirocin or switch to Polysporin? Should he continue the vinegar washes? Pt has follow up with Dr. Hand on 6/20. Please advise on care until that appt.    Thank you,  Anna ORO RN  Dermatology   [phone]

## 2024-06-11 NOTE — TELEPHONE ENCOUNTER
Called and spoke with pt wife and gave below information. Pt wife voiced understanding.    Thank you,  Anna ORO RN  Dermatology   [phone]

## 2024-06-15 DIAGNOSIS — R35.0 URINARY FREQUENCY: ICD-10-CM

## 2024-06-17 RX ORDER — TAMSULOSIN HYDROCHLORIDE 0.4 MG/1
0.8 CAPSULE ORAL DAILY
Qty: 200 CAPSULE | Refills: 0 | Status: SHIPPED | OUTPATIENT
Start: 2024-06-17

## 2024-06-20 ENCOUNTER — OFFICE VISIT (OUTPATIENT)
Dept: DERMATOLOGY | Facility: CLINIC | Age: 84
End: 2024-06-20
Payer: COMMERCIAL

## 2024-06-20 DIAGNOSIS — Z85.828 HISTORY OF SKIN CANCER: Primary | ICD-10-CM

## 2024-06-20 PROCEDURE — 99212 OFFICE O/P EST SF 10 MIN: CPT | Performed by: DERMATOLOGY

## 2024-06-20 NOTE — LETTER
6/20/2024      Timmy Strange  [address] [facility] [address]      Dear Colleague,    Thank you for referring your patient, Timmy Strange, to the [facility]. Please see a copy of my visit note below.    Timmy Strange is an extremely pleasant 83 year old year old male patient here today for hx of non-melanoma skin cancer.  Leg well healed.  Patient has no other skin complaints today.  Remainder of the HPI, Meds, PMH, Allergies, FH, and SH was reviewed in chart.      Past Medical History:   Diagnosis Date     Arthritis 1970    Dx'd with RA when in the      Basal cell carcinoma      BPH (benign prostatic hyperplasia) 12/16/2013     Cancer (H)     basal cell cancer behind ear     Diabetes mellitus      ED (erectile dysfunction) 01/06/2015     HTN (hypertension)      Hyperlipidemia LDL goal <100      Hypothyroidism      Left atrial enlargement 05/24/2023     Mumps      Obesity        Past Surgical History:   Procedure Laterality Date     BIOPSY       COLONOSCOPY N/A 11/21/2014    Procedure: COMBINED COLONOSCOPY, SINGLE OR MULTIPLE BIOPSY/POLYPECTOMY BY BIOPSY;  Surgeon: Rolanda Bey MD;  Location:  GI     COLONOSCOPY N/A 1/20/2020    Procedure: COLONOSCOPY, WITH POLYPECTOMY AND BIOPSY;  Surgeon: Christina Vieira MD;  Location:  GI     COLONOSCOPY N/A 2/1/2021    Procedure: Colonoscopy, With Polypectomy And Biopsy;  Surgeon: Christina Vieira MD;  Location:  GI     ESOPHAGOSCOPY, GASTROSCOPY, DUODENOSCOPY (EGD), COMBINED N/A 3/16/2021    Procedure: ESOPHAGOGASTRODUODENOSCOPY, WITH BIOPSY;  Surgeon: Jose Schrader MD;  Location:  GI     EYE SURGERY       TESTICLE SURGERY       TONSILLECTOMY, ADENOIDECTOMY, COMBINED       VASECTOMY          Family History   Problem Relation Age of Onset     Diabetes Maternal Grandmother      Diabetes Maternal Grandfather      Arthritis Maternal Grandfather      Arthritis Father      Thyroid Disease Father       Glaucoma Mother      Alcohol/Drug Mother 49        cirrhosis     Diabetes Daughter 44        type 2     Obesity Daughter      Glaucoma Maternal Aunt        Social History     Socioeconomic History     Marital status:      Spouse name: Not on file     Number of children: Not on file     Years of education: Not on file     Highest education level: Not on file   Occupational History     Not on file   Tobacco Use     Smoking status: Former     Current packs/day: 0.00     Average packs/day: 0.5 packs/day for 51.0 years (25.5 ttl pk-yrs)     Types: Cigarettes, Pipe     Start date: 1960     Quit date: 11/15/2011     Years since quittin.6     Smokeless tobacco: Never     Tobacco comments:     Smoked pipes 10 minutes a day started 22 yrs old , quit 10 yrs back.   Vaping Use     Vaping status: Never Used   Substance and Sexual Activity     Alcohol use: No     Drug use: No     Sexual activity: Not Currently     Partners: Female     Birth control/protection: Abstinence, None   Other Topics Concern     Parent/sibling w/ CABG, MI or angioplasty before 65F 55M? No      Service Not Asked     Blood Transfusions Not Asked     Caffeine Concern Not Asked     Occupational Exposure Not Asked     Hobby Hazards Not Asked     Sleep Concern Not Asked     Stress Concern Not Asked     Weight Concern Not Asked     Special Diet No     Back Care Not Asked     Exercise Yes     Comment: walking 3-4 days a week     Bike Helmet Not Asked     Seat Belt Not Asked     Self-Exams Not Asked   Social History Narrative     Not on file     Social Determinants of Health     Financial Resource Strain: Low Risk  (2024)    Financial Resource Strain      Within the past 12 months, have you or your family members you live with been unable to get utilities (heat, electricity) when it was really needed?: No   Food Insecurity: Low Risk  (2024)    Food Insecurity      Within the past 12 months, did you worry that your food would run  out before you got money to buy more?: No      Within the past 12 months, did the food you bought just not last and you didn t have money to get more?: No   Transportation Needs: Low Risk  (4/23/2024)    Transportation Needs      Within the past 12 months, has lack of transportation kept you from medical appointments, getting your medicines, non-medical meetings or appointments, work, or from getting things that you need?: No   Physical Activity: Insufficiently Active (4/23/2024)    Exercise Vital Sign      Days of Exercise per Week: 1 day      Minutes of Exercise per Session: 30 min   Stress: Stress Concern Present (4/23/2024)    [employer] - Occupational Stress Questionnaire      Feeling of Stress : Rather much   Social Connections: Socially Isolated (4/23/2024)    Social Connection and Isolation Panel [NHANES]      Frequency of Communication with Friends and Family: Once a week      Frequency of Social Gatherings with Friends and Family: Once a week      Attends Anabaptist Services: Never      Active Member of Clubs or Organizations: No      Attends Club or Organization Meetings: Never      Marital Status:    Interpersonal Safety: Not At Risk (9/8/2021)    Humiliation, Afraid, Rape, and Kick questionnaire      Fear of Current or Ex-Partner: No      Emotionally Abused: No      Physically Abused: No      Sexually Abused: No   Housing Stability: Low Risk  (4/23/2024)    Housing Stability      Do you have housing? : Yes      Are you worried about losing your housing?: No       Outpatient Encounter Medications as of 6/20/2024   Medication Sig Dispense Refill     acetaminophen (TYLENOL) 500 MG tablet Take 1 tablet (500 mg) by mouth every 4 hours as needed for mild pain 30 tablet 0     albuterol (PROAIR HFA/PROVENTIL HFA/VENTOLIN HFA) 108 (90 Base) MCG/ACT inhaler Inhale 2 puffs into the lungs every 6 hours as needed for shortness of breath, wheezing or cough 18 g 1      bacitracin-polymyxin b (POLYSPORIN) 500-49463 UNIT/GM external ointment Apply topically 2 times daily On the leg wound 30 g 0     carvedilol (COREG) 3.125 MG tablet TAKE 1 TABLET BY MOUTH TWICE  DAILY 180 tablet 1     escitalopram (LEXAPRO) 10 MG tablet TAKE 1 TABLET BY MOUTH DAILY 90 tablet 0     Ferrous Sulfate 324 (65 Fe) MG TBEC Take 324 mg by mouth 2 times daily       Fexofenadine HCl (ALLEGRA PO) Take 180 mg by mouth daily        finasteride (PROSCAR) 5 MG tablet TAKE 1 TABLET BY MOUTH DAILY 100 tablet 0     insulin glargine (LANTUS SOLOSTAR) 100 UNIT/ML pen        latanoprost (XALATAN) 0.005 % ophthalmic solution Place 1 drop into both eyes daily       levothyroxine (SYNTHROID/LEVOTHROID) 88 MCG tablet Take 88 mcg by mouth daily.       losartan (COZAAR) 100 MG tablet TAKE 1 TABLET BY MOUTH DAILY 100 tablet 2     lovastatin (MEVACOR) 40 MG tablet TAKE 1 TABLET BY MOUTH DAILY AT  BEDTIME 90 tablet 2     metFORMIN (GLUCOPHAGE XR) 500 MG 24 hr tablet        Multiple Vitamins-Minerals (CENTRUM SILVER) per tablet Take 1 tablet by mouth daily       mupirocin (BACTROBAN) 2 % external ointment Use 3 times a day to affected area for 5 days then stop 22 g 0     ONETOUCH ULTRA test strip Pt uses one touch Verio system and strips       sulfamethoxazole-trimethoprim (BACTRIM DS) 800-160 MG tablet Take 1 tablet by mouth 2 times daily 20 tablet 0     tamsulosin (FLOMAX) 0.4 MG capsule TAKE 2 CAPSULES BY MOUTH DAILY 200 capsule 0     UNABLE TO FIND MEDICATION NAME: Tumeric 100 mg once daily       No facility-administered encounter medications on file as of 6/20/2024.             O:   NAD, WDWN, Alert & Oriented, Mood & Affect wnl, Vitals stable   General appearance normal   Vitals stable   Alert, oriented and in no acute distress   R leg healed      Eyes: Conjunctivae/lids:Normal     ENT: Lips, mucosa: normal    MSK:Normal    Cardiovascular: peripheral edema none    Pulm: Breathing Normal    Neuro/Psych: Orientation:Alert and  Orientedx3 ; Mood/Affect:normal       A/P:  Hx of non-melanoma skin cancer well healed  It was a pleasure speaking to Timmy Strange today.  Previous clinic notes and pertinent laboratory tests were reviewed prior to Timmy Strange's visit.  Signs and Symptoms of skin cancer discussed with patient.  Patient encouraged to perform monthly skin exams.  UV precautions reviewed with patient.  Skin care regimen reviewed with patient: Eliminate harsh soaps, i.e. Dial, zest, irsih spring; Mild soaps such as Cetaphil or Dove sensitive skin, avoid hot or cold showers, aggressive use of emollients including vanicream, cetaphil or cerave discussed with patient.    Return to clinic 6 months      Again, thank you for allowing me to participate in the care of your patient.        Sincerely,        Dayo Hand MD

## 2024-06-20 NOTE — PROGRESS NOTES
Timmy Strange is an extremely pleasant 83 year old year old male patient here today for hx of non-melanoma skin cancer.  Leg well healed.  Patient has no other skin complaints today.  Remainder of the HPI, Meds, PMH, Allergies, FH, and SH was reviewed in chart.      Past Medical History:   Diagnosis Date    Arthritis 1970    Dx'd with RA when in the     Basal cell carcinoma     BPH (benign prostatic hyperplasia) 12/16/2013    Cancer (H)     basal cell cancer behind ear    Diabetes mellitus     ED (erectile dysfunction) 01/06/2015    HTN (hypertension)     Hyperlipidemia LDL goal <100     Hypothyroidism     Left atrial enlargement 05/24/2023    Mumps     Obesity        Past Surgical History:   Procedure Laterality Date    BIOPSY      COLONOSCOPY N/A 11/21/2014    Procedure: COMBINED COLONOSCOPY, SINGLE OR MULTIPLE BIOPSY/POLYPECTOMY BY BIOPSY;  Surgeon: Rolanda Bey MD;  Location:  GI    COLONOSCOPY N/A 1/20/2020    Procedure: COLONOSCOPY, WITH POLYPECTOMY AND BIOPSY;  Surgeon: Christina Vieira MD;  Location:  GI    COLONOSCOPY N/A 2/1/2021    Procedure: Colonoscopy, With Polypectomy And Biopsy;  Surgeon: Christina Veiira MD;  Location:  GI    ESOPHAGOSCOPY, GASTROSCOPY, DUODENOSCOPY (EGD), COMBINED N/A 3/16/2021    Procedure: ESOPHAGOGASTRODUODENOSCOPY, WITH BIOPSY;  Surgeon: Jose Schrader MD;  Location:  GI    EYE SURGERY      TESTICLE SURGERY      TONSILLECTOMY, ADENOIDECTOMY, COMBINED      VASECTOMY          Family History   Problem Relation Age of Onset    Diabetes Maternal Grandmother     Diabetes Maternal Grandfather     Arthritis Maternal Grandfather     Arthritis Father     Thyroid Disease Father     Glaucoma Mother     Alcohol/Drug Mother 49        cirrhosis    Diabetes Daughter 44        type 2    Obesity Daughter     Glaucoma Maternal Aunt        Social History     Socioeconomic History    Marital status:      Spouse name: Not on file    Number of children: Not  on file    Years of education: Not on file    Highest education level: Not on file   Occupational History    Not on file   Tobacco Use    Smoking status: Former     Current packs/day: 0.00     Average packs/day: 0.5 packs/day for 51.0 years (25.5 ttl pk-yrs)     Types: Cigarettes, Pipe     Start date: 1960     Quit date: 11/15/2011     Years since quittin.6    Smokeless tobacco: Never    Tobacco comments:     Smoked pipes 10 minutes a day started 22 yrs old , quit 10 yrs back.   Vaping Use    Vaping status: Never Used   Substance and Sexual Activity    Alcohol use: No    Drug use: No    Sexual activity: Not Currently     Partners: Female     Birth control/protection: Abstinence, None   Other Topics Concern    Parent/sibling w/ CABG, MI or angioplasty before 65F 55M? No     Service Not Asked    Blood Transfusions Not Asked    Caffeine Concern Not Asked    Occupational Exposure Not Asked    Hobby Hazards Not Asked    Sleep Concern Not Asked    Stress Concern Not Asked    Weight Concern Not Asked    Special Diet No    Back Care Not Asked    Exercise Yes     Comment: walking 3-4 days a week    Bike Helmet Not Asked    Seat Belt Not Asked    Self-Exams Not Asked   Social History Narrative    Not on file     Social Determinants of Health     Financial Resource Strain: Low Risk  (2024)    Financial Resource Strain     Within the past 12 months, have you or your family members you live with been unable to get utilities (heat, electricity) when it was really needed?: No   Food Insecurity: Low Risk  (2024)    Food Insecurity     Within the past 12 months, did you worry that your food would run out before you got money to buy more?: No     Within the past 12 months, did the food you bought just not last and you didn t have money to get more?: No   Transportation Needs: Low Risk  (2024)    Transportation Needs     Within the past 12 months, has lack of transportation kept you from medical  appointments, getting your medicines, non-medical meetings or appointments, work, or from getting things that you need?: No   Physical Activity: Insufficiently Active (4/23/2024)    Exercise Vital Sign     Days of Exercise per Week: 1 day     Minutes of Exercise per Session: 30 min   Stress: Stress Concern Present (4/23/2024)    [employer] - Occupational Stress Questionnaire     Feeling of Stress : Rather much   Social Connections: Socially Isolated (4/23/2024)    Social Connection and Isolation Panel [NHANES]     Frequency of Communication with Friends and Family: Once a week     Frequency of Social Gatherings with Friends and Family: Once a week     Attends Latter day Services: Never     Active Member of Clubs or Organizations: No     Attends Club or Organization Meetings: Never     Marital Status:    Interpersonal Safety: Not At Risk (9/8/2021)    Humiliation, Afraid, Rape, and Kick questionnaire     Fear of Current or Ex-Partner: No     Emotionally Abused: No     Physically Abused: No     Sexually Abused: No   Housing Stability: Low Risk  (4/23/2024)    Housing Stability     Do you have housing? : Yes     Are you worried about losing your housing?: No       Outpatient Encounter Medications as of 6/20/2024   Medication Sig Dispense Refill    acetaminophen (TYLENOL) 500 MG tablet Take 1 tablet (500 mg) by mouth every 4 hours as needed for mild pain 30 tablet 0    albuterol (PROAIR HFA/PROVENTIL HFA/VENTOLIN HFA) 108 (90 Base) MCG/ACT inhaler Inhale 2 puffs into the lungs every 6 hours as needed for shortness of breath, wheezing or cough 18 g 1    bacitracin-polymyxin b (POLYSPORIN) 500-06639 UNIT/GM external ointment Apply topically 2 times daily On the leg wound 30 g 0    carvedilol (COREG) 3.125 MG tablet TAKE 1 TABLET BY MOUTH TWICE  DAILY 180 tablet 1    escitalopram (LEXAPRO) 10 MG tablet TAKE 1 TABLET BY MOUTH DAILY 90 tablet 0    Ferrous Sulfate 324 (65 Fe) MG TBEC Take  324 mg by mouth 2 times daily      Fexofenadine HCl (ALLEGRA PO) Take 180 mg by mouth daily       finasteride (PROSCAR) 5 MG tablet TAKE 1 TABLET BY MOUTH DAILY 100 tablet 0    insulin glargine (LANTUS SOLOSTAR) 100 UNIT/ML pen       latanoprost (XALATAN) 0.005 % ophthalmic solution Place 1 drop into both eyes daily      levothyroxine (SYNTHROID/LEVOTHROID) 88 MCG tablet Take 88 mcg by mouth daily.      losartan (COZAAR) 100 MG tablet TAKE 1 TABLET BY MOUTH DAILY 100 tablet 2    lovastatin (MEVACOR) 40 MG tablet TAKE 1 TABLET BY MOUTH DAILY AT  BEDTIME 90 tablet 2    metFORMIN (GLUCOPHAGE XR) 500 MG 24 hr tablet       Multiple Vitamins-Minerals (CENTRUM SILVER) per tablet Take 1 tablet by mouth daily      mupirocin (BACTROBAN) 2 % external ointment Use 3 times a day to affected area for 5 days then stop 22 g 0    ONETOUCH ULTRA test strip Pt uses one touch Verio system and strips      sulfamethoxazole-trimethoprim (BACTRIM DS) 800-160 MG tablet Take 1 tablet by mouth 2 times daily 20 tablet 0    tamsulosin (FLOMAX) 0.4 MG capsule TAKE 2 CAPSULES BY MOUTH DAILY 200 capsule 0    UNABLE TO FIND MEDICATION NAME: Tumeric 100 mg once daily       No facility-administered encounter medications on file as of 6/20/2024.             O:   NAD, WDWN, Alert & Oriented, Mood & Affect wnl, Vitals stable   General appearance normal   Vitals stable   Alert, oriented and in no acute distress   R leg healed      Eyes: Conjunctivae/lids:Normal     ENT: Lips, mucosa: normal    MSK:Normal    Cardiovascular: peripheral edema none    Pulm: Breathing Normal    Neuro/Psych: Orientation:Alert and Orientedx3 ; Mood/Affect:normal       A/P:  Hx of non-melanoma skin cancer well healed  It was a pleasure speaking to Timmy Strange today.  Previous clinic notes and pertinent laboratory tests were reviewed prior to Timmy Strange's visit.  Signs and Symptoms of skin cancer discussed with patient.  Patient encouraged to perform monthly skin  exams.  UV precautions reviewed with patient.  Skin care regimen reviewed with patient: Eliminate harsh soaps, i.e. Dial, zest, irsih spring; Mild soaps such as Cetaphil or Dove sensitive skin, avoid hot or cold showers, aggressive use of emollients including vanicream, cetaphil or cerave discussed with patient.    Return to clinic 6 months

## 2024-06-27 ENCOUNTER — VIRTUAL VISIT (OUTPATIENT)
Dept: ONCOLOGY | Facility: CLINIC | Age: 84
End: 2024-06-27
Attending: INTERNAL MEDICINE
Payer: COMMERCIAL

## 2024-06-27 VITALS — WEIGHT: 190 LBS | HEIGHT: 66 IN | BODY MASS INDEX: 30.53 KG/M2

## 2024-06-27 DIAGNOSIS — D50.9 IRON DEFICIENCY ANEMIA, UNSPECIFIED IRON DEFICIENCY ANEMIA TYPE: Primary | ICD-10-CM

## 2024-06-27 PROCEDURE — 99441 PR PHYSICIAN TELEPHONE EVALUATION 5-10 MIN: CPT | Mod: 93 | Performed by: INTERNAL MEDICINE

## 2024-06-27 ASSESSMENT — PAIN SCALES - GENERAL: PAINLEVEL: NO PAIN (0)

## 2024-06-27 NOTE — LETTER
6/27/2024      Timmy Strange  [address] [facility] [address]      Dear Colleague,    Thank you for referring your patient, Timmy Strange, to the [facility] [place]. Please see a copy of my visit note below.    Virtual Visit Details    Telephone visit time of 5 minutes.    HEMATOLOGY HISTORY: Mr. Strange is a gentleman with chronic normocytic anemia due to anemia of chronic disease and iron deficiency.    1.  On 11/07/2017, hemoglobin of 13.3.  -On 10/30/2018, hemoglobin of 12.5.  -On 02/22/2021, WBC of 4.3, hemoglobin of 10.4, platelet of 226 and MCV of 90.  2.  On 02/01/2021, colonoscopy revealed colon polyps (tubular adenoma), hemorrhoids and diverticulosis.   -On 03/16/2021, EGD was essentially normal.    3. On 05/11/2021:  -Iron of 13, saturation of 6% and ferritin of 69.  -Normal folate.  -Normal vitamin B12.  -Normal TSH.  6. Patient received 1 dose of IV Venofer on 05/11/2021.     SUBJECTIVE:  Mr. Strange is a 83-year-old gentleman with chronic normocytic anemia due to chronic disease and iron deficiency.  He is on oral ferrous sulfate. He is tolerating well.    When his anemia gets worse, he has more fatigue.  Also he gets mental slowness.  Patient said that his brain gets foggy.    Overall his condition is stable.  Has mild fatigue.  No headache.  No dizziness.  No chest pain.  No shortness of breath.  No abdominal pain.  No nausea or vomiting.  No urinary or bowel complaints. He gets intermittent hemorrhoidal bleed.  All other review of system is negative.     PHYSICAL EXAMINATION:    He is alert and oriented x 3.   Rest of the systems not examined as this is a telephone visit.     ASSESSMENT:    1.  An 83-year-old gentleman with normocytic anemia secondary to iron deficiency and anemia of chronic disease. Given his age, he may also have primary bone marrow pathology like MDS.  2.  Iron deficiency from hemorrhoidal bleed.      PLAN:  Take ferrous sulfate twice a day. Take  with orange juice.  Labs in 1 month.  Virtual visit in 1 month.     DISCUSSION:  1.  Patient's overall condition is stable.  He is currently taking ferrous sulfate twice a day.  He will continue on that.  Advised him to take with orange juice.    2.  He has not had any labs done recently.  Last hemoglobin on April 11 was 12.1.  Last iron and ferritin end of March was low.  Advised him to have labs including CBC, iron and ferritin checked in a month.  I will have virtual visit after that. Advised him to call us with any questions or concerns.     Total telephone visit time of 5 minutes.          Again, thank you for allowing me to participate in the care of your patient.        Sincerely,        Chandrakant Panchal MD

## 2024-06-27 NOTE — LETTER
6/27/2024      Timmy Strange  [address] [facility] [address]      Dear Colleague,    Thank you for referring your patient, Timmy Strange, to the [facility] [place]. Please see a copy of my visit note below.    Virtual Visit Details    Telephone visit time of 5 minutes.    HEMATOLOGY HISTORY: Mr. Strange is a gentleman with chronic normocytic anemia due to anemia of chronic disease and iron deficiency.    1.  On 11/07/2017, hemoglobin of 13.3.  -On 10/30/2018, hemoglobin of 12.5.  -On 02/22/2021, WBC of 4.3, hemoglobin of 10.4, platelet of 226 and MCV of 90.  2.  On 02/01/2021, colonoscopy revealed colon polyps (tubular adenoma), hemorrhoids and diverticulosis.   -On 03/16/2021, EGD was essentially normal.    3. On 05/11/2021:  -Iron of 13, saturation of 6% and ferritin of 69.  -Normal folate.  -Normal vitamin B12.  -Normal TSH.  6. Patient received 1 dose of IV Venofer on 05/11/2021.     SUBJECTIVE:  Mr. Strange is a 83-year-old gentleman with chronic normocytic anemia due to chronic disease and iron deficiency.  He is on oral ferrous sulfate. He is tolerating well.    When his anemia gets worse, he has more fatigue.  Also he gets mental slowness.  Patient said that his brain gets foggy.    Overall his condition is stable.  Has mild fatigue.  No headache.  No dizziness.  No chest pain.  No shortness of breath.  No abdominal pain.  No nausea or vomiting.  No urinary or bowel complaints. He gets intermittent hemorrhoidal bleed.  All other review of system is negative.     PHYSICAL EXAMINATION:    He is alert and oriented x 3.   Rest of the systems not examined as this is a telephone visit.     ASSESSMENT:    1.  An 83-year-old gentleman with normocytic anemia secondary to iron deficiency and anemia of chronic disease. Given his age, he may also have primary bone marrow pathology like MDS.  2.  Iron deficiency from hemorrhoidal bleed.      PLAN:  Take ferrous sulfate twice a day. Take  noted with orange juice.  Labs in 1 month.  Virtual visit in 1 month.     DISCUSSION:  1.  Patient's overall condition is stable.  He is currently taking ferrous sulfate twice a day.  He will continue on that.  Advised him to take with orange juice.    2.  He has not had any labs done recently.  Last hemoglobin on April 11 was 12.1.  Last iron and ferritin end of March was low.  Advised him to have labs including CBC, iron and ferritin checked in a month.  I will have virtual visit after that. Advised him to call us with any questions or concerns.     Total telephone visit time of 5 minutes.          Again, thank you for allowing me to participate in the care of your patient.        Sincerely,        Chandrakant Panchal MD

## 2024-06-27 NOTE — PATIENT INSTRUCTIONS
Take ferrous sulfate twice a day. Take with orange juice.  Labs in 1 month.  Virtual visit in 1 month.

## 2024-06-27 NOTE — NURSING NOTE
Is the patient currently in the state of [place]? YES    Visit mode:VIDEO    If the visit is dropped, the patient can be reconnected by: VIDEO VISIT: Text to cell phone:   Telephone Information:   Mobile [phone]       Will anyone else be joining the visit? NO  (If patient encounters technical issues they should call [phone])    How would you like to obtain your AVS? MyChart    Are changes needed to the allergy or medication list? Pt stated no changes to allergies and Pt stated no med changes    Are refills needed on medications prescribed by this physician?     Reason for visit: RECHECK    Pt unavailable to complete distress screening.     Fay HAMMONDF

## 2024-06-27 NOTE — PROGRESS NOTES
HEMATOLOGY HISTORY: Mr. Strange is a gentleman with chronic normocytic anemia due to anemia of chronic disease and iron deficiency.    1.  On 11/07/2017, hemoglobin of 13.3.  -On 10/30/2018, hemoglobin of 12.5.  -On 02/22/2021, WBC of 4.3, hemoglobin of 10.4, platelet of 226 and MCV of 90.  2.  On 02/01/2021, colonoscopy revealed colon polyps (tubular adenoma), hemorrhoids and diverticulosis.   -On 03/16/2021, EGD was essentially normal.    3. On 05/11/2021:  -Iron of 13, saturation of 6% and ferritin of 69.  -Normal folate.  -Normal vitamin B12.  -Normal TSH.  6. Patient received 1 dose of IV Venofer on 05/11/2021.     SUBJECTIVE:  Mr. Strange is a 83-year-old gentleman with chronic normocytic anemia due to chronic disease and iron deficiency.  He is on oral ferrous sulfate. He is tolerating well.    When his anemia gets worse, he has more fatigue.  Also he gets mental slowness.  Patient said that his brain gets foggy.    Overall his condition is stable.  Has mild fatigue.  No headache.  No dizziness.  No chest pain.  No shortness of breath.  No abdominal pain.  No nausea or vomiting.  No urinary or bowel complaints. He gets intermittent hemorrhoidal bleed.  All other review of system is negative.     PHYSICAL EXAMINATION:    He is alert and oriented x 3.   Rest of the systems not examined as this is a telephone visit.     ASSESSMENT:    1.  An 83-year-old gentleman with normocytic anemia secondary to iron deficiency and anemia of chronic disease. Given his age, he may also have primary bone marrow pathology like MDS.  2.  Iron deficiency from hemorrhoidal bleed.      PLAN:  Take ferrous sulfate twice a day. Take with orange juice.  Labs in 1 month.  Virtual visit in 1 month.     DISCUSSION:  1.  Patient's overall condition is stable.  He is currently taking ferrous sulfate twice a day.  He will continue on that.  Advised him to take with orange juice.    2.  He has not had any labs done recently.  Last hemoglobin  on April 11 was 12.1.  Last iron and ferritin end of March was low.  Advised him to have labs including CBC, iron and ferritin checked in a month.  I will have virtual visit after that. Advised him to call us with any questions or concerns.     Total telephone visit time of 5 minutes.

## 2024-07-24 ENCOUNTER — LAB (OUTPATIENT)
Dept: LAB | Facility: CLINIC | Age: 84
End: 2024-07-24
Payer: COMMERCIAL

## 2024-07-24 DIAGNOSIS — D50.9 IRON DEFICIENCY ANEMIA, UNSPECIFIED IRON DEFICIENCY ANEMIA TYPE: ICD-10-CM

## 2024-07-24 LAB
ERYTHROCYTE [DISTWIDTH] IN BLOOD BY AUTOMATED COUNT: 13.9 % (ref 10–15)
HCT VFR BLD AUTO: 33.9 % (ref 40–53)
HGB BLD-MCNC: 10.9 G/DL (ref 13.3–17.7)
MCH RBC QN AUTO: 29.9 PG (ref 26.5–33)
MCHC RBC AUTO-ENTMCNC: 32.2 G/DL (ref 31.5–36.5)
MCV RBC AUTO: 93 FL (ref 78–100)
PLATELET # BLD AUTO: 219 10E3/UL (ref 150–450)
RBC # BLD AUTO: 3.64 10E6/UL (ref 4.4–5.9)
WBC # BLD AUTO: 5.5 10E3/UL (ref 4–11)

## 2024-07-24 PROCEDURE — 83550 IRON BINDING TEST: CPT

## 2024-07-24 PROCEDURE — 85027 COMPLETE CBC AUTOMATED: CPT

## 2024-07-24 PROCEDURE — 82728 ASSAY OF FERRITIN: CPT

## 2024-07-24 PROCEDURE — 36415 COLL VENOUS BLD VENIPUNCTURE: CPT

## 2024-07-24 PROCEDURE — 83540 ASSAY OF IRON: CPT

## 2024-07-25 LAB
FERRITIN SERPL-MCNC: 26 NG/ML (ref 31–409)
IRON BINDING CAPACITY (ROCHE): 277 UG/DL (ref 240–430)
IRON SATN MFR SERPL: 9 % (ref 15–46)
IRON SERPL-MCNC: 26 UG/DL (ref 61–157)

## 2024-07-25 NOTE — RESULT ENCOUNTER NOTE
Dear Mr. Strange,    Hemoglobin mildly lower at 10.9. We will monitor it.    Please, call me with any questions.    Chandrakant Panchal MD

## 2024-07-26 NOTE — RESULT ENCOUNTER NOTE
Dear Mr. Strange,    Iron and ferritin is low. Will discuss regarding iron infusion during appointment.    Please, call me with any questions.    Chandrakant Panchal MD

## 2024-07-31 ENCOUNTER — VIRTUAL VISIT (OUTPATIENT)
Dept: ONCOLOGY | Facility: CLINIC | Age: 84
End: 2024-07-31
Attending: INTERNAL MEDICINE
Payer: COMMERCIAL

## 2024-07-31 VITALS — HEIGHT: 64 IN | BODY MASS INDEX: 33.8 KG/M2 | WEIGHT: 198 LBS

## 2024-07-31 DIAGNOSIS — K90.9 MALABSORPTION OF IRON: ICD-10-CM

## 2024-07-31 DIAGNOSIS — D50.9 IRON DEFICIENCY ANEMIA, UNSPECIFIED IRON DEFICIENCY ANEMIA TYPE: Primary | ICD-10-CM

## 2024-07-31 PROCEDURE — 99214 OFFICE O/P EST MOD 30 MIN: CPT | Mod: 95 | Performed by: INTERNAL MEDICINE

## 2024-07-31 RX ORDER — EPINEPHRINE 1 MG/ML
0.3 INJECTION, SOLUTION INTRAMUSCULAR; SUBCUTANEOUS EVERY 5 MIN PRN
Status: CANCELLED | OUTPATIENT
Start: 2024-07-31

## 2024-07-31 RX ORDER — DIPHENHYDRAMINE HYDROCHLORIDE 50 MG/ML
50 INJECTION INTRAMUSCULAR; INTRAVENOUS
Status: CANCELLED
Start: 2024-07-31

## 2024-07-31 RX ORDER — METHYLPREDNISOLONE SODIUM SUCCINATE 125 MG/2ML
125 INJECTION, POWDER, LYOPHILIZED, FOR SOLUTION INTRAMUSCULAR; INTRAVENOUS
Status: CANCELLED
Start: 2024-07-31

## 2024-07-31 RX ORDER — MEPERIDINE HYDROCHLORIDE 25 MG/ML
25 INJECTION INTRAMUSCULAR; INTRAVENOUS; SUBCUTANEOUS EVERY 30 MIN PRN
Status: CANCELLED | OUTPATIENT
Start: 2024-07-31

## 2024-07-31 RX ORDER — HEPARIN SODIUM,PORCINE 10 UNIT/ML
5-20 VIAL (ML) INTRAVENOUS DAILY PRN
Status: CANCELLED | OUTPATIENT
Start: 2024-07-31

## 2024-07-31 RX ORDER — ALBUTEROL SULFATE 90 UG/1
1-2 AEROSOL, METERED RESPIRATORY (INHALATION)
Status: CANCELLED
Start: 2024-07-31

## 2024-07-31 RX ORDER — ALBUTEROL SULFATE 0.83 MG/ML
2.5 SOLUTION RESPIRATORY (INHALATION)
Status: CANCELLED | OUTPATIENT
Start: 2024-07-31

## 2024-07-31 RX ORDER — HEPARIN SODIUM (PORCINE) LOCK FLUSH IV SOLN 100 UNIT/ML 100 UNIT/ML
5 SOLUTION INTRAVENOUS
Status: CANCELLED | OUTPATIENT
Start: 2024-07-31

## 2024-07-31 ASSESSMENT — PAIN SCALES - GENERAL: PAINLEVEL: NO PAIN (0)

## 2024-07-31 NOTE — LETTER
7/31/2024      Timmy Strange  [address] [address]      Dear Colleague,    Thank you for referring your patient, Timmy Strange, to the [facility] [place]. Please see a copy of my visit note below.    Virtual Visit Details    Type of service:  Video Visit     Originating Location (pt. Location): Home    Distant Location (provider location):  On-site  Platform used for Video Visit: [email or web address]    HEMATOLOGY HISTORY: Mr. Strange is a gentleman with chronic normocytic anemia due to anemia of chronic disease and iron deficiency.    1.  On 11/07/2017, hemoglobin of 13.3.  -On 10/30/2018, hemoglobin of 12.5.  -On 02/22/2021, WBC of 4.3, hemoglobin of 10.4, platelet of 226 and MCV of 90.  2.  On 02/01/2021, colonoscopy revealed colon polyps (tubular adenoma), hemorrhoids and diverticulosis.   -On 03/16/2021, EGD was essentially normal.    3. On 05/11/2021:  -Iron of 13, saturation of 6% and ferritin of 69.  -Normal folate.  -Normal vitamin B12.  -Normal TSH.  6. Patient received 1 dose of IV Venofer on 05/11/2021.     SUBJECTIVE:    Mr. Strange is a 83-year-old gentleman with chronic normocytic anemia due to chronic disease and iron deficiency.  Iron deficiency is secondary to intermittent hemorrhoidal bleed.  Patient says that in last couple of weeks, he had few hemorrhoidal bleed.    He is taking ferrous sulfate twice a day with orange juice.  He is tolerating it well.    Lately he has been more tired.  Also he feels that his brain is foggy. Concentration is not good.    Because of generalized weakness, his activities are limited.  No headache.  Occasional dizziness.  He is on oral ferrous sulfate. He is tolerating well.  No chest pain.  No shortness of breath.  No abdominal pain.  No nausea or vomiting.  No urinary or bowel complaints. He gets intermittent hemorrhoidal bleed.  All other review of system is negative.     PHYSICAL EXAMINATION:    He is alert and oriented x 3.   Rest of  the systems not examined as this is a telephone visit.    LABS: CBC, iron and ferritin done on 07/24/2024 reviewed.     ASSESSMENT:    1.  An 83-year-old gentleman with normocytic anemia secondary to iron deficiency and anemia of chronic disease. Given his age, he may also have primary bone marrow pathology like MDS.  2.  Iron deficiency from hemorrhoidal bleed.      PLAN:  -IV iron in next few days.  -Continue oral ferrous sulfate.  -Follow-up in 6 to 8 weeks with labs.     DISCUSSION:  1.  I had a video visit with the patient.  Labs were reviewed.  His hemoglobin has decreased to 10.9.  His iron and ferritin is low.  Patient has worsening iron deficiency anemia.  He is symptomatic from it.  This has happened while he has been taking oral ferrous sulfate.    Explained to the patient that iron deficiency is secondary to hemorrhoidal bleed.  Advised him to see the colorectal surgeon if hemorrhoidal bleed gets worse.  He has seen them before.    2.  Discussed regarding treatment of iron deficiency anemia.  He is on ferrous sulfate twice a day which he is tolerating well. He will continue on that.    Because of worsening anemia and iron deficiency, discussed regarding IV iron.  He would like that as he is symptomatic.    Will give him IV INFeD.  Side effects including anaphylactic reactions were reviewed.  He is agreeable for IV iron.    Hopefully IV iron will help with anemia and symptomatically he will feel better.    3.  I will see him in 6 to 8 weeks time with labs.  Advised him to call us with any questions or concerns.     Total video visit time of 20 minutes.  Time spent in today's visit, review of chart/investigations today and documentation today.      Again, thank you for allowing me to participate in the care of your patient.        Sincerely,        Chandrakant Panchal MD

## 2024-07-31 NOTE — LETTER
7/31/2024      Timmy Strange  [address] [address]      Dear Colleague,    Thank you for referring your patient, Timmy Strange, to the [facility] [place]. Please see a copy of my visit note below.    Virtual Visit Details    Type of service:  Video Visit     Originating Location (pt. Location): Home    Distant Location (provider location):  On-site  Platform used for Video Visit: [employer]    HEMATOLOGY HISTORY: Mr. Strange is a gentleman with chronic normocytic anemia due to anemia of chronic disease and iron deficiency.    1.  On 11/07/2017, hemoglobin of 13.3.  -On 10/30/2018, hemoglobin of 12.5.  -On 02/22/2021, WBC of 4.3, hemoglobin of 10.4, platelet of 226 and MCV of 90.  2.  On 02/01/2021, colonoscopy revealed colon polyps (tubular adenoma), hemorrhoids and diverticulosis.   -On 03/16/2021, EGD was essentially normal.    3. On 05/11/2021:  -Iron of 13, saturation of 6% and ferritin of 69.  -Normal folate.  -Normal vitamin B12.  -Normal TSH.  6. Patient received 1 dose of IV Venofer on 05/11/2021.     SUBJECTIVE:    Mr. Strange is a 83-year-old gentleman with chronic normocytic anemia due to chronic disease and iron deficiency.  Iron deficiency is secondary to intermittent hemorrhoidal bleed.  Patient says that in last couple of weeks, he had few hemorrhoidal bleed.    He is taking ferrous sulfate twice a day with orange juice.  He is tolerating it well.    Lately he has been more tired.  Also he feels that his brain is foggy. Concentration is not good.    Because of generalized weakness, his activities are limited.  No headache.  Occasional dizziness.  He is on oral ferrous sulfate. He is tolerating well.  No chest pain.  No shortness of breath.  No abdominal pain.  No nausea or vomiting.  No urinary or bowel complaints. He gets intermittent hemorrhoidal bleed.  All other review of system is negative.     PHYSICAL EXAMINATION:    He is alert and oriented x 3.   Rest of  the systems not examined as this is a telephone visit.    LABS: CBC, iron and ferritin done on 07/24/2024 reviewed.     ASSESSMENT:    1.  An 83-year-old gentleman with normocytic anemia secondary to iron deficiency and anemia of chronic disease. Given his age, he may also have primary bone marrow pathology like MDS.  2.  Iron deficiency from hemorrhoidal bleed.      PLAN:  -IV iron in next few days.  -Continue oral ferrous sulfate.  -Follow-up in 6 to 8 weeks with labs.     DISCUSSION:  1.  I had a video visit with the patient.  Labs were reviewed.  His hemoglobin has decreased to 10.9.  His iron and ferritin is low.  Patient has worsening iron deficiency anemia.  He is symptomatic from it.  This has happened while he has been taking oral ferrous sulfate.    Explained to the patient that iron deficiency is secondary to hemorrhoidal bleed.  Advised him to see the colorectal surgeon if hemorrhoidal bleed gets worse.  He has seen them before.    2.  Discussed regarding treatment of iron deficiency anemia.  He is on ferrous sulfate twice a day which he is tolerating well. He will continue on that.    Because of worsening anemia and iron deficiency, discussed regarding IV iron.  He would like that as he is symptomatic.    Will give him IV INFeD.  Side effects including anaphylactic reactions were reviewed.  He is agreeable for IV iron.    Hopefully IV iron will help with anemia and symptomatically he will feel better.    3.  I will see him in 6 to 8 weeks time with labs.  Advised him to call us with any questions or concerns.     Total video visit time of 20 minutes.  Time spent in today's visit, review of chart/investigations today and documentation today.      Again, thank you for allowing me to participate in the care of your patient.        Sincerely,        Chandrakant Panchal MD

## 2024-07-31 NOTE — NURSING NOTE
Current patient location: [address] [place] [id number]    Is the patient currently in the state of [place]? YES    Visit mode:VIDEO    If the visit is dropped, the patient can be reconnected by: VIDEO VISIT: Text to cell phone:   Telephone Information:   Mobile [phone]       Will anyone else be joining the visit? NO  (If patient encounters technical issues they should call [phone]    How would you like to obtain your AVS? MyChart    Are changes needed to the allergy or medication list? Pt stated no changes to allergies and Pt stated no med changes    Are refills needed on medications prescribed by this physician? NO    Reason for visit: PAT Elliott LPN

## 2024-07-31 NOTE — PROGRESS NOTES
Virtual Visit Details    Type of service:  Video Visit     Originating Location (pt. Location): Home    Distant Location (provider location):  On-site  Platform used for Video Visit: "[employer]"    HEMATOLOGY HISTORY: Mr. Strange is a gentleman with chronic normocytic anemia due to anemia of chronic disease and iron deficiency.    1.  On 11/07/2017, hemoglobin of 13.3.  -On 10/30/2018, hemoglobin of 12.5.  -On 02/22/2021, WBC of 4.3, hemoglobin of 10.4, platelet of 226 and MCV of 90.  2.  On 02/01/2021, colonoscopy revealed colon polyps (tubular adenoma), hemorrhoids and diverticulosis.   -On 03/16/2021, EGD was essentially normal.    3. On 05/11/2021:  -Iron of 13, saturation of 6% and ferritin of 69.  -Normal folate.  -Normal vitamin B12.  -Normal TSH.  6. Patient received 1 dose of IV Venofer on 05/11/2021.     SUBJECTIVE:    Mr. Strange is a 83-year-old gentleman with chronic normocytic anemia due to chronic disease and iron deficiency.  Iron deficiency is secondary to intermittent hemorrhoidal bleed.  Patient says that in last couple of weeks, he had few hemorrhoidal bleed.    He is taking ferrous sulfate twice a day with orange juice.  He is tolerating it well.    Lately he has been more tired.  Also he feels that his brain is foggy. Concentration is not good.    Because of generalized weakness, his activities are limited.  No headache.  Occasional dizziness.  He is on oral ferrous sulfate. He is tolerating well.  No chest pain.  No shortness of breath.  No abdominal pain.  No nausea or vomiting.  No urinary or bowel complaints. He gets intermittent hemorrhoidal bleed.  All other review of system is negative.     PHYSICAL EXAMINATION:    He is alert and oriented x 3.   Rest of the systems not examined as this is a telephone visit.    LABS: CBC, iron and ferritin done on 07/24/2024 reviewed.     ASSESSMENT:    1.  An 83-year-old gentleman with normocytic anemia secondary to iron deficiency and anemia of chronic  disease. Given his age, he may also have primary bone marrow pathology like MDS.  2.  Iron deficiency from hemorrhoidal bleed.      PLAN:  -IV iron in next few days.  -Continue oral ferrous sulfate.  -Follow-up in 6 to 8 weeks with labs.     DISCUSSION:  1.  I had a video visit with the patient.  Labs were reviewed.  His hemoglobin has decreased to 10.9.  His iron and ferritin is low.  Patient has worsening iron deficiency anemia.  He is symptomatic from it.  This has happened while he has been taking oral ferrous sulfate.    Explained to the patient that iron deficiency is secondary to hemorrhoidal bleed.  Advised him to see the colorectal surgeon if hemorrhoidal bleed gets worse.  He has seen them before.    2.  Discussed regarding treatment of iron deficiency anemia.  He is on ferrous sulfate twice a day which he is tolerating well. He will continue on that.    Because of worsening anemia and iron deficiency, discussed regarding IV iron.  He would like that as he is symptomatic.    Will give him IV INFeD.  Side effects including anaphylactic reactions were reviewed.  He is agreeable for IV iron.    Hopefully IV iron will help with anemia and symptomatically he will feel better.    3.  I will see him in 6 to 8 weeks time with labs.  Advised him to call us with any questions or concerns.     Total video visit time of 20 minutes.  Time spent in today's visit, review of chart/investigations today and documentation today.

## 2024-07-31 NOTE — PATIENT INSTRUCTIONS
-IV iron in next few days.  -Continue oral ferrous sulfate.  -Follow-up in 6 to 8 weeks with labs.

## 2024-08-01 ENCOUNTER — TRANSFERRED RECORDS (OUTPATIENT)
Dept: MULTI SPECIALTY CLINIC | Facility: CLINIC | Age: 84
End: 2024-08-01

## 2024-08-01 LAB — RETINOPATHY: NORMAL

## 2024-08-11 ENCOUNTER — HEALTH MAINTENANCE LETTER (OUTPATIENT)
Age: 84
End: 2024-08-11

## 2024-08-15 ENCOUNTER — TELEPHONE (OUTPATIENT)
Dept: ONCOLOGY | Facility: CLINIC | Age: 84
End: 2024-08-15
Payer: COMMERCIAL

## 2024-08-15 DIAGNOSIS — R35.0 URINARY FREQUENCY: ICD-10-CM

## 2024-08-15 NOTE — TELEPHONE ENCOUNTER
Oncology Nurse Triage    Situation:   Timmy reporting the following symptoms: GI distress since increasing dose of iron on 4/2/2024    Background:   Treating Provider:   Dr Panchal     Date of last office visit: 7/31/2024 with Dr Panchal     Recent Treatments:oral ferrous sulfate twice daily. Pt is scheduled for IV infed on 8/28/2024    Assessment:     Pt is calling with concerns regarding increased GI upset since increasing dose of iron from one tablet daily to 2 tablets daily on 4/2/2024.  States that ever since he increased the dose, he has been having episodes of diarrhea that alternates with constipation.   He reports that he has diarrhea, and then he takes imodium. One tablet of imodium then causes him to have constipation. He then goes about 2 days without a bowel movement, until  he is finally able to have a bowel movement. Then he develops diarrhea again, and this cycle keeps repeating itself.   Denies any abdominal pain, cramping, or bloating. He does not see any blood in his stool. No nausea/vomiting. He has been taking the iron with food.  Pt denies any other new medications or changes to his diet that could be impacting his stools.     Pt also wanted to update Dr Panchal that he had one internal hemorrhoid that was banded about 2 weeks ago. He still has 2 other internal hemorrhoids that could be banded, but GI provider only wanted to do one at a time. Pt states that he had one day of some mild rectal bleeding about 4 days ago, but this has resolved. He has not had any rectal bleeding over the past 3 days.     Denies chest pain, shortness of breath, severe weakness, dizziness, lightheadedness, numbness/tingling, or other urgent symptoms.   Pt admits that he does not drink enough fluids, usually only about 5 glasses per day.     Recommendations:     Advised pt to continue to work on fluid intake, aiming for 8-10 glasses per day.  Will route to Dr Panchal for review and recommendations. Could oral iron be  decreased to one tablet daily now that pt will be receiving IV iron?    Reviewed emergent symptoms that would warrant return call to clinic or evaluation in ER.   Pt requests that we call his wife Ladan with reply.    Patient verbalized understanding and agreement with plan.  Patient was instructed to call the clinic with any questions, concerns, or worsening symptoms.  Joseline Collazo RN on 8/15/2024 at 2:08 PM

## 2024-08-16 NOTE — TELEPHONE ENCOUNTER
Chandrakant Panchal MD  You; Herminia Lopez RN16 hours ago (4:13 PM)       OK to take oral iron once a day.    Chandrakant Panchal MD     Pt's wife, Ladan, was notified with recommendations, and she agrees with plan.  Pt will have iron infusion on 8/28/2024 as scheduled, and will follow up with Dr Panchal on 9/26/2024.    Patient's wife verbalized understanding and agreement with plan.  Patient's wife was instructed to call the clinic with any questions, concerns, or worsening symptoms.  Joseline Collazo RN on 8/16/2024 at 8:57 AM

## 2024-08-20 RX ORDER — FINASTERIDE 5 MG/1
1 TABLET, FILM COATED ORAL DAILY
Qty: 100 TABLET | Refills: 0 | Status: SHIPPED | OUTPATIENT
Start: 2024-08-20 | End: 2024-09-17

## 2024-08-21 ENCOUNTER — TELEPHONE (OUTPATIENT)
Dept: FAMILY MEDICINE | Facility: CLINIC | Age: 84
End: 2024-08-21
Payer: COMMERCIAL

## 2024-08-28 ENCOUNTER — INFUSION THERAPY VISIT (OUTPATIENT)
Dept: INFUSION THERAPY | Facility: CLINIC | Age: 84
End: 2024-08-28
Attending: INTERNAL MEDICINE
Payer: COMMERCIAL

## 2024-08-28 VITALS
HEART RATE: 56 BPM | SYSTOLIC BLOOD PRESSURE: 129 MMHG | TEMPERATURE: 97.9 F | OXYGEN SATURATION: 96 % | DIASTOLIC BLOOD PRESSURE: 65 MMHG | RESPIRATION RATE: 18 BRPM

## 2024-08-28 DIAGNOSIS — D50.9 IRON DEFICIENCY ANEMIA, UNSPECIFIED IRON DEFICIENCY ANEMIA TYPE: Primary | ICD-10-CM

## 2024-08-28 DIAGNOSIS — K90.9 MALABSORPTION OF IRON: ICD-10-CM

## 2024-08-28 PROCEDURE — 96376 TX/PRO/DX INJ SAME DRUG ADON: CPT

## 2024-08-28 PROCEDURE — 96365 THER/PROPH/DIAG IV INF INIT: CPT

## 2024-08-28 PROCEDURE — 96366 THER/PROPH/DIAG IV INF ADDON: CPT

## 2024-08-28 PROCEDURE — 250N000011 HC RX IP 250 OP 636: Performed by: INTERNAL MEDICINE

## 2024-08-28 PROCEDURE — 258N000003 HC RX IP 258 OP 636: Performed by: INTERNAL MEDICINE

## 2024-08-28 RX ORDER — HEPARIN SODIUM (PORCINE) LOCK FLUSH IV SOLN 100 UNIT/ML 100 UNIT/ML
5 SOLUTION INTRAVENOUS
Status: CANCELLED | OUTPATIENT
Start: 2024-08-28

## 2024-08-28 RX ORDER — HEPARIN SODIUM,PORCINE 10 UNIT/ML
5-20 VIAL (ML) INTRAVENOUS DAILY PRN
Status: DISCONTINUED | OUTPATIENT
Start: 2024-08-28 | End: 2024-08-28 | Stop reason: HOSPADM

## 2024-08-28 RX ORDER — MEPERIDINE HYDROCHLORIDE 25 MG/ML
25 INJECTION INTRAMUSCULAR; INTRAVENOUS; SUBCUTANEOUS EVERY 30 MIN PRN
Status: CANCELLED | OUTPATIENT
Start: 2024-08-28

## 2024-08-28 RX ORDER — HEPARIN SODIUM (PORCINE) LOCK FLUSH IV SOLN 100 UNIT/ML 100 UNIT/ML
5 SOLUTION INTRAVENOUS
Status: DISCONTINUED | OUTPATIENT
Start: 2024-08-28 | End: 2024-08-28 | Stop reason: HOSPADM

## 2024-08-28 RX ORDER — ALBUTEROL SULFATE 90 UG/1
1-2 AEROSOL, METERED RESPIRATORY (INHALATION)
Status: CANCELLED
Start: 2024-08-28

## 2024-08-28 RX ORDER — HEPARIN SODIUM,PORCINE 10 UNIT/ML
5-20 VIAL (ML) INTRAVENOUS DAILY PRN
Status: CANCELLED | OUTPATIENT
Start: 2024-08-28

## 2024-08-28 RX ORDER — EPINEPHRINE 1 MG/ML
0.3 INJECTION, SOLUTION INTRAMUSCULAR; SUBCUTANEOUS EVERY 5 MIN PRN
Status: CANCELLED | OUTPATIENT
Start: 2024-08-28

## 2024-08-28 RX ORDER — ALBUTEROL SULFATE 0.83 MG/ML
2.5 SOLUTION RESPIRATORY (INHALATION)
Status: CANCELLED | OUTPATIENT
Start: 2024-08-28

## 2024-08-28 RX ORDER — DIPHENHYDRAMINE HYDROCHLORIDE 50 MG/ML
50 INJECTION INTRAMUSCULAR; INTRAVENOUS
Status: CANCELLED
Start: 2024-08-28

## 2024-08-28 RX ORDER — METHYLPREDNISOLONE SODIUM SUCCINATE 125 MG/2ML
125 INJECTION, POWDER, LYOPHILIZED, FOR SOLUTION INTRAMUSCULAR; INTRAVENOUS
Status: CANCELLED
Start: 2024-08-28

## 2024-08-28 RX ADMIN — SODIUM CHLORIDE 25 MG: 9 INJECTION, SOLUTION INTRAVENOUS at 09:06

## 2024-08-28 RX ADMIN — SODIUM CHLORIDE 975 MG: 9 INJECTION, SOLUTION INTRAVENOUS at 10:30

## 2024-08-28 RX ADMIN — SODIUM CHLORIDE 250 ML: 9 INJECTION, SOLUTION INTRAVENOUS at 09:07

## 2024-08-28 NOTE — PROGRESS NOTES
Infusion Nursing Note:  Timmy Strange presents today for infed.    Patient seen by provider today: No   present during visit today: Not Applicable.    Note: N/A.      Intravenous Access:  Peripheral IV placed.    Treatment Conditions:  Not Applicable.      Post Infusion Assessment:  Patient tolerated infusion without incident.  Pt observed for 60 minutes post infed test dose per protocol.   Blood return noted pre and post infusion.  Site patent and intact, free from redness, edema or discomfort.  No evidence of extravasations.  Access discontinued per protocol.       Discharge Plan:   Discharge instructions reviewed with: Patient.  Patient and/or family verbalized understanding of discharge instructions and all questions answered.  Patient discharged in stable condition accompanied by: self.  Departure Mode: Ambulatory.      Sally Rudolph RN

## 2024-09-17 ENCOUNTER — TELEPHONE (OUTPATIENT)
Dept: UROLOGY | Facility: CLINIC | Age: 84
End: 2024-09-17
Payer: COMMERCIAL

## 2024-09-17 DIAGNOSIS — R35.0 URINARY FREQUENCY: ICD-10-CM

## 2024-09-17 RX ORDER — FINASTERIDE 5 MG/1
1 TABLET, FILM COATED ORAL DAILY
Qty: 100 TABLET | Refills: 0 | Status: SHIPPED | OUTPATIENT
Start: 2024-09-17

## 2024-09-17 NOTE — TELEPHONE ENCOUNTER
[facility] Call Center    Phone Message    May a detailed message be left on voicemail: no     Reason for Call: Medication Refill Request    Has the patient contacted the pharmacy for the refill? Yes   Name of medication being requested: finasteride (PROSCAR) 5 MG tablet [[id number]] (Order [id number])   pt never got prescription from Aug  Provider who prescribed the medication: [facility]  Pharmacy: [facility]  Date medication is needed: asap       Action Taken: Other: urology    Travel Screening: Not Applicable     Date of Service:

## 2024-09-17 NOTE — TELEPHONE ENCOUNTER
Called patient and informed that a 90+ day supply was sent to his [facility] in August. Explained that when he comes in October for his next visit, we could refill at that time to the [facility] in [place]. Patient was informed to let us know about refill request when he has upcoming appointment.

## 2024-09-17 NOTE — TELEPHONE ENCOUNTER
[facility] Call Center    Phone Message    May a detailed message be left on voicemail: yes     Reason for Call: Medication Refill Request    Has the patient contacted the pharmacy for the refill? Yes   Name of medication being requested: finasteride (PROSCAR) 5 MG tablet  Provider who prescribed the medication: Pino Meier MD   Pharmacy: [id number] [address] [address]  Date medication is needed: ASAP    Action Taken: Other: [facility] - Urology    Travel Screening: Not Applicable     Date of Service:

## 2024-09-21 NOTE — TELEPHONE ENCOUNTER
Please reach out - second attempt >> Lab is asking me to sign pt requested labs - which I can do only at [facility]    Thank you  Michaela Sharma MD on 9/21/2024

## 2024-09-23 ENCOUNTER — TELEPHONE (OUTPATIENT)
Dept: FAMILY MEDICINE | Facility: CLINIC | Age: 84
End: 2024-09-23
Payer: COMMERCIAL

## 2024-09-23 DIAGNOSIS — E11.9 DIABETES MELLITUS (H): Primary | ICD-10-CM

## 2024-09-23 NOTE — TELEPHONE ENCOUNTER
He doesn't have a , and it's from 10:00 am to 1 pm that he has a . Wed and Fridays    He really needs to get the labs done.

## 2024-09-23 NOTE — TELEPHONE ENCOUNTER
Reason for call:  Other   Patient called regarding (reason for call): lab order    Additional comments:   patient's endocrinologist is wanting him to have an A1c lab including in his upcoming lab appt'    Can you please get that in his lab orders before his appointment this wed    Thank you    Phone number to reach patient:  Home number on file [phone] (home)    Best Time:  any    Can we leave a detailed message on this number?  YES    Travel screening: Not Applicable

## 2024-09-23 NOTE — TELEPHONE ENCOUNTER
OK to get him in at 10 AM same day slot open on my schedule on same day 9/25/24 for his [facility] >> And he can have the labs soon after he sees me at the same time.     Thank you  Michaela Sharma MD on 9/23/2024 at 4:53 PM

## 2024-09-25 ENCOUNTER — LAB (OUTPATIENT)
Dept: LAB | Facility: CLINIC | Age: 84
End: 2024-09-25
Payer: COMMERCIAL

## 2024-09-25 DIAGNOSIS — D64.9 ANEMIA, UNSPECIFIED TYPE: ICD-10-CM

## 2024-09-25 DIAGNOSIS — E11.9 DIABETES MELLITUS (H): ICD-10-CM

## 2024-09-25 DIAGNOSIS — I10 ESSENTIAL HYPERTENSION: Primary | ICD-10-CM

## 2024-09-25 DIAGNOSIS — D50.9 IRON DEFICIENCY ANEMIA, UNSPECIFIED IRON DEFICIENCY ANEMIA TYPE: ICD-10-CM

## 2024-09-25 LAB
ANION GAP SERPL CALCULATED.3IONS-SCNC: 6 MMOL/L (ref 7–15)
BASOPHILS # BLD AUTO: 0 10E3/UL (ref 0–0.2)
BASOPHILS NFR BLD AUTO: 1 %
BUN SERPL-MCNC: 21.8 MG/DL (ref 8–23)
CALCIUM SERPL-MCNC: 8.9 MG/DL (ref 8.8–10.4)
CHLORIDE SERPL-SCNC: 105 MMOL/L (ref 98–107)
CREAT SERPL-MCNC: 0.85 MG/DL (ref 0.67–1.17)
EGFRCR SERPLBLD CKD-EPI 2021: 87 ML/MIN/1.73M2
EOSINOPHIL # BLD AUTO: 0.1 10E3/UL (ref 0–0.7)
EOSINOPHIL NFR BLD AUTO: 2 %
ERYTHROCYTE [DISTWIDTH] IN BLOOD BY AUTOMATED COUNT: 14.6 % (ref 10–15)
EST. AVERAGE GLUCOSE BLD GHB EST-MCNC: 123 MG/DL
FERRITIN SERPL-MCNC: 202 NG/ML (ref 31–409)
GLUCOSE SERPL-MCNC: 99 MG/DL (ref 70–99)
HBA1C MFR BLD: 5.9 % (ref 0–5.6)
HCO3 SERPL-SCNC: 27 MMOL/L (ref 22–29)
HCT VFR BLD AUTO: 33.4 % (ref 40–53)
HGB BLD-MCNC: 10.8 G/DL (ref 13.3–17.7)
IMM GRANULOCYTES # BLD: 0 10E3/UL
IMM GRANULOCYTES NFR BLD: 0 %
IRON BINDING CAPACITY (ROCHE): 220 UG/DL (ref 240–430)
IRON SATN MFR SERPL: 28 % (ref 15–46)
IRON SERPL-MCNC: 61 UG/DL (ref 61–157)
LYMPHOCYTES # BLD AUTO: 1.2 10E3/UL (ref 0.8–5.3)
LYMPHOCYTES NFR BLD AUTO: 31 %
MCH RBC QN AUTO: 30.5 PG (ref 26.5–33)
MCHC RBC AUTO-ENTMCNC: 32.3 G/DL (ref 31.5–36.5)
MCV RBC AUTO: 94 FL (ref 78–100)
MONOCYTES # BLD AUTO: 0.5 10E3/UL (ref 0–1.3)
MONOCYTES NFR BLD AUTO: 12 %
NEUTROPHILS # BLD AUTO: 2.1 10E3/UL (ref 1.6–8.3)
NEUTROPHILS NFR BLD AUTO: 55 %
PLATELET # BLD AUTO: 172 10E3/UL (ref 150–450)
POTASSIUM SERPL-SCNC: 4.7 MMOL/L (ref 3.4–5.3)
RBC # BLD AUTO: 3.54 10E6/UL (ref 4.4–5.9)
SODIUM SERPL-SCNC: 138 MMOL/L (ref 135–145)
WBC # BLD AUTO: 3.8 10E3/UL (ref 4–11)

## 2024-09-25 PROCEDURE — 82728 ASSAY OF FERRITIN: CPT

## 2024-09-25 PROCEDURE — 83036 HEMOGLOBIN GLYCOSYLATED A1C: CPT

## 2024-09-25 PROCEDURE — 36415 COLL VENOUS BLD VENIPUNCTURE: CPT

## 2024-09-25 PROCEDURE — 85025 COMPLETE CBC W/AUTO DIFF WBC: CPT

## 2024-09-25 PROCEDURE — 80048 BASIC METABOLIC PNL TOTAL CA: CPT

## 2024-09-25 PROCEDURE — 83550 IRON BINDING TEST: CPT

## 2024-09-25 PROCEDURE — 83540 ASSAY OF IRON: CPT

## 2024-09-26 ENCOUNTER — VIRTUAL VISIT (OUTPATIENT)
Dept: ONCOLOGY | Facility: CLINIC | Age: 84
End: 2024-09-26
Attending: INTERNAL MEDICINE
Payer: COMMERCIAL

## 2024-09-26 VITALS — BODY MASS INDEX: 31.96 KG/M2 | HEIGHT: 66 IN

## 2024-09-26 DIAGNOSIS — D50.9 IRON DEFICIENCY ANEMIA, UNSPECIFIED IRON DEFICIENCY ANEMIA TYPE: ICD-10-CM

## 2024-09-26 DIAGNOSIS — D64.9 ANEMIA, UNSPECIFIED TYPE: Primary | ICD-10-CM

## 2024-09-26 PROCEDURE — G2211 COMPLEX E/M VISIT ADD ON: HCPCS | Mod: 95 | Performed by: INTERNAL MEDICINE

## 2024-09-26 PROCEDURE — 99214 OFFICE O/P EST MOD 30 MIN: CPT | Mod: 95 | Performed by: INTERNAL MEDICINE

## 2024-09-26 ASSESSMENT — PAIN SCALES - GENERAL: PAINLEVEL: NO PAIN (0)

## 2024-09-26 NOTE — NURSING NOTE
Current patient location: [address] [place] [id number]    Is the patient currently in the state of [place]? YES    Visit mode:VIDEO    If the visit is dropped, the patient can be reconnected by: VIDEO VISIT: Send to e-mail at: [email or web address]    Will anyone else be joining the visit? NO  (If patient encounters technical issues they should call [phone])    How would you like to obtain your AVS? MyChart    Are changes needed to the allergy or medication list? No, Pt stated no changes to allergies, and Pt stated no med changes    Are refills needed on medications prescribed by this physician? NO    Rooming Documentation:  Unable to complete questionnaire(s) due to time    Pt states his weight 10 days ago was 187 lbs.     Reason for visit: RECHECK (Return [facility])    Fay HENDERSON

## 2024-09-26 NOTE — LETTER
9/26/2024      Timmy Strange  [address] [facility] [address]      Dear Colleague,    Thank you for referring your patient, Timmy Strange, to the [facility] [place]. Please see a copy of my visit note below.    Virtual Visit Details    Type of service:  Video Visit     Originating Location (pt. Location): Home    Distant Location (provider location):  On-site  Platform used for Video Visit: [employer]    HEMATOLOGY HISTORY: Mr. Strange is a gentleman with chronic normocytic anemia due to anemia of chronic disease and iron deficiency.    1.  On 11/07/2017, hemoglobin of 13.3.  -On 10/30/2018, hemoglobin of 12.5.  -On 02/22/2021, WBC of 4.3, hemoglobin of 10.4, platelet of 226 and MCV of 90.  2.  On 02/01/2021, colonoscopy revealed colon polyps (tubular adenoma), hemorrhoids and diverticulosis.   -On 03/16/2021, EGD was essentially normal.    3. On 05/11/2021:  -Iron of 13, saturation of 6% and ferritin of 69.  -Normal folate.  -Normal vitamin B12.  -Normal TSH.  6. Patient received 1 dose of IV Venofer on 05/11/2021.    7.  Colonoscopy on 04/29/2024 revealed colon polyps, diverticulosis and internal hemorrhoids.  8.  On 07/24/2024, low hemoglobin of 10.9.  Low iron of 26 and low ferritin of 26.  -IV INFeD given on 08/28/2024  -On 09/25/2024, low hemoglobin of 10.8.  Normal iron of 61.  Normal ferritin of 202.     SUBJECTIVE:    Mr. Strange is a 83-year-old gentleman with chronic normocytic anemia due to chronic disease and iron deficiency.  Iron deficiency is secondary to intermittent hemorrhoidal bleed.     On 07/24/2024, low hemoglobin of 10.9, low iron of 26 and low ferritin of 26.  Patient was symptomatic from anemia. IV INFeD given on 08/28/2024.     Patient overall is doing well.  He has mild generalized weakness.  He is able to do activities of daily living without much problem.  His brain is not as foggy. No headache. Occasional dizziness. No chest pain.  No shortness of breath at  rest.  No abdominal pain.  No nausea or vomiting.  No urinary or bowel complaints.  No bleeding.     PHYSICAL EXAMINATION:    He is alert and oriented x 3.   Rest of the systems not examined as this is a video visit.     LABS: BMP, CBC, iron and ferritin reviewed.     ASSESSMENT:    1.  An 83-year-old gentleman with normocytic anemia secondary to anemia of chronic disease. Given his age, he may also have MDS.  2.  Iron deficiency from hemorrhoidal bleed.  Iron deficiency resolved with IV iron.  3.  Mild leukopenia.  4.  Multiple other medical problem including diabetes mellitus, hypothyroidism and hypertension.     PLAN:  -Follow-up in 2 months with labs.  -Continue oral ferrous sulfate.     DISCUSSION:  1.  I had a video visit with the patient.  Patient overall is doing well.  Labs were reviewed with him.  Explained to him that iron deficiency has resolved.  Iron, ferritin and iron saturation index are all normal.  His anemia has not improved.  He continues to hemoglobin of 10.8 with normal MCV.    Discussed regarding normocytic anemia.  Explained to him that his anemia is from anemia of chronic disease.  He does have multiple chronic medical problems including diabetes mellitus.  Explained to the patient that people with chronic disease/inflammation can have anemia with hemoglobin around 10.    At this time I will simply recommend monitoring CBC.  If there is worsening of anemia, we will consider doing bone marrow biopsy mainly to rule out MDS.    Patient had a few questions regarding anemia which were all answered.    2.  Patient is on oral ferrous sulfate.  He is tolerating it well.  He will continue on it.  He is at risk of iron deficiency if he gets hemorrhoidal bleed.    3.  He has mild leukopenia.  Previous WBC has been normal.  At this time, we will simply monitor it.  If there is worsening of leukopenia, will consider bone marrow biopsy to rule out MDS.    4.  He had a few questions which were all  answered.  I will see him in 2 months time with labs.     Total video visit time of 20 minutes.  Time spent in today's visit, review of chart/investigations today and documentation.      Again, thank you for allowing me to participate in the care of your patient.        Sincerely,        Chandrakant Panchal MD

## 2024-09-26 NOTE — LETTER
9/26/2024      Timmy Strange  [address] [facility] [address]      Dear Colleague,    Thank you for referring your patient, Timmy Strange, to the [facility] [place]. Please see a copy of my visit note below.    Virtual Visit Details    Type of service:  Video Visit     Originating Location (pt. Location): Home    Distant Location (provider location):  On-site  Platform used for Video Visit: [employer]    HEMATOLOGY HISTORY: Mr. Strange is a gentleman with chronic normocytic anemia due to anemia of chronic disease and iron deficiency.    1.  On 11/07/2017, hemoglobin of 13.3.  -On 10/30/2018, hemoglobin of 12.5.  -On 02/22/2021, WBC of 4.3, hemoglobin of 10.4, platelet of 226 and MCV of 90.  2.  On 02/01/2021, colonoscopy revealed colon polyps (tubular adenoma), hemorrhoids and diverticulosis.   -On 03/16/2021, EGD was essentially normal.    3. On 05/11/2021:  -Iron of 13, saturation of 6% and ferritin of 69.  -Normal folate.  -Normal vitamin B12.  -Normal TSH.  6. Patient received 1 dose of IV Venofer on 05/11/2021.    7.  Colonoscopy on 04/29/2024 revealed colon polyps, diverticulosis and internal hemorrhoids.  8.  On 07/24/2024, low hemoglobin of 10.9.  Low iron of 26 and low ferritin of 26.  -IV INFeD given on 08/28/2024  -On 09/25/2024, low hemoglobin of 10.8.  Normal iron of 61.  Normal ferritin of 202.     SUBJECTIVE:    Mr. Strange is a 83-year-old gentleman with chronic normocytic anemia due to chronic disease and iron deficiency.  Iron deficiency is secondary to intermittent hemorrhoidal bleed.     On 07/24/2024, low hemoglobin of 10.9, low iron of 26 and low ferritin of 26.  Patient was symptomatic from anemia. IV INFeD given on 08/28/2024.     Patient overall is doing well.  He has mild generalized weakness.  He is able to do activities of daily living without much problem.  His brain is not as foggy. No headache. Occasional dizziness. No chest pain.  No shortness of breath at  rest.  No abdominal pain.  No nausea or vomiting.  No urinary or bowel complaints.  No bleeding.     PHYSICAL EXAMINATION:    He is alert and oriented x 3.   Rest of the systems not examined as this is a video visit.     LABS: BMP, CBC, iron and ferritin reviewed.     ASSESSMENT:    1.  An 83-year-old gentleman with normocytic anemia secondary to anemia of chronic disease. Given his age, he may also have MDS.  2.  Iron deficiency from hemorrhoidal bleed.  Iron deficiency resolved with IV iron.  3.  Mild leukopenia.  4.  Multiple other medical problem including diabetes mellitus, hypothyroidism and hypertension.     PLAN:  -Follow-up in 2 months with labs.  -Continue oral ferrous sulfate.     DISCUSSION:  1.  I had a video visit with the patient.  Patient overall is doing well.  Labs were reviewed with him.  Explained to him that iron deficiency has resolved.  Iron, ferritin and iron saturation index are all normal.  His anemia has not improved.  He continues to hemoglobin of 10.8 with normal MCV.    Discussed regarding normocytic anemia.  Explained to him that his anemia is from anemia of chronic disease.  He does have multiple chronic medical problems including diabetes mellitus.  Explained to the patient that people with chronic disease/inflammation can have anemia with hemoglobin around 10.    At this time I will simply recommend monitoring CBC.  If there is worsening of anemia, we will consider doing bone marrow biopsy mainly to rule out MDS.    Patient had a few questions regarding anemia which were all answered.    2.  Patient is on oral ferrous sulfate.  He is tolerating it well.  He will continue on it.  He is at risk of iron deficiency if he gets hemorrhoidal bleed.    3.  He has mild leukopenia.  Previous WBC has been normal.  At this time, we will simply monitor it.  If there is worsening of leukopenia, will consider bone marrow biopsy to rule out MDS.    4.  He had a few questions which were all  answered.  I will see him in 2 months time with labs.     Total video visit time of 20 minutes.  Time spent in today's visit, review of chart/investigations today and documentation.      Again, thank you for allowing me to participate in the care of your patient.        Sincerely,        Chandrakant Panchal MD

## 2024-09-26 NOTE — PROGRESS NOTES
Virtual Visit Details    Type of service:  Video Visit     Originating Location (pt. Location): Home    Distant Location (provider location):  On-site  Platform used for Video Visit: [employer]    HEMATOLOGY HISTORY: Mr. Strange is a gentleman with chronic normocytic anemia due to anemia of chronic disease and iron deficiency.    1.  On 11/07/2017, hemoglobin of 13.3.  -On 10/30/2018, hemoglobin of 12.5.  -On 02/22/2021, WBC of 4.3, hemoglobin of 10.4, platelet of 226 and MCV of 90.  2.  On 02/01/2021, colonoscopy revealed colon polyps (tubular adenoma), hemorrhoids and diverticulosis.   -On 03/16/2021, EGD was essentially normal.    3. On 05/11/2021:  -Iron of 13, saturation of 6% and ferritin of 69.  -Normal folate.  -Normal vitamin B12.  -Normal TSH.  6. Patient received 1 dose of IV Venofer on 05/11/2021.    7.  Colonoscopy on 04/29/2024 revealed colon polyps, diverticulosis and internal hemorrhoids.  8.  On 07/24/2024, low hemoglobin of 10.9.  Low iron of 26 and low ferritin of 26.  -IV INFeD given on 08/28/2024  -On 09/25/2024, low hemoglobin of 10.8.  Normal iron of 61.  Normal ferritin of 202.     SUBJECTIVE:    Mr. Strange is a 83-year-old gentleman with chronic normocytic anemia due to chronic disease and iron deficiency.  Iron deficiency is secondary to intermittent hemorrhoidal bleed.     On 07/24/2024, low hemoglobin of 10.9, low iron of 26 and low ferritin of 26.  Patient was symptomatic from anemia. IV INFeD given on 08/28/2024.     Patient overall is doing well.  He has mild generalized weakness.  He is able to do activities of daily living without much problem.  His brain is not as foggy. No headache. Occasional dizziness. No chest pain.  No shortness of breath at rest.  No abdominal pain.  No nausea or vomiting.  No urinary or bowel complaints.  No bleeding.     PHYSICAL EXAMINATION:    He is alert and oriented x 3.   Rest of the systems not examined as this is a video visit.     LABS: BMP, CBC, iron  and ferritin reviewed.     ASSESSMENT:    1.  An 83-year-old gentleman with normocytic anemia secondary to anemia of chronic disease. Given his age, he may also have MDS.  2.  Iron deficiency from hemorrhoidal bleed.  Iron deficiency resolved with IV iron.  3.  Mild leukopenia.  4.  Multiple other medical problem including diabetes mellitus, hypothyroidism and hypertension.     PLAN:  -Follow-up in 2 months with labs.  -Continue oral ferrous sulfate.     DISCUSSION:  1.  I had a video visit with the patient.  Patient overall is doing well.  Labs were reviewed with him.  Explained to him that iron deficiency has resolved.  Iron, ferritin and iron saturation index are all normal.  His anemia has not improved.  He continues to hemoglobin of 10.8 with normal MCV.    Discussed regarding normocytic anemia.  Explained to him that his anemia is from anemia of chronic disease.  He does have multiple chronic medical problems including diabetes mellitus.  Explained to the patient that people with chronic disease/inflammation can have anemia with hemoglobin around 10.    At this time I will simply recommend monitoring CBC.  If there is worsening of anemia, we will consider doing bone marrow biopsy mainly to rule out MDS.    Patient had a few questions regarding anemia which were all answered.    2.  Patient is on oral ferrous sulfate.  He is tolerating it well.  He will continue on it.  He is at risk of iron deficiency if he gets hemorrhoidal bleed.    3.  He has mild leukopenia.  Previous WBC has been normal.  At this time, we will simply monitor it.  If there is worsening of leukopenia, will consider bone marrow biopsy to rule out MDS.    4.  He had a few questions which were all answered.  I will see him in 2 months time with labs.     Total video visit time of 20 minutes.  Time spent in today's visit, review of chart/investigations today and documentation.

## 2024-10-07 ENCOUNTER — PATIENT OUTREACH (OUTPATIENT)
Dept: CARE COORDINATION | Facility: CLINIC | Age: 84
End: 2024-10-07
Payer: COMMERCIAL

## 2024-10-21 ENCOUNTER — VIRTUAL VISIT (OUTPATIENT)
Dept: UROLOGY | Facility: CLINIC | Age: 84
End: 2024-10-21
Payer: COMMERCIAL

## 2024-10-21 VITALS — BODY MASS INDEX: 30.05 KG/M2 | HEIGHT: 66 IN | WEIGHT: 187 LBS

## 2024-10-21 DIAGNOSIS — F33.0 MILD EPISODE OF RECURRENT MAJOR DEPRESSIVE DISORDER (H): ICD-10-CM

## 2024-10-21 DIAGNOSIS — R35.0 URINARY FREQUENCY: ICD-10-CM

## 2024-10-21 PROCEDURE — 99213 OFFICE O/P EST LOW 20 MIN: CPT | Mod: 95 | Performed by: UROLOGY

## 2024-10-21 RX ORDER — FINASTERIDE 5 MG/1
1 TABLET, FILM COATED ORAL DAILY
Qty: 90 TABLET | Refills: 3 | Status: SHIPPED | OUTPATIENT
Start: 2024-10-21 | End: 2025-10-21

## 2024-10-21 RX ORDER — ESCITALOPRAM OXALATE 10 MG/1
10 TABLET ORAL DAILY
Qty: 90 TABLET | Refills: 0 | Status: SHIPPED | OUTPATIENT
Start: 2024-10-21

## 2024-10-21 RX ORDER — TAMSULOSIN HYDROCHLORIDE 0.4 MG/1
0.8 CAPSULE ORAL DAILY
Qty: 180 CAPSULE | Refills: 3 | Status: SHIPPED | OUTPATIENT
Start: 2024-10-21 | End: 2025-10-21

## 2024-10-21 ASSESSMENT — PAIN SCALES - GENERAL: PAINLEVEL: NO PAIN (0)

## 2024-10-21 NOTE — LETTER
10/21/2024       RE: Timmy Strange  [address] [facility] [address]     Dear Colleague,    Thank you for referring your patient, Timmy Strange, to the [facility] at [facility]. Please see a copy of my visit note below.    [facility]  CHIEF COMPLAINT   It was my pleasure to see Timmy Straneg who is a 82 year old male for follow-up of LUTS.      HPI   Timmy Strange is a very pleasant 84 year old male     Prior Dr. Vazquez patient - last seen 6/21/21:  History: It is a great pleasure to see this very pleasant 80-year-old gentleman in follow-up consultation today.  He is a patient with type 2 diabetes who is being concerned more recently about frequency of micturition but without significant nocturia; when I initially saw him he was going 12-13 times during the day with initially reduction in caffeinated beverages did reduce this to 9-10 times a day.  He had not been drinking sodas and only drinking simple fluids and drinking when thirsty.  When I saw him initially he is on both tamsulosin and finasteride the tamsulosin dose of 0.8 mg daily and finasteride 5 mg daily.  Renal function was normal creatinine of 0.82  We had considered further investigations including cystoscopy and even the possibility of urodynamics but decided that we should try anticholinergic medication with tolterodine 4 mg daily.  Subsequently that the patient decided not to continue this treatment and is finding that his symptoms are somewhat improved without having to do that.    6/15/2022:  He has been doing well on tamsulosin 0.8mg (Flomax) and finasteride 5mg (Proscar)     He is using sildenafil 75mg (Viagra)   He notes that he has tried to 100 mg in the past, however had better results with 75 mg    8/7/2023:  Follow-up today for annual check and medication refill  He has been doing well with urination with tamsulosin 0.8 mg and finasteride 5 mg daily  He is  "no longer using sildenafil  He does note that he is having some increasing cognitive impairment which will fluctuate    TODAY 10/21/2024:  He will moving to a senior living center in November  His cognitive impairment has been continuing to slowly worsen but doing well today  Urination is doing well with meds    PHYSICAL EXAM  Patient is a 84 year old  male   Vitals: Height 1.676 m (5' 6\"), weight 84.8 kg (187 lb).  Body mass index is 30.18 kg/m .  General Appearance Adult:   Alert, no acute distress, oriented  Neuro: Alert, oriented, speech and mentation normal  Psych: affect and mood normal     Creatinine   Date Value Ref Range Status   09/25/2024 0.85 0.67 - 1.17 mg/dL Final   05/15/2021 0.80 0.66 - 1.25 mg/dL Final       Hemoglobin A1C   Date Value Ref Range Status   09/25/2024 5.9 (H) 0.0 - 5.6 % Final     Comment:     Normal <5.7%   Prediabetes 5.7-6.4%    Diabetes 6.5% or higher     Note: Adopted from ADA consensus guidelines.   05/08/2021 5.6 0 - 5.6 % Final     Comment:     Normal <5.7% Prediabetes 5.7-6.4%  Diabetes 6.5% or higher - adopted from ADA   consensus guidelines.             ASSESSMENT and PLAN  84-year-old man with history of BPH and LUTS    BPH and LUTS  - We discussed the pathophysiology of the bladder and the prostate and the normal changes associated with development of BPH and LUTS  - He has been doing very well with stable symptoms on tamsulosin 0.8 mg daily and finasteride 5 mg daily  -He has been doing well without any side effects and refill prescription sent to the pharmacy  - We reviewed his serum creatinine which is stable and normal  - We discussed that given the stability of his symptoms, he could have future refills through his PCP and follow-up with me on a as needed basis    Follow-up with me on a as needed basis    Time spent: 10 minutes spent on the date of the encounter doing chart review, history and exam, documentation and further activities as noted above.    Note copy " attestation: the elements have been reviewed, edited as needed, and remain pertinent to today's visit.     Pino Meier MD   Urology  [facility]  [facility] Phone: [phone]  [facility] Phone: [phone]      Virtual Visit Details    Type of service:  Video Visit   Video Start Time: 8:15 AM  Video End Time:8:23 AM    Originating Location (pt. Location): Home    Distant Location (provider location):  On-site  Platform used for Video Visit: [employer]      Again, thank you for allowing me to participate in the care of your patient.      Sincerely,    Pino Meier MD

## 2024-10-21 NOTE — NURSING NOTE
Current patient location: [address] [place] [id number]    Is the patient currently in the state of [place]? YES    Visit mode:VIDEO    If the visit is dropped, the patient can be reconnected by: VIDEO VISIT: Text to cell phone:   Telephone Information:   Mobile [phone]       Will anyone else be joining the visit? NO  (If patient encounters technical issues they should call [phone]    Are changes needed to the allergy or medication list? No    Are refills needed on medications prescribed by this physician? YES    Rooming Documentation:  Questionnaire(s) completed    Reason for visit: RECHECK    Vanessa HAMMONDF

## 2024-10-21 NOTE — PROGRESS NOTES
[facility]  CHIEF COMPLAINT   It was my pleasure to see Timmy Strange who is a 82 year old male for follow-up of LUTS.      HPI   Timmy Strange is a very pleasant 84 year old male     Prior Dr. Vazquez patient - last seen 6/21/21:  History: It is a great pleasure to see this very pleasant 80-year-old gentleman in follow-up consultation today.  He is a patient with type 2 diabetes who is being concerned more recently about frequency of micturition but without significant nocturia; when I initially saw him he was going 12-13 times during the day with initially reduction in caffeinated beverages did reduce this to 9-10 times a day.  He had not been drinking sodas and only drinking simple fluids and drinking when thirsty.  When I saw him initially he is on both tamsulosin and finasteride the tamsulosin dose of 0.8 mg daily and finasteride 5 mg daily.  Renal function was normal creatinine of 0.82  We had considered further investigations including cystoscopy and even the possibility of urodynamics but decided that we should try anticholinergic medication with tolterodine 4 mg daily.  Subsequently that the patient decided not to continue this treatment and is finding that his symptoms are somewhat improved without having to do that.    6/15/2022:  He has been doing well on tamsulosin 0.8mg (Flomax) and finasteride 5mg (Proscar)     He is using sildenafil 75mg (Viagra)   He notes that he has tried to 100 mg in the past, however had better results with 75 mg    8/7/2023:  Follow-up today for annual check and medication refill  He has been doing well with urination with tamsulosin 0.8 mg and finasteride 5 mg daily  He is no longer using sildenafil  He does note that he is having some increasing cognitive impairment which will fluctuate    TODAY 10/21/2024:  He will moving to a senior living center in November  His cognitive impairment has been continuing to slowly worsen but doing well today  Urination is doing well with  "meds    PHYSICAL EXAM  Patient is a 84 year old  male   Vitals: Height 1.676 m (5' 6\"), weight 84.8 kg (187 lb).  Body mass index is 30.18 kg/m .  General Appearance Adult:   Alert, no acute distress, oriented  Neuro: Alert, oriented, speech and mentation normal  Psych: affect and mood normal     Creatinine   Date Value Ref Range Status   09/25/2024 0.85 0.67 - 1.17 mg/dL Final   05/15/2021 0.80 0.66 - 1.25 mg/dL Final       Hemoglobin A1C   Date Value Ref Range Status   09/25/2024 5.9 (H) 0.0 - 5.6 % Final     Comment:     Normal <5.7%   Prediabetes 5.7-6.4%    Diabetes 6.5% or higher     Note: Adopted from ADA consensus guidelines.   05/08/2021 5.6 0 - 5.6 % Final     Comment:     Normal <5.7% Prediabetes 5.7-6.4%  Diabetes 6.5% or higher - adopted from ADA   consensus guidelines.             ASSESSMENT and PLAN  84-year-old man with history of BPH and LUTS    BPH and LUTS  - We discussed the pathophysiology of the bladder and the prostate and the normal changes associated with development of BPH and LUTS  - He has been doing very well with stable symptoms on tamsulosin 0.8 mg daily and finasteride 5 mg daily  -He has been doing well without any side effects and refill prescription sent to the pharmacy  - We reviewed his serum creatinine which is stable and normal  - We discussed that given the stability of his symptoms, he could have future refills through his PCP and follow-up with me on a as needed basis    Follow-up with me on a as needed basis    Time spent: 10 minutes spent on the date of the encounter doing chart review, history and exam, documentation and further activities as noted above.    Note copy attestation: the elements have been reviewed, edited as needed, and remain pertinent to today's visit.     Pino Meier MD   Urology  [facility]  [facility] Phone: [phone]  [facility] Phone: [phone]      Virtual Visit " Details    Type of service:  Video Visit   Video Start Time: 8:15 AM  Video End Time:8:23 AM    Originating Location (pt. Location): Home    Distant Location (provider location):  On-site  Platform used for Video Visit: [employer]

## 2024-10-25 ENCOUNTER — TELEPHONE (OUTPATIENT)
Dept: FAMILY MEDICINE | Facility: CLINIC | Age: 84
End: 2024-10-25
Payer: COMMERCIAL

## 2024-10-25 NOTE — TELEPHONE ENCOUNTER
Dental crown on the on  November 15 th.     Dentist asked if the patient needs an antibiotic?       Patricia Palomo RN  [facility]

## 2024-10-27 DIAGNOSIS — I10 ESSENTIAL HYPERTENSION: ICD-10-CM

## 2024-10-27 NOTE — TELEPHONE ENCOUNTER
I do not see any indication for prophylactic antibiotic needs as per his medical conditions unless if dentist suspects any dental infection as he manages - he may consider giving it to pt.     Please give reminder to pt to keep up his appt for AWV as scheduled on 11/27 ( FYI - Pt has memory issues and he needs assistance with someone to accompany him to bring to clinic appt which he was postponed to 11/2024 with these needs. )     Thank you  Michaela Sharma MD on 10/27/2024

## 2024-10-28 RX ORDER — CARVEDILOL 3.12 MG/1
TABLET ORAL
Qty: 180 TABLET | Refills: 0 | Status: SHIPPED | OUTPATIENT
Start: 2024-10-28

## 2024-10-28 NOTE — TELEPHONE ENCOUNTER
Left detailed message from Dr. Sharma as noted below on wife's voice mail. Consent to communicate on file for patient's wife LadanOswald Ward RN

## 2024-11-04 ENCOUNTER — TELEPHONE (OUTPATIENT)
Dept: FAMILY MEDICINE | Facility: CLINIC | Age: 84
End: 2024-11-04
Payer: COMMERCIAL

## 2024-11-04 NOTE — TELEPHONE ENCOUNTER
Printed med list and others from patient's chart. Red folder placed in Dr Sharma's inbox for signature

## 2024-11-04 NOTE — TELEPHONE ENCOUNTER
Forms/Letter Request    Type of form/letter: Info to: [facility]    Have you been seen for this request: No    Do we have the form/letter: Yes: Signed list of meds and health history of patient (Signed by Dr. Sharma)    When is form/letter needed by: 11/13/2024    How would you like the form/letter returned: Mail  [address] [facility]  [place] [address] [phone] and [employer]    Patient Notified form requests are processed in 3-5 business days:Yes    Could we send this information to you in [employer] or would you prefer to receive a phone call?:   [employer] Cell number on file:    Telephone Information:   Mobile [phone]      Okay to leave a detailed message?: Yes

## 2024-11-04 NOTE — TELEPHONE ENCOUNTER
Spoke with [facility] nurse, she gave me the Fax number [phone] and put Att to Shailesh

## 2024-11-05 NOTE — TELEPHONE ENCOUNTER
Forms signed and placed in my out box.    Patient is due for annual physical, the list may change after his physical.  Please let the patient know to keep his upcoming appointment as scheduled on November 20, 2024 for doing the needful.    Thank you,  Michaela Sharma MD on 11/5/2024 at 8:09 AM

## 2024-11-05 NOTE — TELEPHONE ENCOUNTER
Spoke with wife, they are going to hold off on the apartment. She asked how to ask for this information again, explained that the Facility can fax us and let us know what the information need, she said they will let us know when they need the information again.

## 2024-11-20 ENCOUNTER — ALLIED HEALTH/NURSE VISIT (OUTPATIENT)
Dept: FAMILY MEDICINE | Facility: CLINIC | Age: 84
End: 2024-11-20
Payer: COMMERCIAL

## 2024-11-20 ENCOUNTER — LAB (OUTPATIENT)
Dept: LAB | Facility: CLINIC | Age: 84
End: 2024-11-20
Payer: COMMERCIAL

## 2024-11-20 DIAGNOSIS — E11.51 TYPE II DIABETES MELLITUS WITH PERIPHERAL CIRCULATORY DISORDER (H): Primary | ICD-10-CM

## 2024-11-20 DIAGNOSIS — D64.9 ANEMIA, UNSPECIFIED TYPE: ICD-10-CM

## 2024-11-20 DIAGNOSIS — Z23 ENCOUNTER FOR IMMUNIZATION: Primary | ICD-10-CM

## 2024-11-20 LAB
ERYTHROCYTE [DISTWIDTH] IN BLOOD BY AUTOMATED COUNT: 13.7 % (ref 10–15)
FOLATE SERPL-MCNC: 26.4 NG/ML (ref 4.6–34.8)
HCT VFR BLD AUTO: 36.7 % (ref 40–53)
HGB BLD-MCNC: 11.8 G/DL (ref 13.3–17.7)
MCH RBC QN AUTO: 29.9 PG (ref 26.5–33)
MCHC RBC AUTO-ENTMCNC: 32.2 G/DL (ref 31.5–36.5)
MCV RBC AUTO: 93 FL (ref 78–100)
PLATELET # BLD AUTO: 203 10E3/UL (ref 150–450)
RBC # BLD AUTO: 3.95 10E6/UL (ref 4.4–5.9)
RETICS # AUTO: 0.04 10E6/UL (ref 0.03–0.1)
RETICS/RBC NFR AUTO: 1 % (ref 0.5–2)
WBC # BLD AUTO: 4.1 10E3/UL (ref 4–11)

## 2024-11-20 PROCEDURE — 99207 PR NO CHARGE NURSE ONLY: CPT

## 2024-11-20 PROCEDURE — 90480 ADMN SARSCOV2 VAC 1/ONLY CMP: CPT

## 2024-11-20 PROCEDURE — 91320 SARSCV2 VAC 30MCG TRS-SUC IM: CPT

## 2024-11-20 PROCEDURE — 84238 ASSAY NONENDOCRINE RECEPTOR: CPT | Mod: 90

## 2024-11-20 SDOH — HEALTH STABILITY: PHYSICAL HEALTH: ON AVERAGE, HOW MANY DAYS PER WEEK DO YOU ENGAGE IN MODERATE TO STRENUOUS EXERCISE (LIKE A BRISK WALK)?: 0 DAYS

## 2024-11-20 SDOH — HEALTH STABILITY: PHYSICAL HEALTH: ON AVERAGE, HOW MANY MINUTES DO YOU ENGAGE IN EXERCISE AT THIS LEVEL?: PATIENT DECLINED

## 2024-11-20 ASSESSMENT — SOCIAL DETERMINANTS OF HEALTH (SDOH): HOW OFTEN DO YOU GET TOGETHER WITH FRIENDS OR RELATIVES?: TWICE A WEEK

## 2024-11-20 NOTE — PROGRESS NOTES
I have checked with Timmy and he assures me he needs to have the labs from Dr. Panchal done TODAY before his appointment with them on 11.25.24.    Miladys De La Paz MLT ([facility])

## 2024-11-20 NOTE — PROGRESS NOTES
Prior to immunization administration, verified patients identity using patient s name and date of birth. Please see Immunization Activity for additional information.     Is the patient's temperature normal (100.5 or less)? Yes     Patient MEETS CRITERIA. PROCEED with vaccine administration.        11/20/2024   General Questionnaire    Do you have any questions for your care team about the vaccines you will be receiving today? no                11/20/2024   COVID   Have you had myocarditis or pericarditis (inflammation of or around the heart muscle) after getting a COVID-19 vaccine? No   Have you had a serious reaction to a COVID vaccine or something in a COVID vaccine, like polyethylene glycol (PEG) or polysorbate? No   Have you had multisystem inflammatory syndrome from COVID-19 in the past 90 days? No   Have you received a bone marrow transplant within the previous 3 months? No            Patient MEETS CRITERIA. PROCEED with vaccine administration.        Patient instructed to remain in clinic for 15 minutes afterwards, and to report any adverse reactions.      Link to Ancillary Visit Immunization Standing Orders SmartSet     Screening performed by Yazmin Jacobson MA on 11/20/2024 at 10:56 AM.

## 2024-11-21 LAB
CHOLEST SERPL-MCNC: 131 MG/DL
CREAT UR-MCNC: 110 MG/DL
FASTING STATUS PATIENT QL REPORTED: NO
FERRITIN SERPL-MCNC: 99 NG/ML (ref 31–409)
HDLC SERPL-MCNC: 46 MG/DL
IRON BINDING CAPACITY (ROCHE): 233 UG/DL (ref 240–430)
IRON SATN MFR SERPL: 30 % (ref 15–46)
IRON SERPL-MCNC: 70 UG/DL (ref 61–157)
LDLC SERPL CALC-MCNC: 70 MG/DL
MICROALBUMIN UR-MCNC: <12 MG/L
MICROALBUMIN/CREAT UR: NORMAL MG/G{CREAT}
NONHDLC SERPL-MCNC: 85 MG/DL
TRIGL SERPL-MCNC: 74 MG/DL
VIT B12 SERPL-MCNC: 307 PG/ML (ref 232–1245)

## 2024-11-21 NOTE — RESULT ENCOUNTER NOTE
Dear Mr. Strange,    Blood test reveals improvement in anemia. Hemoglobin is 11.8.    Please, call me with any questions.    Chandrakant Panchal MD

## 2024-11-25 ENCOUNTER — VIRTUAL VISIT (OUTPATIENT)
Dept: ONCOLOGY | Facility: CLINIC | Age: 84
End: 2024-11-25
Attending: INTERNAL MEDICINE
Payer: COMMERCIAL

## 2024-11-25 VITALS — BODY MASS INDEX: 30.22 KG/M2 | HEIGHT: 66 IN | WEIGHT: 188 LBS

## 2024-11-25 DIAGNOSIS — D64.9 ANEMIA, UNSPECIFIED TYPE: Primary | ICD-10-CM

## 2024-11-25 PROCEDURE — 99213 OFFICE O/P EST LOW 20 MIN: CPT | Mod: 95 | Performed by: INTERNAL MEDICINE

## 2024-11-25 ASSESSMENT — PAIN SCALES - GENERAL: PAINLEVEL_OUTOF10: NO PAIN (0)

## 2024-11-25 NOTE — LETTER
11/25/2024      Timmy Strange  [address] [address]      Dear Colleague,    Thank you for referring your patient, Timmy Strange, to the [facility]. Please see a copy of my visit note below.    Virtual Visit Details    Type of service:  Video Visit     Originating Location (pt. Location): Home    Distant Location (provider location):  On-site  Platform used for Video Visit: finalsite    HEMATOLOGY HISTORY: Mr. Strange is a gentleman with chronic normocytic anemia due to anemia of chronic disease and iron deficiency.    1.  On 11/07/2017, hemoglobin of 13.3.  -On 10/30/2018, hemoglobin of 12.5.  -On 02/22/2021, WBC of 4.3, hemoglobin of 10.4, platelet of 226 and MCV of 90.  2.  On 02/01/2021, colonoscopy revealed colon polyps (tubular adenoma), hemorrhoids and diverticulosis.   -On 03/16/2021, EGD was essentially normal.    3. On 05/11/2021:  -Iron of 13, saturation of 6% and ferritin of 69.  -Normal folate.  -Normal vitamin B12.  -Normal TSH.  6. Patient received 1 dose of IV Venofer on 05/11/2021.    7.  Colonoscopy on 04/29/2024 revealed colon polyps, diverticulosis and internal hemorrhoids.  8.  On 07/24/2024, low hemoglobin of 10.9.  Low iron of 26 and low ferritin of 26.  -IV INFeD given on 08/28/2024  -On 09/25/2024, low hemoglobin of 10.8.  Normal iron of 61.  Normal ferritin of 202.     SUBJECTIVE:    Mr. Strange is a 84-year-old gentleman with chronic normocytic anemia due to chronic disease and iron deficiency.  Iron deficiency is secondary to intermittent hemorrhoidal bleed.  Lately there has been no hemorrhoidal bleed.    Patient is on oral ferrous sulfate once a day.  He is tolerating it well.    Overall he is doing good.  No excessive fatigue.  No headache.  No dizziness.  No chest pain.  No shortness of breath.  No abdominal pain.  No nausea or vomiting.  No urinary or bowel complaints.  No bleeding.  His stools are dark because of oral iron.     PHYSICAL  EXAMINATION:    He is alert and oriented x 3.  Not in any distress.  Rest of the systems not examined as this is a video visit.     LABS: CBC, iron and ferritin done on 11/20/2024 reviewed.     ASSESSMENT:    1.  An 84-year-old gentleman with normocytic anemia secondary to anemia of chronic disease and iron deficiency. Given his age, he may also have MDS.  Anemia has improved on oral iron.  2.  Iron deficiency from hemorrhoidal bleed.  Iron deficiency has resolved.  3.  Multiple other medical problem including diabetes mellitus, hypothyroidism and hypertension.     PLAN:  -Take ferrous sulfate 1 tablet every other day.  -Follow-up with PCP and have labs including CBC, iron and ferritin rechecked in 6 months.  -Follow-up in hematology/oncology clinic as needed.       DISCUSSION:  1.  I had a video visit with the patient.  Patient overall is doing well.  In fact he feels better.  Fatigue is less.    Labs were reviewed with him.  His hemoglobin has improved to 11.8.  Normal WBC, platelet and MCV.  His iron, iron saturation index and ferritin is normal.    Explained to the patient that he has mild normocytic anemia.  He is going to remain anemic because of anemia of chronic disease.    Explained to him that his iron deficiency has resolved.    2.  Patient is on oral ferrous sulfate once a day.  He is tolerating it well.  Advised him to take it every other day.    Iron deficiency was due to intermittent hemorrhoidal bleed.  If he again has hemorrhoidal bleed, he can again develop iron deficiency anemia.    3.  Discussed regarding follow-up.  Advised him to follow-up with his PCP and have labs including CBC, iron and ferritin rechecked in 6 months.  If his iron and ferritin is normal, ferrous sulfate can be discontinued at that time.    4. He had few questions which were all answered.  He will follow-up in hematology/oncology clinic on as-needed basis.     Total video visit time of 20 minutes.  Time spent in today's  visit, review of chart/investigations today and documentation today.         Again, thank you for allowing me to participate in the care of your patient.        Sincerely,        Chandrakant Panchal MD

## 2024-11-25 NOTE — LETTER
11/25/2024      Timmy Strange  [address] [address]      Dear Colleague,    Thank you for referring your patient, Timmy Strange, to the [facility]. Please see a copy of my visit note below.    Virtual Visit Details    Type of service:  Video Visit     Originating Location (pt. Location): Home    Distant Location (provider location):  On-site  Platform used for Video Visit: Ouroboros    HEMATOLOGY HISTORY: Mr. Strange is a gentleman with chronic normocytic anemia due to anemia of chronic disease and iron deficiency.    1.  On 11/07/2017, hemoglobin of 13.3.  -On 10/30/2018, hemoglobin of 12.5.  -On 02/22/2021, WBC of 4.3, hemoglobin of 10.4, platelet of 226 and MCV of 90.  2.  On 02/01/2021, colonoscopy revealed colon polyps (tubular adenoma), hemorrhoids and diverticulosis.   -On 03/16/2021, EGD was essentially normal.    3. On 05/11/2021:  -Iron of 13, saturation of 6% and ferritin of 69.  -Normal folate.  -Normal vitamin B12.  -Normal TSH.  6. Patient received 1 dose of IV Venofer on 05/11/2021.    7.  Colonoscopy on 04/29/2024 revealed colon polyps, diverticulosis and internal hemorrhoids.  8.  On 07/24/2024, low hemoglobin of 10.9.  Low iron of 26 and low ferritin of 26.  -IV INFeD given on 08/28/2024  -On 09/25/2024, low hemoglobin of 10.8.  Normal iron of 61.  Normal ferritin of 202.     SUBJECTIVE:    Mr. Strange is a 84-year-old gentleman with chronic normocytic anemia due to chronic disease and iron deficiency.  Iron deficiency is secondary to intermittent hemorrhoidal bleed.  Lately there has been no hemorrhoidal bleed.    Patient is on oral ferrous sulfate once a day.  He is tolerating it well.    Overall he is doing good.  No excessive fatigue.  No headache.  No dizziness.  No chest pain.  No shortness of breath.  No abdominal pain.  No nausea or vomiting.  No urinary or bowel complaints.  No bleeding.  His stools are dark because of oral iron.     PHYSICAL  EXAMINATION:    He is alert and oriented x 3.  Not in any distress.  Rest of the systems not examined as this is a video visit.     LABS: CBC, iron and ferritin done on 11/20/2024 reviewed.     ASSESSMENT:    1.  An 84-year-old gentleman with normocytic anemia secondary to anemia of chronic disease and iron deficiency. Given his age, he may also have MDS.  Anemia has improved on oral iron.  2.  Iron deficiency from hemorrhoidal bleed.  Iron deficiency has resolved.  3.  Multiple other medical problem including diabetes mellitus, hypothyroidism and hypertension.     PLAN:  -Take ferrous sulfate 1 tablet every other day.  -Follow-up with PCP and have labs including CBC, iron and ferritin rechecked in 6 months.  -Follow-up in hematology/oncology clinic as needed.       DISCUSSION:  1.  I had a video visit with the patient.  Patient overall is doing well.  In fact he feels better.  Fatigue is less.    Labs were reviewed with him.  His hemoglobin has improved to 11.8.  Normal WBC, platelet and MCV.  His iron, iron saturation index and ferritin is normal.    Explained to the patient that he has mild normocytic anemia.  He is going to remain anemic because of anemia of chronic disease.    Explained to him that his iron deficiency has resolved.    2.  Patient is on oral ferrous sulfate once a day.  He is tolerating it well.  Advised him to take it every other day.    Iron deficiency was due to intermittent hemorrhoidal bleed.  If he again has hemorrhoidal bleed, he can again develop iron deficiency anemia.    3.  Discussed regarding follow-up.  Advised him to follow-up with his PCP and have labs including CBC, iron and ferritin rechecked in 6 months.  If his iron and ferritin is normal, ferrous sulfate can be discontinued at that time.    4. He had few questions which were all answered.  He will follow-up in hematology/oncology clinic on as-needed basis.     Total video visit time of 20 minutes.  Time spent in today's  visit, review of chart/investigations today and documentation today.         Again, thank you for allowing me to participate in the care of your patient.        Sincerely,        Chandrakant Panchal MD

## 2024-11-25 NOTE — PATIENT INSTRUCTIONS
-Take ferrous sulfate 1 tablet every other day.  -Follow-up with PCP and have labs including CBC, iron and ferritin rechecked in 6 months.  -Follow-up in hematology/oncology clinic as needed.

## 2024-11-25 NOTE — PROGRESS NOTES
Virtual Visit Details    Type of service:  Video Visit     Originating Location (pt. Location): Home    Distant Location (provider location):  On-site  Platform used for Video Visit: LeukoDx    HEMATOLOGY HISTORY: Mr. Strange is a gentleman with chronic normocytic anemia due to anemia of chronic disease and iron deficiency.    1.  On 11/07/2017, hemoglobin of 13.3.  -On 10/30/2018, hemoglobin of 12.5.  -On 02/22/2021, WBC of 4.3, hemoglobin of 10.4, platelet of 226 and MCV of 90.  2.  On 02/01/2021, colonoscopy revealed colon polyps (tubular adenoma), hemorrhoids and diverticulosis.   -On 03/16/2021, EGD was essentially normal.    3. On 05/11/2021:  -Iron of 13, saturation of 6% and ferritin of 69.  -Normal folate.  -Normal vitamin B12.  -Normal TSH.  6. Patient received 1 dose of IV Venofer on 05/11/2021.    7.  Colonoscopy on 04/29/2024 revealed colon polyps, diverticulosis and internal hemorrhoids.  8.  On 07/24/2024, low hemoglobin of 10.9.  Low iron of 26 and low ferritin of 26.  -IV INFeD given on 08/28/2024  -On 09/25/2024, low hemoglobin of 10.8.  Normal iron of 61.  Normal ferritin of 202.     SUBJECTIVE:    Mr. Strange is a 84-year-old gentleman with chronic normocytic anemia due to chronic disease and iron deficiency.  Iron deficiency is secondary to intermittent hemorrhoidal bleed.  Lately there has been no hemorrhoidal bleed.    Patient is on oral ferrous sulfate once a day.  He is tolerating it well.    Overall he is doing good.  No excessive fatigue.  No headache.  No dizziness.  No chest pain.  No shortness of breath.  No abdominal pain.  No nausea or vomiting.  No urinary or bowel complaints.  No bleeding.  His stools are dark because of oral iron.     PHYSICAL EXAMINATION:    He is alert and oriented x 3.  Not in any distress.  Rest of the systems not examined as this is a video visit.     LABS: CBC, iron and ferritin done on 11/20/2024 reviewed.     ASSESSMENT:    1.  An 84-year-old gentleman with  normocytic anemia secondary to anemia of chronic disease and iron deficiency. Given his age, he may also have MDS.  Anemia has improved on oral iron.  2.  Iron deficiency from hemorrhoidal bleed.  Iron deficiency has resolved.  3.  Multiple other medical problem including diabetes mellitus, hypothyroidism and hypertension.     PLAN:  -Take ferrous sulfate 1 tablet every other day.  -Follow-up with PCP and have labs including CBC, iron and ferritin rechecked in 6 months.  -Follow-up in hematology/oncology clinic as needed.       DISCUSSION:  1.  I had a video visit with the patient.  Patient overall is doing well.  In fact he feels better.  Fatigue is less.    Labs were reviewed with him.  His hemoglobin has improved to 11.8.  Normal WBC, platelet and MCV.  His iron, iron saturation index and ferritin is normal.    Explained to the patient that he has mild normocytic anemia.  He is going to remain anemic because of anemia of chronic disease.    Explained to him that his iron deficiency has resolved.    2.  Patient is on oral ferrous sulfate once a day.  He is tolerating it well.  Advised him to take it every other day.    Iron deficiency was due to intermittent hemorrhoidal bleed.  If he again has hemorrhoidal bleed, he can again develop iron deficiency anemia.    3.  Discussed regarding follow-up.  Advised him to follow-up with his PCP and have labs including CBC, iron and ferritin rechecked in 6 months.  If his iron and ferritin is normal, ferrous sulfate can be discontinued at that time.    4. He had few questions which were all answered.  He will follow-up in hematology/oncology clinic on as-needed basis.     Total video visit time of 20 minutes.  Time spent in today's visit, review of chart/investigations today and documentation today.

## 2024-11-25 NOTE — NURSING NOTE
Patient confirms medications and allergies are accurate via patients echeck in completion, and or denies any changes since last reviewed/verified.     Betsy Carbone, Virtual Facilitator  Current patient location: [address] [place], [place] [id number]    Is the patient currently in the state of [place]? YES    Visit mode:VIDEO    If the visit is dropped, the patient can be reconnected by:VIDEO VISIT: Text to cell phone:   Telephone Information:   Mobile [phone] Wifes Number       Will anyone else be joining the visit? NO  (If patient encounters technical issues they should call [phone]    Are changes needed to the allergy or medication list? No    Are refills needed on medications prescribed by this physician? NO    Rooming Documentation:  Questionnaire(s) completed    Reason for visit: RECHECK    Betsy Carbone VVF

## 2024-11-26 ASSESSMENT — PATIENT HEALTH QUESTIONNAIRE - PHQ9
SUM OF ALL RESPONSES TO PHQ QUESTIONS 1-9: 0
10. IF YOU CHECKED OFF ANY PROBLEMS, HOW DIFFICULT HAVE THESE PROBLEMS MADE IT FOR YOU TO DO YOUR WORK, TAKE CARE OF THINGS AT HOME, OR GET ALONG WITH OTHER PEOPLE: NOT DIFFICULT AT ALL
SUM OF ALL RESPONSES TO PHQ QUESTIONS 1-9: 0

## 2024-11-27 ENCOUNTER — OFFICE VISIT (OUTPATIENT)
Dept: FAMILY MEDICINE | Facility: CLINIC | Age: 84
End: 2024-11-27
Payer: COMMERCIAL

## 2024-11-27 VITALS
DIASTOLIC BLOOD PRESSURE: 71 MMHG | HEART RATE: 61 BPM | HEIGHT: 65 IN | OXYGEN SATURATION: 95 % | WEIGHT: 185.5 LBS | TEMPERATURE: 97.5 F | RESPIRATION RATE: 18 BRPM | SYSTOLIC BLOOD PRESSURE: 139 MMHG | BODY MASS INDEX: 30.91 KG/M2

## 2024-11-27 DIAGNOSIS — I10 ESSENTIAL HYPERTENSION: ICD-10-CM

## 2024-11-27 DIAGNOSIS — D50.9 IRON DEFICIENCY ANEMIA, UNSPECIFIED IRON DEFICIENCY ANEMIA TYPE: ICD-10-CM

## 2024-11-27 DIAGNOSIS — N40.0 BENIGN PROSTATIC HYPERPLASIA, UNSPECIFIED WHETHER LOWER URINARY TRACT SYMPTOMS PRESENT: ICD-10-CM

## 2024-11-27 DIAGNOSIS — F33.0 MILD EPISODE OF RECURRENT MAJOR DEPRESSIVE DISORDER (H): ICD-10-CM

## 2024-11-27 DIAGNOSIS — E03.9 HYPOTHYROIDISM, UNSPECIFIED TYPE: ICD-10-CM

## 2024-11-27 DIAGNOSIS — K21.9 GASTROESOPHAGEAL REFLUX DISEASE WITHOUT ESOPHAGITIS: ICD-10-CM

## 2024-11-27 DIAGNOSIS — E78.2 MIXED HYPERLIPIDEMIA: ICD-10-CM

## 2024-11-27 DIAGNOSIS — E11.51 TYPE II DIABETES MELLITUS WITH PERIPHERAL CIRCULATORY DISORDER (H): ICD-10-CM

## 2024-11-27 DIAGNOSIS — Z00.00 ENCOUNTER FOR MEDICARE ANNUAL WELLNESS EXAM: Primary | ICD-10-CM

## 2024-11-27 PROCEDURE — 99214 OFFICE O/P EST MOD 30 MIN: CPT | Mod: 25 | Performed by: INTERNAL MEDICINE

## 2024-11-27 PROCEDURE — G0439 PPPS, SUBSEQ VISIT: HCPCS | Performed by: INTERNAL MEDICINE

## 2024-11-27 ASSESSMENT — PAIN SCALES - GENERAL: PAINLEVEL_OUTOF10: NO PAIN (0)

## 2024-11-27 NOTE — PROGRESS NOTES
Preventive Care Visit  [facility] LENARD Sharma MD, Internal Medicine  Nov 27, 2024          Assessment and Plan  1. Encounter for Medicare annual wellness exam (Primary)    Last seen patient in May 2024 for cellulitis of the right leg at that time, he is here for annual physical.  Patient does have medical conditions of diabetes mellitus following endocrinology, hypothyroidism, recurrent UTI, left atrial enlargement on his last echocardiogram BPH and hypertension.    Patient requested me to place all the lab work before this physical, I have explained all the labs to the patient and answered all the questions.  No new labs needed in this visit.    - PRIMARY CARE FOLLOW-UP SCHEDULING; Future    2. Type II diabetes mellitus with peripheral circulatory disorder (H)  Chronic problem, well-controlled.  Continue to follow endocrinology as well as medications of metformin and Lantus managed by them.    3. Mild episode of recurrent major depressive disorder (H)  Chronic stable, continue current Lexapro 10 mg daily.  Will take care of the refills.    4. Essential hypertension  Chronic problem, well-controlled with current losartan 100 mg daily, carvedilol 3.25 mg daily.    5. Iron deficiency anemia, unspecified iron deficiency anemia type  Pt was following Hematology for Anemia and last IV INFeD given on 10/2024 which was hardly not even once a year. And wanting the PCP to consider monitoring .  Will do as requested by the patient.    6. Mixed hyperlipidemia  Chronic stable, continue current lovastatin 40 mg daily.    7. Benign prostatic hyperplasia, unspecified whether lower urinary tract symptoms present  Chronic stable, continue to follow urology recommendations and current tamsulosin.    8. Gastroesophageal reflux disease without esophagitis  Chronic stable, continue to follow current diet and lifestyle modifications.    9. Hypothyroidism, unspecified type  Chronic stable, continue  current levothyroxine 88 mcg daily.         Please note that this note consists of symbols derived from keyboarding, dictation and/or voice recognition software. As a result, there may be errors in the script that have gone undetected. Please consider this when interpreting information found in this chart.    Patient Instructions   As discussed , please continue current medications no changes    Since all the labs done recently - only diabetes please follow your Endocrinology and update on the latest results of A1C as well as Eye exam Facility you went on your mychart reply.     =======================  Patient Education  Preventive Care Advice   This is general advice given by our system to help you stay healthy. However, your care team may have specific advice just for you. Please talk to your care team about your preventive care needs.  Nutrition  Eat 5 or more servings of fruits and vegetables each day.  Try wheat bread, brown rice and whole grain pasta (instead of white bread, rice, and pasta).  Get enough calcium and vitamin D. Check the label on foods and aim for 100% of the RDA (recommended daily allowance).  Lifestyle  Exercise at least 150 minutes each week  (30 minutes a day, 5 days a week).  Do muscle strengthening activities 2 days a week. These help control your weight and prevent disease.  No smoking.  Wear sunscreen to prevent skin cancer.  Have a dental exam and cleaning every 6 months.  Yearly exams  See your health care team every year to talk about:  Any changes in your health.  Any medicines your care team has prescribed.  Preventive care, family planning, and ways to prevent chronic diseases.  Shots (vaccines)   HPV shots (up to age 26), if you've never had them before.  Hepatitis B shots (up to age 59), if you've never had them before.  COVID-19 shot: Get this shot when it's due.  Flu shot: Get a flu shot every year.  Tetanus shot: Get a tetanus shot every 10 years.  Pneumococcal, hepatitis A,  and RSV shots: Ask your care team if you need these based on your risk.  Shingles shot (for age 50 and up)  General health tests  Diabetes screening:  Starting at age 35, Get screened for diabetes at least every 3 years.  If you are younger than age 35, ask your care team if you should be screened for diabetes.  Cholesterol test: At age 39, start having a cholesterol test every 5 years, or more often if advised.  Bone density scan (DEXA): At age 50, ask your care team if you should have this scan for osteoporosis (brittle bones).  Hepatitis C: Get tested at least once in your life.  STIs (sexually transmitted infections)  Before age 24: Ask your care team if you should be screened for STIs.  After age 24: Get screened for STIs if you're at risk. You are at risk for STIs (including HIV) if:  You are sexually active with more than one person.  You don't use condoms every time.  You or a partner was diagnosed with a sexually transmitted infection.  If you are at risk for HIV, ask about PrEP medicine to prevent HIV.  Get tested for HIV at least once in your life, whether you are at risk for HIV or not.  Cancer screening tests  Cervical cancer screening: If you have a cervix, begin getting regular cervical cancer screening tests starting at age 21.  Breast cancer scan (mammogram): If you've ever had breasts, begin having regular mammograms starting at age 40. This is a scan to check for breast cancer.  Colon cancer screening: It is important to start screening for colon cancer at age 45.  Have a colonoscopy test every 10 years (or more often if you're at risk) Or, ask your provider about stool tests like a FIT test every year or Cologuard test every 3 years.  To learn more about your testing options, visit:   .  For help making a decision, visit:   https://bit.ly/zt03018.  Prostate cancer screening test: If you have a prostate, ask your care team if a prostate cancer screening test (PSA) at age 55 is right for you.  Lung  cancer screening: If you are a current or former smoker ages 50 to 80, ask your care team if ongoing lung cancer screenings are right for you.  For informational purposes only. Not to replace the advice of your health care provider. Copyright   2023 [facility] [email or web address]. All rights reserved. Clinically reviewed by the [facility] Transitions Program. [employer] 837343 - REV 01/24.  Hearing Loss: Care Instructions  Overview     Hearing loss is a sudden or slow decrease in how well you hear. It can range from slight to profound. Permanent hearing loss can occur with aging. It also can happen when you are exposed long-term to loud noise. Examples include listening to loud music, riding motorcycles, or being around other loud machines.  Hearing loss can affect your work and home life. It can make you feel lonely or depressed. You may feel that you have lost your independence. But hearing aids and other devices can help you hear better and feel connected to others.  Follow-up care is a key part of your treatment and safety. Be sure to make and go to all appointments, and call your doctor if you are having problems. It's also a good idea to know your test results and keep a list of the medicines you take.  How can you care for yourself at home?  Avoid loud noises whenever possible. This helps keep your hearing from getting worse.  Always wear hearing protection around loud noises.  Wear a hearing aid as directed.  A professional can help you pick a hearing aid that will work best for you.  You can also get hearing aids over the counter for mild to moderate hearing loss.  Have hearing tests as your doctor suggests. They can show whether your hearing has changed. Your hearing aid may need to be adjusted.  Use other devices as needed. These may include:  Telephone amplifiers and hearing aids that can connect to a television, stereo, radio, or microphone.  Devices that use lights or vibrations. These alert you to  "the doorbell, a ringing telephone, or a baby monitor.  Television closed-captioning. This shows the words at the bottom of the screen. Most new TVs can do this.  TTY (text telephone). This lets you type messages back and forth on the telephone instead of talking or listening. These devices are also called TDD. When messages are typed on the keyboard, they are sent over the phone line to a receiving TTY. The message is shown on a monitor.  Use text messaging, social media, and email if it is hard for you to communicate by telephone.  Try to learn a listening technique called speechreading. It is not lipreading. You pay attention to people's gestures, expressions, posture, and tone of voice. These clues can help you understand what a person is saying. Face the person you are talking to, and have them face you. Make sure the lighting is good. You need to see the other person's face clearly.  Think about counseling if you need help to adjust to your hearing loss.  When should you call for help?  Watch closely for changes in your health, and be sure to contact your doctor if:    You think your hearing is getting worse.     You have new symptoms, such as dizziness or nausea.   Where can you learn more?  Go to https://www.CallGrader.net/patiented  Enter R798 in the search box to learn more about \"Hearing Loss: Care Instructions.\"  Current as of: September 27, 2023  Content Version: 14.2 2024 [employer].   Care instructions adapted under license by your healthcare professional. If you have questions about a medical condition or this instruction, always ask your healthcare professional. [employer] Inc[employer] disclaims any warranty or liability for your use of this information.    Substance Use Disorder: Care Instructions  Overview     You can improve your life and health by stopping your use of alcohol or drugs. When you don't drink or use drugs, you may feel and sleep better. You may get along better with " your family, friends, and coworkers. There are medicines and programs that can help with substance use disorder.  How can you care for yourself at home?  Here are some ways to help you stay sober and prevent relapse.  If you have been given medicine to help keep you sober or reduce your cravings, be sure to take it exactly as prescribed.  Talk to your doctor about programs that can help you stop using drugs or drinking alcohol.  Do not keep alcohol or drugs in your home.  Plan ahead. Think about what you'll say if other people ask you to drink or use drugs. Try not to spend time with people who drink or use drugs.  Use the time and money spent on drinking or drugs to do something that's important to you.  Preventing a relapse  Have a plan to deal with relapse. Learn to recognize changes in your thinking that lead you to drink or use drugs. Get help before you start to drink or use drugs again.  Try to stay away from situations, friends, or places that may lead you to drink or use drugs.  If you feel the need to drink alcohol or use drugs again, seek help right away. Call a trusted friend or family member. Some people get support from organizations such as Narcotics Anonymous or [employer] or from treatment facilities.  If you relapse, get help as soon as you can. Some people make a plan with another person that outlines what they want that person to do for them if they relapse. The plan usually includes how to handle the relapse and who to notify in case of relapse.  Don't give up. Remember that a relapse doesn't mean that you have failed. Use the experience to learn the triggers that lead you to drink or use drugs. Then quit again. Recovery is a lifelong process. Many people have several relapses before they are able to quit for good.  Follow-up care is a key part of your treatment and safety. Be sure to make and go to all appointments, and call your doctor if you are having problems. It's also a good idea to  "know your test results and keep a list of the medicines you take.  When should you call for help?   Call 911  anytime you think you may need emergency care. For example, call if you or someone else:    Has overdosed or has withdrawal signs. Be sure to tell the emergency workers that you are or someone else is using or trying to quit using drugs. Overdose or withdrawal signs may include:  Losing consciousness.  Seizure.  Seeing or hearing things that aren't there (hallucinations).     Is thinking or talking about suicide or harming others.   Where to get help 24 hours a day, 7 days a week   If you or someone you know talks about suicide, self-harm, a mental health crisis, a substance use crisis, or any other kind of emotional distress, get help right away. You can:    Call the Suicide and Crisis Lifeline at 988.     Call 5-020-093-TALK (1-575.828.8693).     Text HOME to 870310 to access the Crisis Text Line.   Consider saving these numbers in your phone.  Go to [employer] for more information or to chat online.  Call your doctor now or seek immediate medical care if:    You are having withdrawal symptoms. These may include nausea or vomiting, sweating, shakiness, and anxiety.   Watch closely for changes in your health, and be sure to contact your doctor if:    You have a relapse.     You need more help or support to stop.   Where can you learn more?  Go to https://www.Gumiyo.net/patiented  Enter H573 in the search box to learn more about \"Substance Use Disorder: Care Instructions.\"  Current as of: November 15, 2023  Content Version: 14.2 2024 "[employer] [employer].   Care instructions adapted under license by your healthcare professional. If you have questions about a medical condition or this instruction, always ask your healthcare professional. Healthwise, Incorporated disclaims any warranty or liability for your use of this information.       Return in about 6 months (around 5/27/2025), or if symptoms " worsen or fail to improve, for If symptoms persist, Follow up of last visit, video visit.    Michaela Sharma MD  [facility] LENARD Warner is a 84 year old, presenting for the following:  Physical (Non fasting. )        11/27/2024    10:27 AM   Additional Questions   Roomed by Julia LAYTON         Via the Health Maintenance questionnaire, the patient has reported the following services have been completed -Eye Exam: [facility] 2024-08-01, this information has been sent to the abstraction team.    HPI    Health Care Directive  Patient has a Health Care Directive on file  Advance care planning document is on file and is current.      11/20/2024   General Health   How would you rate your overall physical health? (!) FAIR   Feel stress (tense, anxious, or unable to sleep) Only a little      (!) STRESS CONCERN      11/20/2024   Nutrition   Diet: Regular (no restrictions)            11/20/2024   Exercise   Days per week of moderate/strenous exercise 0 days   Average minutes spent exercising at this level Patient declined      (!) EXERCISE CONCERN      11/20/2024   Social Factors   Frequency of gathering with friends or relatives Twice a week   Worry food won't last until get money to buy more No   Food not last or not have enough money for food? No   Do you have housing? (Housing is defined as stable permanent housing and does not include staying ouside in a car, in a tent, in an abandoned building, in an overnight shelter, or couch-surfing.) Yes   Are you worried about losing your housing? No   Lack of transportation? No   Unable to get utilities (heat,electricity)? No            11/20/2024   Fall Risk   Fallen 2 or more times in the past year? No    Trouble with walking or balance? No        Patient-reported          11/20/2024   Activities of Daily Living- Home Safety   Needs help with the following daily activites None of the above   Safety concerns in the home None of the above             2024   Dental   Dentist two times every year? Yes            2024   Hearing Screening   Hearing concerns? (!) I NEED TO ASK PEOPLE TO SPEAK UP OR REPEAT THEMSELVES.    (!) IT'S HARD TO FOLLOW A CONVERSATION IN A NOISY RESTAURANT OR CROWDED ROOM.    (!) TROUBLE UNDESTANDING A SPEAKER IN A PUBLIC MEETING OR [employer] SERVICE.       Multiple values from one day are sorted in reverse-chronological order         2024   Driving Risk Screening   Patient/family members have concerns about driving No            2024   General Alertness/Fatigue Screening   Have you been more tired than usual lately? No            2024   Urinary Incontinence Screening   Bothered by leaking urine in past 6 months No             Today's PHQ-9 Score:       2024    11:44 AM   PHQ-9 SCORE   PHQ-9 Total Score MyChart 0   PHQ-9 Total Score 0        Patient-reported         2024   Substance Use   Alcohol more than 3/day or more than 7/wk Not Applicable   Do you have a current opioid prescription? No   How severe/bad is pain from 1 to 10? 0/10 (No Pain)   Do you use any other substances recreationally? No    (!) PRESCRIPTION DRUGS    (!) OTHER       Multiple values from one day are sorted in reverse-chronological order     Social History     Tobacco Use    Smoking status: Former     Current packs/day: 0.00     Average packs/day: 0.5 packs/day for 51.0 years (25.5 ttl pk-yrs)     Types: Pipe, Cigarettes     Start date: 1958     Quit date: 11/15/2011     Years since quittin.0    Smokeless tobacco: Never    Tobacco comments:     Smoked pipes 10 minutes a day started 22 yrs old , quit 10 yrs back.   Vaping Use    Vaping status: Never Used   Substance Use Topics    Alcohol use: No    Drug use: No         Reviewed and updated as needed this visit by Provider   Tobacco  Allergies  Meds  Problems  Med Hx  Surg Hx  Fam Hx            Past Medical History:   Diagnosis Date    Arthritis     Dx'd  with RA when in the     Basal cell carcinoma     BPH (benign prostatic hyperplasia) 12/16/2013    Cancer (H)     basal cell cancer behind ear    Diabetes mellitus     ED (erectile dysfunction) 01/06/2015    HTN (hypertension)     Hyperlipidemia LDL goal <100     Hypothyroidism     Left atrial enlargement 05/24/2023    Mumps     Obesity      Past Surgical History:   Procedure Laterality Date    BIOPSY      COLONOSCOPY N/A 11/21/2014    Procedure: COMBINED COLONOSCOPY, SINGLE OR MULTIPLE BIOPSY/POLYPECTOMY BY BIOPSY;  Surgeon: Rolanda Bey MD;  Location:  GI    COLONOSCOPY N/A 1/20/2020    Procedure: COLONOSCOPY, WITH POLYPECTOMY AND BIOPSY;  Surgeon: Christina Vieira MD;  Location:  GI    COLONOSCOPY N/A 2/1/2021    Procedure: Colonoscopy, With Polypectomy And Biopsy;  Surgeon: Christina Vieira MD;  Location: [facility]    ESOPHAGOSCOPY, GASTROSCOPY, DUODENOSCOPY (EGD), COMBINED N/A 3/16/2021    Procedure: ESOPHAGOGASTRODUODENOSCOPY, WITH BIOPSY;  Surgeon: Jose Schrader MD;  Location: [facility]    EYE SURGERY      TESTICLE SURGERY      TONSILLECTOMY, ADENOIDECTOMY, COMBINED      VASECTOMY       Lab work is in process  Labs reviewed in EPIC  BP Readings from Last 3 Encounters:   11/27/24 139/71   08/28/24 129/65   05/16/24 122/60    Wt Readings from Last 3 Encounters:   11/27/24 84.1 kg (185 lb 8 oz)   11/25/24 85.3 kg (188 lb)   10/21/24 84.8 kg (187 lb)                  Patient Active Problem List   Diagnosis    Essential hypertension    Hypothyroidism    Type II diabetes mellitus with peripheral circulatory disorder (H)    BPH (benign prostatic hyperplasia)    ED (erectile dysfunction)    Mixed hyperlipidemia    Special screening for malignant neoplasm of prostate    Localized edema    Seasonal allergic rhinitis    Screening for prostate cancer    Residual hemorrhoidal skin tags    Excess skin of eyelid, unspecified laterality    Glaucoma of both eyes, unspecified glaucoma type    Flatulence,  eructation, and gas pain    Weakness of voice    Anemia, unspecified type    Gastroesophageal reflux disease without esophagitis    Dependent edema    Memory changes    Pain due to onychomycosis of nail    Oropharyngeal dysphagia    Rectal hemorrhage    Change in bowel habits    Internal hemorrhoids with Hx of banding    Anemia, iron deficiency    Malabsorption of iron    Mild episode of recurrent major depressive disorder (H)    Left atrial enlargement    Hypertensive heart disease with heart failure (H)    History of basal cell carcinoma    Skin cancer, basal cell    Skin lesion of right leg     Past Surgical History:   Procedure Laterality Date    BIOPSY      COLONOSCOPY N/A 2014    Procedure: COMBINED COLONOSCOPY, SINGLE OR MULTIPLE BIOPSY/POLYPECTOMY BY BIOPSY;  Surgeon: Rolanda Bey MD;  Location:  GI    COLONOSCOPY N/A 2020    Procedure: COLONOSCOPY, WITH POLYPECTOMY AND BIOPSY;  Surgeon: Christina Vieira MD;  Location:  GI    COLONOSCOPY N/A 2021    Procedure: Colonoscopy, With Polypectomy And Biopsy;  Surgeon: Christina Vieira MD;  [facility]    ESOPHAGOSCOPY, GASTROSCOPY, DUODENOSCOPY (EGD), COMBINED N/A 3/16/2021    Procedure: ESOPHAGOGASTRODUODENOSCOPY, WITH BIOPSY;  Surgeon: Jose Shcrader MD;  Location: [facility]    EYE SURGERY      TESTICLE SURGERY      TONSILLECTOMY, ADENOIDECTOMY, COMBINED      VASECTOMY         Social History     Tobacco Use    Smoking status: Former     Current packs/day: 0.00     Average packs/day: 0.5 packs/day for 51.0 years (25.5 ttl pk-yrs)     Types: Pipe, Cigarettes     Start date: 1958     Quit date: 11/15/2011     Years since quittin.0    Smokeless tobacco: Never    Tobacco comments:     Smoked pipes 10 minutes a day started 22 yrs old , quit 10 yrs back.   Substance Use Topics    Alcohol use: No     Family History   Problem Relation Age of Onset    Diabetes Maternal Grandmother     Diabetes Maternal Grandfather     Arthritis  Maternal Grandfather     Arthritis Father     Thyroid Disease Father     Glaucoma Mother     Alcohol/Drug Mother 49        cirrhosis    Diabetes Daughter 44        type 2    Obesity Daughter     Glaucoma Maternal Aunt          Current Outpatient Medications   Medication Sig Dispense Refill    acetaminophen (TYLENOL) 500 MG tablet Take 1 tablet (500 mg) by mouth every 4 hours as needed for mild pain 30 tablet 0    albuterol (PROAIR HFA/PROVENTIL HFA/VENTOLIN HFA) 108 (90 Base) MCG/ACT inhaler Inhale 2 puffs into the lungs every 6 hours as needed for shortness of breath, wheezing or cough 18 g 1    carvedilol (COREG) 3.125 MG tablet TAKE ONE TABLET BY MOUTH TWICE DAILY 180 tablet 0    escitalopram (LEXAPRO) 10 MG tablet Take 1 tablet (10 mg) by mouth daily 90 tablet 0    Ferrous Sulfate 324 (65 Fe) MG TBEC Take 324 mg by mouth 2 times daily      Fexofenadine HCl (ALLEGRA PO) Take 180 mg by mouth daily       finasteride (PROSCAR) 5 MG tablet Take 1 tablet (5 mg) by mouth daily. 90 tablet 3    insulin glargine (LANTUS SOLOSTAR) 100 UNIT/ML pen       latanoprost (XALATAN) 0.005 % ophthalmic solution Place 1 drop into both eyes daily      levothyroxine (SYNTHROID/LEVOTHROID) 88 MCG tablet Take 1 tablet (88 mcg) by mouth daily 90 tablet 0    losartan (COZAAR) 100 MG tablet TAKE 1 TABLET BY MOUTH DAILY 100 tablet 2    lovastatin (MEVACOR) 40 MG tablet TAKE 1 TABLET BY MOUTH DAILY AT  BEDTIME 90 tablet 2    metFORMIN (GLUCOPHAGE XR) 500 MG 24 hr tablet       Multiple Vitamins-Minerals (CENTRUM SILVER) per tablet Take 1 tablet by mouth daily      ONETOUCH ULTRA test strip Pt uses one touch Verio system and strips      tamsulosin (FLOMAX) 0.4 MG capsule Take 2 capsules (0.8 mg) by mouth daily. 180 capsule 3    UNABLE TO FIND MEDICATION NAME: Tumeric 100 mg once daily (Patient not taking: Reported on 11/27/2024)       Allergies   Allergen Reactions    Shellfish Allergy     Shellfish-Derived Products      Recent Labs   Lab Test  11/20/24  1038 09/25/24  1036 04/11/24  1225 12/11/23  1344 06/14/23  1335 05/04/23  1312 11/01/22  1401 09/21/22  0943 09/06/22  1100 09/07/21  1041 05/15/21  0710 05/11/21  0630   A1C  --  5.9* 6.7*  --   --  5.7*   < >  --   --    < >  --   --    LDL 70  --   --   --  57  --   --   --  70   < >  --   --    HDL 46  --   --   --  47  --   --   --  43   < >  --   --    TRIG 74  --   --  149 116  --   --   --  74   < >  --   --    ALT  --   --  22  --  25  --   --  34  --    < >  --  73*   CR  --  0.85 0.93  --  0.85  --    < > 0.80  --    < > 0.80 0.97   GFRESTIMATED  --  87 82  --  88  --    < > 90  --    < > 85 75   GFRESTBLACK  --   --   --   --   --   --   --   --   --   --  >90 86   POTASSIUM  --  4.7 4.6  --  4.8  --    < > 4.7  --    < > 4.0 4.1   TSH  --   --   --   --  0.99  --   --  1.21  --   --   --  1.10    < > = values in this interval not displayed.      Current providers sharing in care for this patient include:  Patient Care Team:  Michaela Sharma MD as PCP - General (Internal Medicine)  Chandrakant Panchal MD as Assigned Cancer Care Provider  Michaela Sharma MD as Assigned PCP  Pino Meier MD as MD (Urology)  Michel Doran MD as Assigned Heart and Vascular Provider  Pino Meier MD as Assigned Surgical Provider    The following health maintenance items are reviewed in Epic and correct as of today:  Health Maintenance   Topic Date Due    HF ACTION PLAN  Never done    DEPRESSION ACTION PLAN  Never done    ZOSTER IMMUNIZATION (1 of 2) Never done    RSV VACCINE (1 - 1-dose 75+ series) Never done    DIABETIC FOOT EXAM  12/23/2022    EYE EXAM  07/13/2024    A1C  03/25/2025    BMP  03/25/2025    ALT  04/11/2025    ANNUAL REVIEW OF HM ORDERS  05/16/2025    PHQ-9  05/27/2025    LIPID  11/20/2025    MICROALBUMIN  11/20/2025    CBC  11/20/2025    MEDICARE ANNUAL WELLNESS VISIT  11/27/2025    FALL RISK ASSESSMENT  11/27/2025    COLORECTAL CANCER SCREENING  05/08/2029    ADVANCE CARE PLANNING   "11/27/2029    DTAP/TDAP/TD IMMUNIZATION (3 - Td or Tdap) 10/24/2030    TSH W/FREE T4 REFLEX  Completed    INFLUENZA VACCINE  Completed    Pneumococcal Vaccine: 65+ Years  Completed    COVID-19 Vaccine  Completed    HPV IMMUNIZATION  Aged Out    MENINGITIS IMMUNIZATION  Aged Out    RSV MONOCLONAL ANTIBODY  Aged Out         Review of Systems  Constitutional, HEENT, cardiovascular, pulmonary, GI, , musculoskeletal, neuro, skin, endocrine and psych systems are negative, except as otherwise noted.     Objective    Exam  /71   Pulse 61   Temp 97.5  F (36.4  C) (Temporal)   Resp 18   Ht 1.66 m (5' 5.35\")   Wt 84.1 kg (185 lb 8 oz)   SpO2 95%   BMI 30.54 kg/m     Estimated body mass index is 30.54 kg/m  as calculated from the following:    Height as of this encounter: 1.66 m (5' 5.35\").    Weight as of this encounter: 84.1 kg (185 lb 8 oz).    Physical Exam  GENERAL: alert and no distress  EYES: Eyes grossly normal to inspection, PERRL and conjunctivae and sclerae normal  HENT: ear canals and TM's normal, nose and mouth without ulcers or lesions  NECK: no adenopathy, no asymmetry, masses, or scars  RESP: lungs clear to auscultation - no rales, rhonchi or wheezes  CV: regular rate and rhythm, normal S1 S2, no S3 or S4, no murmur, click or rub, no peripheral edema  ABDOMEN: soft, nontender, no hepatosplenomegaly, no masses and bowel sounds normal  MS: no gross musculoskeletal defects noted, no edema  SKIN: no suspicious lesions or rashes  NEURO: Normal strength and tone, mentation intact and speech normal  PSYCH: mentation appears normal, affect normal/bright        11/27/2024   Mini Cog   Clock Draw Score 0 Abnormal   3 Item Recall 3 objects recalled   Mini Cog Total Score 3          Answers submitted by the patient for this visit:  Patient Health Questionnaire (Submitted on 11/26/2024)  If you checked off any problems, how difficult have these problems made it for you to do your work, take care of things at " home, or get along with other people?: Not difficult at all  PHQ9 TOTAL SCORE: 0         Signed Electronically by: Michaela Sharma MD

## 2024-11-27 NOTE — PATIENT INSTRUCTIONS
As discussed , please continue current medications no changes    Since all the labs done recently - only diabetes please follow your Endocrinology and update on the latest results of A1C as well as Eye exam Facility you went on your elenahart reply.     =======================  Patient Education   Preventive Care Advice   This is general advice given by our system to help you stay healthy. However, your care team may have specific advice just for you. Please talk to your care team about your preventive care needs.  Nutrition  Eat 5 or more servings of fruits and vegetables each day.  Try wheat bread, brown rice and whole grain pasta (instead of white bread, rice, and pasta).  Get enough calcium and vitamin D. Check the label on foods and aim for 100% of the RDA (recommended daily allowance).  Lifestyle  Exercise at least 150 minutes each week  (30 minutes a day, 5 days a week).  Do muscle strengthening activities 2 days a week. These help control your weight and prevent disease.  No smoking.  Wear sunscreen to prevent skin cancer.  Have a dental exam and cleaning every 6 months.  Yearly exams  See your health care team every year to talk about:  Any changes in your health.  Any medicines your care team has prescribed.  Preventive care, family planning, and ways to prevent chronic diseases.  Shots (vaccines)   HPV shots (up to age 26), if you've never had them before.  Hepatitis B shots (up to age 59), if you've never had them before.  COVID-19 shot: Get this shot when it's due.  Flu shot: Get a flu shot every year.  Tetanus shot: Get a tetanus shot every 10 years.  Pneumococcal, hepatitis A, and RSV shots: Ask your care team if you need these based on your risk.  Shingles shot (for age 50 and up)  General health tests  Diabetes screening:  Starting at age 35, Get screened for diabetes at least every 3 years.  If you are younger than age 35, ask your care team if you should be screened for diabetes.  Cholesterol test:  At age 39, start having a cholesterol test every 5 years, or more often if advised.  Bone density scan (DEXA): At age 50, ask your care team if you should have this scan for osteoporosis (brittle bones).  Hepatitis C: Get tested at least once in your life.  STIs (sexually transmitted infections)  Before age 24: Ask your care team if you should be screened for STIs.  After age 24: Get screened for STIs if you're at risk. You are at risk for STIs (including HIV) if:  You are sexually active with more than one person.  You don't use condoms every time.  You or a partner was diagnosed with a sexually transmitted infection.  If you are at risk for HIV, ask about PrEP medicine to prevent HIV.  Get tested for HIV at least once in your life, whether you are at risk for HIV or not.  Cancer screening tests  Cervical cancer screening: If you have a cervix, begin getting regular cervical cancer screening tests starting at age 21.  Breast cancer scan (mammogram): If you've ever had breasts, begin having regular mammograms starting at age 40. This is a scan to check for breast cancer.  Colon cancer screening: It is important to start screening for colon cancer at age 45.  Have a colonoscopy test every 10 years (or more often if you're at risk) Or, ask your provider about stool tests like a FIT test every year or Cologuard test every 3 years.  To learn more about your testing options, visit:   .  For help making a decision, visit:   https://bit.ly/wa40701.  Prostate cancer screening test: If you have a prostate, ask your care team if a prostate cancer screening test (PSA) at age 55 is right for you.  Lung cancer screening: If you are a current or former smoker ages 50 to 80, ask your care team if ongoing lung cancer screenings are right for you.  For informational purposes only. Not to replace the advice of your health care provider. Copyright   2023 [employer]. All rights reserved. Clinically reviewed by the [facility] Transitions Program. [employer] [id number] - REV 01/24.  Hearing Loss: Care Instructions  Overview     Hearing loss is a sudden or slow decrease in how well you hear. It can range from slight to profound. Permanent hearing loss can occur with aging. It also can happen when you are exposed long-term to loud noise. Examples include listening to loud music, riding motorcycles, or being around other loud machines.  Hearing loss can affect your work and home life. It can make you feel lonely or depressed. You may feel that you have lost your independence. But hearing aids and other devices can help you hear better and feel connected to others.  Follow-up care is a key part of your treatment and safety. Be sure to make and go to all appointments, and call your doctor if you are having problems. It's also a good idea to know your test results and keep a list of the medicines you take.  How can you care for yourself at home?  Avoid loud noises whenever possible. This helps keep your hearing from getting worse.  Always wear hearing protection around loud noises.  Wear a hearing aid as directed.  A professional can help you pick a hearing aid that will work best for you.  You can also get hearing aids over the counter for mild to moderate hearing loss.  Have hearing tests as your doctor suggests. They can show whether your hearing has changed. Your hearing aid may need to be adjusted.  Use other devices as needed. These may include:  Telephone amplifiers and hearing aids that can connect to a television, stereo, radio, or microphone.  Devices that use lights or vibrations. These alert you to the doorbell, a ringing telephone, or a baby monitor.  Television closed-captioning. This shows the words at the bottom of the screen. Most new TVs can do this.  TTY (text telephone). This lets you type messages back and forth on the telephone instead of talking or listening. These devices are also called TDD. When messages  "are typed on the keyboard, they are sent over the phone line to a receiving TTY. The message is shown on a monitor.  Use text messaging, social media, and email if it is hard for you to communicate by telephone.  Try to learn a listening technique called speechreading. It is not lipreading. You pay attention to people's gestures, expressions, posture, and tone of voice. These clues can help you understand what a person is saying. Face the person you are talking to, and have them face you. Make sure the lighting is good. You need to see the other person's face clearly.  Think about counseling if you need help to adjust to your hearing loss.  When should you call for help?  Watch closely for changes in your health, and be sure to contact your doctor if:    You think your hearing is getting worse.     You have new symptoms, such as dizziness or nausea.   Where can you learn more?  Go to [email or web address]/patiented  Enter R798 in the search box to learn more about \"Hearing Loss: Care Instructions.\"  Current as of: September 27, 2023  Content Version: 14.2 2024 [facility].   Care instructions adapted under license by your healthcare professional. If you have questions about a medical condition or this instruction, always ask your healthcare professional. [employer] Inc[employer] disclaims any warranty or liability for your use of this information.    Substance Use Disorder: Care Instructions  Overview     You can improve your life and health by stopping your use of alcohol or drugs. When you don't drink or use drugs, you may feel and sleep better. You may get along better with your family, friends, and coworkers. There are medicines and programs that can help with substance use disorder.  How can you care for yourself at home?  Here are some ways to help you stay sober and prevent relapse.  If you have been given medicine to help keep you sober or reduce your cravings, be sure to take it exactly " as prescribed.  Talk to your doctor about programs that can help you stop using drugs or drinking alcohol.  Do not keep alcohol or drugs in your home.  Plan ahead. Think about what you'll say if other people ask you to drink or use drugs. Try not to spend time with people who drink or use drugs.  Use the time and money spent on drinking or drugs to do something that's important to you.  Preventing a relapse  Have a plan to deal with relapse. Learn to recognize changes in your thinking that lead you to drink or use drugs. Get help before you start to drink or use drugs again.  Try to stay away from situations, friends, or places that may lead you to drink or use drugs.  If you feel the need to drink alcohol or use drugs again, seek help right away. Call a trusted friend or family member. Some people get support from organizations such as Narcotics Anonymous or LIQVID or from treatment facilities.  If you relapse, get help as soon as you can. Some people make a plan with another person that outlines what they want that person to do for them if they relapse. The plan usually includes how to handle the relapse and who to notify in case of relapse.  Don't give up. Remember that a relapse doesn't mean that you have failed. Use the experience to learn the triggers that lead you to drink or use drugs. Then quit again. Recovery is a lifelong process. Many people have several relapses before they are able to quit for good.  Follow-up care is a key part of your treatment and safety. Be sure to make and go to all appointments, and call your doctor if you are having problems. It's also a good idea to know your test results and keep a list of the medicines you take.  When should you call for help?   Call 911  anytime you think you may need emergency care. For example, call if you or someone else:    Has overdosed or has withdrawal signs. Be sure to tell the emergency workers that you are or someone else is using or trying  "to quit using drugs. Overdose or withdrawal signs may include:  Losing consciousness.  Seizure.  Seeing or hearing things that aren't there (hallucinations).     Is thinking or talking about suicide or harming others.   Where to get help 24 hours a day, 7 days a week   If you or someone you know talks about suicide, self-harm, a mental health crisis, a substance use crisis, or any other kind of emotional distress, get help right away. You can:    Call the Suicide and Crisis Lifeline at 838.     Call 7-596-029-EDUS (1-688.385.5329).     Text HOME to 882483 to access the Crisis Text Line.   Consider saving these numbers in your phone.  Go to [employer] for more information or to chat online.  Call your doctor now or seek immediate medical care if:    You are having withdrawal symptoms. These may include nausea or vomiting, sweating, shakiness, and anxiety.   Watch closely for changes in your health, and be sure to contact your doctor if:    You have a relapse.     You need more help or support to stop.   Where can you learn more?  Go to https://www.[employer].net/patiented  Enter H573 in the search box to learn more about \"Substance Use Disorder: Care Instructions.\"  Current as of: November 15, 2023  Content Version: 14.2 2024 [facility] "[employer]   Care instructions adapted under license by your healthcare professional. If you have questions about a medical condition or this instruction, always ask your healthcare professional. Healthwise, Incorporated disclaims any warranty or liability for your use of this information.       "

## 2024-11-27 NOTE — PROGRESS NOTES
Preventive Care Visit  [facility] LENARD Sharma MD, Internal Medicine  Nov 27, 2024  {Provider  Link to SmartSet :664526}    {PROVIDER CHARTING PREFERENCE:445568}    Jefe Warner is a 84 year old, presenting for the following:  Physical (Non fasting. )        11/27/2024    10:27 AM   Additional Questions   Roomed by Julia LAYTON     {ROOMER if patient is in their first year of Medicare a vision screen is required click here to document the Vison screen and then refresh the note to pull in results  :965441}    Via the Health Maintenance questionnaire, the patient has reported the following services have been completed -Eye Exam: [facility] 2024-08-01, this information has been sent to the abstraction team.    HPI  ***  {MA/LPN/RN Pre-Provider Visit Orders- hCG/UA/Strep (Optional):061854}  {SUPERLIST (Optional):723341}  {additonal problems for provider to add (Optional):696892}  Health Care Directive  Patient has a Health Care Directive on file  Advance care planning document is on file and is current.      11/20/2024   General Health   How would you rate your overall physical health? (!) FAIR   Feel stress (tense, anxious, or unable to sleep) Only a little      (!) STRESS CONCERN      11/20/2024   Nutrition   Diet: Regular (no restrictions)            11/20/2024   Exercise   Days per week of moderate/strenous exercise 0 days   Average minutes spent exercising at this level Patient declined      (!) EXERCISE CONCERN      11/20/2024   Social Factors   Frequency of gathering with friends or relatives Twice a week   Worry food won't last until get money to buy more No   Food not last or not have enough money for food? No   Do you have housing? (Housing is defined as stable permanent housing and does not include staying ouside in a car, in a tent, in an abandoned building, in an overnight shelter, or couch-surfing.) Yes   Are you worried about losing your housing? No   Lack of  transportation? No   Unable to get utilities (heat,electricity)? No            2024   Fall Risk   Fallen 2 or more times in the past year? No    Trouble with walking or balance? No        Patient-reported          2024   Activities of Daily Living- Home Safety   Needs help with the following daily activites None of the above   Safety concerns in the home None of the above            2024   Dental   Dentist two times every year? Yes            2024   Hearing Screening   Hearing concerns? (!) I NEED TO ASK PEOPLE TO SPEAK UP OR REPEAT THEMSELVES.    (!) IT'S HARD TO FOLLOW A CONVERSATION IN A NOISY RESTAURANT OR CROWDED ROOM.    (!) TROUBLE UNDESTANDING A SPEAKER IN A PUBLIC MEETING OR [employer] SERVICE.       Multiple values from one day are sorted in reverse-chronological order         2024   Driving Risk Screening   Patient/family members have concerns about driving No            2024   General Alertness/Fatigue Screening   Have you been more tired than usual lately? No            2024   Urinary Incontinence Screening   Bothered by leaking urine in past 6 months No             Today's PHQ-9 Score:       2024    11:44 AM   PHQ-9 SCORE   PHQ-9 Total Score MyChart 0   PHQ-9 Total Score 0        Patient-reported         2024   Substance Use   Alcohol more than 3/day or more than 7/wk Not Applicable   Do you have a current opioid prescription? No   How severe/bad is pain from 1 to 10? 0/10 (No Pain)   Do you use any other substances recreationally? No    (!) PRESCRIPTION DRUGS    (!) OTHER       Multiple values from one day are sorted in reverse-chronological order     Social History     Tobacco Use    Smoking status: Former     Current packs/day: 0.00     Average packs/day: 0.5 packs/day for 51.0 years (25.5 ttl pk-yrs)     Types: Pipe, Cigarettes     Start date: 1958     Quit date: 11/15/2011     Years since quittin.0    Smokeless tobacco: Never    Tobacco  comments:     Smoked pipes 10 minutes a day started 22 yrs old , quit 10 yrs back.   Vaping Use    Vaping status: Never Used   Substance Use Topics    Alcohol use: No    Drug use: No     {Provider  If there are gaps in the social history shown above, please follow the link to update and then refresh the note Link to Social and Substance History :854807}    {Link to Fracture Risk Assessment Tool (Optional):668899}    {Provider  REQUIRED FOR AWV Use the storyboard to review patient history, after sections have been marked as reviewed, refresh note to capture documentation:348451}  {Provider   REQUIRED AWV use this link to review and update sexual activity history  after section has been marked as reviewed, refresh note to capture documentation:352736}  Reviewed and updated as needed this visit by Provider   Tobacco  Allergies  Meds  Problems  Med Hx  Surg Hx  Fam Hx            {HISTORY OPTIONS (Optional):826497}  Current providers sharing in care for this patient include:  Patient Care Team:  Michaela Sharma MD as PCP - General (Internal Medicine)  Chandrakant Panchal MD as Assigned Cancer Care Provider  Michaela Sharma MD as Assigned PCP  Pino Meier MD as MD (Urology)  Michel Doran MD as Assigned Heart and Vascular Provider  Pino Meier MD as Assigned Surgical Provider    The following health maintenance items are reviewed in Epic and correct as of today:  Health Maintenance   Topic Date Due    HF ACTION PLAN  Never done    DEPRESSION ACTION PLAN  Never done    ZOSTER IMMUNIZATION (1 of 2) Never done    RSV VACCINE (1 - 1-dose 75+ series) Never done    DIABETIC FOOT EXAM  12/23/2022    MEDICARE ANNUAL WELLNESS VISIT  06/14/2024    EYE EXAM  07/13/2024    A1C  03/25/2025    BMP  03/25/2025    ALT  04/11/2025    ANNUAL REVIEW OF HM ORDERS  05/16/2025    PHQ-9  05/27/2025    LIPID  11/20/2025    MICROALBUMIN  11/20/2025    CBC  11/20/2025    FALL RISK ASSESSMENT  11/27/2025    COLORECTAL  "CANCER SCREENING  05/08/2029    ADVANCE CARE PLANNING  11/27/2029    DTAP/TDAP/TD IMMUNIZATION (3 - Td or Tdap) 10/24/2030    TSH W/FREE T4 REFLEX  Completed    INFLUENZA VACCINE  Completed    Pneumococcal Vaccine: 65+ Years  Completed    COVID-19 Vaccine  Completed    HPV IMMUNIZATION  Aged Out    MENINGITIS IMMUNIZATION  Aged Out    RSV MONOCLONAL ANTIBODY  Aged Out       {ROS Picklists (Optional):505371}     Objective    Exam  BP (!) 147/71   Pulse 61   Temp 97.5  F (36.4  C) (Temporal)   Resp 18   Ht 1.66 m (5' 5.35\")   Wt 84.1 kg (185 lb 8 oz)   SpO2 95%   BMI 30.54 kg/m     Estimated body mass index is 30.54 kg/m  as calculated from the following:    Height as of this encounter: 1.66 m (5' 5.35\").    Weight as of this encounter: 84.1 kg (185 lb 8 oz).    Physical Exam  {Exam Choices (Optional):708016}         11/27/2024   Mini Cog   Clock Draw Score 0 Abnormal   3 Item Recall 3 objects recalled   Mini Cog Total Score 3        {A Mini-Cog total score of 0-2 suggests the possibility of dementia, score of 3-5 suggests no dementia:587754}         Signed Electronically by: Michaela Sharma MD  {Email feedback regarding this note to [email or web address]   :780850}  Answers submitted by the patient for this visit:  Patient Health Questionnaire (Submitted on 11/26/2024)  If you checked off any problems, how difficult have these problems made it for you to do your work, take care of things at home, or get along with other people?: Not difficult at all  PHQ9 TOTAL SCORE: 0    "

## 2024-12-18 ENCOUNTER — NURSE TRIAGE (OUTPATIENT)
Dept: FAMILY MEDICINE | Facility: CLINIC | Age: 84
End: 2024-12-18

## 2024-12-18 NOTE — TELEPHONE ENCOUNTER
Provider Response to 2nd Level Triage Request    I have reviewed the RN documentation. My recommendation is:  Face To Face Visit. Same Day: to be seen by another provider in same service line >. Any nearby location provider available to get him in ? Can you please look into. I dont have opening today.      I could have squeezed during lunch hour , but unfortunately I am seeing this message now.     Thank you  Michaela Sharma MD on 12/18/2024 at 12:59 PM

## 2024-12-18 NOTE — TELEPHONE ENCOUNTER
Spoke to pt's wife. Gave message. Encouraged pt to complete evisit or go to . They had already submitted evisit -- gave [facility] assistance number as wife said it was not filled out right and that they want it to be to Dr. Sharma not next available.     Minal Boone, RN

## 2024-12-18 NOTE — TELEPHONE ENCOUNTER
I have a virtual spot cancellation at 5.30 PM . Asked MA to call and schedule so we can do the needful today.

## 2024-12-18 NOTE — TELEPHONE ENCOUNTER
Dr. Sharma, please advise.    Pt's spouse is calling to request that you see this pt ASAP today, as you had told her that you'd want to 'work him in' if he is ever sick.     Spouse mentioned that the pt has been experiencing cold/flu like symptoms (runny nose, cough, etc) and on an earlier call was directed to go to  or submit details via eVisit, but spouse declined those options due to risk and urgent need.    Per schedule review, your day is fully booked, however, is there another route you'd like us to take for this pt to be seen today?    Opal Arambula, RN    Reason for Disposition   Patient wants to be seen    Protocols used: Common Cold-A-[place]

## 2024-12-22 DIAGNOSIS — I10 ESSENTIAL HYPERTENSION, BENIGN: ICD-10-CM

## 2024-12-22 DIAGNOSIS — E03.9 ACQUIRED HYPOTHYROIDISM: ICD-10-CM

## 2024-12-23 RX ORDER — LOSARTAN POTASSIUM 100 MG/1
100 TABLET ORAL DAILY
Qty: 100 TABLET | Refills: 1 | Status: SHIPPED | OUTPATIENT
Start: 2024-12-23

## 2024-12-23 RX ORDER — LEVOTHYROXINE SODIUM 88 UG/1
88 TABLET ORAL DAILY
Qty: 90 TABLET | Refills: 0 | Status: SHIPPED | OUTPATIENT
Start: 2024-12-23

## 2024-12-26 ENCOUNTER — NURSE TRIAGE (OUTPATIENT)
Dept: FAMILY MEDICINE | Facility: CLINIC | Age: 84
End: 2024-12-26
Payer: COMMERCIAL

## 2024-12-26 NOTE — TELEPHONE ENCOUNTER
"Pt's spouse calling to report symptom return, and requesting pt be seen for VV. Spouse agreed to Disposition - to be seen TODAY OR TOMORROW - and was able to schedule the following:    Dec 27, 2024 4:30 PM  (Arrive by 4:25 PM)  Provider Visit with Rom Schneider MD  [facility] ([facility] ) [phone]          Opal Arambula RN      1. ONSET: \"When did the nasal discharge start?\"       Recently returned since initial 12/18  visit  2. AMOUNT: \"How much discharge is there?\"       Runny nose  3. COUGH: \"Do you have a cough?\" If Yes, ask: \"Describe the color of your mucus.\" (e.g., clear, white, yellow, green)      Coughing present  4. RESPIRATORY DISTRESS: \"Describe your breathing.\"       WNL, similar to initial infection  5. FEVER: \"Do you have a fever?\" If Yes, ask: \"What is your temperature, how was it measured, and when did it start?\"      --  6. SEVERITY: \"Overall, how bad are you feeling right now?\" (e.g., doesn't interfere with normal activities, staying home from school/work, staying in bed)       Similar to 12/18   7. OTHER SYMPTOMS: \"Do you have any other symptoms?\" (e.g., earache, mouth sores, sore throat, wheezing)      Sore throat    Reason for Disposition   Patient wants to be seen    Additional Information   Negative: Symptoms of COVID-19 (e.g., cough, fever, SOB, or others) and COVID-19 is widespread in the community   Negative: Symptoms of COVID-19 (e.g., cough, fever, SOB, or others) and within 14 days of COVID-19 EXPOSURE   Negative: Symptoms of FLU (e.g., cough, runny nose, SOB, sore throat; with or without fever) and within 14 days of EXPOSURE (close contact) with someone diagnosed with influenza (e.g., flu test positive)   Negative: Difficulty breathing and not from stuffy nose (e.g., not relieved by cleaning out the nose)   Negative: Runny nose is caused by pollen or other allergies   Negative: Cough is main symptom   " Negative: Sore throat is main symptom   Negative: Patient sounds very sick or weak to the triager   Negative: Fever > 103 F (39.4 C)   Negative: Fever > 101 F (38.3 C) and over 60 years of age   Negative: Fever > 100 F (37.8 C) and has diabetes mellitus or a weak immune system (e.g., HIV positive, cancer chemotherapy, organ transplant, splenectomy, chronic steroids)   Negative: Fever > 100 F (37.8 C) and bedridden (e.g., CVA, chronic illness, recovering from surgery)   Negative: Fever present > 3 days (72 hours)   Negative: Fever returns after gone for over 24 hours and symptoms worse or not improved   Negative: Sinus pain (not just congestion) and fever   Negative: Earache   Negative: Sinus congestion (pressure, fullness) present > 10 days   Negative: Nasal discharge present > 10 days   Negative: Using nasal washes and pain medicine > 24 hours and sinus pain (lower forehead, cheekbone, or eye) persists    Protocols used: Common Cold-A-OH

## 2025-01-16 ENCOUNTER — MYC MEDICAL ADVICE (OUTPATIENT)
Dept: FAMILY MEDICINE | Facility: CLINIC | Age: 85
End: 2025-01-16
Payer: COMMERCIAL

## 2025-01-16 NOTE — TELEPHONE ENCOUNTER
Refaxed [facility] move-in orders (3 pgs). Page 2 wasn't rcvd previously.    Faxed move-in order to [facility] and filed Tm 3.     Original documents all filed Tm 3, 1/16/25.     Sindy Bragg on 1/16/2025 at 12:28 PM

## 2025-01-16 NOTE — TELEPHONE ENCOUNTER
Pt's spouse requesting form be addended so no dietary restrictions (page 2).      [facility] move-in orders forms placed in red folder, Dr. Sharma's inbox.     Sindy Bragg on 1/16/2025 at 2:27 PM

## 2025-01-17 NOTE — TELEPHONE ENCOUNTER
Addended forms faxed to [facility] at [phone].     Sent pgs 1, addenced pg 2, & 3 to pt via [employer].     Sindy Bragg on 1/16/2025 at 6:24 PM

## 2025-01-19 DIAGNOSIS — I10 ESSENTIAL HYPERTENSION: ICD-10-CM

## 2025-01-19 DIAGNOSIS — F33.0 MILD EPISODE OF RECURRENT MAJOR DEPRESSIVE DISORDER: ICD-10-CM

## 2025-01-19 DIAGNOSIS — E78.5 HYPERLIPIDEMIA LDL GOAL <70: ICD-10-CM

## 2025-01-20 RX ORDER — CARVEDILOL 3.12 MG/1
TABLET ORAL
Qty: 180 TABLET | Refills: 0 | Status: SHIPPED | OUTPATIENT
Start: 2025-01-20

## 2025-01-20 RX ORDER — LOVASTATIN 40 MG/1
TABLET ORAL
Qty: 170 TABLET | Refills: 0 | Status: SHIPPED | OUTPATIENT
Start: 2025-01-20

## 2025-01-20 RX ORDER — ESCITALOPRAM OXALATE 10 MG/1
10 TABLET ORAL DAILY
Qty: 90 TABLET | Refills: 0 | Status: SHIPPED | OUTPATIENT
Start: 2025-01-20

## 2025-01-20 NOTE — TELEPHONE ENCOUNTER
Prescription approved per [facility].  Shannan Gabriel RN  [facility]

## 2025-03-19 ENCOUNTER — TELEPHONE (OUTPATIENT)
Dept: FAMILY MEDICINE | Facility: CLINIC | Age: 85
End: 2025-03-19
Payer: COMMERCIAL

## 2025-03-19 NOTE — TELEPHONE ENCOUNTER
Pt will be moving to a senior center at the end of the month. Pt wife requesting if medication list can be updated to reflect below:     Updated med list to be faxed   [facility]   Fax:  [phone]     Please also attach to [employer] so wife can download and email to facility as there have been troubles with the fax in the past.       Lantus  8 units AM  Latanoprost eye drops at bedtime  Levothryoxyine 88mcg in AM  Losartan 100mg in AM  Lovastatin 40mg 1 tablet in evening   Metformin 500mg 3 tablets with or after dinner   Multivitamin 1 tablet AM  Tamsolusin 2 capsules AM  Carvediolol 1 AM and 1 PM  Lexapro 10mg evening   Ferrous sulfate 1 AM with orange juice    Allegra 1 AM       OTC: PRN  Imodium multi sx   Tylenol advil or ibuprofen   Afin nasal spray   Prep H ointment   True plus glucose tablets     No longer taking tumeric, acetaminophen, albuterol inhaler     Hermila Kennedy RN

## 2025-03-20 NOTE — TELEPHONE ENCOUNTER
Reviewed the medication list as requested - All are upto the kendrick except these below 2 which you will need to check with Endocrinology    Lantus  8 units AM  Metformin 500mg 3 tablets with or after dinner     ===============================  Latanoprost eye drops at bedtime  Levothryoxyine 88mcg in AM  Losartan 100mg in AM  Lovastatin 40mg 1 tablet in evening   Multivitamin 1 tablet AM  Tamsolusin 2 capsules AM  Carvediolol 1 AM and 1 PM  Lexapro 10mg evening   Ferrous sulfate 1 AM with orange juice    Allegra 1 AM   =========================    OK to share patient current medication list to pt new NEREIDA as requested. And also make it as PDF attachment on [employer] message as requested by pt wife.    Also assist Video visit with pt due in 5/2025 for 6 months follow up as instructed in his last visit .     Thank you  Michaela Sharma MD on 3/19/2025

## 2025-03-21 ENCOUNTER — MEDICAL CORRESPONDENCE (OUTPATIENT)
Dept: HEALTH INFORMATION MANAGEMENT | Facility: CLINIC | Age: 85
End: 2025-03-21
Payer: COMMERCIAL

## 2025-04-12 ENCOUNTER — HEALTH MAINTENANCE LETTER (OUTPATIENT)
Age: 85
End: 2025-04-12

## 2025-04-28 ENCOUNTER — PATIENT OUTREACH (OUTPATIENT)
Dept: CARE COORDINATION | Facility: CLINIC | Age: 85
End: 2025-04-28
Payer: COMMERCIAL

## 2025-05-20 ENCOUNTER — LAB REQUISITION (OUTPATIENT)
Dept: LAB | Facility: CLINIC | Age: 85
End: 2025-05-20
Payer: COMMERCIAL

## 2025-05-20 DIAGNOSIS — I10 ESSENTIAL (PRIMARY) HYPERTENSION: ICD-10-CM

## 2025-05-20 DIAGNOSIS — E03.8 OTHER SPECIFIED HYPOTHYROIDISM: ICD-10-CM

## 2025-05-20 DIAGNOSIS — D64.89 OTHER SPECIFIED ANEMIAS: ICD-10-CM

## 2025-06-02 NOTE — TELEPHONE ENCOUNTER
[facility]   PROGRESS REPORT   CERTIFICATION OF DISABILITY Claim Number:     Social Security Number:    Patient’s Name: Chema Whitehead Date of Injury:    Employer:   Name of [employer] (if applicable):      Patient’s Job Description/Occupation:        Previous Injuries/Diseases/Surgeries Contributing to the Condition:  No       Diagnosis: (M62.830) Back muscle spasm  (primary encounter diagnosis)      Related to the Industrial Injury? Yes     Explain:   DOI:   5/28      F/u on low back pain      States pain overall improved, but still has pain near end of shift.    Pain will rarely  radiate down left leg        Pertinent negatives include no bladder incontinence, bowel incontinence, dysuria, fever, headaches, numbness or weakness       Objective Medical Findings:        Lumbar spine: pt exhibits full AROM.    No TTP or muscle spasm.    Pt exhibits no bony tenderness, no swelling, no edema, no deformity  .          None - Discharged                         Stable  No                 Ratable  No     X   Generally Improved                         Condition Worsened                  Condition Same  May Have Suffered a Permanent Disability No     Treatment Plan:    Back muscle spasm (Primary)    Improved     Cleared for full duty    - Referral to Physical Therapy    F/u one week             No Change in Therapy                  PT/OT Prescribed                      Medication May be Used While Working        Case Management                          PT/OT Discontinued    Consultation    Further Diagnostic Studies:    Prescription(s)               X  Released to FULL DUTY /No Restrictions on (Date):       Certified TOTALLY TEMPORARILY DISABLED (Indicate Dates) From:   To:      Released to RESTRICTED/Modified Duty on (Date): From:   To:    Restrictions Are:         No Sitting    No Standing    No Pulling Other:         No Bending at Waist     No Stooping     No Lifting        No  Pt calling clinic asking why his ED f/u visit is virtual rather than in person. Pt states this is frustrating as he would like PCP to be able to listen to his lungs. Writer explained to pt that Virtual Visit for covid are standard practice to minimize exposure to others. From the VV the provider will be able to further assess if more care is needed. Pt denies any worsening of symptoms. Pt verbalized understanding and agrees to continue with VV scheduled tomorrow.    Tawnya MILIAN RN  [facility] Triage Team     Carrying     No Walking Lifting Restricted to (lbs.):          No Pushing        No Climbing     No Reaching Above Shoulders       Date of Next Visit:  6/9/2025 Date of this Exam: 6/2/2025 Physician/Chiropractic Physician Name: Blair Jimenez M.D. Physician/Chiropractic Physician Signature:  Elan Sun DO MPH     [place]:  [address] [place] [address], [address] [place], [place] [address] - Telephone [phone] [place]:  [address] [place] [address], [address] [place], [place] [address] - Telephone [phone]    [email or web address]  D-39 (Rev. 10/24)

## 2025-06-03 LAB
ERYTHROCYTE [DISTWIDTH] IN BLOOD BY AUTOMATED COUNT: 13.9 % (ref 10–15)
EST. AVERAGE GLUCOSE BLD GHB EST-MCNC: 160 MG/DL
HBA1C MFR BLD: 7.2 %
HCT VFR BLD AUTO: 35.7 % (ref 40–53)
HGB BLD-MCNC: 11.1 G/DL (ref 13.3–17.7)
MCH RBC QN AUTO: 29 PG (ref 26.5–33)
MCHC RBC AUTO-ENTMCNC: 31.1 G/DL (ref 31.5–36.5)
MCV RBC AUTO: 93 FL (ref 78–100)
PLATELET # BLD AUTO: 222 10E3/UL (ref 150–450)
RBC # BLD AUTO: 3.83 10E6/UL (ref 4.4–5.9)
WBC # BLD AUTO: 5.4 10E3/UL (ref 4–11)

## 2025-06-04 LAB
ANION GAP SERPL CALCULATED.3IONS-SCNC: 12 MMOL/L (ref 7–15)
BUN SERPL-MCNC: 20.4 MG/DL (ref 8–23)
CALCIUM SERPL-MCNC: 8.8 MG/DL (ref 8.8–10.4)
CHLORIDE SERPL-SCNC: 102 MMOL/L (ref 98–107)
CREAT SERPL-MCNC: 0.86 MG/DL (ref 0.67–1.17)
EGFRCR SERPLBLD CKD-EPI 2021: 86 ML/MIN/1.73M2
GLUCOSE SERPL-MCNC: 244 MG/DL (ref 70–99)
HCO3 SERPL-SCNC: 24 MMOL/L (ref 22–29)
POTASSIUM SERPL-SCNC: 4.7 MMOL/L (ref 3.4–5.3)
SODIUM SERPL-SCNC: 138 MMOL/L (ref 135–145)
TSH SERPL DL<=0.005 MIU/L-ACNC: 0.63 UIU/ML (ref 0.3–4.2)

## (undated) RX ORDER — FENTANYL CITRATE 50 UG/ML
INJECTION, SOLUTION INTRAMUSCULAR; INTRAVENOUS
Status: DISPENSED
Start: 2021-03-16

## (undated) RX ORDER — FENTANYL CITRATE 50 UG/ML
INJECTION, SOLUTION INTRAMUSCULAR; INTRAVENOUS
Status: DISPENSED
Start: 2021-02-01

## (undated) RX ORDER — FENTANYL CITRATE 50 UG/ML
INJECTION, SOLUTION INTRAMUSCULAR; INTRAVENOUS
Status: DISPENSED
Start: 2020-01-20